# Patient Record
Sex: MALE | Race: OTHER | HISPANIC OR LATINO | URBAN - METROPOLITAN AREA
[De-identification: names, ages, dates, MRNs, and addresses within clinical notes are randomized per-mention and may not be internally consistent; named-entity substitution may affect disease eponyms.]

---

## 2020-08-27 ENCOUNTER — INPATIENT (INPATIENT)
Facility: HOSPITAL | Age: 35
LOS: 7 days | Discharge: HOME | DRG: 896 | End: 2020-09-04
Attending: STUDENT IN AN ORGANIZED HEALTH CARE EDUCATION/TRAINING PROGRAM | Admitting: FAMILY MEDICINE
Payer: MEDICAID

## 2020-08-27 VITALS
RESPIRATION RATE: 26 BRPM | TEMPERATURE: 98 F | WEIGHT: 179.9 LBS | OXYGEN SATURATION: 96 % | SYSTOLIC BLOOD PRESSURE: 139 MMHG | DIASTOLIC BLOOD PRESSURE: 91 MMHG | HEIGHT: 70 IN | HEART RATE: 120 BPM

## 2020-08-27 DIAGNOSIS — Z29.9 ENCOUNTER FOR PROPHYLACTIC MEASURES, UNSPECIFIED: ICD-10-CM

## 2020-08-27 DIAGNOSIS — E87.6 HYPOKALEMIA: ICD-10-CM

## 2020-08-27 DIAGNOSIS — E83.51 HYPOCALCEMIA: ICD-10-CM

## 2020-08-27 DIAGNOSIS — F10.929 ALCOHOL USE, UNSPECIFIED WITH INTOXICATION, UNSPECIFIED: ICD-10-CM

## 2020-08-27 DIAGNOSIS — R41.82 ALTERED MENTAL STATUS, UNSPECIFIED: ICD-10-CM

## 2020-08-27 DIAGNOSIS — E87.1 HYPO-OSMOLALITY AND HYPONATREMIA: ICD-10-CM

## 2020-08-27 DIAGNOSIS — D69.6 THROMBOCYTOPENIA, UNSPECIFIED: ICD-10-CM

## 2020-08-27 DIAGNOSIS — R56.9 UNSPECIFIED CONVULSIONS: ICD-10-CM

## 2020-08-27 DIAGNOSIS — Z98.890 OTHER SPECIFIED POSTPROCEDURAL STATES: Chronic | ICD-10-CM

## 2020-08-27 DIAGNOSIS — G93.41 METABOLIC ENCEPHALOPATHY: ICD-10-CM

## 2020-08-27 DIAGNOSIS — Z90.49 ACQUIRED ABSENCE OF OTHER SPECIFIED PARTS OF DIGESTIVE TRACT: Chronic | ICD-10-CM

## 2020-08-27 DIAGNOSIS — K72.00 ACUTE AND SUBACUTE HEPATIC FAILURE WITHOUT COMA: ICD-10-CM

## 2020-08-27 LAB
ALBUMIN SERPL ELPH-MCNC: 2.9 G/DL — LOW (ref 3.3–5)
ALP SERPL-CCNC: 233 U/L — HIGH (ref 40–120)
ALT FLD-CCNC: 373 U/L — HIGH (ref 12–78)
ANION GAP SERPL CALC-SCNC: 10 MMOL/L — SIGNIFICANT CHANGE UP (ref 5–17)
ANION GAP SERPL CALC-SCNC: 11 MMOL/L — SIGNIFICANT CHANGE UP (ref 5–17)
ANION GAP SERPL CALC-SCNC: 14 MMOL/L — SIGNIFICANT CHANGE UP (ref 5–17)
ANION GAP SERPL CALC-SCNC: 19 MMOL/L — HIGH (ref 5–17)
APAP SERPL-MCNC: <2 UG/ML — LOW (ref 10–30)
APTT BLD: 37.2 SEC — HIGH (ref 27.5–35.5)
AST SERPL-CCNC: 460 U/L — HIGH (ref 15–37)
BASOPHILS # BLD AUTO: 0 K/UL — SIGNIFICANT CHANGE UP (ref 0–0.2)
BASOPHILS NFR BLD AUTO: 0 % — SIGNIFICANT CHANGE UP (ref 0–2)
BILIRUB SERPL-MCNC: 12.8 MG/DL — HIGH (ref 0.2–1.2)
BUN SERPL-MCNC: 3 MG/DL — LOW (ref 7–23)
BUN SERPL-MCNC: 3 MG/DL — LOW (ref 7–23)
BUN SERPL-MCNC: 4 MG/DL — LOW (ref 7–23)
BUN SERPL-MCNC: 4 MG/DL — LOW (ref 7–23)
CALCIUM SERPL-MCNC: 6.5 MG/DL — CRITICAL LOW (ref 8.5–10.1)
CALCIUM SERPL-MCNC: 7.1 MG/DL — LOW (ref 8.5–10.1)
CALCIUM SERPL-MCNC: 7.3 MG/DL — LOW (ref 8.5–10.1)
CALCIUM SERPL-MCNC: 7.6 MG/DL — LOW (ref 8.5–10.1)
CHLORIDE SERPL-SCNC: 72 MMOL/L — LOW (ref 96–108)
CHLORIDE SERPL-SCNC: 83 MMOL/L — LOW (ref 96–108)
CHLORIDE SERPL-SCNC: 88 MMOL/L — LOW (ref 96–108)
CHLORIDE SERPL-SCNC: 92 MMOL/L — LOW (ref 96–108)
CO2 SERPL-SCNC: 20 MMOL/L — LOW (ref 22–31)
CO2 SERPL-SCNC: 23 MMOL/L — SIGNIFICANT CHANGE UP (ref 22–31)
CO2 SERPL-SCNC: 26 MMOL/L — SIGNIFICANT CHANGE UP (ref 22–31)
CO2 SERPL-SCNC: 27 MMOL/L — SIGNIFICANT CHANGE UP (ref 22–31)
CREAT SERPL-MCNC: 0.54 MG/DL — SIGNIFICANT CHANGE UP (ref 0.5–1.3)
CREAT SERPL-MCNC: 0.64 MG/DL — SIGNIFICANT CHANGE UP (ref 0.5–1.3)
CREAT SERPL-MCNC: 0.68 MG/DL — SIGNIFICANT CHANGE UP (ref 0.5–1.3)
CREAT SERPL-MCNC: 0.78 MG/DL — SIGNIFICANT CHANGE UP (ref 0.5–1.3)
EOSINOPHIL # BLD AUTO: 0 K/UL — SIGNIFICANT CHANGE UP (ref 0–0.5)
EOSINOPHIL NFR BLD AUTO: 0 % — SIGNIFICANT CHANGE UP (ref 0–6)
ETHANOL SERPL-MCNC: 314 MG/DL — HIGH (ref 0–10)
GLUCOSE SERPL-MCNC: 119 MG/DL — HIGH (ref 70–99)
GLUCOSE SERPL-MCNC: 132 MG/DL — HIGH (ref 70–99)
GLUCOSE SERPL-MCNC: 90 MG/DL — SIGNIFICANT CHANGE UP (ref 70–99)
GLUCOSE SERPL-MCNC: 92 MG/DL — SIGNIFICANT CHANGE UP (ref 70–99)
HCT VFR BLD CALC: 38 % — LOW (ref 39–50)
HGB BLD-MCNC: 14 G/DL — SIGNIFICANT CHANGE UP (ref 13–17)
IMM GRANULOCYTES NFR BLD AUTO: 0 % — SIGNIFICANT CHANGE UP (ref 0–1.5)
INR BLD: 1.43 RATIO — HIGH (ref 0.88–1.16)
LIDOCAIN IGE QN: 156 U/L — SIGNIFICANT CHANGE UP (ref 73–393)
LYMPHOCYTES # BLD AUTO: 1.3 K/UL — SIGNIFICANT CHANGE UP (ref 1–3.3)
LYMPHOCYTES # BLD AUTO: 28.3 % — SIGNIFICANT CHANGE UP (ref 13–44)
MAGNESIUM SERPL-MCNC: 1.8 MG/DL — SIGNIFICANT CHANGE UP (ref 1.6–2.6)
MCHC RBC-ENTMCNC: 27.7 PG — SIGNIFICANT CHANGE UP (ref 27–34)
MCHC RBC-ENTMCNC: 36.8 GM/DL — HIGH (ref 32–36)
MCV RBC AUTO: 75.2 FL — LOW (ref 80–100)
MONOCYTES # BLD AUTO: 0.35 K/UL — SIGNIFICANT CHANGE UP (ref 0–0.9)
MONOCYTES NFR BLD AUTO: 7.6 % — SIGNIFICANT CHANGE UP (ref 2–14)
NEUTROPHILS # BLD AUTO: 2.95 K/UL — SIGNIFICANT CHANGE UP (ref 1.8–7.4)
NEUTROPHILS NFR BLD AUTO: 64.1 % — SIGNIFICANT CHANGE UP (ref 43–77)
NRBC # BLD: 0 /100 WBCS — SIGNIFICANT CHANGE UP (ref 0–0)
PHOSPHATE SERPL-MCNC: 3.4 MG/DL — SIGNIFICANT CHANGE UP (ref 2.5–4.5)
PLATELET # BLD AUTO: 110 K/UL — LOW (ref 150–400)
POTASSIUM SERPL-MCNC: 2.4 MMOL/L — CRITICAL LOW (ref 3.5–5.3)
POTASSIUM SERPL-MCNC: 2.9 MMOL/L — CRITICAL LOW (ref 3.5–5.3)
POTASSIUM SERPL-MCNC: 3 MMOL/L — LOW (ref 3.5–5.3)
POTASSIUM SERPL-MCNC: 3 MMOL/L — LOW (ref 3.5–5.3)
POTASSIUM SERPL-SCNC: 2.4 MMOL/L — CRITICAL LOW (ref 3.5–5.3)
POTASSIUM SERPL-SCNC: 2.9 MMOL/L — CRITICAL LOW (ref 3.5–5.3)
POTASSIUM SERPL-SCNC: 3 MMOL/L — LOW (ref 3.5–5.3)
POTASSIUM SERPL-SCNC: 3 MMOL/L — LOW (ref 3.5–5.3)
PROT SERPL-MCNC: 6.4 G/DL — SIGNIFICANT CHANGE UP (ref 6–8.3)
PROTHROM AB SERPL-ACNC: 16.5 SEC — HIGH (ref 10.6–13.6)
RBC # BLD: 5.05 M/UL — SIGNIFICANT CHANGE UP (ref 4.2–5.8)
RBC # FLD: 16.5 % — HIGH (ref 10.3–14.5)
SALICYLATES SERPL-MCNC: <1.7 MG/DL — LOW (ref 2.8–20)
SARS-COV-2 IGG SERPL QL IA: NEGATIVE — SIGNIFICANT CHANGE UP
SARS-COV-2 IGM SERPL IA-ACNC: 5.15 AU/ML — SIGNIFICANT CHANGE UP
SARS-COV-2 RNA SPEC QL NAA+PROBE: SIGNIFICANT CHANGE UP
SODIUM SERPL-SCNC: 111 MMOL/L — CRITICAL LOW (ref 135–145)
SODIUM SERPL-SCNC: 120 MMOL/L — CRITICAL LOW (ref 135–145)
SODIUM SERPL-SCNC: 125 MMOL/L — LOW (ref 135–145)
SODIUM SERPL-SCNC: 129 MMOL/L — LOW (ref 135–145)
WBC # BLD: 4.6 K/UL — SIGNIFICANT CHANGE UP (ref 3.8–10.5)
WBC # FLD AUTO: 4.6 K/UL — SIGNIFICANT CHANGE UP (ref 3.8–10.5)

## 2020-08-27 PROCEDURE — 99291 CRITICAL CARE FIRST HOUR: CPT

## 2020-08-27 PROCEDURE — 71045 X-RAY EXAM CHEST 1 VIEW: CPT | Mod: 26

## 2020-08-27 PROCEDURE — 99223 1ST HOSP IP/OBS HIGH 75: CPT | Mod: AI

## 2020-08-27 PROCEDURE — 93010 ELECTROCARDIOGRAM REPORT: CPT

## 2020-08-27 PROCEDURE — 99285 EMERGENCY DEPT VISIT HI MDM: CPT

## 2020-08-27 PROCEDURE — 70450 CT HEAD/BRAIN W/O DYE: CPT | Mod: 26

## 2020-08-27 PROCEDURE — 76700 US EXAM ABDOM COMPLETE: CPT | Mod: 26

## 2020-08-27 RX ORDER — THIAMINE MONONITRATE (VIT B1) 100 MG
100 TABLET ORAL DAILY
Refills: 0 | Status: DISCONTINUED | OUTPATIENT
Start: 2020-08-27 | End: 2020-08-30

## 2020-08-27 RX ORDER — DESMOPRESSIN ACETATE 0.1 MG/1
2 TABLET ORAL
Refills: 0 | Status: DISCONTINUED | OUTPATIENT
Start: 2020-08-28 | End: 2020-08-28

## 2020-08-27 RX ORDER — SODIUM CHLORIDE 9 MG/ML
1000 INJECTION, SOLUTION INTRAVENOUS
Refills: 0 | Status: DISCONTINUED | OUTPATIENT
Start: 2020-08-27 | End: 2020-08-28

## 2020-08-27 RX ORDER — THIAMINE MONONITRATE (VIT B1) 100 MG
100 TABLET ORAL DAILY
Refills: 0 | Status: DISCONTINUED | OUTPATIENT
Start: 2020-08-27 | End: 2020-08-27

## 2020-08-27 RX ORDER — DEXMEDETOMIDINE HYDROCHLORIDE IN 0.9% SODIUM CHLORIDE 4 UG/ML
0.5 INJECTION INTRAVENOUS
Qty: 400 | Refills: 0 | Status: DISCONTINUED | OUTPATIENT
Start: 2020-08-27 | End: 2020-08-27

## 2020-08-27 RX ORDER — SODIUM CHLORIDE 9 MG/ML
500 INJECTION, SOLUTION INTRAVENOUS ONCE
Refills: 0 | Status: COMPLETED | OUTPATIENT
Start: 2020-08-27 | End: 2020-08-27

## 2020-08-27 RX ORDER — DEXMEDETOMIDINE HYDROCHLORIDE IN 0.9% SODIUM CHLORIDE 4 UG/ML
0.7 INJECTION INTRAVENOUS
Qty: 200 | Refills: 0 | Status: DISCONTINUED | OUTPATIENT
Start: 2020-08-27 | End: 2020-08-27

## 2020-08-27 RX ORDER — POTASSIUM CHLORIDE 20 MEQ
10 PACKET (EA) ORAL
Refills: 0 | Status: COMPLETED | OUTPATIENT
Start: 2020-08-27 | End: 2020-08-27

## 2020-08-27 RX ORDER — DESMOPRESSIN ACETATE 0.1 MG/1
2 TABLET ORAL ONCE
Refills: 0 | Status: DISCONTINUED | OUTPATIENT
Start: 2020-08-27 | End: 2020-08-27

## 2020-08-27 RX ORDER — SODIUM CHLORIDE 9 MG/ML
1000 INJECTION INTRAMUSCULAR; INTRAVENOUS; SUBCUTANEOUS
Refills: 0 | Status: DISCONTINUED | OUTPATIENT
Start: 2020-08-27 | End: 2020-08-27

## 2020-08-27 RX ORDER — DESMOPRESSIN ACETATE 0.1 MG/1
2 TABLET ORAL ONCE
Refills: 0 | Status: COMPLETED | OUTPATIENT
Start: 2020-08-27 | End: 2020-08-27

## 2020-08-27 RX ORDER — SODIUM CHLORIDE 9 MG/ML
1000 INJECTION INTRAMUSCULAR; INTRAVENOUS; SUBCUTANEOUS ONCE
Refills: 0 | Status: COMPLETED | OUTPATIENT
Start: 2020-08-27 | End: 2020-08-27

## 2020-08-27 RX ORDER — PANTOPRAZOLE SODIUM 20 MG/1
40 TABLET, DELAYED RELEASE ORAL DAILY
Refills: 0 | Status: DISCONTINUED | OUTPATIENT
Start: 2020-08-28 | End: 2020-08-31

## 2020-08-27 RX ORDER — HEPARIN SODIUM 5000 [USP'U]/ML
5000 INJECTION INTRAVENOUS; SUBCUTANEOUS EVERY 8 HOURS
Refills: 0 | Status: DISCONTINUED | OUTPATIENT
Start: 2020-08-27 | End: 2020-08-28

## 2020-08-27 RX ORDER — POTASSIUM CHLORIDE 20 MEQ
10 PACKET (EA) ORAL
Refills: 0 | Status: DISCONTINUED | OUTPATIENT
Start: 2020-08-27 | End: 2020-08-27

## 2020-08-27 RX ORDER — SODIUM CHLORIDE 9 MG/ML
1000 INJECTION, SOLUTION INTRAVENOUS
Refills: 0 | Status: COMPLETED | OUTPATIENT
Start: 2020-08-27 | End: 2020-08-27

## 2020-08-27 RX ORDER — DESMOPRESSIN ACETATE 0.1 MG/1
0.1 TABLET ORAL ONCE
Refills: 0 | Status: DISCONTINUED | OUTPATIENT
Start: 2020-08-27 | End: 2020-08-27

## 2020-08-27 RX ORDER — CALCIUM GLUCONATE 100 MG/ML
2 VIAL (ML) INTRAVENOUS ONCE
Refills: 0 | Status: COMPLETED | OUTPATIENT
Start: 2020-08-27 | End: 2020-08-27

## 2020-08-27 RX ORDER — PANTOPRAZOLE SODIUM 20 MG/1
40 TABLET, DELAYED RELEASE ORAL
Refills: 0 | Status: DISCONTINUED | OUTPATIENT
Start: 2020-08-27 | End: 2020-08-27

## 2020-08-27 RX ORDER — SODIUM CHLORIDE 9 MG/ML
1000 INJECTION, SOLUTION INTRAVENOUS
Refills: 0 | Status: DISCONTINUED | OUTPATIENT
Start: 2020-08-27 | End: 2020-08-27

## 2020-08-27 RX ORDER — DEXMEDETOMIDINE HYDROCHLORIDE IN 0.9% SODIUM CHLORIDE 4 UG/ML
0.7 INJECTION INTRAVENOUS
Qty: 200 | Refills: 0 | Status: DISCONTINUED | OUTPATIENT
Start: 2020-08-27 | End: 2020-08-28

## 2020-08-27 RX ORDER — FOLIC ACID 0.8 MG
1 TABLET ORAL DAILY
Refills: 0 | Status: DISCONTINUED | OUTPATIENT
Start: 2020-08-27 | End: 2020-08-30

## 2020-08-27 RX ORDER — POTASSIUM CHLORIDE 20 MEQ
10 PACKET (EA) ORAL ONCE
Refills: 0 | Status: COMPLETED | OUTPATIENT
Start: 2020-08-27 | End: 2020-08-27

## 2020-08-27 RX ORDER — ONDANSETRON 8 MG/1
4 TABLET, FILM COATED ORAL ONCE
Refills: 0 | Status: COMPLETED | OUTPATIENT
Start: 2020-08-27 | End: 2020-08-27

## 2020-08-27 RX ORDER — URSODIOL 250 MG/1
300 TABLET, FILM COATED ORAL THREE TIMES A DAY
Refills: 0 | Status: DISCONTINUED | OUTPATIENT
Start: 2020-08-27 | End: 2020-09-04

## 2020-08-27 RX ORDER — DESMOPRESSIN ACETATE 0.1 MG/1
1 TABLET ORAL ONCE
Refills: 0 | Status: COMPLETED | OUTPATIENT
Start: 2020-08-27 | End: 2020-08-27

## 2020-08-27 RX ORDER — DEXMEDETOMIDINE HYDROCHLORIDE IN 0.9% SODIUM CHLORIDE 4 UG/ML
0.05 INJECTION INTRAVENOUS
Qty: 200 | Refills: 0 | Status: DISCONTINUED | OUTPATIENT
Start: 2020-08-27 | End: 2020-08-27

## 2020-08-27 RX ADMIN — Medication 100 MILLIEQUIVALENT(S): at 12:53

## 2020-08-27 RX ADMIN — SODIUM CHLORIDE 1000 MILLILITER(S): 9 INJECTION INTRAMUSCULAR; INTRAVENOUS; SUBCUTANEOUS at 08:00

## 2020-08-27 RX ADMIN — Medication 100 MILLIEQUIVALENT(S): at 13:52

## 2020-08-27 RX ADMIN — Medication 100 MILLIEQUIVALENT(S): at 21:44

## 2020-08-27 RX ADMIN — Medication 100 MILLIEQUIVALENT(S): at 10:47

## 2020-08-27 RX ADMIN — Medication 2 MILLIGRAM(S): at 07:45

## 2020-08-27 RX ADMIN — SODIUM CHLORIDE 125 MILLILITER(S): 9 INJECTION INTRAMUSCULAR; INTRAVENOUS; SUBCUTANEOUS at 10:03

## 2020-08-27 RX ADMIN — Medication 2 MILLIGRAM(S): at 08:20

## 2020-08-27 RX ADMIN — Medication 100 MILLIGRAM(S): at 12:21

## 2020-08-27 RX ADMIN — Medication 200 GRAM(S): at 13:35

## 2020-08-27 RX ADMIN — DEXMEDETOMIDINE HYDROCHLORIDE IN 0.9% SODIUM CHLORIDE 18.2 MICROGRAM(S)/KG/HR: 4 INJECTION INTRAVENOUS at 22:19

## 2020-08-27 RX ADMIN — SODIUM CHLORIDE 1000 MILLILITER(S): 9 INJECTION, SOLUTION INTRAVENOUS at 14:40

## 2020-08-27 RX ADMIN — DESMOPRESSIN ACETATE 1 MICROGRAM(S): 0.1 TABLET ORAL at 13:30

## 2020-08-27 RX ADMIN — SODIUM CHLORIDE 30 MILLILITER(S): 9 INJECTION, SOLUTION INTRAVENOUS at 12:52

## 2020-08-27 RX ADMIN — URSODIOL 300 MILLIGRAM(S): 250 TABLET, FILM COATED ORAL at 21:32

## 2020-08-27 RX ADMIN — Medication 100 MILLIEQUIVALENT(S): at 14:30

## 2020-08-27 RX ADMIN — Medication 100 MILLIEQUIVALENT(S): at 11:53

## 2020-08-27 RX ADMIN — Medication 2 MILLIGRAM(S): at 11:54

## 2020-08-27 RX ADMIN — HEPARIN SODIUM 5000 UNIT(S): 5000 INJECTION INTRAVENOUS; SUBCUTANEOUS at 21:32

## 2020-08-27 RX ADMIN — DESMOPRESSIN ACETATE 2 MICROGRAM(S): 0.1 TABLET ORAL at 21:32

## 2020-08-27 RX ADMIN — Medication 100 MILLIEQUIVALENT(S): at 09:02

## 2020-08-27 RX ADMIN — SODIUM CHLORIDE 1000 MILLILITER(S): 9 INJECTION, SOLUTION INTRAVENOUS at 21:58

## 2020-08-27 RX ADMIN — DEXMEDETOMIDINE HYDROCHLORIDE IN 0.9% SODIUM CHLORIDE 18.2 MICROGRAM(S)/KG/HR: 4 INJECTION INTRAVENOUS at 21:34

## 2020-08-27 RX ADMIN — ONDANSETRON 4 MILLIGRAM(S): 8 TABLET, FILM COATED ORAL at 23:14

## 2020-08-27 RX ADMIN — Medication 1 MILLIGRAM(S): at 17:59

## 2020-08-27 RX ADMIN — HEPARIN SODIUM 5000 UNIT(S): 5000 INJECTION INTRAVENOUS; SUBCUTANEOUS at 11:54

## 2020-08-27 RX ADMIN — SODIUM CHLORIDE 1000 MILLILITER(S): 9 INJECTION INTRAMUSCULAR; INTRAVENOUS; SUBCUTANEOUS at 07:00

## 2020-08-27 RX ADMIN — SODIUM CHLORIDE 250 MILLILITER(S): 9 INJECTION, SOLUTION INTRAVENOUS at 21:35

## 2020-08-27 RX ADMIN — SODIUM CHLORIDE 50 MILLILITER(S): 9 INJECTION, SOLUTION INTRAVENOUS at 16:00

## 2020-08-27 NOTE — ED PROVIDER NOTE - PHYSICAL EXAMINATION
Gen: intoxicated  Head/eyes: NC/AT, pupils sluggish, +scleral icterus  ENT: airway patent  Neck: supple, no tenderness/meningismus/JVD, Trachea midline  Pulm/lung: Bilateral clear BS, normal resp effort, no wheeze/stridor/retractions  CV/heart: RRR, no M/R/G, +2 dist pulses (radial, pedal DP/PT, popliteal)  GI/Abd: soft, NT/ND, +BS, no guarding/rebound tenderness  Musculoskeletal: no edema/erythema/cyanosis, FROM in all extremities, no C/T/L spine ttp  Skin: no rash, no vesicles, no petechaie, no ecchymosis, no swelling  Neuro: intoxicated

## 2020-08-27 NOTE — PROGRESS NOTE ADULT - SUBJECTIVE AND OBJECTIVE BOX
HISTORY  35y Male presenting to [place] ED with Severe hyponatremia, altered mental status/encephalopathy and possible seizure overnight. He is a 12 beer/day drinker. Received 2 mg Ativan, 1L fluid bolus (infusing on arrival). ETOH > 300. Amharic speaking male, not following commands to bedside nursing interpretation. Noncompliant to exam.     SUBJECTIVE/ROS:  [ ] A ten-point review of systems was otherwise negative except as noted.  [x ] Due to altered mental status/intubation, subjective information were not able to be obtained from the patient. History was obtained, to the extent possible, from review of the chart and collateral sources of information.    PAST MEDICAL & SURGICAL HISTORY:  ETOH    Allergies    No Known Allergies    Intolerances        NEURO  RASS: +2  Exam:  agitated, not following commands. He is not oriented    Meds: LORazepam   Injectable 2 milliGRAM(s) IV Push once    [x] Adequacy of sedation and pain control has been assessed and adjusted      RESPIRATORY  RR: 26 (08-27-20 @ 06:47) (26 - 26)  SpO2: 96% (08-27-20 @ 06:47) (96% - 96%)  Wt(kg): --  Exam: unlabored, clear to auscultation bilaterally  Mechanical Ventilation: NA    [ NA] Extubation Readiness Assessed  Meds:       CARDIOVASCULAR  HR: 120 (08-27-20 @ 06:47) (120 - 120)  BP: 139/91 (08-27-20 @ 06:47) (139/91 - 139/91)  BP(mean): --  ABP: --  ABP(mean): --  Wt(kg): --  CVP(cm H2O): --      Exam:  Cardiac Rhythm: Sinus tachycardia 100s, S1S2  Perfusion     [x ]Adequate   [ ]Inadequate  Mentation   [ ]Normal       [x ]Reduced  Extremities  [ x]Warm         [ ]Cool  Volume Status [ ]Hypervolemic [ ]Euvolemic [ x]Hypovolemic  Meds:       GI/NUTRITION  Exam: soft nt nd, obese  Diet: NPO  Meds:     GENITOURINARY  I&O's Detail    Weight (kg): 81.6 (08-27 @ 06:55)  08-27    111<LL>  |  72<L>  |  3<L>  ----------------------------<  132<H>  2.9<LL>   |  20<L>  |  0.78    Ca    7.1<L>      27 Aug 2020 07:01    TPro  6.4  /  Alb  2.9<L>  /  TBili  12.8<H>  /  DBili  x   /  AST  460<H>  /  ALT  373<H>  /  AlkPhos  233<H>  08-27    [ ] Heredia catheter, indication: N/A  Meds: sodium chloride 0.9% Bolus 1000 milliLiter(s) IV Bolus once        HEMATOLOGIC  Meds:   [x] VTE Prophylaxis                        14.0   4.60  )-----------( 110      ( 27 Aug 2020 07:01 )             38.0       Transfusion     [ ] PRBC   [ ] Platelets   [ ] FFP   [ ] Cryoprecipitate      INFECTIOUS DISEASES  T(C): 36.7 (08-27-20 @ 06:47), Max: 36.7 (08-27-20 @ 06:47)  Wt(kg): --  WBC Count: 4.60 K/uL (08-27 @ 07:01)    Recent Cultures:    Meds:       ENDOCRINE  Capillary Blood Glucose  None    Meds:       ACCESS DEVICES:  [x ] Peripheral IV  [ ] Central Venous Line	[ ] R	[ ] L	[ ] IJ	[ ] Fem	[ ] SC	Placed:   [ ] Arterial Line		[ ] R	[ ] L	[ ] Fem	[ ] Rad	[ ] Ax	Placed:   [ ] PICC:					[ ] Mediport  [ ] Urinary Catheter, Date Placed:   [ ] Necessity of urinary, arterial, and venous catheters discussed    OTHER MEDICATIONS:      CODE STATUS: Full    IMAGING: Pending CT Head/CXR

## 2020-08-27 NOTE — PROGRESS NOTE ADULT - SUBJECTIVE AND OBJECTIVE BOX
Significant recent/past 24 hr events:  - DDAVP x1 with D5W infusion for rapid rise in serum sodium  - Precedex infusion restarted 2/2 increased agitation.    Subjective:    Review of Systems         Unable to obtain due to altered mental status in setting of severe hyponatremia.          Patient is a 35y old  Male who presents with a chief complaint of Seizure (27 Aug 2020 14:27)    HPI:  History obtained from patients partner, Leydig  36yo male with PMHx of ETOH abuse brought into ED this morning due to concern for "convulsion". States that about 6 am she noticed he was shaking a lot, and had blood in his mouth because he had bitten his tongue. Episode lasted about 2 mins. They were both lying in bed, and she noticed he was shaking next to her and as she turned around, she saw him having the shaking episode. He was unresponsive, an she called other household members who helped carry him out of the room and brought him in by car. His skin was very cold during this process. She states patient has no previous seizure history. The night prior he seemed weak/tired, had complained of some abdominal pain, and she noticed his skin was yellow. Last night, he had bowel and bladder incontinence.   She states he has been drinking ETOH excessively for about 1 month. Drinks beer (Corona) daily; up to 12 beers/day. Last drink was at 3am 08/27/2020. He had taken some medications last night for sleep; tylenol, peptobismol.   Prior to this, he had stopped drinking for about 2 years. According to partner, patient has no hx of drug use or cigarette smoking. (27 Aug 2020 09:08)    PAST MEDICAL & SURGICAL HISTORY:  No pertinent past medical history  S/P appendectomy  H/O hernia repair    FAMILY HISTORY:  Family history of renal disease: Uncle      Vitals   ICU Vital Signs Last 24 Hrs  T(C): 37.8 (27 Aug 2020 20:32), Max: 37.8 (27 Aug 2020 20:32)  T(F): 100.1 (27 Aug 2020 20:32), Max: 100.1 (27 Aug 2020 20:32)  HR: 91 (27 Aug 2020 22:00) (81 - 126)  BP: 118/71 (27 Aug 2020 22:00) (91/51 - 140/84)  BP(mean): 88 (27 Aug 2020 22:00) (64 - 105)  ABP: --  ABP(mean): --  RR: 25 (27 Aug 2020 22:00) (17 - 29)  SpO2: 95% (27 Aug 2020 22:00) (93% - 100%)      Physical Exam:   Constitutional: NAD, well-groomed, well-developed  HEENT: PERRLA, EOMI, no drainage or redness  Neck: supple,  No JVD, Trachea midline  Back: Normal spine flexure, No CVA tenderness, No deformity or limitation of movement  Respiratory: Breath Sounds equal & clear bilaterally to auscultation, no accessory muscle use noted  Cardiovascular: Regular rate, regular rhythm, normal S1, S2; no murmurs or rub  Gastrointestinal: Soft, non-tender, non distended, no hepatosplenomegaly, normal bowel sounds  Extremities: JOHNSON x 4, no peripheral edema, no cyanosis, no clubbing   Vascular: Equal and normal pulses: 2+ peripheral pulses throughout  Neurological: A+O x 3; speech clear and intact; no sensory, motor  deficits, normal reflexes  Psychiatric: calm, normal mood, normal affect  Musculoskeletal: No joint swelling or deformity; no limitation of movement  Skin: warm, dry, well perfused, no rashes    VENT SETTINGS         I&O's Detail    27 Aug 2020 07:01  -  27 Aug 2020 22:35  --------------------------------------------------------  IN:    dexmedetomidine Infusion: 26 mL    dextrose 5%.: 420 mL    dextrose 5%.: 500 mL    Lactated Ringers IV Bolus: 500 mL    sodium chloride 0.9%: 375 mL    Solution: 100 mL    Solution: 600 mL    Solution: 1000 mL  Total IN: 3521 mL    OUT:    Indwelling Catheter - Urethral: 6447 mL  Total OUT: 6447 mL    Total NET: -2926 mL          LABS                        14.0   4.60  )-----------( 110      ( 27 Aug 2020 07:01 )             38.0     08-27    129<L>  |  92<L>  |  4<L>  ----------------------------<  90  3.0<L>   |  23  |  0.68    Ca    7.3<L>      27 Aug 2020 20:47  Phos  3.4     08-27  Mg     1.8     08-27    TPro  5.7<L>  /  Alb  2.6<L>  /  TBili  11.5<H>  /  DBili  8.90<H>  /  AST  442<H>  /  ALT  328<H>  /  AlkPhos  197<H>  08-27    LIVER FUNCTIONS - ( 27 Aug 2020 10:05 )  Alb: 2.6 g/dL / Pro: 5.7 g/dL / ALK PHOS: 197 U/L / ALT: 328 U/L / AST: 442 U/L / GGT: x           PT/INR - ( 27 Aug 2020 10:05 )   PT: 16.5 sec;   INR: 1.43 ratio         PTT - ( 27 Aug 2020 10:05 )  PTT:37.2 sec                MEDICATIONS  (STANDING):  dexMEDEtomidine Infusion 0.7 MICROgram(s)/kG/Hr (18.2 mL/Hr) IV Continuous <Continuous>  dextrose 5%. 1000 milliLiter(s) (250 mL/Hr) IV Continuous <Continuous>  folic acid Injectable 1 milliGRAM(s) IV Push daily  heparin   Injectable 5000 Unit(s) SubCutaneous every 8 hours  multivitamin 1 Tablet(s) Oral daily  thiamine Injectable 100 milliGRAM(s) IV Push daily  ursodiol Capsule 300 milliGRAM(s) Oral three times a day    MEDICATIONS  (PRN):  LORazepam   Injectable 2 milliGRAM(s) IV Push every 2 hours PRN CIWA-Ar score increase by 2 points and a total score of 7 or less  LORazepam   Injectable 2 milliGRAM(s) IV Push every 1 hour PRN CIWA-Ar score 8 or greater      Allergies:  No Known Allergies        CRITICAL CARE TIME SPENT:  (Assessing presenting problems of acute illness, which pose high probability of life threatening deterioration or end organ damage/dysfunction, as well as medical decision making including initiating plan of care, reviewing data, reviewing radiologic exams, discussing with multidisciplinary team,  discussing goals of care with patient/family, and writing this note.  Non-inclusive of procedures performed) Significant recent/past 24 hr events:  - DDAVP x1 with D5W infusion for rapid rise in serum sodium.  With large amount of UOP over the past few hours.  - Precedex infusion restarted 2/2 increased agitation.    Subjective:    Review of Systems         Unable to obtain due to altered mental status in setting of severe hyponatremia.          Patient is a 35y old  Male who presents with a chief complaint of Seizure (27 Aug 2020 14:27)    HPI:  History obtained from patients partner, Leydig  36yo male with PMHx of ETOH abuse brought into ED this morning due to concern for "convulsion". States that about 6 am she noticed he was shaking a lot, and had blood in his mouth because he had bitten his tongue. Episode lasted about 2 mins. They were both lying in bed, and she noticed he was shaking next to her and as she turned around, she saw him having the shaking episode. He was unresponsive, an she called other household members who helped carry him out of the room and brought him in by car. His skin was very cold during this process. She states patient has no previous seizure history. The night prior he seemed weak/tired, had complained of some abdominal pain, and she noticed his skin was yellow. Last night, he had bowel and bladder incontinence.   She states he has been drinking ETOH excessively for about 1 month. Drinks beer (Corona) daily; up to 12 beers/day. Last drink was at 3am 08/27/2020. He had taken some medications last night for sleep; tylenol, peptobismol.   Prior to this, he had stopped drinking for about 2 years. According to partner, patient has no hx of drug use or cigarette smoking. (27 Aug 2020 09:08)    PAST MEDICAL & SURGICAL HISTORY:  No pertinent past medical history  S/P appendectomy  H/O hernia repair    FAMILY HISTORY:  Family history of renal disease: Uncle      Vitals   ICU Vital Signs Last 24 Hrs  T(C): 37.8 (27 Aug 2020 20:32), Max: 37.8 (27 Aug 2020 20:32)  T(F): 100.1 (27 Aug 2020 20:32), Max: 100.1 (27 Aug 2020 20:32)  HR: 91 (27 Aug 2020 22:00) (81 - 126)  BP: 118/71 (27 Aug 2020 22:00) (91/51 - 140/84)  BP(mean): 88 (27 Aug 2020 22:00) (64 - 105)  ABP: --  ABP(mean): --  RR: 25 (27 Aug 2020 22:00) (17 - 29)  SpO2: 95% (27 Aug 2020 22:00) (93% - 100%)      Physical Exam:   Constitutional: NAD, well-groomed, well-developed  HEENT: PERRLA, EOMI, no drainage or redness  Neck: supple,  No JVD, Trachea midline  Respiratory: Breath Sounds equal & clear bilaterally to auscultation, no accessory muscle use noted  Cardiovascular: Regular rate, regular rhythm, normal S1, S2; no murmurs or rub  Gastrointestinal: Soft, non-tender, non distended, no hepatosplenomegaly, normal bowel sounds  Extremities: JOHNSON x 4, no peripheral edema, no cyanosis, no clubbing   Vascular: Equal and normal pulses: 2+ peripheral pulses throughout  Neurological: Exam limited by agitation.  Not redirectable but exam non focal  Psychiatric: agitated  Musculoskeletal: No joint swelling or deformity; no limitation of movement  Skin: warm, dry, well perfused, no rashes    VENT SETTINGS         I&O's Detail    27 Aug 2020 07:01  -  27 Aug 2020 22:35  --------------------------------------------------------  IN:    dexmedetomidine Infusion: 26 mL    dextrose 5%.: 420 mL    dextrose 5%.: 500 mL    Lactated Ringers IV Bolus: 500 mL    sodium chloride 0.9%: 375 mL    Solution: 100 mL    Solution: 600 mL    Solution: 1000 mL  Total IN: 3521 mL    OUT:    Indwelling Catheter - Urethral: 6447 mL  Total OUT: 6447 mL    Total NET: -2926 mL          LABS                        14.0   4.60  )-----------( 110      ( 27 Aug 2020 07:01 )             38.0     08-27    129<L>  |  92<L>  |  4<L>  ----------------------------<  90  3.0<L>   |  23  |  0.68    Ca    7.3<L>      27 Aug 2020 20:47  Phos  3.4     08-27  Mg     1.8     08-27    TPro  5.7<L>  /  Alb  2.6<L>  /  TBili  11.5<H>  /  DBili  8.90<H>  /  AST  442<H>  /  ALT  328<H>  /  AlkPhos  197<H>  08-27    LIVER FUNCTIONS - ( 27 Aug 2020 10:05 )  Alb: 2.6 g/dL / Pro: 5.7 g/dL / ALK PHOS: 197 U/L / ALT: 328 U/L / AST: 442 U/L / GGT: x           PT/INR - ( 27 Aug 2020 10:05 )   PT: 16.5 sec;   INR: 1.43 ratio         PTT - ( 27 Aug 2020 10:05 )  PTT:37.2 sec                MEDICATIONS  (STANDING):  dexMEDEtomidine Infusion 0.7 MICROgram(s)/kG/Hr (18.2 mL/Hr) IV Continuous <Continuous>  dextrose 5%. 1000 milliLiter(s) (250 mL/Hr) IV Continuous <Continuous>  folic acid Injectable 1 milliGRAM(s) IV Push daily  heparin   Injectable 5000 Unit(s) SubCutaneous every 8 hours  multivitamin 1 Tablet(s) Oral daily  thiamine Injectable 100 milliGRAM(s) IV Push daily  ursodiol Capsule 300 milliGRAM(s) Oral three times a day    MEDICATIONS  (PRN):  LORazepam   Injectable 2 milliGRAM(s) IV Push every 2 hours PRN CIWA-Ar score increase by 2 points and a total score of 7 or less  LORazepam   Injectable 2 milliGRAM(s) IV Push every 1 hour PRN CIWA-Ar score 8 or greater      Allergies:  No Known Allergies        CRITICAL CARE TIME SPENT: 60 min  (Assessing presenting problems of acute illness, which pose high probability of life threatening deterioration or end organ damage/dysfunction, as well as medical decision making including initiating plan of care, reviewing data, reviewing radiologic exams, discussing with multidisciplinary team,  discussing goals of care with patient/family, and writing this note.  Non-inclusive of procedures performed) Significant recent/past 24 hr events:  - DDAVP x1 with D5W infusion for rapid rise in serum sodium.  With large amount of UOP over the past few hours.  - Precedex infusion restarted 2/2 increased agitation.    Subjective:    Review of Systems         Unable to obtain due to altered mental status in setting of severe hyponatremia.          Patient is a 35y old  Male who presents with a chief complaint of Seizure (27 Aug 2020 14:27)    HPI:  History obtained from patients partner, Leydig  34yo male with PMHx of ETOH abuse brought into ED this morning due to concern for "convulsion". States that about 6 am she noticed he was shaking a lot, and had blood in his mouth because he had bitten his tongue. Episode lasted about 2 mins. They were both lying in bed, and she noticed he was shaking next to her and as she turned around, she saw him having the shaking episode. He was unresponsive, an she called other household members who helped carry him out of the room and brought him in by car. His skin was very cold during this process. She states patient has no previous seizure history. The night prior he seemed weak/tired, had complained of some abdominal pain, and she noticed his skin was yellow. Last night, he had bowel and bladder incontinence.   She states he has been drinking ETOH excessively for about 1 month. Drinks beer (Corona) daily; up to 12 beers/day. Last drink was at 3am 08/27/2020. He had taken some medications last night for sleep; tylenol, peptobismol.   Prior to this, he had stopped drinking for about 2 years. According to partner, patient has no hx of drug use or cigarette smoking. (27 Aug 2020 09:08)    PAST MEDICAL & SURGICAL HISTORY:  No pertinent past medical history  S/P appendectomy  H/O hernia repair    FAMILY HISTORY:  Family history of renal disease: Uncle      Vitals   ICU Vital Signs Last 24 Hrs  T(C): 37.8 (27 Aug 2020 20:32), Max: 37.8 (27 Aug 2020 20:32)  T(F): 100.1 (27 Aug 2020 20:32), Max: 100.1 (27 Aug 2020 20:32)  HR: 91 (27 Aug 2020 22:00) (81 - 126)  BP: 118/71 (27 Aug 2020 22:00) (91/51 - 140/84)  BP(mean): 88 (27 Aug 2020 22:00) (64 - 105)  ABP: --  ABP(mean): --  RR: 25 (27 Aug 2020 22:00) (17 - 29)  SpO2: 95% (27 Aug 2020 22:00) (93% - 100%)      Physical Exam:   Constitutional: NAD, well-groomed, well-developed  HEENT: PERRLA, EOMI, no drainage or redness  Neck: supple,  No JVD, Trachea midline  Respiratory: Breath Sounds equal & clear bilaterally to auscultation, no accessory muscle use noted  Cardiovascular: Regular rate, regular rhythm, normal S1, S2; no murmurs or rub  Gastrointestinal: Soft, non-tender, non distended, no hepatosplenomegaly, normal bowel sounds  Extremities: JOHNSON x 4, no peripheral edema, no cyanosis, no clubbing   Vascular: Equal and normal pulses: 2+ peripheral pulses throughout  Neurological: Exam limited by agitation.  Not redirectable but exam non focal  Psychiatric: agitated  Musculoskeletal: No joint swelling or deformity; no limitation of movement  Skin: warm, dry, well perfused, no rashes    VENT SETTINGS         I&O's Detail    27 Aug 2020 07:01  -  27 Aug 2020 22:35  --------------------------------------------------------  IN:    dexmedetomidine Infusion: 26 mL    dextrose 5%.: 420 mL    dextrose 5%.: 500 mL    Lactated Ringers IV Bolus: 500 mL    sodium chloride 0.9%: 375 mL    Solution: 100 mL    Solution: 600 mL    Solution: 1000 mL  Total IN: 3521 mL    OUT:    Indwelling Catheter - Urethral: 6447 mL  Total OUT: 6447 mL    Total NET: -2926 mL          LABS                        14.0   4.60  )-----------( 110      ( 27 Aug 2020 07:01 )             38.0     08-27    129<L>  |  92<L>  |  4<L>  ----------------------------<  90  3.0<L>   |  23  |  0.68    Ca    7.3<L>      27 Aug 2020 20:47  Phos  3.4     08-27  Mg     1.8     08-27    TPro  5.7<L>  /  Alb  2.6<L>  /  TBili  11.5<H>  /  DBili  8.90<H>  /  AST  442<H>  /  ALT  328<H>  /  AlkPhos  197<H>  08-27    LIVER FUNCTIONS - ( 27 Aug 2020 10:05 )  Alb: 2.6 g/dL / Pro: 5.7 g/dL / ALK PHOS: 197 U/L / ALT: 328 U/L / AST: 442 U/L / GGT: x           PT/INR - ( 27 Aug 2020 10:05 )   PT: 16.5 sec;   INR: 1.43 ratio         PTT - ( 27 Aug 2020 10:05 )  PTT:37.2 sec                MEDICATIONS  (STANDING):  dexMEDEtomidine Infusion 0.7 MICROgram(s)/kG/Hr (18.2 mL/Hr) IV Continuous <Continuous>  dextrose 5%. 1000 milliLiter(s) (250 mL/Hr) IV Continuous <Continuous>  folic acid Injectable 1 milliGRAM(s) IV Push daily  heparin   Injectable 5000 Unit(s) SubCutaneous every 8 hours  multivitamin 1 Tablet(s) Oral daily  thiamine Injectable 100 milliGRAM(s) IV Push daily  ursodiol Capsule 300 milliGRAM(s) Oral three times a day    MEDICATIONS  (PRN):  LORazepam   Injectable 2 milliGRAM(s) IV Push every 2 hours PRN CIWA-Ar score increase by 2 points and a total score of 7 or less  LORazepam   Injectable 2 milliGRAM(s) IV Push every 1 hour PRN CIWA-Ar score 8 or greater      Allergies:  No Known Allergies

## 2020-08-27 NOTE — CONSULT NOTE ADULT - ASSESSMENT
1.	Hyponatremia: Beer potomania, Increased ADH state  2.	Hypokalemia  3.	Alcohol abuse  4.	Seizure    Improving sodium levels. Degree of correction maybe appropriate at this time as pt is presumed to have symptomatic hyponatremia. Will d/c IVF.   Follow sodium trend. Seizure precautions and treatment. Potassium supps. Will follow electrolytes and renal function trend. ICU management.   Further recommendations pending clinical course. Thank you for the courtesy of this referral.

## 2020-08-27 NOTE — PROGRESS NOTE ADULT - ASSESSMENT
36yo male with PMHx of ETOH abuse brought in by family after witnessed seizure like activity x 2 mins, admitted for seizure 2/2 severe hyponatremia, metabolic encephalopathy 2/2 ETOH intoxication and hyponatremia.     #Neuro  - Seizure like activity upon presentation  - Sodium 111 initially but with heavy urine output throughout the course of the day  - Agitated 2/2 etoh withdrawal; blood alcohol level 314  - CIWA protocol with lorazepam  - If unmanageable with CIWA protocol; will utilize dexmedetomidine  - c/w thiamine and folic acid     #CV  - No active issues  - POCUS grossly normal.  No evidence of pericardial effusion.  RV<LV. IVC indeterminate. Grossly normal LV function  - Hemodynamically stable  - Continue to monitor    #Pulm  - No active issues  - POCUS A-line predominant.  No consolidations or pleural effusions.  - Saturating % on room air    #GI  - Transaminitis 2/2 alcohol hepatitis  - GI following  - ursodiol  - PUD prophylaxis with protonix    #Renal/Metabolic  - indwelling squires catheter in place  - Strict I&Os  - Large output  - DDAVP 2mcg q6h x 24 hours   - Sodium initially 111 now 129; UOP fluctuating  - D5W @ 250/hour; D5W 1L x 1  - Continue to monitor serum sodiums q4h  - Continue to assess mental status     #Heme  - no active issues    #Dispo  - Critically ill 36yo male with PMHx of ETOH abuse brought in by family after witnessed seizure like activity x 2 mins, admitted for seizure 2/2 severe hyponatremia, metabolic encephalopathy 2/2 ETOH intoxication and hyponatremia.     #Neuro  - Seizure like activity upon presentation  - Sodium 111 initially but with heavy urine output throughout the course of the day  - Agitated 2/2 etoh withdrawal; blood alcohol level 314  - CIWA protocol with lorazepam  - If unmanageable with CIWA protocol; will utilize dexmedetomidine  - c/w thiamine and folic acid     #CV  - No active issues  - POCUS grossly normal.  No evidence of pericardial effusion.  RV<LV. IVC indeterminate. Grossly normal LV function  - Hemodynamically stable  - Continue to monitor    #Pulm  - No active issues  - POCUS A-line predominant.  No consolidations or pleural effusions.  - Saturating % on room air    #GI  - Transaminitis 2/2 alcohol hepatitis  - GI following  - ursodiol  - PUD prophylaxis with protonix    #Renal/Metabolic  - indwelling squires catheter in place  - Strict I&Os  - Large output  - DDAVP 2mcg q6h x 24 hours   - Sodium initially 111 now 129; UOP fluctuating  - D5W @ 250/hour; D5W 1L x 1  - Continue to monitor serum sodiums q4h  - Continue to assess mental status     #Heme  - no active issues    #Dispo  - Critically ill     CRITICAL CARE TIME SPENT: 60 min  (Assessing presenting problems of acute illness, which pose high probability of life threatening deterioration or end organ damage/dysfunction, as well as medical decision making including initiating plan of care, reviewing data, reviewing radiologic exams, discussing with multidisciplinary team,  discussing goals of care with patient/family, and writing this note.  Non-inclusive of procedures performed) 36yo male with PMHx of ETOH abuse brought in by family after witnessed seizure like activity x 2 mins, admitted for seizure 2/2 severe hyponatremia, metabolic encephalopathy 2/2 ETOH intoxication and hyponatremia.     #Neuro  - Seizure like activity upon presentation  - Sodium 111 initially but with heavy urine output throughout the course of the day  - Agitated 2/2 etoh withdrawal; blood alcohol level 314  - CIWA protocol with lorazepam  - If unmanageable with CIWA protocol; will utilize dexmedetomidine  - c/w thiamine and folic acid     #CV  - No active issues  - POCUS grossly normal.  No evidence of pericardial effusion.  RV<LV. IVC indeterminate. Grossly normal LV function  - Hemodynamically stable  - Continue to monitor    #Pulm  - No active issues  - POCUS A-line predominant.  No consolidations or pleural effusions.  - Saturating % on room air    #GI  - Transaminitis 2/2 alcohol hepatitis  - GI following  - ursodiol  - PUD prophylaxis with protonix    #Renal/Metabolic  - indwelling squires catheter in place  - Strict I&Os  - Large output  - DDAVP 2mcg q6h x 24 hours   - Sodium initially 111 now 129; UOP fluctuating  - D5W @ 250/hour; D5W 1L x 1  - Continue to monitor serum sodiums q4h  - Continue to assess mental status     #Heme  - no active issues    #Dispo  - Critically ill   - Case discussed and plan formulated with EICU attending Dr. Glasgow.    CRITICAL CARE TIME SPENT: 60 min  (Assessing presenting problems of acute illness, which pose high probability of life threatening deterioration or end organ damage/dysfunction, as well as medical decision making including initiating plan of care, reviewing data, reviewing radiologic exams, discussing with multidisciplinary team,  discussing goals of care with patient/family, and writing this note.  Non-inclusive of procedures performed)

## 2020-08-27 NOTE — H&P ADULT - NSHPPHYSICALEXAM_GEN_ALL_CORE
Vital Signs Last 24 Hrs  T(C): 36.7 (27 Aug 2020 06:47), Max: 36.7 (27 Aug 2020 06:47)  T(F): 98 (27 Aug 2020 06:47), Max: 98 (27 Aug 2020 06:47)  HR: 126 (27 Aug 2020 09:09) (103 - 126)  BP: 140/72 (27 Aug 2020 09:09) (133/89 - 140/72)  BP(mean): --  RR: 17 (27 Aug 2020 09:09) (17 - 26)  SpO2: 96% (27 Aug 2020 09:09) (95% - 96%)

## 2020-08-27 NOTE — H&P ADULT - PROBLEM SELECTOR PLAN 9
- Heparin    IMPROVE VTE Individual Risk Assessment  RISK                                                                Points  [  ] Previous VTE                                                  3  [  ] Thrombophilia                                               2  [  ] Lower limb paralysis                                      2        (unable to hold up >15 seconds)    [  ] Current Cancer                                              2         (within 6 months)  [X ] Immobilization > 24 hrs                                1  [X ] ICU/CCU stay > 24 hours                              1  [  ] Age > 60                                                      1  IMPROVE VTE Score ___2___    IMPROVE Score 0-1: Low Risk, No VTE prophylaxis required for most patients, encourage ambulation.   IMPROVE Score 2-3: At risk, pharmacologic VTE prophylaxis is indicated for most patients (in the absence of a contraindication)  IMPROVE Score > or = 4: High Risk, pharmacologic VTE prophylaxis is indicated for most patients (in the absence of a contraindication)

## 2020-08-27 NOTE — H&P ADULT - PROBLEM SELECTOR PLAN 5
- Potassium of 2.9 on admission  - Kcl 10 x  3 bags ordered in ED  - F/u repeat CMP - Last drink 3am 8/27/2020  - ETOH of 314on admission  - [facility] protocol  - F/u b12/b9 levels - Pt with transaminitis, hyperbilirubinemia, likely acute liver failure  - F/u PT/INR, hepatitis panel  - F/u abd ultrasound  - Avoid hepatotoxic drugs  - GI consulted per ED, Dr Torres

## 2020-08-27 NOTE — ED PROVIDER NOTE - OBJECTIVE STATEMENT
36 yo male hx of etoh abuse, here with wife, c/o episode of convulsion/seizure while sleeping tonight, wife states he drinks 12 beers a day for past month.

## 2020-08-27 NOTE — ED ADULT TRIAGE NOTE - CHIEF COMPLAINT QUOTE
Patient brought in by family from home as reported has been drinking alcohol since last night had 12 beer last night was having diarrhea and took tylenol and imodium as per wife she noticed  was seizing and blood noted in his tongue he fell yesterday

## 2020-08-27 NOTE — H&P ADULT - PROBLEM SELECTOR PLAN 3
- Pt with Na of 114 on admission and report of seizure like activity per family  - Nephro consulted in ED (Dr Silva)  - NS bolus given, repeat BMP (per nephro, will re-eval after bolus to determine if hypertonic saline indicated)  - F/u urine studies  - Serial BMPs - Admitted to icu, monitor on tele  - S/p ativan 2mg x 2  - Seizure precautions  - Ativan prn for seizure activity

## 2020-08-27 NOTE — ED PROVIDER NOTE - PROGRESS NOTE DETAILS
Pt agitated , was unable to get CT scan. Dw ICU - will see pt, agree with ativan now. Dw pts wife at length re seriousness of pts condition and need for admission to ICU. Pt with much agitation - given total 4mg - now sedate - CT performed. Dw Dr Abraham - accepts admit. Pt was seen and accepted by ICU.

## 2020-08-27 NOTE — H&P ADULT - PROBLEM SELECTOR PLAN 2
- Multifactorial; liver failure, hyponatremia, alcohol intoxication  - Admitted to ICU/tele monitoring  - Head CT neg for acute bleed  - F/u ammonia levels, Urine drug screen - Pt with Na of 114 on admission and report of seizure like activity per family  - Nephro consulted in ED (Dr Silva)  - NS bolus given, repeat BMP (per nephro, will re-eval after bolus to determine if hypertonic saline indicated)  - F/u urine studies  - Serial BMPs

## 2020-08-27 NOTE — CONSULT NOTE ADULT - SUBJECTIVE AND OBJECTIVE BOX
Patient is a 35y old  Male who presents with a chief complaint of Seizure (27 Aug 2020 09:08)    HPI:  History obtained from patients partner, Leydig  36yo male with PMHx of ETOH abuse brought into ED this morning due to concern for "convulsion". States that about 6 am she noticed he was shaking a lot, and had blood in his mouth because he had bitten his tongue. Episode lasted about 2 mins. They were both lying in bed, and she noticed he was shaking next to her and as she turned around, she saw him having the shaking episode. He was unresponsive, an she called other household members who helped carry him out of the room and brought him in by car. His skin was very cold during this process. She states patient has no previous seizure history. The night prior he seemed weak/tired, had complained of some abdominal pain, and she noticed his skin was yellow. Last night, he had bowel and bladder incontinence.   She states he has been drinking ETOH excessively for about 1 month. Drinks beer ([place]) daily; up to 12 beers/day. Last drink was at 3am 08/27/2020. He had taken some medications last night for sleep; tylenol, peptobismol.   Prior to this, he had stopped drinking for about 2 years. According to partner, patient has no hx of drug use or cigarette smoking. (27 Aug 2020 09:08)    Renal consult called for hyponatremia. History obtained from chart and patient. Pt seen in ICU.       PAST MEDICAL HISTORY:  No pertinent past medical history      PAST SURGICAL HISTORY:  S/P appendectomy  H/O hernia repair      FAMILY HISTORY:  Family history of renal disease: Uncle      SOCIAL HISTORY: + alcohol intake    Allergies    No Known Allergies    Intolerances      Home Medications:    MEDICATIONS  (STANDING):  calcium gluconate IVPB 2 Gram(s) IV Intermittent once  folic acid Injectable 1 milliGRAM(s) IV Push daily  heparin   Injectable 5000 Unit(s) SubCutaneous every 8 hours  multivitamin 1 Tablet(s) Oral daily  potassium chloride  10 mEq/100 mL IVPB 10 milliEquivalent(s) IV Intermittent every 1 hour  potassium chloride  10 mEq/100 mL IVPB 10 milliEquivalent(s) IV Intermittent every 1 hour  thiamine Injectable 100 milliGRAM(s) IV Push daily    MEDICATIONS  (PRN):  LORazepam   Injectable 2 milliGRAM(s) IV Push every 2 hours PRN CIWA-Ar score increase by 2 points and a total score of 7 or less  LORazepam   Injectable 2 milliGRAM(s) IV Push every 1 hour PRN CIWA-Ar score 8 or greater      REVIEW OF SYSTEMS:  General: pt resting in bed  sedated      T(F): 97.6 (08-27-20 @ 09:34), Max: 98 (08-27-20 @ 06:47)  HR: 121 (08-27-20 @ 10:00) (103 - 126)  BP: 134/80 (08-27-20 @ 10:00) (133/89 - 140/84)  RR: 28 (08-27-20 @ 10:00) (17 - 28)  SpO2: 99% (08-27-20 @ 10:00) (95% - 99%)  Wt(kg): --    PHYSICAL EXAM:  General: NAD  Respiratory: b/l air entry  Cardiovascular: S1 S2  Gastrointestinal: soft  Extremities: no edema        08-27    120<LL>  |  83<L>  |  3<L>  ----------------------------<  119<H>  2.4<LL>   |  26  |  0.64    Ca    6.5<LL>      27 Aug 2020 10:05    TPro  6.4  /  Alb  2.9<L>  /  TBili  12.8<H>  /  DBili  x   /  AST  460<H>  /  ALT  373<H>  /  AlkPhos  233<H>  08-27                          14.0   4.60  )-----------( 110      ( 27 Aug 2020 07:01 )             38.0       Potassium, Serum: 2.4 mmol/L (08-27 @ 10:05)  Blood Urea Nitrogen, Serum: 3 mg/dL (08-27 @ 10:05)  Calcium, Total Serum: 6.5 mg/dL (08-27 @ 10:05)  Hemoglobin: 14.0 g/dL (08-27 @ 07:01)      Creatinine, Serum: 0.64 (08-27 @ 10:05)  Creatinine, Serum: 0.78 (08-27 @ 07:01)        LIVER FUNCTIONS - ( 27 Aug 2020 07:01 )  Alb: 2.9 g/dL / Pro: 6.4 g/dL / ALK PHOS: 233 U/L / ALT: 373 U/L / AST: 460 U/L / GGT: x             I&O's Detail    27 Aug 2020 07:01  -  27 Aug 2020 11:01  --------------------------------------------------------  IN:    sodium chloride 0.9%: 375 mL    Solution: 100 mL  Total IN: 475 mL    OUT:    Indwelling Catheter - Urethral: 2900 mL  Total OUT: 2900 mL    Total NET: -2425 mL    < from: Xray Chest 1 View AP/PA (08.27.20 @ 08:48) >    EXAM:  XR CHEST AP OR PA 1V                            PROCEDURE DATE:  08/27/2020          INTERPRETATION:  Clinical information: Intoxication    AP view of the chest from 0833 hours:    COMPARISON:  None    The cardiac, hilar and mediastinal contours are unremarkable. The lungs are clear. The osseous structures demonstrate no acute abnormality.    IMPRESSION:    Clear lungs        SINTIA SULTANA M.D.,ATTENDING RADIOLOGIST  This document has been electronically signed. Aug 27 2020 10:18AM              < end of copied text >

## 2020-08-27 NOTE — ED ADULT NURSE REASSESSMENT NOTE - NS ED NURSE REASSESS COMMENT FT1
0715 Patient on monitor restless wife at bedside. Taken to CT unable to perform due to restless. MSpencer  0800 Ativan administered patient + results taken back to CT on cardiac monitor with RN & transport still restless md advised to administer 2nd dose ativan. CT complete brought back down to ED VS stable. Continued to be monitored. Report given to ICU RN ORA Melendez. Patient transported to ICU on monitor with RN & transport. Patient voiding. MSpencer

## 2020-08-27 NOTE — H&P ADULT - PROBLEM SELECTOR PLAN 6
- PLT of 110 on admission  - Thrombocytopenia likely due to chronic ETOH use - Potassium of 2.9 on admission  - Kcl 10 x  3 bags ordered in ED  - F/u repeat CMP - Last drink 3am 8/27/2020  - ETOH of 314on admission  - [facility] protocol  - F/u b12/b9 levels

## 2020-08-27 NOTE — H&P ADULT - PROBLEM SELECTOR PLAN 4
- Pt with transaminitis, hyperbilirubinemia, likely acute liver failure  - F/u PT/INR, hepatitis panel  - F/u abd ultrasound  - Avoid hepatotoxic drugs  - GI consulted per ED, Dr Torres - Corrected calcium 7.6 on admission  - s/p repletion in ICU

## 2020-08-27 NOTE — ED ADULT NURSE NOTE - OBJECTIVE STATEMENT
35 yr old male presents to the ED with c/o etoh intoxication. Brought in by wife who reports he started drinking 1 month ago. PMH of etoh abuse. Wife reports over the last 2 days, he drank 12 cases of beer. Pt obtunded and responds only to pain. speech slurred. Etoh odor on breath. Tachycardic. NSR on monitor. S1/S2. Lungs clear bruno. Resp even and unlabored on room air. Hiccups noted. + BS in all quadrants. Pt soiled himself with urine and stool. hyperglycemic on admission. skin warm dry and intact. no obvious signs of trauma, fall, bleeding, or injuries. will continue to monitor.

## 2020-08-27 NOTE — H&P ADULT - ASSESSMENT
36yo male with PMHx of ETOH abuse brought in by family after witnessed seizure like activity x 2 mins, admitted for seizure, hyponatremia, metabolic encephalopathy 2/2 ETOH intoxication and liver failure.

## 2020-08-27 NOTE — H&P ADULT - PROBLEM SELECTOR PLAN 8
- Heparin    IMPROVE VTE Individual Risk Assessment  RISK                                                                Points  [  ] Previous VTE                                                  3  [  ] Thrombophilia                                               2  [  ] Lower limb paralysis                                      2        (unable to hold up >15 seconds)    [  ] Current Cancer                                              2         (within 6 months)  [X ] Immobilization > 24 hrs                                1    [X ] ICU/CCU stay > 24 hours                              1  [  ] Age > 60                                                      1    IMPROVE VTE Score ___2___    IMPROVE Score 0-1: Low Risk, No VTE prophylaxis required for most patients, encourage ambulation.   IMPROVE Score 2-3: At risk, pharmacologic VTE prophylaxis is indicated for most patients (in the absence of a contraindication)  IMPROVE Score > or = 4: High Risk, pharmacologic VTE prophylaxis is indicated for most patients (in the absence of a contraindication) - PLT of 110 on admission  - Thrombocytopenia likely due to chronic ETOH use

## 2020-08-27 NOTE — CONSULT NOTE ADULT - SUBJECTIVE AND OBJECTIVE BOX
Patient is a 35y old  Male who presents with a chief complaint of Seizure (27 Aug 2020 11:00)    HPI:  35M h/o ETOH abuse p/w "convulsion". Pt unable to provide history, obtained from records. Per wife, while lying in bed, noted pt to be shaking and bleeding from mouth after biting tongue. Had recently restarted drinking EtOH after stopping 2 years ago. States that he has been drinking 12 beers/day for ~1month, last drink around 3AM this morning. No previous seizure history per wife. No known history of drug use.     Allergies: No Known Allergies    PAST MEDICAL & SURGICAL HISTORY:  No pertinent past medical history  S/P appendectomy  H/O hernia repair    FAMILY HISTORY:  Family history of renal disease: Uncle    SOCIAL HISTORY: daiy EtOH use (~12beers/day); no smoking, no illicit drugs    Home Medications: n/a    Review of Systems: unable to obtain due to mental status    T(F): 97.6 (08-27-20 @ 09:34), Max: 98 (08-27-20 @ 06:47)  HR: 104 (08-27-20 @ 11:00) (103 - 126)  BP: 120/62 (08-27-20 @ 11:00) (120/62 - 140/84)  RR: 20 (08-27-20 @ 11:00) (17 - 28)  SpO2: 95% (08-27-20 @ 11:00)  Wt(kg): --    CAPILLARY BLOOD GLUCOSE        I&O's Summary    27 Aug 2020 07:01  -  27 Aug 2020 11:22  --------------------------------------------------------  IN: 475 mL / OUT: 2900 mL / NET: -2425 mL        Physical Exam:   Gen: had been agitated but now calm s/p Ativan 2mg prior to CT  Neuro: unable to assess  HEENT: PERRL, +scleral icterus  CVS: tachy but regular, no murmur  Resp: cta b/l  Abd: soft, nt, nd  Ext: FROM all extremities, no edema/swelling  Skin: no rash/erythema/lacerations    Meds:   LORazepam   Injectable 2 milliGRAM(s) IV Push every 2 hours PRN  LORazepam   Injectable 2 milliGRAM(s) IV Push every 1 hour PRN  heparin   Injectable 5000 Unit(s) SubCutaneous every 8 hours  calcium gluconate IVPB 2 Gram(s) IV Intermittent once  folic acid Injectable 1 milliGRAM(s) IV Push daily  multivitamin 1 Tablet(s) Oral daily  potassium chloride  10 mEq/100 mL IVPB 10 milliEquivalent(s) IV Intermittent every 1 hour  potassium chloride  10 mEq/100 mL IVPB 10 milliEquivalent(s) IV Intermittent every 1 hour  thiamine Injectable 100 milliGRAM(s) IV Push daily                                    14.0   4.60  )-----------( 110      ( 27 Aug 2020 07:01 )             38.0       08-27    120<LL>  |  83<L>  |  3<L>  ----------------------------<  119<H>  2.4<LL>   |  26  |  0.64    Ca    6.5<LL>      27 Aug 2020 10:05    TPro  6.4  /  Alb  2.9<L>  /  TBili  12.8<H>  /  DBili  x   /  AST  460<H>  /  ALT  373<H>  /  AlkPhos  233<H>  08-27          PT/INR - ( 27 Aug 2020 10:05 )   PT: 16.5 sec;   INR: 1.43 ratio         PTT - ( 27 Aug 2020 10:05 )  PTT:37.2 sec      Radiology: CTH no acute findings      CODE STATUS: full  [facility] discussion: Y  Critical care time spent (mins): 45

## 2020-08-27 NOTE — H&P ADULT - PROBLEM SELECTOR PLAN 1
- S/p ativan 2mg x 2 - Multifactorial; liver failure, hyponatremia, alcohol intoxication  - Admitted to ICU/tele monitoring  - Head CT neg for acute bleed  - F/u ammonia levels, Urine drug screen

## 2020-08-27 NOTE — ED PROVIDER NOTE - CARE PLAN
Principal Discharge DX:	Altered mental status, unspecified altered mental status type  Secondary Diagnosis:	Hyponatremia  Secondary Diagnosis:	Alcoholic intoxication with complication  Secondary Diagnosis:	Acute liver failure with hepatic coma

## 2020-08-27 NOTE — PROGRESS NOTE ADULT - ASSESSMENT
34yo male presenting to [place] with:    1. Acute ETOH Intoxication  2. Severe Hyponatremia  3. Acute toxic/metabolic encephalopathy    Plan:   Neuro: CT head to r/o pathology. Aggressive resuscitation. Ativan/CIWA for withdrawal. Serial BMP with slow correction of sodium. Target 6-12meq per day. If remains symptomatic may require bolus of 3% hypertonic saline. Will admit to ICU for monitoring. Patient may require intubation due to agitation/withdrawal symptoms. Will additionally order a CT A/P vs Abdominal ultrasound to gauge degree of liver injury (manifested in admit LFT elevation). Do not suspect infectious etiology. Report of seizure activity while at home concerning in setting of severe hyponatremia. Ativan for seizure control/seizure. Will also consider antiepileptic. Blood sugar normal on admit labs.     NPO for now.   DVT proph  Serial BMP Q 4 hours  EKG  Access is appropriate for now (2 PIV). If requires persistent hypertonic saline will place central line  strict I&Os. Urine studies although almost assuredly prerenal. May need squires for output monitoring  Aspiration precautions. Fall risk protocol. Restraints may become necessary    Patient is critically ill and at high risk for deterioration and/or death without aggressive ICU level care. Discussed with ICU attending and ED attending.     CC Time spent excluding that which is considered bundled and/or procedural = 35 minutes.

## 2020-08-27 NOTE — PATIENT PROFILE ADULT - STATED REASON FOR ADMISSION
He was drinking  everyday for past 1 month. last drink was early this morning at 0200,takes 12bottles beer/day and liquor. He strted shaking whole  body and froth from the mouth noted and also he bit his tongue.So briought him to Er via car

## 2020-08-27 NOTE — CONSULT NOTE ADULT - ASSESSMENT
35M h/o EtOH abuse brought in with seizure-like activity in setting of EtOH intoxication and hyponatremia, now s/p 2L IVF in ED     Acute Problems:  - Symptomatic severe hyponatremia  - Seizure  - Alcohol Intoxication/Withdrawal  - Metabolic/Toxic encephalopathy  - Hypokalemia  - Hypocalcemia    Plan  - s/p 2L IVF in ED with correction of sodium from 111 --> 120 - although rapid correction, pt symptomatic with seizures likely in setting of hyponatremia; will hold additional fluid at this time, repeat BMP at 2pm  - check urine electrolytes with urine uric acid as well; electrolyte abnormalities likely 2/2 beer potomania, poor diet  - aggressive repletion of K of 2.4; ordered for 10mEq IVPB x6, repelte Ca with 2g x1  - seizure likely in setting of severe hyponatremia, no recurrence after presentation - will hold off on seizure ppx at this time  - continue CIWA protocol with standing Librium, prn symptom triggered Ativan  - tox screen pending    Pt to be admitted to ICU for ongoing managmenet 35M h/o EtOH abuse brought in with seizure-like activity in setting of EtOH intoxication and hyponatremia, now s/p 2L IVF in ED     Acute Problems:  - Symptomatic severe hyponatremia  - Seizure  - Alcohol Intoxication/Withdrawal  - Metabolic/Toxic encephalopathy  - Hypokalemia  - Hypocalcemia  - Transaminitis    Plan  - s/p 2L IVF in ED with correction of sodium from 111 --> 120 - although rapid correction, pt symptomatic with seizures likely in setting of hyponatremia; will hold additional fluid at this time, repeat BMP at 2pm  - check urine electrolytes with urine uric acid as well; electrolyte abnormalities likely 2/2 beer potomania, poor diet  - aggressive repletion of K of 2.4; ordered for 10mEq IVPB x6, repelte Ca with 2g x1  - seizure likely in setting of severe hyponatremia, no recurrence after presentation - will hold off on seizure ppx at this time  - continue CIWA protocol with standing Librium, prn symptom triggered Ativan  - tox screen pending  - transminitis likely in setting of acute alcoholic hepatitis - RUQ sono pending, will trend    Pt to be admitted to ICU for ongoing managmenet

## 2020-08-27 NOTE — ED ADULT NURSE NOTE - CAS EDN INTEG ASSESS
[FreeTextEntry1] : ------------Assessment and Plan--------39 year-old lady with PULMONARY ARTERIAL HYPERTENSION  S/P PPM  FOR TACHYCARDIA AT [place] ON S/Q REMODULIN - \par \par 1]   PULM  ART HTN ]   --------- WHO GROUP I FUNCTIONAL CLASS III----[ AS PER  DR FREEMAN  WHO DID PULM ANGIO SHE HAS FEATURES OF GROUP I  WITH SOME EVIDENCE OF DISTAL CLOTS] She is on remodulin  15NG/KG/MIN --Will increase to 16ng---NEEDS repeat  ECHO\par 2) ---She is now on xarelto---will be on lifelong anticoagulation---encouraged use of oxygen-\par 3), labs today- ? anemia - \par 4) S/P PPM- follows EP clinic at [facility] ---NEEDS TO F/U WITH EPS SERVICE AS WELL\par 5) chronic palpitations- referred to Dr Garcia- heart failure clinic\par 6) needs yearly CT chest and PFT\par 7) she has features of ERICA- including sleep disturbance, nocturnal desat, and excessive fatigue- will get HST to r/o ERICA\par 8) influenza vac given\par 9) has consult /[facility] next month\par 10) f/u in 2-3 months\par \par   Patient at this time  will follow  the above mentioned recommendations --and f/u in DEC 2018----If you have any questions  I can be reached  at #  [phone]. \par \par Vivian Yoon MD, FCCP \par Director, Pulmonary Hypertension Program \par [facility] \par Division of Pulmonary, Critical Care and Sleep Medicine \par  Professor of Medicine \par [employer]\par \par  - - -

## 2020-08-27 NOTE — CONSULT NOTE ADULT - ASSESSMENT
etoh abuse  etoh hepatitis  anemia    df 25 no role for steroids  in and out  thiamine folic acid  ppi once a day  check cbc  will need  upper gastrointestinal endoscopy when stable sodium

## 2020-08-27 NOTE — H&P ADULT - HISTORY OF PRESENT ILLNESS
History obtained from patients partner, Leydig  36yo male with PMHx of ETOH abuse brought into ED this morning due to concern for "convulsion". States that about 6 am she noticed he was shaking a lot, and had blood in his mouth because he had bitten his tongue. Episode lasted about 2 mins. They were both lying in bed, and she noticed he was shaking next to her and as she turned around, she saw him having the shaking episode. He was unresponsive, an she called other household members who helped carry him out of the room and brought him in by car. His skin was very cold during this process. She states patient has no previous seizure history. The night prior he seemed weak/tired, had complained of some abdominal pain, and she noticed his skin was yellow. Last night, he had bowel and bladder incontinence.   She states he has been drinking ETOH excessively for about 1 month. Drinks beer (Corona) daily; up to 12 beers/day. Last drink was at 3am 08/27/2020. He had taken some medications last night for sleep; tylenol, peptobismol.   Prior to this, he had stopped drinking for about 2 years. According to partner, patient has no hx of drug use or cigarette smoking.

## 2020-08-27 NOTE — H&P ADULT - PROBLEM SELECTOR PLAN 7
- PLT of 110 on admission  - Thrombocytopenia likely due to chronic ETOH use - Potassium of 2.9 on admission  - Kcl 10 x  3 bags ordered in ED  - F/u repeat CMP

## 2020-08-27 NOTE — ED ADULT NURSE NOTE - NSIMPLEMENTINTERV_GEN_ALL_ED
Implemented All Fall with Harm Risk Interventions:  [place] to call system. Call bell, personal items and telephone within reach. Instruct patient to call for assistance. Room bathroom lighting operational. Non-slip footwear when patient is off stretcher. Physically safe environment: no spills, clutter or unnecessary equipment. Stretcher in lowest position, wheels locked, appropriate side rails in place. Provide visual cue, wrist band, yellow gown, etc. Monitor gait and stability. Monitor for mental status changes and reorient to person, place, and time. Review medications for side effects contributing to fall risk. Reinforce activity limits and safety measures with patient and family. Provide visual clues: red socks.

## 2020-08-27 NOTE — CONSULT NOTE ADULT - SUBJECTIVE AND OBJECTIVE BOX
Chief Complaint:  Patient is a 35y old  Male who presents with a chief complaint of Seizure 36yo male with PMHx of ETOH abuse brought into ED this morning due to concern for "convulsion". States that about 6 am she noticed he was shaking a lot, and had blood in his mouth because he had bitten his tongue. Episode lasted about 2 mins. They were both lying in bed, and she noticed he was shaking next to her and as she turned around, she saw him having the shaking episode. He was unresponsive, an she called other household members who helped carry him out of the room and brought him in by car. His skin was very cold during this process. She states patient has no previous seizure history. The night prior he seemed weak/tired, had complained of some abdominal pain, and she noticed his skin was yellow. Last night, he had bowel and bladder incontinence.   She states he has been drinking ETOH excessively for about 1 month. Drinks beer (Corona) daily; up to 12 beers/day. Last drink was at 3am 2020. He had taken some medications last night for sleep; tylenol, peptobismol.   Prior to this, he had stopped drinking for about 2 years. According to partner, patient has no hx of drug use or cigarette smoking.        Review of Systems:  Review of Systems: Unable to obtain review of system due to patients mental status	      Allergies:  No Known Allergies      Medications:  dexMEDEtomidine Infusion 0.5 MICROgram(s)/kG/Hr IV Continuous <Continuous>  dextrose 5%. 1000 milliLiter(s) IV Continuous <Continuous>  folic acid Injectable 1 milliGRAM(s) IV Push daily  heparin   Injectable 5000 Unit(s) SubCutaneous every 8 hours  LORazepam   Injectable 2 milliGRAM(s) IV Push every 2 hours PRN  LORazepam   Injectable 2 milliGRAM(s) IV Push every 1 hour PRN  multivitamin 1 Tablet(s) Oral daily  pantoprazole    Tablet 40 milliGRAM(s) Oral before breakfast  potassium chloride  10 mEq/100 mL IVPB 10 milliEquivalent(s) IV Intermittent every 1 hour  thiamine Injectable 100 milliGRAM(s) IV Push daily  ursodiol Capsule 300 milliGRAM(s) Oral three times a day      PMHX/PSHX:  No pertinent past medical history  S/P appendectomy  H/O hernia repair      Family history:  Family history of renal disease      Social History: etoh abuse    ROS:     General:  No wt loss, fevers, chills, night sweats, fatigue,   Eyes:  Good vision, no reported pain  ENT:  No sore throat, pain, runny nose, dysphagia  CV:  No pain, palpitations, hypo/hypertension  Resp:  No dyspnea, cough, tachypnea, wheezing  GI:  No pain, No nausea, No vomiting, No diarrhea, No constipation, No weight loss, No fever, No pruritis, No rectal bleeding, No tarry stools, No dysphagia,  :  No pain, bleeding, incontinence, nocturia  Muscle:  No pain, weakness  Neuro:  No weakness, tingling, memory problems  Psych:  No fatigue, insomnia, mood problems, depression  Endocrine:  No polyuria, polydipsia, cold/heat intolerance  Heme:  No petechiae, ecchymosis, easy bruisability  Skin:  No rash, tattoos, scars, edema      PHYSICAL EXAM:   Vital Signs:  Vital Signs Last 24 Hrs  T(C): 36.6 (27 Aug 2020 13:58), Max: 36.7 (27 Aug 2020 06:47)  T(F): 97.9 (27 Aug 2020 13:58), Max: 98 (27 Aug 2020 06:47)  HR: 95 (27 Aug 2020 13:45) (81 - 126)  BP: 103/54 (27 Aug 2020 13:45) (91/51 - 140/84)  BP(mean): 75 (27 Aug 2020 13:45) (64 - 105)  RR: 27 (27 Aug 2020 13:45) (17 - 29)  SpO2: 100% (27 Aug 2020 13:45) (93% - 100%)  Daily Height in cm: 177.8 (27 Aug 2020 06:47)    Daily Weight in k.9 (27 Aug 2020 09:34)    GENERAL:  Appears stated age, well-groomed, well-nourished, no distress  HEENT:  NC/AT,  conjunctivae clear and pink, no thyromegaly, nodules, adenopathy, no JVD, sclera -anicteric  CHEST:  Full & symmetric excursion, no increased effort, breath sounds clear  HEART:  Regular rhythm, S1, S2, no murmur/rub/S3/S4, no abdominal bruit, no edema  ABDOMEN:  Soft, non-tender, non-distended, normoactive bowel sounds,  no masses ,no hepato-splenomegaly, no signs of chronic liver disease  EXTEREMITIES:  no cyanosis,clubbing or edema  SKIN:  No rash/erythema/ecchymoses/petechiae/wounds/abscess/warm/dry  NEURO:  Alert, oriented, no asterixis, no tremor, no encephalopathy    LABS:                        14.0   4.60  )-----------( 110      ( 27 Aug 2020 07:01 )             38.0     08-    120<LL>  |  83<L>  |  3<L>  ----------------------------<  119<H>  2.4<LL>   |  26  |  0.64    Ca    6.5<LL>      27 Aug 2020 10:05    TPro  5.7<L>  /  Alb  2.6<L>  /  TBili  11.5<H>  /  DBili  8.90<H>  /  AST  442<H>  /  ALT  328<H>  /  AlkPhos  197<H>      LIVER FUNCTIONS - ( 27 Aug 2020 10:05 )  Alb: 2.6 g/dL / Pro: 5.7 g/dL / ALK PHOS: 197 U/L / ALT: 328 U/L / AST: 442 U/L / GGT: x           PT/INR - ( 27 Aug 2020 10:05 )   PT: 16.5 sec;   INR: 1.43 ratio         PTT - ( 27 Aug 2020 10:05 )  PTT:37.2 sec    Amylase Serum--      Lipase [id number]       Ammonia--      Imaging:

## 2020-08-28 LAB
ALBUMIN SERPL ELPH-MCNC: 2 G/DL — LOW (ref 3.3–5)
ALP SERPL-CCNC: 176 U/L — HIGH (ref 40–120)
ALT FLD-CCNC: 238 U/L — HIGH (ref 12–78)
AMMONIA BLD-MCNC: 73 UMOL/L — HIGH (ref 11–32)
ANION GAP SERPL CALC-SCNC: 10 MMOL/L — SIGNIFICANT CHANGE UP (ref 5–17)
ANION GAP SERPL CALC-SCNC: 11 MMOL/L — SIGNIFICANT CHANGE UP (ref 5–17)
ANION GAP SERPL CALC-SCNC: 14 MMOL/L — SIGNIFICANT CHANGE UP (ref 5–17)
ANION GAP SERPL CALC-SCNC: 6 MMOL/L — SIGNIFICANT CHANGE UP (ref 5–17)
ANION GAP SERPL CALC-SCNC: 8 MMOL/L — SIGNIFICANT CHANGE UP (ref 5–17)
APTT BLD: 45.6 SEC — HIGH (ref 27.5–35.5)
AST SERPL-CCNC: 372 U/L — HIGH (ref 15–37)
BASOPHILS # BLD AUTO: 0.01 K/UL — SIGNIFICANT CHANGE UP (ref 0–0.2)
BASOPHILS NFR BLD AUTO: 0.2 % — SIGNIFICANT CHANGE UP (ref 0–2)
BILIRUB SERPL-MCNC: 10.7 MG/DL — HIGH (ref 0.2–1.2)
BUN SERPL-MCNC: 2 MG/DL — LOW (ref 7–23)
BUN SERPL-MCNC: 3 MG/DL — LOW (ref 7–23)
BUN SERPL-MCNC: 4 MG/DL — LOW (ref 7–23)
CALCIUM SERPL-MCNC: 6.6 MG/DL — LOW (ref 8.5–10.1)
CALCIUM SERPL-MCNC: 7 MG/DL — LOW (ref 8.5–10.1)
CALCIUM SERPL-MCNC: 7 MG/DL — LOW (ref 8.5–10.1)
CALCIUM SERPL-MCNC: 7.1 MG/DL — LOW (ref 8.5–10.1)
CALCIUM SERPL-MCNC: 7.8 MG/DL — LOW (ref 8.5–10.1)
CHLORIDE SERPL-SCNC: 84 MMOL/L — LOW (ref 96–108)
CHLORIDE SERPL-SCNC: 86 MMOL/L — LOW (ref 96–108)
CHLORIDE SERPL-SCNC: 87 MMOL/L — LOW (ref 96–108)
CHLORIDE SERPL-SCNC: 89 MMOL/L — LOW (ref 96–108)
CHLORIDE SERPL-SCNC: 95 MMOL/L — LOW (ref 96–108)
CHLORIDE UR-SCNC: 55 MMOL/L — SIGNIFICANT CHANGE UP
CO2 SERPL-SCNC: 23 MMOL/L — SIGNIFICANT CHANGE UP (ref 22–31)
CO2 SERPL-SCNC: 25 MMOL/L — SIGNIFICANT CHANGE UP (ref 22–31)
CO2 SERPL-SCNC: 25 MMOL/L — SIGNIFICANT CHANGE UP (ref 22–31)
CO2 SERPL-SCNC: 26 MMOL/L — SIGNIFICANT CHANGE UP (ref 22–31)
CO2 SERPL-SCNC: 27 MMOL/L — SIGNIFICANT CHANGE UP (ref 22–31)
CREAT ?TM UR-MCNC: 43 MG/DL — SIGNIFICANT CHANGE UP
CREAT SERPL-MCNC: 0.79 MG/DL — SIGNIFICANT CHANGE UP (ref 0.5–1.3)
CREAT SERPL-MCNC: 0.82 MG/DL — SIGNIFICANT CHANGE UP (ref 0.5–1.3)
CREAT SERPL-MCNC: 0.88 MG/DL — SIGNIFICANT CHANGE UP (ref 0.5–1.3)
CREAT SERPL-MCNC: 0.93 MG/DL — SIGNIFICANT CHANGE UP (ref 0.5–1.3)
CREAT SERPL-MCNC: 1 MG/DL — SIGNIFICANT CHANGE UP (ref 0.5–1.3)
EOSINOPHIL # BLD AUTO: 0.02 K/UL — SIGNIFICANT CHANGE UP (ref 0–0.5)
EOSINOPHIL NFR BLD AUTO: 0.5 % — SIGNIFICANT CHANGE UP (ref 0–6)
FOLATE SERPL-MCNC: 8.1 NG/ML — SIGNIFICANT CHANGE UP
GLUCOSE SERPL-MCNC: 122 MG/DL — HIGH (ref 70–99)
GLUCOSE SERPL-MCNC: 126 MG/DL — HIGH (ref 70–99)
GLUCOSE SERPL-MCNC: 131 MG/DL — HIGH (ref 70–99)
GLUCOSE SERPL-MCNC: 141 MG/DL — HIGH (ref 70–99)
GLUCOSE SERPL-MCNC: 150 MG/DL — HIGH (ref 70–99)
HAV IGM SER-ACNC: SIGNIFICANT CHANGE UP
HBV CORE IGM SER-ACNC: SIGNIFICANT CHANGE UP
HBV SURFACE AG SER-ACNC: SIGNIFICANT CHANGE UP
HCT VFR BLD CALC: 31.2 % — LOW (ref 39–50)
HCV AB S/CO SERPL IA: 0.14 S/CO — SIGNIFICANT CHANGE UP (ref 0–0.99)
HCV AB SERPL-IMP: SIGNIFICANT CHANGE UP
HGB BLD-MCNC: 11.3 G/DL — LOW (ref 13–17)
IMM GRANULOCYTES NFR BLD AUTO: 0.2 % — SIGNIFICANT CHANGE UP (ref 0–1.5)
INR BLD: 1.16 RATIO — SIGNIFICANT CHANGE UP (ref 0.88–1.16)
LYMPHOCYTES # BLD AUTO: 0.91 K/UL — LOW (ref 1–3.3)
LYMPHOCYTES # BLD AUTO: 22.5 % — SIGNIFICANT CHANGE UP (ref 13–44)
MAGNESIUM SERPL-MCNC: 1.7 MG/DL — SIGNIFICANT CHANGE UP (ref 1.6–2.6)
MCHC RBC-ENTMCNC: 28.4 PG — SIGNIFICANT CHANGE UP (ref 27–34)
MCHC RBC-ENTMCNC: 36.2 GM/DL — HIGH (ref 32–36)
MCV RBC AUTO: 78.4 FL — LOW (ref 80–100)
MONOCYTES # BLD AUTO: 0.19 K/UL — SIGNIFICANT CHANGE UP (ref 0–0.9)
MONOCYTES NFR BLD AUTO: 4.7 % — SIGNIFICANT CHANGE UP (ref 2–14)
NEUTROPHILS # BLD AUTO: 2.91 K/UL — SIGNIFICANT CHANGE UP (ref 1.8–7.4)
NEUTROPHILS NFR BLD AUTO: 71.9 % — SIGNIFICANT CHANGE UP (ref 43–77)
NRBC # BLD: 0 /100 WBCS — SIGNIFICANT CHANGE UP (ref 0–0)
OSMOLALITY UR: 231 MOSM/KG — SIGNIFICANT CHANGE UP (ref 50–1200)
PCP SPEC-MCNC: SIGNIFICANT CHANGE UP
PHOSPHATE SERPL-MCNC: 1.3 MG/DL — LOW (ref 2.5–4.5)
PLATELET # BLD AUTO: 73 K/UL — LOW (ref 150–400)
POTASSIUM SERPL-MCNC: 2.6 MMOL/L — CRITICAL LOW (ref 3.5–5.3)
POTASSIUM SERPL-MCNC: 2.7 MMOL/L — CRITICAL LOW (ref 3.5–5.3)
POTASSIUM SERPL-MCNC: 3 MMOL/L — LOW (ref 3.5–5.3)
POTASSIUM SERPL-MCNC: 3.5 MMOL/L — SIGNIFICANT CHANGE UP (ref 3.5–5.3)
POTASSIUM SERPL-MCNC: 3.9 MMOL/L — SIGNIFICANT CHANGE UP (ref 3.5–5.3)
POTASSIUM SERPL-SCNC: 2.6 MMOL/L — CRITICAL LOW (ref 3.5–5.3)
POTASSIUM SERPL-SCNC: 2.7 MMOL/L — CRITICAL LOW (ref 3.5–5.3)
POTASSIUM SERPL-SCNC: 3 MMOL/L — LOW (ref 3.5–5.3)
POTASSIUM SERPL-SCNC: 3.5 MMOL/L — SIGNIFICANT CHANGE UP (ref 3.5–5.3)
POTASSIUM SERPL-SCNC: 3.9 MMOL/L — SIGNIFICANT CHANGE UP (ref 3.5–5.3)
PROT SERPL-MCNC: 4.6 G/DL — LOW (ref 6–8.3)
PROTHROM AB SERPL-ACNC: 13.5 SEC — SIGNIFICANT CHANGE UP (ref 10.6–13.6)
RBC # BLD: 3.98 M/UL — LOW (ref 4.2–5.8)
RBC # FLD: 17.8 % — HIGH (ref 10.3–14.5)
SODIUM SERPL-SCNC: 119 MMOL/L — CRITICAL LOW (ref 135–145)
SODIUM SERPL-SCNC: 121 MMOL/L — LOW (ref 135–145)
SODIUM SERPL-SCNC: 123 MMOL/L — LOW (ref 135–145)
SODIUM SERPL-SCNC: 126 MMOL/L — LOW (ref 135–145)
SODIUM SERPL-SCNC: 127 MMOL/L — LOW (ref 135–145)
SODIUM UR-SCNC: 65 MMOL/L — SIGNIFICANT CHANGE UP
VIT B12 SERPL-MCNC: >2000 PG/ML — HIGH (ref 232–1245)
WBC # BLD: 4.05 K/UL — SIGNIFICANT CHANGE UP (ref 3.8–10.5)
WBC # FLD AUTO: 4.05 K/UL — SIGNIFICANT CHANGE UP (ref 3.8–10.5)

## 2020-08-28 PROCEDURE — 99291 CRITICAL CARE FIRST HOUR: CPT

## 2020-08-28 PROCEDURE — 99233 SBSQ HOSP IP/OBS HIGH 50: CPT

## 2020-08-28 RX ORDER — SODIUM CHLORIDE 9 MG/ML
1000 INJECTION, SOLUTION INTRAVENOUS
Refills: 0 | Status: DISCONTINUED | OUTPATIENT
Start: 2020-08-28 | End: 2020-08-29

## 2020-08-28 RX ORDER — DESMOPRESSIN ACETATE 0.1 MG/1
0.2 TABLET ORAL ONCE
Refills: 0 | Status: DISCONTINUED | OUTPATIENT
Start: 2020-08-28 | End: 2020-08-28

## 2020-08-28 RX ORDER — POTASSIUM CHLORIDE 20 MEQ
40 PACKET (EA) ORAL EVERY 4 HOURS
Refills: 0 | Status: COMPLETED | OUTPATIENT
Start: 2020-08-28 | End: 2020-08-28

## 2020-08-28 RX ORDER — POTASSIUM CHLORIDE 20 MEQ
20 PACKET (EA) ORAL ONCE
Refills: 0 | Status: COMPLETED | OUTPATIENT
Start: 2020-08-28 | End: 2020-08-28

## 2020-08-28 RX ORDER — SODIUM CHLORIDE 9 MG/ML
500 INJECTION, SOLUTION INTRAVENOUS
Refills: 0 | Status: COMPLETED | OUTPATIENT
Start: 2020-08-28 | End: 2020-08-28

## 2020-08-28 RX ORDER — POTASSIUM PHOSPHATE, MONOBASIC POTASSIUM PHOSPHATE, DIBASIC 236; 224 MG/ML; MG/ML
30 INJECTION, SOLUTION INTRAVENOUS ONCE
Refills: 0 | Status: DISCONTINUED | OUTPATIENT
Start: 2020-08-28 | End: 2020-08-28

## 2020-08-28 RX ORDER — DESMOPRESSIN ACETATE 0.1 MG/1
2 TABLET ORAL ONCE
Refills: 0 | Status: COMPLETED | OUTPATIENT
Start: 2020-08-28 | End: 2020-08-28

## 2020-08-28 RX ORDER — ENOXAPARIN SODIUM 100 MG/ML
40 INJECTION SUBCUTANEOUS DAILY
Refills: 0 | Status: DISCONTINUED | OUTPATIENT
Start: 2020-08-28 | End: 2020-09-04

## 2020-08-28 RX ORDER — POTASSIUM CHLORIDE 20 MEQ
10 PACKET (EA) ORAL
Refills: 0 | Status: COMPLETED | OUTPATIENT
Start: 2020-08-28 | End: 2020-08-28

## 2020-08-28 RX ORDER — SODIUM CHLORIDE 9 MG/ML
500 INJECTION, SOLUTION INTRAVENOUS
Refills: 0 | Status: DISCONTINUED | OUTPATIENT
Start: 2020-08-28 | End: 2020-08-28

## 2020-08-28 RX ORDER — POTASSIUM PHOSPHATE, MONOBASIC POTASSIUM PHOSPHATE, DIBASIC 236; 224 MG/ML; MG/ML
30 INJECTION, SOLUTION INTRAVENOUS ONCE
Refills: 0 | Status: COMPLETED | OUTPATIENT
Start: 2020-08-28 | End: 2020-08-28

## 2020-08-28 RX ORDER — MAGNESIUM SULFATE 500 MG/ML
2 VIAL (ML) INJECTION ONCE
Refills: 0 | Status: COMPLETED | OUTPATIENT
Start: 2020-08-28 | End: 2020-08-28

## 2020-08-28 RX ADMIN — DESMOPRESSIN ACETATE 2 MICROGRAM(S): 0.1 TABLET ORAL at 16:48

## 2020-08-28 RX ADMIN — Medication 1 TABLET(S): at 11:54

## 2020-08-28 RX ADMIN — Medication 2 MILLIGRAM(S): at 19:33

## 2020-08-28 RX ADMIN — POTASSIUM PHOSPHATE, MONOBASIC POTASSIUM PHOSPHATE, DIBASIC 83.33 MILLIMOLE(S): 236; 224 INJECTION, SOLUTION INTRAVENOUS at 08:00

## 2020-08-28 RX ADMIN — Medication 40 MILLIEQUIVALENT(S): at 09:46

## 2020-08-28 RX ADMIN — ENOXAPARIN SODIUM 40 MILLIGRAM(S): 100 INJECTION SUBCUTANEOUS at 11:55

## 2020-08-28 RX ADMIN — Medication 100 MILLIEQUIVALENT(S): at 09:15

## 2020-08-28 RX ADMIN — DEXMEDETOMIDINE HYDROCHLORIDE IN 0.9% SODIUM CHLORIDE 18.2 MICROGRAM(S)/KG/HR: 4 INJECTION INTRAVENOUS at 04:24

## 2020-08-28 RX ADMIN — Medication 50 MILLIGRAM(S): at 19:33

## 2020-08-28 RX ADMIN — Medication 100 MILLIEQUIVALENT(S): at 10:05

## 2020-08-28 RX ADMIN — URSODIOL 300 MILLIGRAM(S): 250 TABLET, FILM COATED ORAL at 05:21

## 2020-08-28 RX ADMIN — Medication 100 MILLIGRAM(S): at 11:54

## 2020-08-28 RX ADMIN — Medication 100 MILLIEQUIVALENT(S): at 08:38

## 2020-08-28 RX ADMIN — PANTOPRAZOLE SODIUM 40 MILLIGRAM(S): 20 TABLET, DELAYED RELEASE ORAL at 11:55

## 2020-08-28 RX ADMIN — SODIUM CHLORIDE 500 MILLILITER(S): 9 INJECTION, SOLUTION INTRAVENOUS at 06:53

## 2020-08-28 RX ADMIN — Medication 1 MILLIGRAM(S): at 13:53

## 2020-08-28 RX ADMIN — SODIUM CHLORIDE 500 MILLILITER(S): 9 INJECTION, SOLUTION INTRAVENOUS at 06:25

## 2020-08-28 RX ADMIN — SODIUM CHLORIDE 500 MILLILITER(S): 9 INJECTION, SOLUTION INTRAVENOUS at 01:12

## 2020-08-28 RX ADMIN — Medication 20 MILLIEQUIVALENT(S): at 15:15

## 2020-08-28 RX ADMIN — Medication 40 MILLIEQUIVALENT(S): at 06:45

## 2020-08-28 RX ADMIN — SODIUM CHLORIDE 500 MILLILITER(S): 9 INJECTION, SOLUTION INTRAVENOUS at 02:51

## 2020-08-28 RX ADMIN — Medication 50 GRAM(S): at 09:10

## 2020-08-28 RX ADMIN — SODIUM CHLORIDE 500 MILLILITER(S): 9 INJECTION, SOLUTION INTRAVENOUS at 05:22

## 2020-08-28 RX ADMIN — Medication 2 MILLIGRAM(S): at 11:30

## 2020-08-28 RX ADMIN — URSODIOL 300 MILLIGRAM(S): 250 TABLET, FILM COATED ORAL at 15:15

## 2020-08-28 RX ADMIN — DESMOPRESSIN ACETATE 2 MICROGRAM(S): 0.1 TABLET ORAL at 02:51

## 2020-08-28 RX ADMIN — DEXMEDETOMIDINE HYDROCHLORIDE IN 0.9% SODIUM CHLORIDE 18.2 MICROGRAM(S)/KG/HR: 4 INJECTION INTRAVENOUS at 01:12

## 2020-08-28 RX ADMIN — Medication 40 MILLIEQUIVALENT(S): at 13:53

## 2020-08-28 RX ADMIN — HEPARIN SODIUM 5000 UNIT(S): 5000 INJECTION INTRAVENOUS; SUBCUTANEOUS at 05:22

## 2020-08-28 RX ADMIN — SODIUM CHLORIDE 75 MILLILITER(S): 9 INJECTION, SOLUTION INTRAVENOUS at 19:33

## 2020-08-28 RX ADMIN — URSODIOL 300 MILLIGRAM(S): 250 TABLET, FILM COATED ORAL at 21:29

## 2020-08-28 NOTE — DIETITIAN INITIAL EVALUATION ADULT. - PROBLEM SELECTOR PLAN 2
- Pt with Na of 114 on admission and report of seizure like activity per family  - Nephro consulted in ED (Dr Silva)  - NS bolus given, repeat BMP (per nephro, will re-eval after bolus to determine if hypertonic saline indicated)  - F/u urine studies  - Serial BMPs

## 2020-08-28 NOTE — DIETITIAN INITIAL EVALUATION ADULT. - OTHER INFO
Pt admit s/p seizure with ETOH abuse/hyponatemia.  Per EMR, daily ETOH 12 beers/day, with liver failure/hepatic steosis.  Pt stated(through  phone) appetite usually good, ate 1/2 breakfast today, but admits to drinking too much alcohol/not enough food at times. Wt generally stable, may have lost a few pounds. BMI 32. Spoke with pt regarding healthy diet to promote liver health, adequate protein intake, mvi/thiamine/folic acid supplementation. Py with improving Na+ level, may need fluid restriction(per EMR hyponatremia likely contributed to poor diet/ETOH consumption).

## 2020-08-28 NOTE — SBIRT NOTE ADULT - NSSBIRTALCPASSREFTXDET_GEN_A_CORE
pt states he does not want inpatient rehab and does not have time to go to outpatient due to work.  He is agreeable to attending AA and list will be provided.  Will follow for additional needs.

## 2020-08-28 NOTE — PROGRESS NOTE ADULT - ATTENDING COMMENTS
35M h/o EtOH abuse brought in with seizure-like activity in setting of EtOH intoxication and hyponatremia s/p 2L IVF in ED with course c/b rapid overcorrection of Na with water diuresis s/p desmopressin x2, D5 500cc bolus x3 now with improved correction rate and no further seizure activity    Neuro: AOx3, continue CIWA with symptom triggered ativan (required 2 doses past 12 hrs), now off precedex and remains calm - no additional seizure activity, continue neuro checks q4hr  CV: no acute issues  Pulm: no acute issues  GI: regular diet, continue thiamine/folate/mvi; transaminitis improving, likely 2/2 alcohol hepatitis, RUQ sono showing steatosis, no acute inteverventions at current  Renal: overcorrection of Na initially from 111 --> 129 s/p 2L IVF in ED and water diuresis, given ddavp x2 and 1500cc D5 with improvement, Na 121 at noon; continued water diuresis and subsequent hypokalemia - will give additional dose of desmopressin now, consider DI w/u if not improving  Heme: Hb drop noted, likely dilutional s/p 2L IVF in ED, will trend. Thrombocytopenia likely 2/2 EtOH use, will trend; continue dvt ppx with lovenox  Endo: no acute issues    Pt remains critically ill. To remain in ICU at this time

## 2020-08-28 NOTE — PROGRESS NOTE ADULT - ASSESSMENT
34yo male with PMHx of ETOH abuse brought in by family after witnessed seizure like activity x 2 mins, admitted for seizure 2/2 severe hyponatremia, metabolic encephalopathy 2/2 ETOH intoxication and hyponatremia.     #Neuro  - Seizure like activity upon presentation  - Sodium upon presentation 111; was in 129s overnight 08/27; now 123  - D5W and DDVAP discontinued to titrate sodium levels in positive direction  - Upon presentation agitated 2/2 etoh withdrawal with blood alcohol level 314  - Agitation improving; continue CIWA protocol with lorazepam  - If unmanageable with CIWA protocol; will utilize dexmedetomidine which is off now  - c/w thiamine and folic acid     #CV  - No active issues  - POCUS grossly normal.  No evidence of pericardial effusion.  RV<LV. IVC indeterminate. Grossly normal LV function  - Hemodynamically stable  - Continue to monitor    #Pulm  - No active issues  - POCUS A-line predominant.  No consolidations or pleural effusions.  - Saturating % on room air    #GI  - Presented with transaminitis 2/2 alcohol hepatitis  - LFTs downtrending; continue to monitor  - Continue with ursodiol  - PUD prophylaxis with protonix  - GI following    #Renal/Metabolic  - indwelling squires catheter in place  - Strict I&Os  - DDAVP and D5W discontinued as sodium began to downtrend from to 123 from 129 yesterday   - Sodium initially 111 now 129; UOP fluctuating  - Continue to monitor serum sodiums q4h  - Continue to assess mental status     #Heme  - no active issues    #Dispo  - Off 1:1 monitoring as agitation resolving  - Continue to monitor until stable for downgrade to medicine floor 36yo male with PMHx of ETOH abuse brought in by family after witnessed seizure like activity x 2 mins, admitted for seizure 2/2 severe hyponatremia, metabolic encephalopathy 2/2 ETOH intoxication and hyponatremia.     PLAN    NEURO: Mental status improved, now AAOx3. Required precedex last night for agitation, now DC. Patient calm this AM, constant observation DCd. Pt w/ ?seizure yesterday at home, likely in the setting of severe hyponatremia. Will hold seizure ppx for now. Continue MV, thiamine, and folic acid.  CARDIO: HD stable, no acute issues  PULM: No active issues, saturating % on room air  GI: DASH diet started. Presented with transaminitis likely 2/2 alcohol hepatitis. LFTs downtrending; continue to monitor. US RUQ showed hepatic steatosis. PUD prophylaxis with protonix. GI following  RENAL: Patient w/ severe hyponatremia likely 2/2 beer potomania. Urine studies normal. Sodium rapidly correcting and required DDAVP x3 and 500cc D5 bolus x4 overnight. Repeat sodium now stable. Goal increase Na 8mEq/24hrs. Continue to monitor BMP q4. This morning, pt w/ hypokalemia and hypophosphatemia, repleted. Continue to monitor lytes and replete as needed.  ID: No acute issues  HEME: Lovenox for DVT ppx. H&H downtrending, likely dilutional. F/U morning CBC 34yo male with PMHx of ETOH abuse brought in by family after witnessed seizure like activity x 2 mins, admitted for seizure 2/2 severe hyponatremia, metabolic encephalopathy 2/2 ETOH intoxication and hyponatremia.     PLAN    NEURO: Mental status improved, now AAOx3. Required precedex last night for agitation, now DC. Patient calm this AM, constant observation DCd. Pt w/ ?seizure yesterday at home, likely in the setting of severe hyponatremia. Will hold seizure ppx for now. Continue MV, thiamine, and folic acid.  CARDIO: HD stable, no acute issues  PULM: No active issues, saturating % on room air  GI: DASH diet started. Presented with transaminitis likely 2/2 alcohol hepatitis. LFTs downtrending; continue to monitor. US RUQ showed hepatic steatosis. PUD prophylaxis with protonix. GI following  RENAL: Patient w/ severe hyponatremia likely 2/2 beer potomania. Urine studies normal. Sodium rapidly correcting and required DDAVP x3 and 500cc D5 bolus x4 overnight (111-->120-->125-->129-->now 123). Repeat sodium showed sodium of 119, and D5 was discontinued. Sodium now 121. Goal increase Na 8mEq/24hrs. Continue to monitor BMP q4. This morning, pt w/ hypokalemia and hypophosphatemia, repleted. Continue to monitor lytes and replete as needed.  ID: No acute issues  HEME: Lovenox for DVT ppx. H&H downtrending, likely dilutional. F/U morning CBC 36yo male with PMHx of ETOH abuse brought in by family after witnessed seizure like activity x 2 mins, admitted for seizure 2/2 severe hyponatremia, metabolic encephalopathy 2/2 ETOH intoxication and hyponatremia.     PLAN    NEURO: Mental status improved, now AAOx3. Required precedex last night for agitation, now DC. Patient calm this AM, constant observation DCd. Pt w/ ?seizure yesterday at home, likely in the setting of severe hyponatremia. Will hold seizure ppx for now. Continue MV, thiamine, and folic acid.  CARDIO: HD stable, no acute issues  PULM: No active issues, saturating % on room air  GI: DASH diet started. Presented with transaminitis likely 2/2 alcohol hepatitis. LFTs downtrending; continue to monitor. US RUQ showed hepatic steatosis, no evidence of gallstones. GI following. On ursodiol. PUD prophylaxis with protonix.  RENAL: Patient w/ severe hyponatremia likely 2/2 beer potomania. Urine studies normal. Sodium rapidly correcting and required DDAVP x3 and 500cc D5 bolus x4 overnight (111-->120-->125-->129-->now 123). Repeat sodium showed sodium of 119, and D5 was discontinued. Sodium now 121. Goal increase Na 8mEq/24hrs. Continue to monitor BMP q4. This morning, pt w/ hypokalemia and hypophosphatemia, repleted. Continue to monitor lytes and replete as needed.  ID: No acute issues  HEME: Lovenox for DVT ppx. H&H downtrending, likely dilutional. F/U morning CBC

## 2020-08-28 NOTE — DIETITIAN INITIAL EVALUATION ADULT. - PROBLEM/PLAN-2
OT Daily Note    Time In 1032   Time Out 1115     Subjective: Agreeable to therapy  Pain: Not expressed    Precautions: Other (comment)(fall)    Activity Tolerance   Patient completed BUE endurance and cognitive task seated in wheelchair at tabletop. For first task, patient prompted to locate and retrieve numbered discs from [place] board at midline on tabletop using alternating hands and insert into slanted target board forward on tabletop in chronological order (L to R, top to bottom). Patient completed with good accuracy, then prompted to retrieve and flip numbered discs from target board using alternating hands (completing in-hand manipulation), revealing new random number 1-60, and place back into corresponding space in [place] board at midline on tabletop. Patient again completed with 100% accuracy. Patient completed standing trial at elevated tabletop for functional reaching task promoting dynamic standing balance and activity tolerance. Patient transferred sit <> stand and maintained balance with supervision, tolerated standing x 10 minutes to participate in large jigsaw puzzle before time  this session. Assessment: Patient tolerated well    Education: Purpose of therapy  Interdisciplinary Communication: Collaborated with OTMalini regarding patient's performance and POC. Plan: Continue to address ADL/IADL, functional mobility, activity tolerance, balance, strengthening, education.       Smitha Malcolm, OTR/L DISPLAY PLAN FREE TEXT

## 2020-08-28 NOTE — PROGRESS NOTE ADULT - SUBJECTIVE AND OBJECTIVE BOX
INTERVAL HPI/OVERNIGHT EVENTS:  lethargic pt is still sleepy    Allergies    No Known Allergies    Intolerances    General:  No wt loss, fevers, chills, night sweats, fatigue,   Eyes:  Good vision, no reported pain  ENT:  No sore throat, pain, runny nose, dysphagia  CV:  No pain, palpitations, hypo/hypertension  Resp:  No dyspnea, cough, tachypnea, wheezing  GI:  No pain, No nausea, No vomiting, No diarrhea, No constipation, No weight loss, No fever, No pruritis, No rectal bleeding, No tarry stools, No dysphagia,  :  No pain, bleeding, incontinence, nocturia  Muscle:  No pain, weakness  Neuro:  No weakness, tingling, memory problems  Psych:  No fatigue, insomnia, mood problems, depression  Endocrine:  No polyuria, polydipsia, cold/heat intolerance  Heme:  No petechiae, ecchymosis, easy bruisability  Skin:  No rash, tattoos, scars, edema    PHYSICAL EXAM:   Vital Signs:  Vital Signs Last 24 Hrs  T(C): 36.7 (28 Aug 2020 08:00), Max: 37.8 (27 Aug 2020 20:32)  T(F): 98 (28 Aug 2020 08:00), Max: 100.1 (27 Aug 2020 20:32)  HR: 83 (28 Aug 2020 11:00) (61 - 122)  BP: 126/73 (28 Aug 2020 11:00) (91/51 - 126/73)  BP(mean): 95 (28 Aug 2020 11:00) (64 - 95)  RR: 30 (28 Aug 2020 11:) (12 - 33)  SpO2: 97% (28 Aug 2020 11:00) (93% - 100%)  Daily     Daily Weight in k.2 (28 Aug 2020 07:00)I&O's Summary    27 Aug 2020 07:01  -  28 Aug 2020 07:00  --------------------------------------------------------  IN: 7750.9 mL / OUT: 7547 mL / NET: 203.9 mL    28 Aug 2020 07:01  -  28 Aug 2020 11:46  --------------------------------------------------------  IN: 1380 mL / OUT: 1550 mL / NET: -170 mL        GENERAL:  Appears stated age, well-groomed, well-nourished, no distress  HEENT:  NC/AT,  conjunctivae clear and pink, no thyromegaly, nodules, adenopathy, no JVD, sclera -anicteric  CHEST:  Full & symmetric excursion, no increased effort, breath sounds clear  HEART:  Regular rhythm, S1, S2, no murmur/rub/S3/S4, no abdominal bruit, no edema  ABDOMEN:  Soft, non-tender, non-distended, normoactive bowel sounds,  no masses ,no hepato-splenomegaly, no signs of chronic liver disease  EXTEREMITIES:  no cyanosis,clubbing or edema  SKIN:  No rash/erythema/ecchymoses/petechiae/wounds/abscess/warm/dry  NEURO:  Alert, oriented, no asterixis, no tremor, no encephalopathy      LABS:                        11.3   4.05  )-----------( 73       ( 28 Aug 2020 04:47 )             31.2         119<LL>  |  84<L>  |  2<L>  ----------------------------<  131<H>  2.7<LL>   |  27  |  0.82    Ca    7.0<L>      28 Aug 2020 07:47  Phos  1.3       Mg     1.7         TPro  4.6<L>  /  Alb  2.0<L>  /  TBili  10.7<H>  /  DBili  x   /  AST  372<H>  /  ALT  238<H>  /  AlkPhos  176<H>      PT/INR - ( 28 Aug 2020 04:47 )   PT: 13.5 sec;   INR: 1.16 ratio         PTT - ( 28 Aug 2020 04:47 )  PTT:45.6 sec    amylase   lipaseLipase, Serum: 156 U/L ( @ 07:01)    RADIOLOGY & ADDITIONAL TESTS:

## 2020-08-28 NOTE — PROGRESS NOTE ADULT - SUBJECTIVE AND OBJECTIVE BOX
Patient is a 35y old  Male who presents with a chief complaint of Seizure (27 Aug 2020 22:35)    24 hour events: ***    REVIEW OF SYSTEMS  Constitutional: No fever, chills, fatigue  Neuro: No headache, numbness, weakness  Resp: No cough, wheezing, shortness of breath  CVS: No chest pain, palpitations, leg swelling  GI: No abdominal pain, nausea, vomiting, diarrhea   : No dysuria, frequency, incontinence  Skin: No itching, burning, rashes, or lesions   Msk: No joint pain or swelling  Psych: No depression, anxiety, mood swings  Heme: No bleeding    T(F): 97.8 (08-28-20 @ 04:24), Max: 100.1 (08-27-20 @ 20:32)  HR: 63 (08-28-20 @ 07:00) (61 - 126)  BP: 120/73 (08-28-20 @ 07:00) (91/51 - 140/84)  RR: 14 (08-28-20 @ 07:00) (12 - 29)  SpO2: 98% (08-28-20 @ 07:00) (93% - 100%)  Wt(kg): --            I&O's Summary    08-27 @ 07:01  -  08-28 @ 07:00  --------------------------------------------------------  IN: 7750.9 mL / OUT: 7547 mL / NET: 203.9 mL    08-28 @ 07:01  -  08-28 @ 07:53  --------------------------------------------------------  IN: 250 mL / OUT: 450 mL / NET: -200 mL      PHYSICAL EXAM  General:   CNS:   HEENT:   Resp:   CVS:   Abd:   Ext:   Skin:     MEDICATIONS      desmopressin Injectable IV Push          heparin   Injectable SubCutaneous    pantoprazole  Injectable IV Push  ursodiol Capsule Oral      dextrose 5%. IV Continuous  dextrose 5%. IV Continuous  folic acid Injectable IV Push  multivitamin Oral  potassium chloride   Powder Oral  potassium phosphate IVPB IV Intermittent  thiamine Injectable IV Push                                11.3   4.05  )-----------( 73       ( 28 Aug 2020 04:47 )             31.2       08-28    123<L>  |  87<L>  |  4<L>  ----------------------------<  150<H>  2.6<LL>   |  25  |  0.88    Ca    6.6<L>      28 Aug 2020 04:47  Phos  1.3     08-28  Mg     1.7     08-28    TPro  4.6<L>  /  Alb  2.0<L>  /  TBili  10.7<H>  /  DBili  x   /  AST  372<H>  /  ALT  238<H>  /  AlkPhos  176<H>  08-28          PT/INR - ( 28 Aug 2020 04:47 )   PT: 13.5 sec;   INR: 1.16 ratio         PTT - ( 28 Aug 2020 04:47 )  PTT:45.6 sec          Radiology: ***  Bedside lung ultrasound: ***  Bedside ECHO: ***    CENTRAL LINE: Y/N          DATE INSERTED:              REMOVE: Y/N  OSEGUERA: Y/N                        DATE INSERTED:              REMOVE: Y/N  A-LINE: Y/N                       DATE INSERTED:              REMOVE: Y/N    GLOBAL ISSUE/BEST PRACTICE  Analgesia:   Sedation:   CAM-ICU:   HOB elevation: yes  Stress ulcer prophylaxis:   VTE prophylaxis:   Glycemic control:   Nutrition:     CODE STATUS: ***  [facility] discussion: Y Patient is a 35y old  Male who presents with a chief complaint of Seizure (27 Aug 2020 22:35)    24 hour events: DDVAP and D5W infusion discontinued for decreasing sodium level. Off Precedex Ativan x1 for agitation this AM. Pt seen and examined at bedside this AM. Mental status is improving, no signs of Sz activity. Admits to fatigue, but denies fever, dizziness, chest pain, palpitations, nausea or abd. pain.    REVIEW OF SYSTEMS  Constitutional: +fatigue; No fever, chills  Neuro: No headache, numbness, weakness  Resp: No cough, wheezing, shortness of breath  CVS: No chest pain, palpitations, leg swelling  GI: No abdominal pain, nausea, vomiting, diarrhea   : No dysuria, frequency, incontinence  Skin: No itching, burning, rashes, or lesions   MSK: No joint pain or swelling    ICU Vital Signs Last 24 Hrs  T(C): 36.7 (28 Aug 2020 08:00), Max: 37.8 (27 Aug 2020 20:32)  T(F): 98 (28 Aug 2020 08:00), Max: 100.1 (27 Aug 2020 20:32)  HR: 90 (28 Aug 2020 12:00) (61 - 122)  BP: 127/74 (28 Aug 2020 12:00) (103/54 - 127/74)  BP(mean): 96 (28 Aug 2020 12:00) (74 - 96)  ABP: --  ABP(mean): --  RR: 26 (28 Aug 2020 12:00) (12 - 33)  SpO2: 97% (28 Aug 2020 12:00) (94% - 100%)      I&O's Summary    27 Aug 2020 07:01  -  28 Aug 2020 07:00  --------------------------------------------------------  IN: 7750.9 mL / OUT: 7547 mL / NET: 203.9 mL    28 Aug 2020 07:01  -  28 Aug 2020 13:39  --------------------------------------------------------  IN: 1480 mL / OUT: 1900 mL / NET: -420 mL      PHYSICAL EXAM  Constitutional: NAD  HEENT: Scleral icterus, non-bleeding laceration on R side of tongue; PERRLA, EOMI, no nystagmus  Neck: supple,  No JVD, Trachea midline  Respiratory: Breath Sounds equal & clear bilaterally to auscultation, no accessory muscle use noted  Cardiovascular: Regular rate, regular rhythm, normal S1, S2; no murmurs or rub  Gastrointestinal: Soft, non-tender, non distended, no hepatosplenomegaly, normal bowel sounds  Extremities: JOHNSON x 4, no peripheral edema, no cyanosis, no clubbing   Vascular: Equal and normal pulses: 2+ peripheral pulses throughout  Neurological: non-agitated; no focal neurologic deficits   Musculoskeletal: No joint swelling or deformity; no limitation of movement  Skin: warm, dry, well perfused, no rashes        MEDICATIONS  (STANDING):  enoxaparin Injectable 40 milliGRAM(s) SubCutaneous daily  folic acid Injectable 1 milliGRAM(s) IV Push daily  multivitamin 1 Tablet(s) Oral daily  pantoprazole  Injectable 40 milliGRAM(s) IV Push daily  potassium chloride   Powder 40 milliEquivalent(s) Oral every 4 hours  thiamine Injectable 100 milliGRAM(s) IV Push daily  ursodiol Capsule 300 milliGRAM(s) Oral three times a day    MEDICATIONS  (PRN):                            11.3   4.05  )-----------( 73       ( 28 Aug 2020 04:47 )             31.2       08-28    123<L>  |  87<L>  |  4<L>  ----------------------------<  150<H>  2.6<LL>   |  25  |  0.88    Ca    6.6<L>      28 Aug 2020 04:47  Phos  1.3     08-28  Mg     1.7     08-28    TPro  4.6<L>  /  Alb  2.0<L>  /  TBili  10.7<H>  /  DBili  x   /  AST  372<H>  /  ALT  238<H>  /  AlkPhos  176<H>  08-28          PT/INR - ( 28 Aug 2020 04:47 )   PT: 13.5 sec;   INR: 1.16 ratio         PTT - ( 28 Aug 2020 04:47 )  PTT:45.6 sec          Radiology: ***  Bedside lung ultrasound: ***  Bedside ECHO: ***    CENTRAL LINE: Y/N          DATE INSERTED:              REMOVE: Y/N  OSEGUERA: Y/N                        DATE INSERTED:              REMOVE: Y/N  A-LINE: Y/N                       DATE INSERTED:              REMOVE: Y/N    GLOBAL ISSUE/BEST PRACTICE  Analgesia:   Sedation:   CAM-ICU:   HOB elevation: yes  Stress ulcer prophylaxis:   VTE prophylaxis:   Glycemic control:   Nutrition:     CODE STATUS: ***  [facility] discussion: Y Patient is a 35y old  Male who presents with a chief complaint of Seizure (27 Aug 2020 22:35)    24 hour events: DDVAP and D5W infusion discontinued for decreasing sodium level. Off Precedex given Ativan x1 for agitation this AM which is resolving. Pt seen and examined at bedside this AM. Mental status is improving, no signs of Sz activity. Admits to fatigue, but denies fever, dizziness, chest pain, palpitations, nausea or abd. pain.    REVIEW OF SYSTEMS  Constitutional: +fatigue; No fever, chills  Neuro: No headache, numbness, weakness  Resp: No cough, wheezing, shortness of breath  CVS: No chest pain, palpitations, leg swelling  GI: No abdominal pain, nausea, vomiting, diarrhea   : No dysuria, frequency, incontinence  Skin: No itching, burning, rashes, or lesions   MSK: No joint pain or swelling    ICU Vital Signs Last 24 Hrs  T(C): 36.7 (28 Aug 2020 08:00), Max: 37.8 (27 Aug 2020 20:32)  T(F): 98 (28 Aug 2020 08:00), Max: 100.1 (27 Aug 2020 20:32)  HR: 90 (28 Aug 2020 12:00) (61 - 122)  BP: 127/74 (28 Aug 2020 12:00) (103/54 - 127/74)  BP(mean): 96 (28 Aug 2020 12:00) (74 - 96)  ABP: --  ABP(mean): --  RR: 26 (28 Aug 2020 12:00) (12 - 33)  SpO2: 97% (28 Aug 2020 12:00) (94% - 100%)      I&O's Summary    27 Aug 2020 07:01  -  28 Aug 2020 07:00  --------------------------------------------------------  IN: 7750.9 mL / OUT: 7547 mL / NET: 203.9 mL    28 Aug 2020 07:01  -  28 Aug 2020 13:39  --------------------------------------------------------  IN: 1480 mL / OUT: 1900 mL / NET: -420 mL      PHYSICAL EXAM  Constitutional: NAD  HEENT: Scleral icterus, non-bleeding laceration on R side of tongue; PERRLA, EOMI, no nystagmus  Neck: supple,  No JVD, Trachea midline  Respiratory: Breath Sounds equal & clear bilaterally to auscultation, no accessory muscle use noted  Cardiovascular: Regular rate, regular rhythm, normal S1, S2; no murmurs or rub  Gastrointestinal: Soft, non-tender, non distended, no hepatosplenomegaly, normal bowel sounds  Extremities: JOHNSON x 4, no peripheral edema, no cyanosis, no clubbing   Vascular: Equal and normal pulses: 2+ peripheral pulses throughout  Neurological: non-agitated; no focal neurologic deficits   Musculoskeletal: No joint swelling or deformity; no limitation of movement  Skin: warm, dry, well perfused, no rashes        MEDICATIONS  (STANDING):  enoxaparin Injectable 40 milliGRAM(s) SubCutaneous daily  folic acid Injectable 1 milliGRAM(s) IV Push daily  multivitamin 1 Tablet(s) Oral daily  pantoprazole  Injectable 40 milliGRAM(s) IV Push daily  potassium chloride   Powder 40 milliEquivalent(s) Oral every 4 hours  thiamine Injectable 100 milliGRAM(s) IV Push daily  ursodiol Capsule 300 milliGRAM(s) Oral three times a day    MEDICATIONS  (PRN):                            11.3   4.05  )-----------( 73       ( 28 Aug 2020 04:47 )             31.2       08-28    123<L>  |  87<L>  |  4<L>  ----------------------------<  150<H>  2.6<LL>   |  25  |  0.88    Ca    6.6<L>      28 Aug 2020 04:47  Phos  1.3     08-28  Mg     1.7     08-28    TPro  4.6<L>  /  Alb  2.0<L>  /  TBili  10.7<H>  /  DBili  x   /  AST  372<H>  /  ALT  238<H>  /  AlkPhos  176<H>  08-28          PT/INR - ( 28 Aug 2020 04:47 )   PT: 13.5 sec;   INR: 1.16 ratio         PTT - ( 28 Aug 2020 04:47 )  PTT:45.6 sec          Radiology: CT Head: no evidence of acute infarct, intracranial hemorrhage, or mass effect.  Abd. US: Hepatic Steatosis; No gallstones or gallbladder thickening  CXR: Unremarkable. Clear lungs.  Bedside lung ultrasound: A-line predominant. No consolidations or pleural effusions.  Bedside ECHO: No evidence of pericardial effusion. IVC indeterminate. Grossly normal LV function.    CENTRAL LINE: N            OSEGUERA: Y                      DATE INSERTED: 08/27          REMOVE: N  A-LINE: Patient is a 35y old  Male who presents with a chief complaint of Seizure (27 Aug 2020 22:35)    24 hour events: Patient seen and examined at bedside. Patient agitated last night and required precedex, now DCd. Remains HD stable, afebrile, satting well on RA. Mental status is improving, no signs of Sz activity. Admits to fatigue, but denies fever, dizziness, chest pain, palpitations, nausea or abd. pain. Ativan x1 for agitation this AM which is resolving.    REVIEW OF SYSTEMS  Constitutional: +fatigue; No fever, chills  Neuro: No headache, numbness, weakness  Resp: No cough, wheezing, shortness of breath  CVS: No chest pain, palpitations, leg swelling  GI: No abdominal pain, nausea, vomiting, diarrhea   : No dysuria, frequency, incontinence  Skin: No itching, burning, rashes, or lesions   MSK: No joint pain or swelling    ICU Vital Signs Last 24 Hrs  T(C): 36.7 (28 Aug 2020 08:00), Max: 37.8 (27 Aug 2020 20:32)  T(F): 98 (28 Aug 2020 08:00), Max: 100.1 (27 Aug 2020 20:32)  HR: 90 (28 Aug 2020 12:00) (61 - 122)  BP: 127/74 (28 Aug 2020 12:00) (103/54 - 127/74)  BP(mean): 96 (28 Aug 2020 12:00) (74 - 96)  ABP: --  ABP(mean): --  RR: 26 (28 Aug 2020 12:00) (12 - 33)  SpO2: 97% (28 Aug 2020 12:00) (94% - 100%)      I&O's Summary    27 Aug 2020 07:01  -  28 Aug 2020 07:00  --------------------------------------------------------  IN: 7750.9 mL / OUT: 7547 mL / NET: 203.9 mL    28 Aug 2020 07:01  -  28 Aug 2020 13:39  --------------------------------------------------------  IN: 1480 mL / OUT: 1900 mL / NET: -420 mL      PHYSICAL EXAM  Constitutional: Laying upright in bed in NAD  HEENT: Scleral icterus, non-bleeding laceration on R side of tongue; PERRLA, EOMI, no nystagmus. No tongue fasciculations.  Respiratory: Breath Sounds equal & clear bilaterally to auscultation, no wheezes, rales or rhonchi.  Cardiovascular: Regular rate, regular rhythm, normal S1, S2; no murmurs or rub  Gastrointestinal: Soft, non-tender, non distended, normal bowel sounds x4  Extremities: JOHNSON x 4, no peripheral edema, no cyanosis, no clubbing   Vascular: Equal and normal pulses: 2+ peripheral pulses throughout  Neurological: non-agitated; no focal neurologic deficits   Musculoskeletal: No joint swelling or deformity; no limitation of movement  Skin: warm, dry, well perfused, no rashes        MEDICATIONS  (STANDING):  enoxaparin Injectable 40 milliGRAM(s) SubCutaneous daily  folic acid Injectable 1 milliGRAM(s) IV Push daily  multivitamin 1 Tablet(s) Oral daily  pantoprazole  Injectable 40 milliGRAM(s) IV Push daily  potassium chloride   Powder 40 milliEquivalent(s) Oral every 4 hours  thiamine Injectable 100 milliGRAM(s) IV Push daily  ursodiol Capsule 300 milliGRAM(s) Oral three times a day    MEDICATIONS  (PRN):                            11.3   4.05  )-----------( 73       ( 28 Aug 2020 04:47 )             31.2       08-28    123<L>  |  87<L>  |  4<L>  ----------------------------<  150<H>  2.6<LL>   |  25  |  0.88    Ca    6.6<L>      28 Aug 2020 04:47  Phos  1.3     08-28  Mg     1.7     08-28    TPro  4.6<L>  /  Alb  2.0<L>  /  TBili  10.7<H>  /  DBili  x   /  AST  372<H>  /  ALT  238<H>  /  AlkPhos  176<H>  08-28          PT/INR - ( 28 Aug 2020 04:47 )   PT: 13.5 sec;   INR: 1.16 ratio         PTT - ( 28 Aug 2020 04:47 )  PTT:45.6 sec          Radiology: CT Head: no evidence of acute infarct, intracranial hemorrhage, or mass effect.  Abd US: Hepatic Steatosis; No gallstones or gallbladder thickening  CXR: Unremarkable. Clear lungs.  Bedside lung ultrasound: A-line predominant. No consolidations or pleural effusions.  Bedside ECHO: No evidence of pericardial effusion. IVC indeterminate. Grossly normal LV function.    CENTRAL LINE: N            OSEGUERA: Y                      DATE INSERTED: 08/27          REMOVE: N  A-LINE:  N                Code status: Full code

## 2020-08-28 NOTE — PROGRESS NOTE ADULT - SUBJECTIVE AND OBJECTIVE BOX
Patient is a 35y old  Male who presents with a chief complaint of Seizure (28 Aug 2020 11:46)      INTERVAL HPI: Pt seen and examined. States he is feeling better, knows he is in hospital but not ICU, states he has history of substance use disorder of alcoholism and was sober  at one point but relapse due to being extra thirsty. Pt understands and wants to quit.     OVERNIGHT EVENTS: none noted  T(F): 99.4 (08-28-20 @ 16:16), Max: 100.1 (08-27-20 @ 20:32)  HR: 104 (08-28-20 @ 16:00) (61 - 122)  BP: 122/76 (08-28-20 @ 16:00) (104/59 - 130/83)  RR: 27 (08-28-20 @ 16:00) (12 - 33)  SpO2: 97% (08-28-20 @ 16:00) (94% - 98%)  I&O's Summary    27 Aug 2020 07:01  -  28 Aug 2020 07:00  --------------------------------------------------------  IN: 7750.9 mL / OUT: 7547 mL / NET: 203.9 mL    28 Aug 2020 07:01  -  28 Aug 2020 16:31  --------------------------------------------------------  IN: 1820 mL / OUT: 5150 mL / NET: -3330 mL        REVIEW OF SYSTEMS:  CONSTITUTIONAL: No fever, weight loss, or fatigue  RESPIRATORY: No cough, wheezing, chills or hemoptysis; No shortness of breath  CARDIOVASCULAR: No chest pain, palpitations, dizziness, or leg swelling  GASTROINTESTINAL: No abdominal or epigastric pain. No nausea, vomiting, or hematemesis; No diarrhea or constipation. No melena or hematochezia.  GENITOURINARY: No dysuria, frequency, hematuria, or incontinence  NEUROLOGICAL: No headaches, memory loss, loss of strength, numbness, or tremors  SKIN: No itching, burning, rashes, or lesions   MUSCULOSKELETAL: No joint pain or swelling; No muscle, back, or extremity pain  PSYCHIATRIC: No depression, anxiety, mood swings, or difficulty sleeping      PHYSICAL EXAM:  GENERAL: NAD, well-groomed, well-developed  NERVOUS SYSTEM:  Alert & Oriented X3, Good concentration; Motor Strength 3/5 B/L upper and lower extremities; DTRs 2+ intact and symmetric  CHEST/LUNG: Clear to percussion bilaterally; No rales, rhonchi, wheezing, or rubs  HEART: Regular rate and rhythm; No murmurs, rubs, or gallops  ABDOMEN: Soft, Nontender, Nondistended; Bowel sounds present  EXTREMITIES:  2+ Peripheral Pulses, No clubbing, cyanosis, or edema  SKIN: No rashes or lesions    LABS:                        11.3   4.05  )-----------( 73       ( 28 Aug 2020 04:47 )             31.2     08-28    121<L>  |  86<L>  |  3<L>  ----------------------------<  126<H>  3.5   |  25  |  0.79    Ca    7.0<L>      28 Aug 2020 12:26  Phos  1.3     08-28  Mg     1.7     08-28    TPro  4.6<L>  /  Alb  2.0<L>  /  TBili  10.7<H>  /  DBili  x   /  AST  372<H>  /  ALT  238<H>  /  AlkPhos  176<H>  08-28    PT/INR - ( 28 Aug 2020 04:47 )   PT: 13.5 sec;   INR: 1.16 ratio         PTT - ( 28 Aug 2020 04:47 )  PTT:45.6 sec    CAPILLARY BLOOD GLUCOSE                  MEDICATIONS  (STANDING):  desmopressin Injectable 2 MICROGram(s) IV Push once  enoxaparin Injectable 40 milliGRAM(s) SubCutaneous daily  folic acid Injectable 1 milliGRAM(s) IV Push daily  multivitamin 1 Tablet(s) Oral daily  pantoprazole  Injectable 40 milliGRAM(s) IV Push daily  thiamine Injectable 100 milliGRAM(s) IV Push daily  ursodiol Capsule 300 milliGRAM(s) Oral three times a day    MEDICATIONS  (PRN):

## 2020-08-28 NOTE — DIETITIAN INITIAL EVALUATION ADULT. - REASON INDICATOR FOR ASSESSMENT
89 year old male patient with past medical history of Afib, HTN, hypothyroidism, chronic back pain, recurrent falls, anemia, dyslipidemia, facial cancer, CKD presented for bilateral hand swelling and pain and admitted with a primary diagnosis of cellulitis. More likely, it is looking like a rheumatological diagnosis.    #Hypotension, Lethargy and worsening renal function on CKD III  - clinically worsening, s/p 2L IVF with worsening renal fxn and urine output  - chest xray with b/l effusions/opacities  - No evidence of infection  - ID on board, dc abx (patient's myoclonus can also be from cefepime)  - FU Echo results  - denied ICU  - will give another 500cc bolus NS  - if continues to be hypotensive, may need to start pressors  - GOC    #Myoclonus  - F/U EEG  - CTH without any acute pathology  - could be due to neurotoxicity due to cefepime  - neurology on board    #Swelling with redness dorsum of left hand, likely synovitis?/ no evidence of cellulitis  - Leucocytosis on admission, mildly hypothermic, hemodynamically stable   - ESR elevated, so far rheum workup negative  - c/w steroids as follows: Prednisone 10 mg today, then 7.5 mg x 2 days, 5 mg x 2 days, then 2.5 mg x 2 days and stop  - rheumatology F/U    #Afib   - c/w Amiodarone and lopressor, holding Cardizem given hypotension  - c/w eliquis for now    #Suspected Malnutrition with functional Quadriplegia  - dietary on board, encourage po Intake  - PT/Rehab eval     #Facial 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 Awaiting wife's arrival for GOC conversation  Disposition:  Unknown    PAtient is critically ill and is at a high risk of decompensation ICU admit

## 2020-08-28 NOTE — DIETITIAN INITIAL EVALUATION ADULT. - PROBLEM SELECTOR PLAN 5
- Pt with transaminitis, hyperbilirubinemia, likely acute liver failure  - F/u PT/INR, hepatitis panel  - F/u abd ultrasound  - Avoid hepatotoxic drugs  - GI consulted per ED, Dr Torres

## 2020-08-28 NOTE — PROGRESS NOTE ADULT - SUBJECTIVE AND OBJECTIVE BOX
Patient is a 35y old  Male who presents with a chief complaint of Seizure (28 Aug 2020 13:12)      BRIEF HOSPITAL COURSE: 34 y/o male with pmhx of etoh abuse who presented on 8/27 with seizure activity witnessed by wife in setting of etoh intoxication and hyponatremia with initial sodium of 111. Hospital course complicated by sodium rapid correction to 129 in a 14 hour period. Since then sodium improved to 119 but then increased rapidly again and required an additional dose of DDAVP today (8/28) around 16:00 with a 1500 cc D5W bolus.     Events last 24 hours: continue auto-diuresis. urine output initially decraesed to 125 after 4 pm dose of DDAVP now uptrending again. repeat Na levels 127. no seizure activity. patient is tachycardia and agitated. responded well to 2 mg IVP ativan.    PAST MEDICAL & SURGICAL HISTORY:  No pertinent past medical history  S/P appendectomy  H/O hernia repair      Review of Systems:  CONSTITUTIONAL: No fever, chills, or fatigue  EYES: No eye pain, visual disturbances, or discharge  ENMT:  No difficulty hearing, tinnitus, vertigo; No sinus or throat pain  NECK: No pain or stiffness  RESPIRATORY: No cough, wheezing, chills or hemoptysis; No shortness of breath  CARDIOVASCULAR: No chest pain, palpitations, dizziness, or leg swelling  GASTROINTESTINAL: No abdominal or epigastric pain. No nausea, vomiting, or hematemesis; No diarrhea or constipation. No melena or hematochezia.  GENITOURINARY: No dysuria, frequency, hematuria, or incontinence  NEUROLOGICAL: No headaches, memory loss, loss of strength, numbness, or tremors  SKIN: No itching, burning, rashes, or lesions   MUSCULOSKELETAL: No joint pain or swelling; No muscle, back, or extremity pain  PSYCHIATRIC: No depression, anxiety, mood swings, or difficulty sleeping      Medications:        chlordiazePOXIDE 50 milliGRAM(s) Oral every 6 hours  chlordiazePOXIDE   Oral   LORazepam   Injectable 2 milliGRAM(s) IV Push every 4 hours PRN      enoxaparin Injectable 40 milliGRAM(s) SubCutaneous daily    pantoprazole  Injectable 40 milliGRAM(s) IV Push daily  ursodiol Capsule 300 milliGRAM(s) Oral three times a day        dextrose 5%. 1000 milliLiter(s) IV Continuous <Continuous>  folic acid Injectable 1 milliGRAM(s) IV Push daily  multivitamin 1 Tablet(s) Oral daily  thiamine Injectable 100 milliGRAM(s) IV Push daily                ICU Vital Signs Last 24 Hrs  T(C): 37.5 (28 Aug 2020 20:33), Max: 37.6 (27 Aug 2020 23:24)  T(F): 99.5 (28 Aug 2020 20:33), Max: 99.6 (27 Aug 2020 23:24)  HR: 127 (28 Aug 2020 21:00) (0 - 150)  BP: 134/68 (28 Aug 2020 21:00) (104/59 - 142/99)  BP(mean): 89 (28 Aug 2020 21:00) (75 - 113)  ABP: --  ABP(mean): --  RR: 30 (28 Aug 2020 21:00) (12 - 35)  SpO2: 94% (28 Aug 2020 21:00) (94% - 98%)          I&O's Detail    27 Aug 2020 07:01  -  28 Aug 2020 07:00  --------------------------------------------------------  IN:    dexmedetomidine Infusion: 26 mL    dexmedetomidine Infusion: 129.9 mL    dextrose 5%.: 1500 mL    dextrose 5%.: 420 mL    dextrose 5%.: 500 mL    dextrose 5%.: 2500 mL    Lactated Ringers IV Bolus: 500 mL    Oral Fluid: 100 mL    sodium chloride 0.9%: 375 mL    Solution: 100 mL    Solution: 600 mL    Solution: 1000 mL  Total IN: 7750.9 mL    OUT:    Indwelling Catheter - Urethral: 7547 mL  Total OUT: 7547 mL    Total NET: 203.9 mL      28 Aug 2020 07:01  -  28 Aug 2020 21:30  --------------------------------------------------------  IN:    dextrose 5%.: 250 mL    dextrose 5%.: 225 mL    Oral Fluid: 1180 mL    Solution: 500 mL    Solution: 50 mL    Solution: 300 mL  Total IN: 2505 mL    OUT:    Indwelling Catheter - Urethral: 6750 mL  Total OUT: 6750 mL    Total NET: -4245 mL            LABS:                        11.3   4.05  )-----------( 73       ( 28 Aug 2020 04:47 )             31.2     08-28    127<L>  |  95<L>  |  3<L>  ----------------------------<  141<H>  3.9   |  26  |  1.00    Ca    7.8<L>      28 Aug 2020 18:42  Phos  1.3     08-28  Mg     1.7     08-28    TPro  4.6<L>  /  Alb  2.0<L>  /  TBili  10.7<H>  /  DBili  x   /  AST  372<H>  /  ALT  238<H>  /  AlkPhos  176<H>  08-28          CAPILLARY BLOOD GLUCOSE        PT/INR - ( 28 Aug 2020 04:47 )   PT: 13.5 sec;   INR: 1.16 ratio         PTT - ( 28 Aug 2020 04:47 )  PTT:45.6 sec    CULTURES:      Physical Examination:    General: No acute distress.      HEENT: Pupils equal, reactive to light.  Symmetric.    PULM: Clear to auscultation bilaterally, no significant sputum production    NECK: Supple, no lymphadenopathy, trachea midline    CVS: Regular rate and rhythm, no murmurs, rubs, or gallops    ABD: Soft, nondistended, nontender, normoactive bowel sounds, no masses    EXT: No edema, nontender    SKIN: Warm and well perfused, no rashes noted.    NEURO: Alert, oriented, interactive, nonfocal    DEVICES:     RADIOLOGY: ***    CRITICAL CARE TIME SPENT: ***

## 2020-08-28 NOTE — DIETITIAN INITIAL EVALUATION ADULT. - PROBLEM SELECTOR PLAN 6
- Last drink 3am 8/27/2020  - ETOH of 314on admission  - [facility] protocol  - F/u b12/b9 levels

## 2020-08-28 NOTE — DIETITIAN INITIAL EVALUATION ADULT. - PROBLEM SELECTOR PLAN 3
- Admitted to icu, monitor on tele  - S/p ativan 2mg x 2  - Seizure precautions  - Ativan prn for seizure activity

## 2020-08-28 NOTE — PROGRESS NOTE ADULT - SUBJECTIVE AND OBJECTIVE BOX
Patient is a 35y old  Male who presents with a chief complaint of Seizure (28 Aug 2020 11:46)  Patient seen in follow up for hyponatremia.     Events noted. Received desmopressin, D5W for rapid correction of sodium levels.        PAST MEDICAL HISTORY:  No pertinent past medical history    MEDICATIONS  (STANDING):  enoxaparin Injectable 40 milliGRAM(s) SubCutaneous daily  folic acid Injectable 1 milliGRAM(s) IV Push daily  multivitamin 1 Tablet(s) Oral daily  pantoprazole  Injectable 40 milliGRAM(s) IV Push daily  thiamine Injectable 100 milliGRAM(s) IV Push daily  ursodiol Capsule 300 milliGRAM(s) Oral three times a day    MEDICATIONS  (PRN):    T(C): 37.4 (08-28-20 @ 16:16), Max: 37.8 (08-27-20 @ 20:32)  HR: 104 (08-28-20 @ 16:00) (61 - 126)  BP: 122/76 (08-28-20 @ 16:00) (91/51 - 140/84)  RR: 27 (08-28-20 @ 16:00) (12 - 33)  SpO2: 97% (08-28-20 @ 16:00) (93% - 100%)  Wt(kg): --  I&O's Detail    27 Aug 2020 07:01  -  28 Aug 2020 07:00  --------------------------------------------------------  IN:    dexmedetomidine Infusion: 26 mL    dexmedetomidine Infusion: 129.9 mL    dextrose 5%.: 1500 mL    dextrose 5%.: 420 mL    dextrose 5%.: 500 mL    dextrose 5%.: 2500 mL    Lactated Ringers IV Bolus: 500 mL    Oral Fluid: 100 mL    sodium chloride 0.9%: 375 mL    Solution: 100 mL    Solution: 600 mL    Solution: 1000 mL  Total IN: 7750.9 mL    OUT:    Indwelling Catheter - Urethral: 7547 mL  Total OUT: 7547 mL    Total NET: 203.9 mL      28 Aug 2020 07:01  -  28 Aug 2020 17:12  --------------------------------------------------------  IN:    dextrose 5%.: 250 mL    Oral Fluid: 720 mL    Solution: 500 mL    Solution: 50 mL    Solution: 300 mL  Total IN: 1820 mL    OUT:    Indwelling Catheter - Urethral: 5850 mL  Total OUT: 5850 mL    Total NET: -4030 mL    PHYSICAL EXAM:  General: No distress  Respiratory: b/l air entry  Cardiovascular: S1 S2  Gastrointestinal: soft  Extremities:  no edema                            11.3   4.05  )-----------( 73       ( 28 Aug 2020 04:47 )             31.2     08-28    121<L>  |  86<L>  |  3<L>  ----------------------------<  126<H>  3.5   |  25  |  0.79    Ca    7.0<L>      28 Aug 2020 12:26  Phos  1.3     08-28  Mg     1.7     08-28    TPro  4.6<L>  /  Alb  2.0<L>  /  T Bili  10.7<H>  /  D Bili  x   /  AST  372<H>  /  ALT  238<H>  /  Alk Phos  176<H>  08-28        LIVER FUNCTIONS - ( 28 Aug 2020 04:47 )  Alb: 2.0 g/dL / Pro: 4.6 g/dL / ALK PHOS: 176 U/L / ALT: 238 U/L / AST: 372 U/L / GGT: x               Sodium, Serum: 121 (08-28 @ 12:26)  Sodium, Serum: 119 (08-28 @ 07:47)  Sodium, Serum: 123 (08-28 @ 04:47)  Sodium, Serum: 126 (08-28 @ 00:21)    Creatinine, Serum: 0.79 (08-28 @ 12:26)  Creatinine, Serum: 0.82 (08-28 @ 07:47)  Creatinine, Serum: 0.88 (08-28 @ 04:47)  Creatinine, Serum: 0.93 (08-28 @ 00:21)  Creatinine, Serum: 0.68 (08-27 @ 20:47)  Creatinine, Serum: 0.54 (08-27 @ 15:28)  Creatinine, Serum: 0.64 (08-27 @ 10:05)  Creatinine, Serum: 0.78 (08-27 @ 07:01)    Potassium, Serum: 3.5 (08-28 @ 12:26)  Potassium, Serum: 2.7 (08-28 @ 07:47)  Potassium, Serum: 2.6 (08-28 @ 04:47)  Potassium, Serum: 3.0 (08-28 @ 00:21)    Hemoglobin: 11.3 (08-28 @ 04:47)  Hemoglobin: 14.0 (08-27 @ 07:01)

## 2020-08-28 NOTE — PROGRESS NOTE ADULT - ASSESSMENT
34 y/o male with pmhx of etoh abuse admitted with seizure like activity in setting of hyponatremia and etoh intoxication with hospital course complicated by rapid overcorrection of sodium levels secondary to auto-diuresis.    NEURO: MV, thiamine, folic acid. added prn ativan. hold librium due to transaminitis. he has not required frequent dosing as of yet, but if so will add on an ativan taper.   CVS: sinus tachy secondary to etoh withdrawal  PULM: no active issues  GI: protonix daily  RENAL: startd on D5W at 75 cc/hr as overcorrecting again. just received DDAVP < 6 hours ago. f/u bmp ordered for 23:00. may require additional DDAVP dose. goal Na level is 127 for morning labs, which would be a total of 16 meq increase in a 48 hour period. goal is no more than 8 meq in 24 hour period. hyponatremia likely secondary to beer potomania.   ID: monitor off abx  ENDO:  no active issues  HEME: lovenox subq for dvt prophylaxis  DISPO: full code 36 y/o male with pmhx of etoh abuse admitted with seizure like activity in setting of hyponatremia and etoh intoxication with hospital course complicated by rapid overcorrection of sodium levels secondary to auto-diuresis.    NEURO: MV, thiamine, folic acid. added prn ativan. hold librium due to transaminitis. he has not required frequent dosing as of yet, but if so will add on an ativan taper.   CVS: sinus tachy secondary to etoh withdrawal  PULM: no active issues  GI: protonix daily  RENAL: startd on D5W at 75 cc/hr as overcorrecting again. just received DDAVP < 6 hours ago. f/u bmp ordered for 23:00. may require additional DDAVP dose. goal Na level is 127 for morning labs, which would be a total of 16 meq increase in a 48 hour period. goal is no more than 8 meq in 24 hour period. hyponatremia likely secondary to beer potomania.   ID: monitor off abx  ENDO:  no active issues  HEME: lovenox subq for dvt prophylaxis  DISPO: full code      addendum: Na increase to 130 on repeat labs. urine output 350 cc/hr. give additional dose of DDAVP and bolus 500 cc D5W. continue maintenance for now to account of ongoing urinary losses causing overcorrection. potassium repleted. f/u am labs. 34 y/o male with pmhx of etoh abuse admitted with seizure like activity in setting of hyponatremia and etoh intoxication with hospital course complicated by rapid overcorrection of sodium levels secondary to auto-diuresis.    NEURO: MV, thiamine, folic acid. added prn ativan. hold librium due to transaminitis. he has not required frequent dosing as of yet, but if so will add on an ativan taper.   CVS: sinus tachy secondary to etoh withdrawal  PULM: no active issues  GI: protonix daily  RENAL: startd on D5W at 75 cc/hr as overcorrecting again. just received DDAVP < 6 hours ago. f/u bmp ordered for 23:00. may require additional DDAVP dose. goal Na level is 127 for morning labs, which would be a total of 16 meq increase in a 48 hour period. goal is no more than 8 meq in 24 hour period. hyponatremia likely secondary to beer potomania.   ID: monitor off abx  ENDO:  no active issues  HEME: lovenox subq for dvt prophylaxis  DISPO: full code      addendum: Na increase to 130 on repeat labs. urine output 350 cc/hr. give additional dose of DDAVP and bolus 500 cc D5W. continue maintenance for now to account of ongoing urinary losses causing overcorrection. potassium repleted. f/u am labs.     complaining of lower abdominal pain that started about an hour ago. had one episode of diarrhea earlier tonight but has not had any since. check cbc, lactate lipase. had an abdominal u/s that was negative. his belly is soft and nontender to palpation. monitor for now. 34 y/o male with pmhx of etoh abuse admitted with seizure like activity in setting of hyponatremia and etoh intoxication with hospital course complicated by rapid overcorrection of sodium levels secondary to auto-diuresis.    NEURO: MV, thiamine, folic acid. added prn ativan. hold librium due to transaminitis. he has not required frequent dosing as of yet, but if so will add on an ativan taper.   CVS: sinus tachy secondary to etoh withdrawal  PULM: no active issues  GI: protonix daily  RENAL: startd on D5W at 75 cc/hr as overcorrecting again. just received DDAVP < 6 hours ago. f/u bmp ordered for 23:00. may require additional DDAVP dose. goal Na level is 127 for morning labs, which would be a total of 16 meq increase in a 48 hour period. goal is no more than 8 meq in 24 hour period. hyponatremia likely secondary to beer potomania.   ID: monitor off abx  ENDO:  no active issues  HEME: lovenox subq for dvt prophylaxis  DISPO: full code      addendum: Na increase to 130 on repeat labs. urine output 350 cc/hr. give additional dose of DDAVP and bolus 500 cc D5W. continue maintenance for now to account of ongoing urinary losses causing overcorrection. potassium and phosphorus repleted. f/u am labs.     complaining of lower abdominal pain that started about an hour ago. had one episode of diarrhea earlier tonight but has not had any since. check cbc, lactate lipase. had an abdominal u/s that was negative. his belly is soft and nontender to palpation. monitor for now. 34 y/o male with pmhx of etoh abuse admitted with seizure like activity in setting of hyponatremia and etoh intoxication with hospital course complicated by rapid overcorrection of sodium levels secondary to auto-diuresis.    NEURO: MV, thiamine, folic acid. added prn ativan. hold librium due to transaminitis. he has not required frequent dosing as of yet, but if so will add on an ativan taper.   CVS: sinus tachy secondary to etoh withdrawal  PULM: no active issues  GI: protonix daily  RENAL: startd on D5W at 75 cc/hr as overcorrecting again. just received DDAVP < 6 hours ago. f/u bmp ordered for 23:00. may require additional DDAVP dose. goal Na level is 127 for morning labs, which would be a total of 16 meq increase in a 48 hour period. goal is no more than 8 meq in 24 hour period. hyponatremia likely secondary to beer potomania.   ID: monitor off abx  ENDO:  no active issues  HEME: lovenox subq for dvt prophylaxis  DISPO: full code      addendum: Na increase to 130 on repeat labs. urine output 350 cc/hr. give additional dose of DDAVP and bolus 500 cc D5W. continue maintenance for now to account of ongoing urinary losses causing overcorrection. potassium and phosphorus repleted. (switched kphos to be mixed with d5w not ns.) f/u am labs.     complaining of lower abdominal pain that started about an hour ago. had one episode of diarrhea earlier tonight but has not had any since. check cbc, lactate lipase. had an abdominal u/s that was negative. his belly is soft and nontender to palpation. monitor for now. 36 y/o male with pmhx of etoh abuse admitted with seizure like activity in setting of hyponatremia and etoh intoxication with hospital course complicated by rapid overcorrection of sodium levels secondary to auto-diuresis.    NEURO: MV, thiamine, folic acid. added prn ativan. hold librium due to transaminitis. he has not required frequent dosing as of yet, but if so will add on an ativan taper.   CVS: sinus tachy secondary to etoh withdrawal  PULM: no active issues  GI: protonix daily  RENAL: startd on D5W at 75 cc/hr as overcorrecting again. just received DDAVP < 6 hours ago. f/u bmp ordered for 23:00. may require additional DDAVP dose. goal Na level is 127 for morning labs, which would be a total of 16 meq increase in a 48 hour period. goal is no more than 8 meq in 24 hour period. hyponatremia likely secondary to beer potomania.   ID: monitor off abx  ENDO:  no active issues  HEME: lovenox subq for dvt prophylaxis  DISPO: full code      addendum: Na increase to 130 on repeat labs. urine output 350 cc/hr. give additional dose of DDAVP and bolus 500 cc D5W. continue maintenance for now to account of ongoing urinary losses causing overcorrection. potassium and phosphorus repleted. (switched kphos to be mixed with d5w not ns.) f/u am labs.     complaining of lower abdominal pain that started about an hour ago. had one episode of diarrhea earlier tonight but has not had any since. check cbc, lactate lipase. had an abdominal u/s that was negative. his belly is soft and nontender to palpation. monitor for now.    lipase mildly elevated, on maintenance fluids. likely secondary to etoh hepatitis. trend for now. no signs of sepsis, lactate normal.

## 2020-08-28 NOTE — PROGRESS NOTE ADULT - ASSESSMENT
etoh abuse  anemia  etoh hepatitis    plan  continue detox protocol   watch for seizures  serial meld labs  may need  upper gastrointestinal endoscopy prior to d/c  ppi once a day  gerd precautions  thiamine and folic acid

## 2020-08-28 NOTE — PROGRESS NOTE ADULT - PROBLEM SELECTOR PLAN 1
- Multifactorial; liver failure, hyponatremia, alcohol intoxication  - Admitted to ICU/tele monitoring  - Head CT neg for acute bleed  - F/u ammonia levels, Urine drug screen

## 2020-08-28 NOTE — PROGRESS NOTE ADULT - ASSESSMENT
1.	Hyponatremia: Beer potomania, Increased ADH state  2.	Hypokalemia  3.	Alcohol abuse  4.	Seizure    D/c IVF and desmopressin. Monitor sodium levels. If sodium remains low will dose sodium chloride tabs.   Avoid excessive PO fluids. Seizure precautions and treatment. Potassium supps. Will follow electrolytes and renal function trend. ICU management.

## 2020-08-29 LAB
ALBUMIN SERPL ELPH-MCNC: 2.4 G/DL — LOW (ref 3.3–5)
ALP SERPL-CCNC: 262 U/L — HIGH (ref 40–120)
ALT FLD-CCNC: 216 U/L — HIGH (ref 12–78)
AMYLASE P1 CFR SERPL: 95 U/L — SIGNIFICANT CHANGE UP (ref 25–125)
ANION GAP SERPL CALC-SCNC: 6 MMOL/L — SIGNIFICANT CHANGE UP (ref 5–17)
ANION GAP SERPL CALC-SCNC: 7 MMOL/L — SIGNIFICANT CHANGE UP (ref 5–17)
ANION GAP SERPL CALC-SCNC: 8 MMOL/L — SIGNIFICANT CHANGE UP (ref 5–17)
APTT BLD: 31.8 SEC — SIGNIFICANT CHANGE UP (ref 27.5–35.5)
AST SERPL-CCNC: 308 U/L — HIGH (ref 15–37)
BASOPHILS # BLD AUTO: 0.02 K/UL — SIGNIFICANT CHANGE UP (ref 0–0.2)
BASOPHILS # BLD AUTO: 0.03 K/UL — SIGNIFICANT CHANGE UP (ref 0–0.2)
BASOPHILS NFR BLD AUTO: 0.4 % — SIGNIFICANT CHANGE UP (ref 0–2)
BASOPHILS NFR BLD AUTO: 0.7 % — SIGNIFICANT CHANGE UP (ref 0–2)
BILIRUB SERPL-MCNC: 18.8 MG/DL — CRITICAL HIGH (ref 0.2–1.2)
BUN SERPL-MCNC: 2 MG/DL — LOW (ref 7–23)
BUN SERPL-MCNC: 3 MG/DL — LOW (ref 7–23)
BUN SERPL-MCNC: 4 MG/DL — LOW (ref 7–23)
CALCIUM SERPL-MCNC: 7.4 MG/DL — LOW (ref 8.5–10.1)
CALCIUM SERPL-MCNC: 7.5 MG/DL — LOW (ref 8.5–10.1)
CALCIUM SERPL-MCNC: 7.5 MG/DL — LOW (ref 8.5–10.1)
CALCIUM SERPL-MCNC: 7.6 MG/DL — LOW (ref 8.5–10.1)
CALCIUM SERPL-MCNC: 7.9 MG/DL — LOW (ref 8.5–10.1)
CALCIUM SERPL-MCNC: 7.9 MG/DL — LOW (ref 8.5–10.1)
CHLORIDE SERPL-SCNC: 101 MMOL/L — SIGNIFICANT CHANGE UP (ref 96–108)
CHLORIDE SERPL-SCNC: 103 MMOL/L — SIGNIFICANT CHANGE UP (ref 96–108)
CHLORIDE SERPL-SCNC: 94 MMOL/L — LOW (ref 96–108)
CHLORIDE SERPL-SCNC: 94 MMOL/L — LOW (ref 96–108)
CHLORIDE SERPL-SCNC: 97 MMOL/L — SIGNIFICANT CHANGE UP (ref 96–108)
CHLORIDE SERPL-SCNC: 97 MMOL/L — SIGNIFICANT CHANGE UP (ref 96–108)
CO2 SERPL-SCNC: 26 MMOL/L — SIGNIFICANT CHANGE UP (ref 22–31)
CO2 SERPL-SCNC: 28 MMOL/L — SIGNIFICANT CHANGE UP (ref 22–31)
CO2 SERPL-SCNC: 29 MMOL/L — SIGNIFICANT CHANGE UP (ref 22–31)
CREAT SERPL-MCNC: 0.89 MG/DL — SIGNIFICANT CHANGE UP (ref 0.5–1.3)
CREAT SERPL-MCNC: 0.92 MG/DL — SIGNIFICANT CHANGE UP (ref 0.5–1.3)
CREAT SERPL-MCNC: 0.92 MG/DL — SIGNIFICANT CHANGE UP (ref 0.5–1.3)
CREAT SERPL-MCNC: 0.93 MG/DL — SIGNIFICANT CHANGE UP (ref 0.5–1.3)
CREAT SERPL-MCNC: 0.95 MG/DL — SIGNIFICANT CHANGE UP (ref 0.5–1.3)
CREAT SERPL-MCNC: 1 MG/DL — SIGNIFICANT CHANGE UP (ref 0.5–1.3)
EOSINOPHIL # BLD AUTO: 0.01 K/UL — SIGNIFICANT CHANGE UP (ref 0–0.5)
EOSINOPHIL # BLD AUTO: 0.01 K/UL — SIGNIFICANT CHANGE UP (ref 0–0.5)
EOSINOPHIL NFR BLD AUTO: 0.2 % — SIGNIFICANT CHANGE UP (ref 0–6)
EOSINOPHIL NFR BLD AUTO: 0.2 % — SIGNIFICANT CHANGE UP (ref 0–6)
GLUCOSE SERPL-MCNC: 102 MG/DL — HIGH (ref 70–99)
GLUCOSE SERPL-MCNC: 107 MG/DL — HIGH (ref 70–99)
GLUCOSE SERPL-MCNC: 107 MG/DL — HIGH (ref 70–99)
GLUCOSE SERPL-MCNC: 113 MG/DL — HIGH (ref 70–99)
GLUCOSE SERPL-MCNC: 114 MG/DL — HIGH (ref 70–99)
GLUCOSE SERPL-MCNC: 117 MG/DL — HIGH (ref 70–99)
HCT VFR BLD CALC: 35.9 % — LOW (ref 39–50)
HCT VFR BLD CALC: 36.8 % — LOW (ref 39–50)
HGB BLD-MCNC: 13 G/DL — SIGNIFICANT CHANGE UP (ref 13–17)
HGB BLD-MCNC: 13.2 G/DL — SIGNIFICANT CHANGE UP (ref 13–17)
IMM GRANULOCYTES NFR BLD AUTO: 0.2 % — SIGNIFICANT CHANGE UP (ref 0–1.5)
IMM GRANULOCYTES NFR BLD AUTO: 0.2 % — SIGNIFICANT CHANGE UP (ref 0–1.5)
INR BLD: 1.04 RATIO — SIGNIFICANT CHANGE UP (ref 0.88–1.16)
LACTATE SERPL-SCNC: 1.3 MMOL/L — SIGNIFICANT CHANGE UP (ref 0.7–2)
LDH SERPL L TO P-CCNC: 586 U/L — HIGH (ref 50–242)
LIDOCAIN IGE QN: 443 U/L — HIGH (ref 73–393)
LYMPHOCYTES # BLD AUTO: 0.78 K/UL — LOW (ref 1–3.3)
LYMPHOCYTES # BLD AUTO: 0.79 K/UL — LOW (ref 1–3.3)
LYMPHOCYTES # BLD AUTO: 15.1 % — SIGNIFICANT CHANGE UP (ref 13–44)
LYMPHOCYTES # BLD AUTO: 17.5 % — SIGNIFICANT CHANGE UP (ref 13–44)
MAGNESIUM SERPL-MCNC: 2.1 MG/DL — SIGNIFICANT CHANGE UP (ref 1.6–2.6)
MAGNESIUM SERPL-MCNC: 2.3 MG/DL — SIGNIFICANT CHANGE UP (ref 1.6–2.6)
MAGNESIUM SERPL-MCNC: 2.3 MG/DL — SIGNIFICANT CHANGE UP (ref 1.6–2.6)
MCHC RBC-ENTMCNC: 28.3 PG — SIGNIFICANT CHANGE UP (ref 27–34)
MCHC RBC-ENTMCNC: 28.4 PG — SIGNIFICANT CHANGE UP (ref 27–34)
MCHC RBC-ENTMCNC: 35.9 GM/DL — SIGNIFICANT CHANGE UP (ref 32–36)
MCHC RBC-ENTMCNC: 36.2 GM/DL — HIGH (ref 32–36)
MCV RBC AUTO: 78.4 FL — LOW (ref 80–100)
MCV RBC AUTO: 78.8 FL — LOW (ref 80–100)
MONOCYTES # BLD AUTO: 0.28 K/UL — SIGNIFICANT CHANGE UP (ref 0–0.9)
MONOCYTES # BLD AUTO: 0.29 K/UL — SIGNIFICANT CHANGE UP (ref 0–0.9)
MONOCYTES NFR BLD AUTO: 5.6 % — SIGNIFICANT CHANGE UP (ref 2–14)
MONOCYTES NFR BLD AUTO: 6.2 % — SIGNIFICANT CHANGE UP (ref 2–14)
NEUTROPHILS # BLD AUTO: 3.4 K/UL — SIGNIFICANT CHANGE UP (ref 1.8–7.4)
NEUTROPHILS # BLD AUTO: 4.07 K/UL — SIGNIFICANT CHANGE UP (ref 1.8–7.4)
NEUTROPHILS NFR BLD AUTO: 75.2 % — SIGNIFICANT CHANGE UP (ref 43–77)
NEUTROPHILS NFR BLD AUTO: 78.5 % — HIGH (ref 43–77)
NRBC # BLD: 0 /100 WBCS — SIGNIFICANT CHANGE UP (ref 0–0)
NRBC # BLD: 0 /100 WBCS — SIGNIFICANT CHANGE UP (ref 0–0)
PHOSPHATE SERPL-MCNC: 0.7 MG/DL — CRITICAL LOW (ref 2.5–4.5)
PHOSPHATE SERPL-MCNC: 2.1 MG/DL — LOW (ref 2.5–4.5)
PHOSPHATE SERPL-MCNC: 3.3 MG/DL — SIGNIFICANT CHANGE UP (ref 2.5–4.5)
PLATELET # BLD AUTO: 88 K/UL — LOW (ref 150–400)
PLATELET # BLD AUTO: 93 K/UL — LOW (ref 150–400)
POTASSIUM SERPL-MCNC: 3.4 MMOL/L — LOW (ref 3.5–5.3)
POTASSIUM SERPL-MCNC: 3.5 MMOL/L — SIGNIFICANT CHANGE UP (ref 3.5–5.3)
POTASSIUM SERPL-MCNC: 3.5 MMOL/L — SIGNIFICANT CHANGE UP (ref 3.5–5.3)
POTASSIUM SERPL-MCNC: 3.7 MMOL/L — SIGNIFICANT CHANGE UP (ref 3.5–5.3)
POTASSIUM SERPL-MCNC: 4 MMOL/L — SIGNIFICANT CHANGE UP (ref 3.5–5.3)
POTASSIUM SERPL-MCNC: 4.1 MMOL/L — SIGNIFICANT CHANGE UP (ref 3.5–5.3)
POTASSIUM SERPL-SCNC: 3.4 MMOL/L — LOW (ref 3.5–5.3)
POTASSIUM SERPL-SCNC: 3.5 MMOL/L — SIGNIFICANT CHANGE UP (ref 3.5–5.3)
POTASSIUM SERPL-SCNC: 3.5 MMOL/L — SIGNIFICANT CHANGE UP (ref 3.5–5.3)
POTASSIUM SERPL-SCNC: 3.7 MMOL/L — SIGNIFICANT CHANGE UP (ref 3.5–5.3)
POTASSIUM SERPL-SCNC: 4 MMOL/L — SIGNIFICANT CHANGE UP (ref 3.5–5.3)
POTASSIUM SERPL-SCNC: 4.1 MMOL/L — SIGNIFICANT CHANGE UP (ref 3.5–5.3)
PROT SERPL-MCNC: 5.7 G/DL — LOW (ref 6–8.3)
PROTHROM AB SERPL-ACNC: 12.1 SEC — SIGNIFICANT CHANGE UP (ref 10.6–13.6)
RBC # BLD: 4.58 M/UL — SIGNIFICANT CHANGE UP (ref 4.2–5.8)
RBC # BLD: 4.67 M/UL — SIGNIFICANT CHANGE UP (ref 4.2–5.8)
RBC # FLD: 19 % — HIGH (ref 10.3–14.5)
RBC # FLD: 19.2 % — HIGH (ref 10.3–14.5)
SODIUM SERPL-SCNC: 130 MMOL/L — LOW (ref 135–145)
SODIUM SERPL-SCNC: 134 MMOL/L — LOW (ref 135–145)
SODIUM SERPL-SCNC: 135 MMOL/L — SIGNIFICANT CHANGE UP (ref 135–145)
WBC # BLD: 4.52 K/UL — SIGNIFICANT CHANGE UP (ref 3.8–10.5)
WBC # BLD: 5.18 K/UL — SIGNIFICANT CHANGE UP (ref 3.8–10.5)
WBC # FLD AUTO: 4.52 K/UL — SIGNIFICANT CHANGE UP (ref 3.8–10.5)
WBC # FLD AUTO: 5.18 K/UL — SIGNIFICANT CHANGE UP (ref 3.8–10.5)

## 2020-08-29 PROCEDURE — 99233 SBSQ HOSP IP/OBS HIGH 50: CPT

## 2020-08-29 PROCEDURE — 76700 US EXAM ABDOM COMPLETE: CPT | Mod: 26

## 2020-08-29 RX ORDER — POTASSIUM PHOSPHATE, MONOBASIC POTASSIUM PHOSPHATE, DIBASIC 236; 224 MG/ML; MG/ML
30 INJECTION, SOLUTION INTRAVENOUS ONCE
Refills: 0 | Status: DISCONTINUED | OUTPATIENT
Start: 2020-08-29 | End: 2020-08-29

## 2020-08-29 RX ORDER — SODIUM CHLORIDE 9 MG/ML
500 INJECTION, SOLUTION INTRAVENOUS
Refills: 0 | Status: COMPLETED | OUTPATIENT
Start: 2020-08-29 | End: 2020-08-29

## 2020-08-29 RX ORDER — POTASSIUM CHLORIDE 20 MEQ
40 PACKET (EA) ORAL ONCE
Refills: 0 | Status: COMPLETED | OUTPATIENT
Start: 2020-08-29 | End: 2020-08-29

## 2020-08-29 RX ORDER — SODIUM CHLORIDE 9 MG/ML
1000 INJECTION INTRAMUSCULAR; INTRAVENOUS; SUBCUTANEOUS
Refills: 0 | Status: DISCONTINUED | OUTPATIENT
Start: 2020-08-29 | End: 2020-08-29

## 2020-08-29 RX ORDER — POTASSIUM PHOSPHATE, MONOBASIC POTASSIUM PHOSPHATE, DIBASIC 236; 224 MG/ML; MG/ML
30 INJECTION, SOLUTION INTRAVENOUS ONCE
Refills: 0 | Status: COMPLETED | OUTPATIENT
Start: 2020-08-29 | End: 2020-08-29

## 2020-08-29 RX ORDER — DESMOPRESSIN ACETATE 0.1 MG/1
2 TABLET ORAL ONCE
Refills: 0 | Status: COMPLETED | OUTPATIENT
Start: 2020-08-29 | End: 2020-08-29

## 2020-08-29 RX ORDER — POTASSIUM CHLORIDE 20 MEQ
40 PACKET (EA) ORAL EVERY 4 HOURS
Refills: 0 | Status: COMPLETED | OUTPATIENT
Start: 2020-08-29 | End: 2020-08-29

## 2020-08-29 RX ORDER — SODIUM CHLORIDE 9 MG/ML
1000 INJECTION, SOLUTION INTRAVENOUS
Refills: 0 | Status: DISCONTINUED | OUTPATIENT
Start: 2020-08-29 | End: 2020-08-30

## 2020-08-29 RX ADMIN — Medication 40 MILLIEQUIVALENT(S): at 00:26

## 2020-08-29 RX ADMIN — Medication 1 TABLET(S): at 12:10

## 2020-08-29 RX ADMIN — URSODIOL 300 MILLIGRAM(S): 250 TABLET, FILM COATED ORAL at 22:42

## 2020-08-29 RX ADMIN — Medication 100 MILLIGRAM(S): at 13:52

## 2020-08-29 RX ADMIN — Medication 2 MILLIGRAM(S): at 05:54

## 2020-08-29 RX ADMIN — PANTOPRAZOLE SODIUM 40 MILLIGRAM(S): 20 TABLET, DELAYED RELEASE ORAL at 12:10

## 2020-08-29 RX ADMIN — POTASSIUM PHOSPHATE, MONOBASIC POTASSIUM PHOSPHATE, DIBASIC 83.33 MILLIMOLE(S): 236; 224 INJECTION, SOLUTION INTRAVENOUS at 09:25

## 2020-08-29 RX ADMIN — Medication 2 MILLIGRAM(S): at 11:57

## 2020-08-29 RX ADMIN — URSODIOL 300 MILLIGRAM(S): 250 TABLET, FILM COATED ORAL at 05:26

## 2020-08-29 RX ADMIN — POTASSIUM PHOSPHATE, MONOBASIC POTASSIUM PHOSPHATE, DIBASIC 83.33 MILLIMOLE(S): 236; 224 INJECTION, SOLUTION INTRAVENOUS at 00:42

## 2020-08-29 RX ADMIN — Medication 40 MILLIEQUIVALENT(S): at 07:59

## 2020-08-29 RX ADMIN — URSODIOL 300 MILLIGRAM(S): 250 TABLET, FILM COATED ORAL at 13:52

## 2020-08-29 RX ADMIN — SODIUM CHLORIDE 50 MILLILITER(S): 9 INJECTION, SOLUTION INTRAVENOUS at 17:15

## 2020-08-29 RX ADMIN — ENOXAPARIN SODIUM 40 MILLIGRAM(S): 100 INJECTION SUBCUTANEOUS at 12:10

## 2020-08-29 RX ADMIN — SODIUM CHLORIDE 999 MILLILITER(S): 9 INJECTION, SOLUTION INTRAVENOUS at 20:07

## 2020-08-29 RX ADMIN — Medication 1 MILLIGRAM(S): at 13:51

## 2020-08-29 RX ADMIN — Medication 40 MILLIEQUIVALENT(S): at 05:26

## 2020-08-29 RX ADMIN — DESMOPRESSIN ACETATE 2 MICROGRAM(S): 0.1 TABLET ORAL at 00:26

## 2020-08-29 RX ADMIN — SODIUM CHLORIDE 500 MILLILITER(S): 9 INJECTION, SOLUTION INTRAVENOUS at 00:25

## 2020-08-29 NOTE — PROGRESS NOTE ADULT - SUBJECTIVE AND OBJECTIVE BOX
Patient is a 35y old  Male who presents with a chief complaint of Seizure (28 Aug 2020 21:30)    24 hour events: ***    REVIEW OF SYSTEMS      T(F): 99 (08-29-20 @ 05:15), Max: 100.5 (08-29-20 @ 01:00)  HR: 124 (08-29-20 @ 07:00) (0 - 150)  BP: 128/79 (08-29-20 @ 07:00) (104/74 - 142/99)  RR: 20 (08-29-20 @ 07:00) (20 - 37)  SpO2: 97% (08-29-20 @ 07:00) (94% - 97%)  Wt(kg): --            I&O's Summary    08-28 @ 07:01  -  08-29 @ 07:00  --------------------------------------------------------  IN: 5214.8 mL / OUT: 69717 mL / NET: -5035.2 mL      PHYSICAL EXAM      MEDICATIONS  LORazepam   Injectable IV Push PRN  enoxaparin Injectable SubCutaneous  pantoprazole  Injectable IV Push  ursodiol Capsule Oral  dextrose 5%. IV Continuous  folic acid Injectable IV Push  multivitamin Oral  potassium chloride    Tablet ER Oral  potassium phosphate IVPB IV Intermittent  thiamine Injectable IV Push                              13.2   4.52  )-----------( 93       ( 29 Aug 2020 06:14 )             36.8       08-29    130<L>  |  94<L>  |  2<L>  ----------------------------<  107<H>  3.5   |  28  |  0.95    Ca    7.4<L>      29 Aug 2020 06:14  Phos  2.1     08-29  Mg     2.3     08-29    TPro  5.7<L>  /  Alb  2.4<L>  /  TBili  18.8<HH>  /  DBili  x   /  AST  308<H>  /  ALT  216<H>  /  AlkPhos  262<H>  08-29    Lactate 1.3           08-29 @ 00:49          PT/INR - ( 28 Aug 2020 04:47 )   PT: 13.5 sec;   INR: 1.16 ratio         PTT - ( 28 Aug 2020 04:47 )  PTT:45.6 sec          Radiology: ***  Bedside lung ultrasound: ***  Bedside ECHO: ***    CENTRAL LINE: Y/N          DATE INSERTED:              REMOVE: Y/N  OSEGUERA: Y/N                        DATE INSERTED:              REMOVE: Y/N  A-LINE: Y/N                       DATE INSERTED:              REMOVE: Y/N    GLOBAL ISSUE/BEST PRACTICE  Analgesia:   Sedation:   CAM-ICU:   HOB elevation: yes  Stress ulcer prophylaxis:   VTE prophylaxis:   Glycemic control:   Nutrition:     CODE STATUS: ***  GO discussion: Y Patient is a 35y old  Male who presents with a chief complaint of Seizure (28 Aug 2020 21:30)    24 hour events: Patient seen and examined at bedside. Continued to have high urine output of 350cc-400cc/hr overnight. Sodium increased to 130 and patient received additional dose of DDVAP around 12am. Patient with compliant of abdominal pain, denies nausea, vomiting.    REVIEW OF SYSTEMS  Constitutional: denies fever  HEENT: denies headache, dizziness  Respiratory: denies SOB  Cardiovascular: denies CP  Gastrointestinal: admits abdominal pain denies nausea, vomiting, diarrhea, constipation  Genitourinary: denies painful urination  Musculoskeletal: denies muscle aches    T(F): 99 (08-29-20 @ 05:15), Max: 100.5 (08-29-20 @ 01:00)  HR: 124 (08-29-20 @ 07:00) (0 - 150)  BP: 128/79 (08-29-20 @ 07:00) (104/74 - 142/99)  RR: 20 (08-29-20 @ 07:00) (20 - 37)  SpO2: 97% (08-29-20 @ 07:00) (94% - 97%)  Wt(kg): --    I&O's Summary    08-28 @ 07:01  -  08-29 @ 07:00  --------------------------------------------------------  IN: 5214.8 mL / OUT: 54911 mL / NET: -5035.2 mL      PHYSICAL EXAM  Gen: NAD  HEENT: NCAT, PEERLA b/l, EOMI b/l, no conjunctival erythema  Cardio: tachycardia, +s1s2, no murmurs, rubs, or gallops  Pulm: CTA b/l, no wheezes, rales or rhonchi  Abdomen: soft, tender to palpation of lower quadrants b/l, nondistended, +BS x4 quadrants, no guarding  Extremities: no clubbing, cyanosis or edema, +2 pedal pulses  Neuro: AAOx3  Skin: warm and dry      MEDICATIONS  LORazepam   Injectable IV Push PRN  enoxaparin Injectable SubCutaneous  pantoprazole  Injectable IV Push  ursodiol Capsule Oral  dextrose 5%. IV Continuous  folic acid Injectable IV Push  multivitamin Oral  potassium chloride    Tablet ER Oral  potassium phosphate IVPB IV Intermittent  thiamine Injectable IV Push                              13.2   4.52  )-----------( 93       ( 29 Aug 2020 06:14 )             36.8       08-29    130<L>  |  94<L>  |  2<L>  ----------------------------<  107<H>  3.5   |  28  |  0.95    Ca    7.4<L>      29 Aug 2020 06:14  Phos  2.1     08-29  Mg     2.3     08-29    TPro  5.7<L>  /  Alb  2.4<L>  /  TBili  18.8<HH>  /  DBili  x   /  AST  308<H>  /  ALT  216<H>  /  AlkPhos  262<H>  08-29    Lactate 1.3           08-29 @ 00:49          PT/INR - ( 28 Aug 2020 04:47 )   PT: 13.5 sec;   INR: 1.16 ratio         PTT - ( 28 Aug 2020 04:47 )  PTT:45.6 sec    Bedside lung ultrasound: ***  Bedside ECHO: ***    CENTRAL LINE: N   OSEGUERA: Y                     DATE INSERTED: 8/27        REMOVE: N    GLOBAL ISSUE/BEST PRACTICE  Analgesia: N  Sedation: N  HOB elevation: yes  Stress ulcer prophylaxis: Y  VTE prophylaxis: Y  Glycemic control: N  Nutrition: N    CODE STATUS: Full Code

## 2020-08-29 NOTE — PROGRESS NOTE ADULT - SUBJECTIVE AND OBJECTIVE BOX
Seen in ICU    Vital Signs Last 24 Hrs  T(C): 36.8 (08-29-20 @ 12:00), Max: 38.1 (08-29-20 @ 01:00)  T(F): 98.2 (08-29-20 @ 12:00), Max: 100.5 (08-29-20 @ 01:00)  HR: 121 (08-29-20 @ 16:00) (108 - 150)  BP: 120/71 (08-29-20 @ 16:00) (104/74 - 142/99)  BP(mean): 89 (08-29-20 @ 16:00) (83 - 113)  RR: 27 (08-29-20 @ 16:00) (20 - 37)  SpO2: 96% (08-29-20 @ 16:00) (94% - 97%)    PHYSICAL EXAM:    Respiratory: b/l air entry  Cardiovascular: S1 S2  Gastrointestinal: soft  Extremities:  no edema                        13.2   4.52  )-----------( 93       ( 29 Aug 2020 06:14 )             36.8     29 Aug 2020 14:26    134    |  101    |  3      ----------------------------<  117    4.1     |  26     |  0.92     Ca    7.9        29 Aug 2020 14:26  Phos  3.3       29 Aug 2020 14:26  Mg     2.3       29 Aug 2020 14:26    TPro  5.7    /  Alb  2.4    /  TBili  18.8   /  DBili  x      /  AST  308    /  ALT  216    /  AlkPhos  262    29 Aug 2020 06:14    LIVER FUNCTIONS - ( 29 Aug 2020 06:14 )  Alb: 2.4 g/dL / Pro: 5.7 g/dL / ALK PHOS: 262 U/L / ALT: 216 U/L / AST: 308 U/L / GGT: x           PT/INR - ( 29 Aug 2020 07:34 )   PT: 12.1 sec;   INR: 1.04 ratio      dextrose 5%. 1000 milliLiter(s) IV Continuous <Continuous>  enoxaparin Injectable 40 milliGRAM(s) SubCutaneous daily  folic acid Injectable 1 milliGRAM(s) IV Push daily  LORazepam   Injectable 2 milliGRAM(s) IV Push every 4 hours PRN  multivitamin 1 Tablet(s) Oral daily  pantoprazole  Injectable 40 milliGRAM(s) IV Push daily  thiamine Injectable 100 milliGRAM(s) IV Push daily  ursodiol Capsule 300 milliGRAM(s) Oral three times a day      Assessment and Plan:   		  Hyponatremia in setting of Beer potomania, Increased ADH state  Alcohol abuse  Seizure  Na corrected somewhat fast  NPO  D5W at 50cc/hr  F/u [facility]    [phone]

## 2020-08-29 NOTE — PROGRESS NOTE ADULT - SUBJECTIVE AND OBJECTIVE BOX
Date/Time Patient Seen:  		  Referring MD:   Data Reviewed	       Patient is a 35y old  Male who presents with a chief complaint of Seizure (29 Aug 2020 07:13)      Subjective/HPI     PAST MEDICAL & SURGICAL HISTORY:  No pertinent past medical history  S/P appendectomy  H/O hernia repair        Medication list         MEDICATIONS  (STANDING):  dextrose 5%. 1000 milliLiter(s) (75 mL/Hr) IV Continuous <Continuous>  enoxaparin Injectable 40 milliGRAM(s) SubCutaneous daily  folic acid Injectable 1 milliGRAM(s) IV Push daily  multivitamin 1 Tablet(s) Oral daily  pantoprazole  Injectable 40 milliGRAM(s) IV Push daily  potassium chloride    Tablet ER 40 milliEquivalent(s) Oral once  potassium phosphate IVPB 30 milliMole(s) IV Intermittent once  thiamine Injectable 100 milliGRAM(s) IV Push daily  ursodiol Capsule 300 milliGRAM(s) Oral three times a day    MEDICATIONS  (PRN):  LORazepam   Injectable 2 milliGRAM(s) IV Push every 4 hours PRN CIWA > 8         Vitals log        ICU Vital Signs Last 24 Hrs  T(C): 37.2 (29 Aug 2020 05:15), Max: 38.1 (29 Aug 2020 01:00)  T(F): 99 (29 Aug 2020 05:15), Max: 100.5 (29 Aug 2020 01:00)  HR: 124 (29 Aug 2020 07:00) (0 - 150)  BP: 128/79 (29 Aug 2020 07:00) (104/74 - 142/99)  BP(mean): 97 (29 Aug 2020 07:00) (83 - 113)  ABP: --  ABP(mean): --  RR: 20 (29 Aug 2020 07:00) (20 - 37)  SpO2: 97% (29 Aug 2020 07:00) (94% - 97%)           Input and Output:  I&O's Detail    28 Aug 2020 07:01  -  29 Aug 2020 07:00  --------------------------------------------------------  IN:    dextrose 5%.: 250 mL    dextrose 5%.: 500 mL    dextrose 5%.: 975 mL    Oral Fluid: 2140 mL    Solution: 999.8 mL    Solution: 50 mL    Solution: 300 mL  Total IN: 5214.8 mL    OUT:    Indwelling Catheter - Urethral: 94216 mL  Total OUT: 82635 mL    Total NET: -5035.2 mL          Lab Data                        13.2   4.52  )-----------( 93       ( 29 Aug 2020 06:14 )             36.8     08-29    130<L>  |  94<L>  |  2<L>  ----------------------------<  107<H>  3.5   |  28  |  0.95    Ca    7.4<L>      29 Aug 2020 06:14  Phos  2.1     08-29  Mg     2.3     08-29    TPro  5.7<L>  /  Alb  2.4<L>  /  TBili  18.8<HH>  /  DBili  x   /  AST  308<H>  /  ALT  216<H>  /  AlkPhos  262<H>  08-29            Review of Systems	      Objective     Physical Examination    on RA  heart s1s2  lung dec BS  abd soft      Pertinent Lab findings & Imaging      Giovanna:  NO   Adequate UO     I&O's Detail    28 Aug 2020 07:01  -  29 Aug 2020 07:00  --------------------------------------------------------  IN:    dextrose 5%.: 250 mL    dextrose 5%.: 500 mL    dextrose 5%.: 975 mL    Oral Fluid: 2140 mL    Solution: 999.8 mL    Solution: 50 mL    Solution: 300 mL  Total IN: 5214.8 mL    OUT:    Indwelling Catheter - Urethral: 88903 mL  Total OUT: 42277 mL    Total NET: -5035.2 mL               Discussed with:     Cultures:	        Radiology

## 2020-08-29 NOTE — PROGRESS NOTE ADULT - PROBLEM SELECTOR PLAN 1
VENKAT  etoh use disorder  hepatitis - etoh related  serial labs  vitamins - education - counseling -   would rec - Standing Dose of Ativan - in light of Etoh Hepatitis   cont ciwa  cont Ativan PRN as per ciwa - but pt would def benefit from standing dose of Ativan  replete lytes  assist with ADL  monitor for needs

## 2020-08-29 NOTE — PROGRESS NOTE ADULT - ATTENDING COMMENTS
5M h/o EtOH abuse brought in with seizure-like activity in setting of EtOH intoxication and hyponatremia s/p 2L IVF in ED with course c/b rapid overcorrection of Na with water diuresis s/p desmopressin x2, D5 500cc bolus x3 now with improved correction rate and no further seizure activity    Neuro: AOx3, continue CIWA with symptom triggered ativan; no additional seizure activity, continue neuro checks q4hr  CV: no acute issues  Pulm: no acute issues  GI: bilirubin noted to increase to 19 today, LFTs otherwise stable; lipase added (443) - given bump in bili with mild pancreatitis concern for obstruction despite previous US findings - will get repeat US today to assess, NPO pending results; continue thiamine/folate/mvi; transaminitis, c/w protonix for dark vomitus upon admission  Renal: Na again correcting rapidly with water diuresis 10L/24hrs, received desmopressin x2 overnight and restarted on D5, continue q4hr BMP (stable 130 previous 3 checks); hypokalemia resolved with aggressive repletion  Heme: H/H stable, hrombocytopenia slightly improving and likely 2/2 EtOH use, will trend; continue dvt ppx with lovenox  Endo: no acute issues    Pt remains critically ill, requiring close monitoring of electrolytes. To remain in ICU at this time . 35M h/o EtOH abuse brought in with seizure-like activity in setting of EtOH intoxication and hyponatremia s/p 2L IVF in ED with course c/b rapid overcorrection of Na with water diuresis s/p desmopressin x2, D5 500cc bolus x3 now with improved correction rate and no further seizure activity    Neuro: AOx3, continue CIWA with symptom triggered ativan; no additional seizure activity, continue neuro checks q4hr  CV: no acute issues  Pulm: no acute issues  GI: bilirubin noted to increase to 19 today, LFTs otherwise stable; lipase added (443) - given bump in bili with mild pancreatitis concern for obstruction despite previous US findings - will get repeat US today to assess, NPO pending results; continue thiamine/folate/mvi; transaminitis, c/w protonix for dark vomitus upon admission  Renal: Na again correcting rapidly with water diuresis 10L/24hrs, received desmopressin x2 overnight and restarted on D5, continue q4hr BMP (stable 130 previous 3 checks); hypokalemia resolved with aggressive repletion  Heme: H/H stable, hrombocytopenia slightly improving and likely 2/2 EtOH use, will trend; continue dvt ppx with lovenox  Endo: no acute issues    Pt remains critically ill, requiring close monitoring of electrolytes. To remain in ICU at this time .

## 2020-08-29 NOTE — PROGRESS NOTE ADULT - ASSESSMENT
34 y/o male with pmhx of etoh abuse admitted with seizure like activity in setting of hyponatremia and etoh intoxication with hospital course complicated by rapid overcorrection of sodium levels secondary to auto-diuresis.    Neuro: AAOx3, no further seizure activity, continue CIWA with symptom triggered ativan, continue neuro checks q4hr  CV: sinus tachycardia, likely 2/2 to ETOH withdrawal, continue to monitor  Pulm: no acute issues  GI: compliant of abdominal pain with elevated Bili of 18.8, NPO pending abdominal US, continue thiamine/folate, transaminitis improving, likely 2/2 alcohol hepatitis, f/u repeat abdominal US  Renal: overcorrection of Na initially from 111 --> 129 s/p 2L IVF in ED and water diuresis, given ddavp x3 yesterday during day, then addtional dose of DDAVP overnight, Na now holding at 130, continue D5W at 75 cc/hr, high urine output of 3550-400cc/hr, continue to monitor strict I&Os, hyponatremia 2/2 beer potomania, however may need to consider DI as cause  ID: No acute issues  Heme: Hb stable at 13.2, Thrombocytopenia likely 2/2 EtOH use, will continue to trend; continue dvt ppx with lovenox  Endo: no acute issues  Dispo: Full Code, remain in ICU at this time

## 2020-08-29 NOTE — PROGRESS NOTE ADULT - SUBJECTIVE AND OBJECTIVE BOX
INTERVAL HPI/OVERNIGHT EVENTS:  awake, minimally answering questions  h/h stable  uptrending Tbili 18.8 from 10.1   Meld- Na 23        MEDICATIONS  (STANDING):  dextrose 5%. 1000 milliLiter(s) (75 mL/Hr) IV Continuous <Continuous>  enoxaparin Injectable 40 milliGRAM(s) SubCutaneous daily  folic acid Injectable 1 milliGRAM(s) IV Push daily  multivitamin 1 Tablet(s) Oral daily  pantoprazole  Injectable 40 milliGRAM(s) IV Push daily  thiamine Injectable 100 milliGRAM(s) IV Push daily  ursodiol Capsule 300 milliGRAM(s) Oral three times a day    MEDICATIONS  (PRN):  LORazepam   Injectable 2 milliGRAM(s) IV Push every 4 hours PRN CIWA > 8      Allergies    No Known Allergies    Intolerances    General:  No wt loss, fevers, chills, night sweats, fatigue,   Eyes:  Good vision, no reported pain  ENT:  No sore throat, pain, runny nose, dysphagia  CV:  No pain, palpitations, hypo/hypertension  Resp:  No dyspnea, cough, tachypnea, wheezing  GI:  No pain, No nausea, No vomiting, No diarrhea, No constipation, No weight loss, No fever, No pruritis, No rectal bleeding, No tarry stools, No dysphagia,  :  No pain, bleeding, incontinence, nocturia  Muscle:  No pain, weakness  Neuro:  No weakness, tingling, memory problems  Psych:  No fatigue, insomnia, mood problems, depression  Endocrine:  No polyuria, polydipsia, cold/heat intolerance  Heme:  No petechiae, ecchymosis, easy bruisability  Skin:  No rash, tattoos, scars, edema    PHYSICAL EXAM:     Vital Signs Last 24 Hrs  T(C): 37 (29 Aug 2020 08:00), Max: 38.1 (29 Aug 2020 01:00)  T(F): 98.6 (29 Aug 2020 08:00), Max: 100.5 (29 Aug 2020 01:00)  HR: 145 (29 Aug 2020 09:00) (0 - 150)  BP: 135/80 (29 Aug 2020 09:00) (104/74 - 142/99)  BP(mean): 100 (29 Aug 2020 09:00) (83 - 113)  RR: 30 (29 Aug 2020 09:00) (20 - 37)  SpO2: 97% (29 Aug 2020 09:00) (94% - 97%)      27 Aug 2020 07:01  -  28 Aug 2020 07:00  --------------------------------------------------------  IN: 7750.9 mL / OUT: 7547 mL / NET: 203.9 mL    28 Aug 2020 07:01  -  28 Aug 2020 11:46  --------------------------------------------------------  IN: 1380 mL / OUT: 1550 mL / NET: -170 mL        GENERAL:  Appears stated age, well-groomed, well-nourished, no distress  HEENT:  NC/AT,  conjunctivae clear and pink, no thyromegaly, nodules, adenopathy, no JVD, sclera -anicteric  CHEST:  Full & symmetric excursion, no increased effort, breath sounds clear  HEART:  Regular rhythm, S1, S2, no murmur/rub/S3/S4, no abdominal bruit, no edema  ABDOMEN:  Soft, non-tender, non-distended, normoactive bowel sounds,  no masses ,no hepato-splenomegaly, no signs of chronic liver disease  EXTEREMITIES:  no cyanosis,clubbing or edema  SKIN:  No rash/erythema/ecchymoses/petechiae/wounds/abscess/warm/dry  NEURO:  Alert, oriented, no asterixis, no tremor, no encephalopathy        LABS:                        13.2   4.52  )-----------( 93       ( 29 Aug 2020 06:14 )             36.8     08-29    130<L>  |  94<L>  |  2<L>  ----------------------------<  107<H>  3.5   |  28  |  0.95    Ca    7.4<L>      29 Aug 2020 06:14  Phos  2.1     08-29  Mg     2.3     08-29    TPro  5.7<L>  /  Alb  2.4<L>  /  TBili  18.8<HH>  /  DBili  x   /  AST  308<H>  /  ALT  216<H>  /  AlkPhos  262<H>  08-29    PT/INR - ( 29 Aug 2020 07:34 )   PT: 12.1 sec;   INR: 1.04 ratio         PTT - ( 29 Aug 2020 07:34 )  PTT:31.8 sec      08-28-20 @ 07:01  -  08-29-20 @ 07:00  --------------------------------------------------------  IN: 5214.8 mL / OUT: 50893 mL / NET: -5035.2 mL    08-29-20 @ 07:01  -  08-29-20 @ 09:33  --------------------------------------------------------  IN: 150 mL / OUT: 650 mL / NET: -500 mL        RADIOLOGY & ADDITIONAL TESTS:

## 2020-08-29 NOTE — PROGRESS NOTE ADULT - ASSESSMENT
34yo male with PMHx of ETOH abuse brought in by family after witnessed seizure like activity x 2 mins, admitted for seizure, hyponatremia, metabolic encephalopathy 2/2 ETOH intoxication and liver failure.

## 2020-08-29 NOTE — PROGRESS NOTE ADULT - ASSESSMENT
etoh abuse  anemia  etoh hepatitis    plan  continue detox protocol   watch for seizures  serial meld labs Meld today 23  may need  upper gastrointestinal endoscopy prior to d/c  ppi once a day  gerd precautions  thiamine and folic acid

## 2020-08-29 NOTE — PROGRESS NOTE ADULT - SUBJECTIVE AND OBJECTIVE BOX
Patient is a 35y old  Male who presents with a chief complaint of Seizure (29 Aug 2020 17:05)      INTERVAL HPI:  OVERNIGHT EVENTS:  T(F): 98.2 (08-29-20 @ 12:00), Max: 100.5 (08-29-20 @ 01:00)  HR: 114 (08-29-20 @ 17:00) (114 - 150)  BP: 109/69 (08-29-20 @ 17:00) (104/74 - 142/99)  RR: 20 (08-29-20 @ 17:00) (20 - 37)  SpO2: 95% (08-29-20 @ 17:00) (94% - 97%)  I&O's Summary    28 Aug 2020 07:01  -  29 Aug 2020 07:00  --------------------------------------------------------  IN: 5214.8 mL / OUT: 89141 mL / NET: -5035.2 mL    29 Aug 2020 07:01  -  29 Aug 2020 17:57  --------------------------------------------------------  IN: 750 mL / OUT: 4000 mL / NET: -3250 mL        REVIEW OF SYSTEMS:  CONSTITUTIONAL: No fever, weight loss, or fatigue  EYES: No eye pain, visual disturbances, or discharge  ENMT:  No difficulty hearing, tinnitus, vertigo; No sinus or throat pain  NECK: No pain or stiffness  BREASTS: No pain, masses, or nipple discharge  RESPIRATORY: No cough, wheezing, chills or hemoptysis; No shortness of breath  CARDIOVASCULAR: No chest pain, palpitations, dizziness, or leg swelling  GASTROINTESTINAL: No abdominal or epigastric pain. No nausea, vomiting, or hematemesis; No diarrhea or constipation. No melena or hematochezia.  GENITOURINARY: No dysuria, frequency, hematuria, or incontinence  NEUROLOGICAL: No headaches, memory loss, loss of strength, numbness, or tremors  SKIN: No itching, burning, rashes, or lesions   LYMPH NODES: No enlarged glands  ENDOCRINE: No heat or cold intolerance; No hair loss  MUSCULOSKELETAL: No joint pain or swelling; No muscle, back, or extremity pain  PSYCHIATRIC: No depression, anxiety, mood swings, or difficulty sleeping  HEME/LYMPH: No easy bruising, or bleeding gums  ALLERY AND IMMUNOLOGIC: No hives or eczema    PHYSICAL EXAM:  GENERAL: NAD, well-groomed, well-developed  HEAD:  Atraumatic, Normocephalic  EYES: EOMI, PERRLA, conjunctiva and sclera clear  ENMT: No tonsillar erythema, exudates, or enlargement; Moist mucous membranes, Good dentition, No lesions  NECK: Supple, No JVD, Normal thyroid  NERVOUS SYSTEM:  Alert & Oriented X3, Good concentration; Motor Strength 5/5 B/L upper and lower extremities; DTRs 2+ intact and symmetric  CHEST/LUNG: Clear to percussion bilaterally; No rales, rhonchi, wheezing, or rubs  HEART: Regular rate and rhythm; No murmurs, rubs, or gallops  ABDOMEN: Soft, Nontender, Nondistended; Bowel sounds present  EXTREMITIES:  2+ Peripheral Pulses, No clubbing, cyanosis, or edema  LYMPH: No lymphadenopathy noted  SKIN: No rashes or lesions    LABS:                        13.2   4.52  )-----------( 93       ( 29 Aug 2020 06:14 )             36.8     08-29    134<L>  |  101  |  3<L>  ----------------------------<  117<H>  4.1   |  26  |  0.92    Ca    7.9<L>      29 Aug 2020 14:26  Phos  3.3     08-29  Mg     2.3     08-29    TPro  5.7<L>  /  Alb  2.4<L>  /  TBili  18.8<HH>  /  DBili  x   /  AST  308<H>  /  ALT  216<H>  /  AlkPhos  262<H>  08-29    PT/INR - ( 29 Aug 2020 07:34 )   PT: 12.1 sec;   INR: 1.04 ratio         PTT - ( 29 Aug 2020 07:34 )  PTT:31.8 sec    CAPILLARY BLOOD GLUCOSE                  MEDICATIONS  (STANDING):  dextrose 5%. 1000 milliLiter(s) (50 mL/Hr) IV Continuous <Continuous>  enoxaparin Injectable 40 milliGRAM(s) SubCutaneous daily  folic acid Injectable 1 milliGRAM(s) IV Push daily  multivitamin 1 Tablet(s) Oral daily  pantoprazole  Injectable 40 milliGRAM(s) IV Push daily  thiamine Injectable 100 milliGRAM(s) IV Push daily  ursodiol Capsule 300 milliGRAM(s) Oral three times a day    MEDICATIONS  (PRN):  LORazepam   Injectable 2 milliGRAM(s) IV Push every 4 hours PRN CIWA > 8 Patient is a 35y old  Male who presents with a chief complaint of Seizure (29 Aug 2020 17:05)      INTERVAL HPI: Pt seen and examined. States he is feeling bettter, denies any acute complaints at this time. States he still has his gallbladder, did admit to some transverse lower abd pain but its better.     OVERNIGHT EVENTS: none ntoed  T(F): 98.2 (08-29-20 @ 12:00), Max: 100.5 (08-29-20 @ 01:00)  HR: 114 (08-29-20 @ 17:00) (114 - 150)  BP: 109/69 (08-29-20 @ 17:00) (104/74 - 142/99)  RR: 20 (08-29-20 @ 17:00) (20 - 37)  SpO2: 95% (08-29-20 @ 17:00) (94% - 97%)  I&O's Summary    28 Aug 2020 07:01  -  29 Aug 2020 07:00  --------------------------------------------------------  IN: 5214.8 mL / OUT: 25320 mL / NET: -5035.2 mL    29 Aug 2020 07:01  -  29 Aug 2020 17:57  --------------------------------------------------------  IN: 750 mL / OUT: 4000 mL / NET: -3250 mL        REVIEW OF SYSTEMS:  CONSTITUTIONAL: No fever, weight loss, or fatigue  RESPIRATORY: No cough, wheezing, chills or hemoptysis; No shortness of breath  CARDIOVASCULAR: No chest pain, palpitations, dizziness, or leg swelling  GASTROINTESTINAL: No abdominal or epigastric pain. No nausea, vomiting, or hematemesis; No diarrhea or constipation. No melena or hematochezia.  GENITOURINARY: No dysuria, frequency, hematuria, or incontinence  NEUROLOGICAL: No headaches, memory loss, loss of strength, numbness, or tremors  SKIN: No itching, burning, rashes, or lesions   MUSCULOSKELETAL: No joint pain or swelling; No muscle, back, or extremity pain  PSYCHIATRIC: No depression, anxiety, mood swings, or difficulty sleeping      PHYSICAL EXAM:  GENERAL: NAD, tremulous, poss withdrawal symptoms occuring  HEENT-scleral icterus noted  NERVOUS SYSTEM:  Alert & Oriented X3, tremulous, Motor Strength 3/5 B/L upper and lower extremities  CHEST/LUNG: Clear to percussion bilaterally; No rales, rhonchi, wheezing, or rubs  HEART: Regular rate and rhythm; No murmurs, rubs, or gallops  ABDOMEN: Soft, Nontender, Nondistended; Bowel sounds present  EXTREMITIES:  2+ Peripheral Pulses, No clubbing, cyanosis, or edema  SKIN: No rashes or lesions    LABS:                        13.2   4.52  )-----------( 93       ( 29 Aug 2020 06:14 )             36.8     08-29    134<L>  |  101  |  3<L>  ----------------------------<  117<H>  4.1   |  26  |  0.92    Ca    7.9<L>      29 Aug 2020 14:26  Phos  3.3     08-29  Mg     2.3     08-29    TPro  5.7<L>  /  Alb  2.4<L>  /  TBili  18.8<HH>  /  DBili  x   /  AST  308<H>  /  ALT  216<H>  /  AlkPhos  262<H>  08-29    PT/INR - ( 29 Aug 2020 07:34 )   PT: 12.1 sec;   INR: 1.04 ratio         PTT - ( 29 Aug 2020 07:34 )  PTT:31.8 sec    CAPILLARY BLOOD GLUCOSE                  MEDICATIONS  (STANDING):  dextrose 5%. 1000 milliLiter(s) (50 mL/Hr) IV Continuous <Continuous>  enoxaparin Injectable 40 milliGRAM(s) SubCutaneous daily  folic acid Injectable 1 milliGRAM(s) IV Push daily  multivitamin 1 Tablet(s) Oral daily  pantoprazole  Injectable 40 milliGRAM(s) IV Push daily  thiamine Injectable 100 milliGRAM(s) IV Push daily  ursodiol Capsule 300 milliGRAM(s) Oral three times a day    MEDICATIONS  (PRN):  LORazepam   Injectable 2 milliGRAM(s) IV Push every 4 hours PRN CIWA > 8

## 2020-08-29 NOTE — PROGRESS NOTE ADULT - PROBLEM SELECTOR PLAN 1
- improving  - Multifactorial; liver failure, hyponatremia, alcohol intoxication  - Admitted to ICU/tele monitoring  - Head CT neg for acute bleed  - F/u ammonia levels, Urine drug screen

## 2020-08-30 LAB
ALBUMIN SERPL ELPH-MCNC: 2.4 G/DL — LOW (ref 3.3–5)
ALP SERPL-CCNC: 283 U/L — HIGH (ref 40–120)
ALT FLD-CCNC: 189 U/L — HIGH (ref 12–78)
ANION GAP SERPL CALC-SCNC: 4 MMOL/L — LOW (ref 5–17)
ANION GAP SERPL CALC-SCNC: 5 MMOL/L — SIGNIFICANT CHANGE UP (ref 5–17)
ANION GAP SERPL CALC-SCNC: 6 MMOL/L — SIGNIFICANT CHANGE UP (ref 5–17)
ANION GAP SERPL CALC-SCNC: 7 MMOL/L — SIGNIFICANT CHANGE UP (ref 5–17)
ANION GAP SERPL CALC-SCNC: 7 MMOL/L — SIGNIFICANT CHANGE UP (ref 5–17)
AST SERPL-CCNC: 247 U/L — HIGH (ref 15–37)
BASOPHILS # BLD AUTO: 0.03 K/UL — SIGNIFICANT CHANGE UP (ref 0–0.2)
BASOPHILS NFR BLD AUTO: 0.8 % — SIGNIFICANT CHANGE UP (ref 0–2)
BILIRUB SERPL-MCNC: 19.8 MG/DL — CRITICAL HIGH (ref 0.2–1.2)
BUN SERPL-MCNC: 5 MG/DL — LOW (ref 7–23)
BUN SERPL-MCNC: 6 MG/DL — LOW (ref 7–23)
BUN SERPL-MCNC: 8 MG/DL — SIGNIFICANT CHANGE UP (ref 7–23)
CALCIUM SERPL-MCNC: 8.2 MG/DL — LOW (ref 8.5–10.1)
CALCIUM SERPL-MCNC: 8.3 MG/DL — LOW (ref 8.5–10.1)
CALCIUM SERPL-MCNC: 8.3 MG/DL — LOW (ref 8.5–10.1)
CALCIUM SERPL-MCNC: 8.4 MG/DL — LOW (ref 8.5–10.1)
CALCIUM SERPL-MCNC: 8.4 MG/DL — LOW (ref 8.5–10.1)
CHLORIDE SERPL-SCNC: 101 MMOL/L — SIGNIFICANT CHANGE UP (ref 96–108)
CHLORIDE SERPL-SCNC: 102 MMOL/L — SIGNIFICANT CHANGE UP (ref 96–108)
CHLORIDE SERPL-SCNC: 104 MMOL/L — SIGNIFICANT CHANGE UP (ref 96–108)
CO2 SERPL-SCNC: 26 MMOL/L — SIGNIFICANT CHANGE UP (ref 22–31)
CO2 SERPL-SCNC: 27 MMOL/L — SIGNIFICANT CHANGE UP (ref 22–31)
CO2 SERPL-SCNC: 27 MMOL/L — SIGNIFICANT CHANGE UP (ref 22–31)
CO2 SERPL-SCNC: 28 MMOL/L — SIGNIFICANT CHANGE UP (ref 22–31)
CO2 SERPL-SCNC: 28 MMOL/L — SIGNIFICANT CHANGE UP (ref 22–31)
CREAT SERPL-MCNC: 1 MG/DL — SIGNIFICANT CHANGE UP (ref 0.5–1.3)
CREAT SERPL-MCNC: 1.1 MG/DL — SIGNIFICANT CHANGE UP (ref 0.5–1.3)
CREAT SERPL-MCNC: 1.1 MG/DL — SIGNIFICANT CHANGE UP (ref 0.5–1.3)
EOSINOPHIL # BLD AUTO: 0.07 K/UL — SIGNIFICANT CHANGE UP (ref 0–0.5)
EOSINOPHIL NFR BLD AUTO: 1.9 % — SIGNIFICANT CHANGE UP (ref 0–6)
GLUCOSE SERPL-MCNC: 106 MG/DL — HIGH (ref 70–99)
GLUCOSE SERPL-MCNC: 108 MG/DL — HIGH (ref 70–99)
GLUCOSE SERPL-MCNC: 114 MG/DL — HIGH (ref 70–99)
GLUCOSE SERPL-MCNC: 117 MG/DL — HIGH (ref 70–99)
GLUCOSE SERPL-MCNC: 97 MG/DL — SIGNIFICANT CHANGE UP (ref 70–99)
HCT VFR BLD CALC: 38 % — LOW (ref 39–50)
HGB BLD-MCNC: 13.1 G/DL — SIGNIFICANT CHANGE UP (ref 13–17)
IMM GRANULOCYTES NFR BLD AUTO: 0.5 % — SIGNIFICANT CHANGE UP (ref 0–1.5)
LIDOCAIN IGE QN: 564 U/L — HIGH (ref 73–393)
LYMPHOCYTES # BLD AUTO: 1.02 K/UL — SIGNIFICANT CHANGE UP (ref 1–3.3)
LYMPHOCYTES # BLD AUTO: 27.4 % — SIGNIFICANT CHANGE UP (ref 13–44)
MAGNESIUM SERPL-MCNC: 2.3 MG/DL — SIGNIFICANT CHANGE UP (ref 1.6–2.6)
MCHC RBC-ENTMCNC: 28.2 PG — SIGNIFICANT CHANGE UP (ref 27–34)
MCHC RBC-ENTMCNC: 34.5 GM/DL — SIGNIFICANT CHANGE UP (ref 32–36)
MCV RBC AUTO: 81.9 FL — SIGNIFICANT CHANGE UP (ref 80–100)
MONOCYTES # BLD AUTO: 0.36 K/UL — SIGNIFICANT CHANGE UP (ref 0–0.9)
MONOCYTES NFR BLD AUTO: 9.7 % — SIGNIFICANT CHANGE UP (ref 2–14)
NEUTROPHILS # BLD AUTO: 2.22 K/UL — SIGNIFICANT CHANGE UP (ref 1.8–7.4)
NEUTROPHILS NFR BLD AUTO: 59.7 % — SIGNIFICANT CHANGE UP (ref 43–77)
NRBC # BLD: 0 /100 WBCS — SIGNIFICANT CHANGE UP (ref 0–0)
PHOSPHATE SERPL-MCNC: 2.8 MG/DL — SIGNIFICANT CHANGE UP (ref 2.5–4.5)
PLATELET # BLD AUTO: 101 K/UL — LOW (ref 150–400)
POTASSIUM SERPL-MCNC: 3.6 MMOL/L — SIGNIFICANT CHANGE UP (ref 3.5–5.3)
POTASSIUM SERPL-MCNC: 3.7 MMOL/L — SIGNIFICANT CHANGE UP (ref 3.5–5.3)
POTASSIUM SERPL-MCNC: 3.8 MMOL/L — SIGNIFICANT CHANGE UP (ref 3.5–5.3)
POTASSIUM SERPL-MCNC: 3.8 MMOL/L — SIGNIFICANT CHANGE UP (ref 3.5–5.3)
POTASSIUM SERPL-MCNC: 3.9 MMOL/L — SIGNIFICANT CHANGE UP (ref 3.5–5.3)
POTASSIUM SERPL-SCNC: 3.6 MMOL/L — SIGNIFICANT CHANGE UP (ref 3.5–5.3)
POTASSIUM SERPL-SCNC: 3.7 MMOL/L — SIGNIFICANT CHANGE UP (ref 3.5–5.3)
POTASSIUM SERPL-SCNC: 3.8 MMOL/L — SIGNIFICANT CHANGE UP (ref 3.5–5.3)
POTASSIUM SERPL-SCNC: 3.8 MMOL/L — SIGNIFICANT CHANGE UP (ref 3.5–5.3)
POTASSIUM SERPL-SCNC: 3.9 MMOL/L — SIGNIFICANT CHANGE UP (ref 3.5–5.3)
PROT SERPL-MCNC: 6.1 G/DL — SIGNIFICANT CHANGE UP (ref 6–8.3)
RBC # BLD: 4.64 M/UL — SIGNIFICANT CHANGE UP (ref 4.2–5.8)
RBC # FLD: 21.2 % — HIGH (ref 10.3–14.5)
SODIUM SERPL-SCNC: 133 MMOL/L — LOW (ref 135–145)
SODIUM SERPL-SCNC: 133 MMOL/L — LOW (ref 135–145)
SODIUM SERPL-SCNC: 134 MMOL/L — LOW (ref 135–145)
SODIUM SERPL-SCNC: 137 MMOL/L — SIGNIFICANT CHANGE UP (ref 135–145)
SODIUM SERPL-SCNC: 137 MMOL/L — SIGNIFICANT CHANGE UP (ref 135–145)
WBC # BLD: 3.72 K/UL — LOW (ref 3.8–10.5)
WBC # FLD AUTO: 3.72 K/UL — LOW (ref 3.8–10.5)

## 2020-08-30 PROCEDURE — 99233 SBSQ HOSP IP/OBS HIGH 50: CPT

## 2020-08-30 RX ORDER — LANOLIN ALCOHOL/MO/W.PET/CERES
5 CREAM (GRAM) TOPICAL AT BEDTIME
Refills: 0 | Status: DISCONTINUED | OUTPATIENT
Start: 2020-08-30 | End: 2020-09-04

## 2020-08-30 RX ORDER — THIAMINE MONONITRATE (VIT B1) 100 MG
100 TABLET ORAL DAILY
Refills: 0 | Status: DISCONTINUED | OUTPATIENT
Start: 2020-08-30 | End: 2020-09-04

## 2020-08-30 RX ORDER — DESMOPRESSIN ACETATE 0.1 MG/1
2 TABLET ORAL ONCE
Refills: 0 | Status: COMPLETED | OUTPATIENT
Start: 2020-08-30 | End: 2020-08-30

## 2020-08-30 RX ORDER — FOLIC ACID 0.8 MG
1 TABLET ORAL DAILY
Refills: 0 | Status: DISCONTINUED | OUTPATIENT
Start: 2020-08-30 | End: 2020-09-04

## 2020-08-30 RX ADMIN — ENOXAPARIN SODIUM 40 MILLIGRAM(S): 100 INJECTION SUBCUTANEOUS at 11:23

## 2020-08-30 RX ADMIN — Medication 2 MILLIGRAM(S): at 16:09

## 2020-08-30 RX ADMIN — Medication 1 TABLET(S): at 11:23

## 2020-08-30 RX ADMIN — URSODIOL 300 MILLIGRAM(S): 250 TABLET, FILM COATED ORAL at 16:09

## 2020-08-30 RX ADMIN — Medication 5 MILLIGRAM(S): at 21:23

## 2020-08-30 RX ADMIN — Medication 2 MILLIGRAM(S): at 09:05

## 2020-08-30 RX ADMIN — URSODIOL 300 MILLIGRAM(S): 250 TABLET, FILM COATED ORAL at 05:42

## 2020-08-30 RX ADMIN — Medication 30 MILLILITER(S): at 04:17

## 2020-08-30 RX ADMIN — DESMOPRESSIN ACETATE 2 MICROGRAM(S): 0.1 TABLET ORAL at 03:18

## 2020-08-30 RX ADMIN — Medication 2 MILLIGRAM(S): at 23:01

## 2020-08-30 RX ADMIN — Medication 100 MILLIGRAM(S): at 11:23

## 2020-08-30 RX ADMIN — URSODIOL 300 MILLIGRAM(S): 250 TABLET, FILM COATED ORAL at 21:23

## 2020-08-30 RX ADMIN — PANTOPRAZOLE SODIUM 40 MILLIGRAM(S): 20 TABLET, DELAYED RELEASE ORAL at 11:23

## 2020-08-30 RX ADMIN — Medication 1 MILLIGRAM(S): at 11:23

## 2020-08-30 RX ADMIN — SODIUM CHLORIDE 50 MILLILITER(S): 9 INJECTION, SOLUTION INTRAVENOUS at 01:00

## 2020-08-30 NOTE — PROGRESS NOTE ADULT - PROBLEM SELECTOR PLAN 1
US abd - hepatic steatosis -   on Ativan PRN for ciwa triggers - VENKAT management  vitamins -   education and counseling  monitor VS and HD and Sat  replete lytes  labs and imaging reviewed

## 2020-08-30 NOTE — PHARMACOTHERAPY INTERVENTION NOTE - COMMENTS
Patient ordered thiamine and folic acid IV, now tolerating oral meds, s/w ICU MD to change to oral orders; accepted. Also discussed IV protonix, ordered per GI for bloody emesis, MD to keep as IV for now

## 2020-08-30 NOTE — PROGRESS NOTE ADULT - SUBJECTIVE AND OBJECTIVE BOX
Patient is a 35y old  Male who presents with a chief complaint of Seizure (30 Aug 2020 06:51)    24 hour events: **    REVIEW OF SYSTEMS      T(F): 98.6 (08-30-20 @ 04:00), Max: 99 (08-30-20 @ 00:00)  HR: 122 (08-30-20 @ 07:00) (109 - 149)  BP: 115/80 (08-30-20 @ 07:00) (109/69 - 142/80)  RR: 25 (08-30-20 @ 07:00) (18 - 37)  SpO2: 96% (08-30-20 @ 07:00) (93% - 97%)  Wt(kg): --            I&O's Summary    08-29 @ 07:01  -  08-30 @ 07:00  --------------------------------------------------------  IN: 2620 mL / OUT: 7500 mL / NET: -4880 mL      PHYSICAL EXAM  General:   CNS:   HEENT:   Resp:   CVS:   Abd:   Ext:   Skin:     MEDICATIONS  LORazepam   Injectable IV Push PRN  enoxaparin Injectable SubCutaneous  pantoprazole  Injectable IV Push  ursodiol Capsule Oral  dextrose 5%. IV Continuous  folic acid Injectable IV Push  multivitamin Oral  thiamine Injectable IV Push                                13.1   3.72  )-----------( 101      ( 30 Aug 2020 06:20 )             38.0       08-30    137  |  102  |  6<L>  ----------------------------<  97  3.9   |  28  |  1.10    Ca    8.3<L>      30 Aug 2020 06:20  Phos  2.8     08-30  Mg     2.3     08-30    TPro  6.1  /  Alb  2.4<L>  /  TBili  x   /  DBili  x   /  AST  247<H>  /  ALT  189<H>  /  AlkPhos  283<H>  08-30    PT/INR - ( 29 Aug 2020 07:34 )   PT: 12.1 sec;   INR: 1.04 ratio         PTT - ( 29 Aug 2020 07:34 )  PTT:31.8 sec      Radiology: ***  Bedside lung ultrasound: ***  Bedside ECHO: ***    CENTRAL LINE: Y/N          DATE INSERTED:              REMOVE: Y/N  OSEGUERA: Y/N                        DATE INSERTED:              REMOVE: Y/N  A-LINE: Y/N                       DATE INSERTED:              REMOVE: Y/N    GLOBAL ISSUE/BEST PRACTICE  Analgesia:   Sedation:   CAM-ICU:   HOB elevation: yes  Stress ulcer prophylaxis:   VTE prophylaxis:   Glycemic control:   Nutrition:     CODE STATUS: ***  [facility] discussion: Y Patient is a 35y old  Male who presents with a chief complaint of Seizure (30 Aug 2020 06:51)    24 hour events: Patient seen and examined at bedside. No acute events overnight, remained afebrile. Recieved additional dose of DDVAP at 3am. Urine output slowing down. No seizure activity overnight.      REVIEW OF SYSTEMS  Constitutional: denies fever  HEENT: denies headache, dizziness  Respiratory: denies SOB  Cardiovascular: denies CP  Gastrointestinal: denies nausea, vomiting, diarrhea, constipation, abdominal pain  Genitourinary: denies painful urination  Musculoskeletal: denies muscle aches      T(F): 98.6 (08-30-20 @ 04:00), Max: 99 (08-30-20 @ 00:00)  HR: 122 (08-30-20 @ 07:00) (109 - 149)  BP: 115/80 (08-30-20 @ 07:00) (109/69 - 142/80)  RR: 25 (08-30-20 @ 07:00) (18 - 37)  SpO2: 96% (08-30-20 @ 07:00) (93% - 97%)  Wt(kg): --    I&O's Summary    08-29 @ 07:01  -  08-30 @ 07:00  --------------------------------------------------------  IN: 2620 mL / OUT: 7500 mL / NET: -4880 mL    Physical Exam:  Gen: well appearing, NAD  HEENT: NCAT, PEERLA b/l, EOMI b/l, no conjunctival erythema  Cardio: regular rate and rhythm, +s1s2, no murmurs, rubs, or gallops  Pulm: CTA b/l, no wheezes, rales or rhonchi  Abdomen: soft, nontender, nondistended, no guarding  Extremities: no clubbing, cyanosis or edema, +2 pedal pulses  Neuro: AAOx3  Skin: warm and dry      MEDICATIONS  LORazepam   Injectable IV Push PRN  enoxaparin Injectable SubCutaneous  pantoprazole  Injectable IV Push  ursodiol Capsule Oral  dextrose 5%. IV Continuous  folic acid Injectable IV Push  multivitamin Oral  thiamine Injectable IV Push                                13.1   3.72  )-----------( 101      ( 30 Aug 2020 06:20 )             38.0       08-30    137  |  102  |  6<L>  ----------------------------<  97  3.9   |  28  |  1.10    Ca    8.3<L>      30 Aug 2020 06:20  Phos  2.8     08-30  Mg     2.3     08-30    TPro  6.1  /  Alb  2.4<L>  /  TBili  x   /  DBili  x   /  AST  247<H>  /  ALT  189<H>  /  AlkPhos  283<H>  08-30    PT/INR - ( 29 Aug 2020 07:34 )   PT: 12.1 sec;   INR: 1.04 ratio         PTT - ( 29 Aug 2020 07:34 )  PTT:31.8 sec      Radiology:  < from: US Abdomen Complete (08.29.20 @ 23:49) >  IMPRESSION: Hepatic steatosis. No biliary ductal dilatation.    < end of copied text >    CENTRAL LINE: N   OSEGUERA: Y                     DATE INSERTED: 8/27        REMOVE: N    GLOBAL ISSUE/BEST PRACTICE  Analgesia: N  Sedation: N  HOB elevation: yes  Stress ulcer prophylaxis: Y  VTE prophylaxis: Y  Glycemic control: N  Nutrition: N    CODE STATUS: Full Code

## 2020-08-30 NOTE — PROGRESS NOTE ADULT - SUBJECTIVE AND OBJECTIVE BOX
INTERVAL HPI/OVERNIGHT EVENTS:  Pt seen and examined, sitting in bed cooperative w/ exam  h/h stable  uptrending Tbili 19.8 <- 18.8 <-10.1, AST/ ALT downtrending   lipase uptrending 564   yest Meld- Na 23        MEDICATIONS  (STANDING):  dextrose 5%. 1000 milliLiter(s) (50 mL/Hr) IV Continuous <Continuous>  enoxaparin Injectable 40 milliGRAM(s) SubCutaneous daily  folic acid 1 milliGRAM(s) Oral daily  multivitamin 1 Tablet(s) Oral daily  pantoprazole  Injectable 40 milliGRAM(s) IV Push daily  thiamine 100 milliGRAM(s) Oral daily  ursodiol Capsule 300 milliGRAM(s) Oral three times a day    MEDICATIONS  (PRN):  LORazepam   Injectable 2 milliGRAM(s) IV Push every 4 hours PRN CIWA > 8      Allergies    No Known Allergies    Intolerances        General:  No wt loss, fevers, chills, night sweats, fatigue,   Eyes:  Good vision, no reported pain  ENT:  No sore throat, pain, runny nose, dysphagia  CV:  No pain, palpitations, hypo/hypertension  Resp:  No dyspnea, cough, tachypnea, wheezing  GI:  No pain, No nausea, No vomiting, No diarrhea, No constipation, No weight loss, No fever, No pruritis, No rectal bleeding, No tarry stools, No dysphagia,  :  No pain, bleeding, incontinence, nocturia  Muscle:  No pain, weakness  Neuro:  No weakness, tingling, memory problems  Psych:  No fatigue, insomnia, mood problems, depression  Endocrine:  No polyuria, polydipsia, cold/heat intolerance  Heme:  No petechiae, ecchymosis, easy bruisability  Skin:  No rash, tattoos, scars, edema    PHYSICAL EXAM:     Vital Signs Last 24 Hrs  T(C): 37 (30 Aug 2020 04:00), Max: 37.2 (30 Aug 2020 00:00)  T(F): 98.6 (30 Aug 2020 04:00), Max: 99 (30 Aug 2020 00:00)  HR: 127 (30 Aug 2020 09:00) (109 - 149)  BP: 118/86 (30 Aug 2020 09:00) (109/69 - 142/80)  BP(mean): 98 (30 Aug 2020 09:00) (83 - 104)  RR: 30 (30 Aug 2020 09:00) (18 - 37)  SpO2: 96% (30 Aug 2020 09:00) (93% - 97%)      27 Aug 2020 07:01  -  28 Aug 2020 07:00  --------------------------------------------------------  IN: 7750.9 mL / OUT: 7547 mL / NET: 203.9 mL    28 Aug 2020 07:01  -  28 Aug 2020 11:46  --------------------------------------------------------  IN: 1380 mL / OUT: 1550 mL / NET: -170 mL        GENERAL:  Appears stated age, well-groomed, well-nourished, no distress  HEENT:  NC/AT,  conjunctivae clear and pink, no thyromegaly, nodules, adenopathy, no JVD, sclera -anicteric  CHEST:  Full & symmetric excursion, no increased effort, breath sounds clear  HEART:  Regular rhythm, S1, S2, no murmur/rub/S3/S4, no abdominal bruit, no edema  ABDOMEN:  Soft, non-tender, non-distended, normoactive bowel sounds,  no masses ,no hepato-splenomegaly, no signs of chronic liver disease  EXTEREMITIES:  no cyanosis,clubbing or edema  SKIN:  No rash/erythema/ecchymoses/petechiae/wounds/abscess/warm/dry  NEURO:  Alert, oriented, no asterixis, no tremor, no encephalopathy              LABS:                        13.1   3.72  )-----------( 101      ( 30 Aug 2020 06:20 )             38.0     08-30    137  |  102  |  6<L>  ----------------------------<  97  3.9   |  28  |  1.10    Ca    8.3<L>      30 Aug 2020 06:20  Phos  2.8     08-30  Mg     2.3     08-30    TPro  6.1  /  Alb  2.4<L>  /  TBili  19.8<HH>  /  DBili  15.60<H>  /  AST  247<H>  /  ALT  189<H>  /  AlkPhos  283<H>  08-30    PT/INR - ( 29 Aug 2020 07:34 )   PT: 12.1 sec;   INR: 1.04 ratio         PTT - ( 29 Aug 2020 07:34 )  PTT:31.8 sec      08-29-20 @ 07:01  -  08-30-20 @ 07:00  --------------------------------------------------------  IN: 2620 mL / OUT: 7500 mL / NET: -4880 mL    08-30-20 @ 07:01 - 08-30-20 @ 10:22  --------------------------------------------------------  IN: 175 mL / OUT: 0 mL / NET: 175 mL      RADIOLOGY & ADDITIONAL TESTS:

## 2020-08-30 NOTE — PROGRESS NOTE ADULT - PROBLEM SELECTOR PLAN 1
- improving  - Multifactorial; liver failure, hyponatremia, alcohol intoxication  - Admitted to ICU/tele monitoring  - Head CT neg for acute bleed  - ammonia levels initially 73, Urine drug screen negative  -steadily improving  -Will need to c/w Detox protocol

## 2020-08-30 NOTE — PROGRESS NOTE ADULT - ASSESSMENT
5M h/o EtOH abuse brought in with seizure-like activity in setting of EtOH intoxication and hyponatremia s/p 2L IVF in ED with course c/b rapid overcorrection of Na with water diuresis s/p desmopressin and D5  now with improved correction rate and no further seizure activity.    Neuro: AAOx3, continue CIWA with symptom triggered prn ativan, no additional seizure activity, continue neuro checks q4hr  CV: sinus tachycxardia, likely 2/.2 to ETOH withdrawal  Pulm: no acute issues  GI: total bilirubin noted to increase to 19.8 with both elevated direct and indirect, LFTs otherwise stable; lipase added (443) - given bump in bili with mild pancreatitis concern for obstruction despite previous US findings - will get repeat US today to assess, NPO pending results; continue thiamine/folate/mvi; transaminitis, c/w protonix for dark vomitus upon admission  Renal: Na again correcting rapidly with water diuresis 10L/24hrs, received desmopressin x2 overnight and restarted on D5, continue q4hr BMP (stable 130 previous 3 checks); hypokalemia resolved with aggressive repletion  Heme: H/H stable, hrombocytopenia slightly improving and likely 2/2 EtOH use, will trend; continue dvt ppx with lovenox  Endo: no acute issues 5M h/o EtOH abuse brought in with seizure-like activity in setting of EtOH intoxication and hyponatremia s/p 2L IVF in ED with course c/b rapid overcorrection of Na with water diuresis s/p desmopressin and D5  now with improved correction rate and no further seizure activity.    Neuro: AAOx3, continue CIWA with symptom triggered prn ativan, no additional seizure activity, continue neuro checks q4hr  CV: sinus tachycxardia, likely 2/.2 to ETOH withdrawal  Pulm: no acute issues  GI: total bilirubin noted to increase to 19.8 with both elevated direct and indirect, LFTs otherwise stable; repeat ultrasound with out evidence of obstruction, possibly 2/2 to hepatic steatosis, continue to trend; continue thiamine/folate/mvi; c/w protonix for dark vomitus upon admission, may need endoscopy piror to d/c as per GI  Renal: Na stable at 137, s/p 1 dose of DDVAP overnight, continue 50cc/hr of D5, continue q4hr BMP and continue to monitor urine output  Heme: H/H stable, thrombocytopenia slightly improving and likely 2/2 EtOH use, will trend; continue dvt ppx with lovenox  Endo: no acute issues  Dispo: Full Code, continue to monitor in ICU

## 2020-08-30 NOTE — PROGRESS NOTE ADULT - SUBJECTIVE AND OBJECTIVE BOX
Patient seen and examined at bedside. Comfortable cooperative this am with writer.  Chart notes reviewed. Received DDAVP overnight  On CIWA  Labs: Bilis uptrending    Review of Systems:  General:denies fever chills, headache, weakness  HEENT: denies blurry vision,diffculty swallowing, difficulty hearing, tinnitus  Cardiovascular: denies chest pain  ,palpitations  Pulmonary:denies shortness of breath, cough, wheezing, hemoptysis  Gastrointestinal: denies abdominal pain, constipation, diarrhea,nausea , vomiting, hematochezia  : denies hematuria, dysuria, or incontinence  Neurological: denies weakness, numbness , tingling, dizziness, tremors  MSK: denies muscle pain, difficulty ambulating, swelling, back pain  skin: denies skin rash, itching, burning, or  skin lesions  Psychiatrical: denies mood disturbances, anxierty, feeling depressed, depression , or difficulty sleeping    Objective:  Vitals  T(C): 36.7 (08-30-20 @ 16:00), Max: 37.2 (08-30-20 @ 00:00)  HR: 104 (08-30-20 @ 22:00) (104 - 149)  BP: 116/66 (08-30-20 @ 22:00) (109/75 - 144/110)  RR: 26 (08-30-20 @ 22:00) (19 - 38)  SpO2: 97% (08-30-20 @ 22:00) (93% - 98%)    Physical Exam:  General: comfortable, no acute distress, well nourished  HEENT: Atraumatic, no LAD, trachea midline, PERRLA  Cardiovascular: normal s1s2, no murmurs, gallops or fricition rubs  Pulmonary: clear to ausculation Bilaterally, no wheezing , rhonchi  Gastrointestinal: soft non tender non distended, no masses felt, no organomegally  Muscloskeletal: no lower extremity edema, intact bilateral lower extremity pulses  Neurological: CN II-12 intact. No focal weakness  Psychiatrical: normal mood, cooperative  SKIN: no rash, lesions or ulcers    Labs:                          13.1   3.72  )-----------( 101      ( 30 Aug 2020 06:20 )             38.0     08-30    133<L>  |  101  |  8   ----------------------------<  114<H>  3.7   |  28  |  1.00    Ca    8.2<L>      30 Aug 2020 16:28  Phos  2.8     08-30  Mg     2.3     08-30    TPro  6.1  /  Alb  2.4<L>  /  TBili  19.8<HH>  /  DBili  15.60<H>  /  AST  247<H>  /  ALT  189<H>  /  AlkPhos  283<H>  08-30    LIVER FUNCTIONS - ( 30 Aug 2020 06:20 )  Alb: 2.4 g/dL / Pro: 6.1 g/dL / ALK PHOS: 283 U/L / ALT: 189 U/L / AST: 247 U/L / GGT: x           PT/INR - ( 29 Aug 2020 07:34 )   PT: 12.1 sec;   INR: 1.04 ratio         PTT - ( 29 Aug 2020 07:34 )  PTT:31.8 sec      Active Medications  MEDICATIONS  (STANDING):  enoxaparin Injectable 40 milliGRAM(s) SubCutaneous daily  folic acid 1 milliGRAM(s) Oral daily  multivitamin 1 Tablet(s) Oral daily  pantoprazole  Injectable 40 milliGRAM(s) IV Push daily  thiamine 100 milliGRAM(s) Oral daily  ursodiol Capsule 300 milliGRAM(s) Oral three times a day    MEDICATIONS  (PRN):  LORazepam   Injectable 2 milliGRAM(s) IV Push every 4 hours PRN CIWA > 8  melatonin 5 milliGRAM(s) Oral at bedtime PRN Insomnia

## 2020-08-30 NOTE — PROGRESS NOTE ADULT - ATTENDING COMMENTS
35M h/o EtOH abuse brought in with seizure-like activity in setting of EtOH intoxication and hyponatremia s/p 2L IVF in ED with course c/b rapid overcorrection of Na with water diuresis s/p desmopressin x2, D5 500cc bolus x3 now with improved correction rate and no further seizure activity    Neuro: AOx3, continue CIWA with symptom triggered ativan (required 1 dose in previous 24 hours)   CV: sinus tach 2/2 withdrawal vs volume depletion - if Na stabilized can give trial of gentle hydration   Pulm: no acute issues  GI: bilirubin noted to increase to 20 today (direct = 16), LFTs otherwise stable; lipase also uptrending - repeat sono without evidence of biliary ductal dilation, likely 2/2 hepatic steatosis. GI following; continue thiamine/folate/mvi; transaminitis, c/w protonix for likely gastritis  Renal: sodium now corrected, UOP slowing down today, likely polyuria in setting of water diuresis from sodium correction, will monitor today; if continues with normalization of sodium, may need DI workup  Heme: H/H stable, platelets continue to uptrend; continue dvt ppx with lovenox  Endo: no acute issues    Will monitor in ICU at current, if polyuria resolves and needs less frequent electrolyte checks can transfer to floor in coming days

## 2020-08-30 NOTE — PROGRESS NOTE ADULT - SUBJECTIVE AND OBJECTIVE BOX
Date/Time Patient Seen:  		  Referring MD:   Data Reviewed	       Patient is a 35y old  Male who presents with a chief complaint of Seizure (29 Aug 2020 17:05)      Subjective/HPI     PAST MEDICAL & SURGICAL HISTORY:  No pertinent past medical history  S/P appendectomy  H/O hernia repair        Medication list         MEDICATIONS  (STANDING):  dextrose 5%. 1000 milliLiter(s) (50 mL/Hr) IV Continuous <Continuous>  enoxaparin Injectable 40 milliGRAM(s) SubCutaneous daily  folic acid Injectable 1 milliGRAM(s) IV Push daily  multivitamin 1 Tablet(s) Oral daily  pantoprazole  Injectable 40 milliGRAM(s) IV Push daily  thiamine Injectable 100 milliGRAM(s) IV Push daily  ursodiol Capsule 300 milliGRAM(s) Oral three times a day    MEDICATIONS  (PRN):  LORazepam   Injectable 2 milliGRAM(s) IV Push every 4 hours PRN CIWA > 8         Vitals log        ICU Vital Signs Last 24 Hrs  T(C): 37 (30 Aug 2020 04:00), Max: 37.2 (30 Aug 2020 00:00)  T(F): 98.6 (30 Aug 2020 04:00), Max: 99 (30 Aug 2020 00:00)  HR: 114 (30 Aug 2020 05:00) (109 - 149)  BP: 114/71 (30 Aug 2020 05:00) (109/69 - 142/80)  BP(mean): 86 (30 Aug 2020 05:00) (83 - 104)  ABP: --  ABP(mean): --  RR: 25 (30 Aug 2020 05:00) (18 - 37)  SpO2: 95% (30 Aug 2020 05:00) (93% - 97%)           Input and Output:  I&O's Detail    28 Aug 2020 07:01  -  29 Aug 2020 07:00  --------------------------------------------------------  IN:    dextrose 5%.: 975 mL    dextrose 5%.: 250 mL    dextrose 5%.: 500 mL    Oral Fluid: 2140 mL    Solution: 999.8 mL    Solution: 50 mL    Solution: 300 mL  Total IN: 5214.8 mL    OUT:    Indwelling Catheter - Urethral: 31395 mL  Total OUT: 98353 mL    Total NET: -5035.2 mL      29 Aug 2020 07:01  -  30 Aug 2020 06:51  --------------------------------------------------------  IN:    dextrose 5%.: 750 mL    dextrose 5%.: 500 mL    dextrose 5%.: 600 mL    Oral Fluid: 470 mL  Total IN: 2320 mL    OUT:    Indwelling Catheter - Urethral: 7450 mL  Total OUT: 7450 mL    Total NET: -5130 mL          Lab Data                        13.1   3.72  )-----------( 101      ( 30 Aug 2020 06:20 )             38.0     08-30    137  |  104  |  5<L>  ----------------------------<  106<H>  3.8   |  27  |  1.00    Ca    8.3<L>      30 Aug 2020 00:36  Phos  3.3     08-29  Mg     2.3     08-29    TPro  5.7<L>  /  Alb  2.4<L>  /  TBili  18.8<HH>  /  DBili  x   /  AST  308<H>  /  ALT  216<H>  /  AlkPhos  262<H>  08-29            Review of Systems	      Objective     Physical Examination    heart s1s2  lung dec BS  abd soft  on room air      Pertinent Lab findings & Imaging      Giovanna:  NO   Adequate UO     I&O's Detail    28 Aug 2020 07:01  -  29 Aug 2020 07:00  --------------------------------------------------------  IN:    dextrose 5%.: 975 mL    dextrose 5%.: 250 mL    dextrose 5%.: 500 mL    Oral Fluid: 2140 mL    Solution: 999.8 mL    Solution: 50 mL    Solution: 300 mL  Total IN: 5214.8 mL    OUT:    Indwelling Catheter - Urethral: 82658 mL  Total OUT: 78156 mL    Total NET: -5035.2 mL      29 Aug 2020 07:01  -  30 Aug 2020 06:51  --------------------------------------------------------  IN:    dextrose 5%.: 750 mL    dextrose 5%.: 500 mL    dextrose 5%.: 600 mL    Oral Fluid: 470 mL  Total IN: 2320 mL    OUT:    Indwelling Catheter - Urethral: 7450 mL  Total OUT: 7450 mL    Total NET: -5130 mL               Discussed with:     Cultures:	        Radiology

## 2020-08-30 NOTE — PROGRESS NOTE ADULT - SUBJECTIVE AND OBJECTIVE BOX
Resting    Vital Signs Last 24 Hrs  T(C): 37 (08-30-20 @ 04:00), Max: 37.2 (08-30-20 @ 00:00)  T(F): 98.6 (08-30-20 @ 04:00), Max: 99 (08-30-20 @ 00:00)  HR: 118 (08-30-20 @ 16:00) (109 - 149)  BP: 111/66 (08-30-20 @ 16:00) (109/75 - 142/80)  BP(mean): 82 (08-30-20 @ 16:00) (82 - 104)  RR: 30 (08-30-20 @ 16:00) (18 - 31)  SpO2: 95% (08-30-20 @ 16:00) (93% - 97%)    Respiratory: b/l air entry  Cardiovascular: S1 S2  Gastrointestinal: soft  Extremities:  no edema                                13.1   3.72  )-----------( 101      ( 30 Aug 2020 06:20 )             38.0     30 Aug 2020 16:28    133    |  101    |  8      ----------------------------<  114    3.7     |  28     |  1.00     Ca    8.2        30 Aug 2020 16:28  Phos  2.8       30 Aug 2020 06:20  Mg     2.3       30 Aug 2020 06:20    TPro  6.1    /  Alb  2.4    /  TBili  19.8   /  DBili  15.60  /  AST  247    /  ALT  189    /  AlkPhos  283    30 Aug 2020 06:20    LIVER FUNCTIONS - ( 30 Aug 2020 06:20 )  Alb: 2.4 g/dL / Pro: 6.1 g/dL / ALK PHOS: 283 U/L / ALT: 189 U/L / AST: 247 U/L / GGT: x           PT/INR - ( 29 Aug 2020 07:34 )   PT: 12.1 sec;   INR: 1.04 ratio      enoxaparin Injectable 40 milliGRAM(s) SubCutaneous daily  folic acid 1 milliGRAM(s) Oral daily  LORazepam   Injectable 2 milliGRAM(s) IV Push every 4 hours PRN  multivitamin 1 Tablet(s) Oral daily  pantoprazole  Injectable 40 milliGRAM(s) IV Push daily  thiamine 100 milliGRAM(s) Oral daily  ursodiol Capsule 300 milliGRAM(s) Oral three times a day      Assessment and Plan:   		  Hx Alcohol abuse  Hyponatremia in setting of Beer potomania, Increased ADH state  Seizure  Na is better and stable  No need for IVF  F/u BMP in am    [phone]

## 2020-08-30 NOTE — PROGRESS NOTE ADULT - ASSESSMENT
etoh abuse  anemia  etoh hepatitis        plan  elevated T bili abd u/s dem hepatic steatosis no biliary dilatation   trend LFTs   continue detox protocol   watch for seizures  serial meld labs -Meld yest 23  may need  upper gastrointestinal endoscopy prior to d/c  ppi once a day  gerd precautions  thiamine and folic acid

## 2020-08-31 LAB
ALBUMIN SERPL ELPH-MCNC: 2.4 G/DL — LOW (ref 3.3–5)
ALBUMIN SERPL ELPH-MCNC: 2.4 G/DL — LOW (ref 3.3–5)
ALP SERPL-CCNC: 278 U/L — HIGH (ref 40–120)
ALP SERPL-CCNC: 293 U/L — HIGH (ref 40–120)
ALT FLD-CCNC: 178 U/L — HIGH (ref 12–78)
ALT FLD-CCNC: 180 U/L — HIGH (ref 12–78)
ANION GAP SERPL CALC-SCNC: 7 MMOL/L — SIGNIFICANT CHANGE UP (ref 5–17)
ANION GAP SERPL CALC-SCNC: 7 MMOL/L — SIGNIFICANT CHANGE UP (ref 5–17)
AST SERPL-CCNC: 230 U/L — HIGH (ref 15–37)
AST SERPL-CCNC: 237 U/L — HIGH (ref 15–37)
BASOPHILS # BLD AUTO: 0.06 K/UL — SIGNIFICANT CHANGE UP (ref 0–0.2)
BASOPHILS NFR BLD AUTO: 1.1 % — SIGNIFICANT CHANGE UP (ref 0–2)
BILIRUB DIRECT SERPL-MCNC: 15.2 MG/DL — HIGH (ref 0.05–0.2)
BILIRUB SERPL-MCNC: 18.2 MG/DL — CRITICAL HIGH (ref 0.2–1.2)
BILIRUB SERPL-MCNC: 18.5 MG/DL — CRITICAL HIGH (ref 0.2–1.2)
BUN SERPL-MCNC: 7 MG/DL — SIGNIFICANT CHANGE UP (ref 7–23)
BUN SERPL-MCNC: 8 MG/DL — SIGNIFICANT CHANGE UP (ref 7–23)
CALCIUM SERPL-MCNC: 8.5 MG/DL — SIGNIFICANT CHANGE UP (ref 8.5–10.1)
CALCIUM SERPL-MCNC: 8.6 MG/DL — SIGNIFICANT CHANGE UP (ref 8.5–10.1)
CHLORIDE SERPL-SCNC: 101 MMOL/L — SIGNIFICANT CHANGE UP (ref 96–108)
CHLORIDE SERPL-SCNC: 98 MMOL/L — SIGNIFICANT CHANGE UP (ref 96–108)
CO2 SERPL-SCNC: 25 MMOL/L — SIGNIFICANT CHANGE UP (ref 22–31)
CO2 SERPL-SCNC: 29 MMOL/L — SIGNIFICANT CHANGE UP (ref 22–31)
CREAT SERPL-MCNC: 0.96 MG/DL — SIGNIFICANT CHANGE UP (ref 0.5–1.3)
CREAT SERPL-MCNC: 1.1 MG/DL — SIGNIFICANT CHANGE UP (ref 0.5–1.3)
EOSINOPHIL # BLD AUTO: 0.15 K/UL — SIGNIFICANT CHANGE UP (ref 0–0.5)
EOSINOPHIL NFR BLD AUTO: 2.9 % — SIGNIFICANT CHANGE UP (ref 0–6)
GLUCOSE SERPL-MCNC: 101 MG/DL — HIGH (ref 70–99)
GLUCOSE SERPL-MCNC: 121 MG/DL — HIGH (ref 70–99)
HCT VFR BLD CALC: 38.5 % — LOW (ref 39–50)
HGB BLD-MCNC: 12.8 G/DL — LOW (ref 13–17)
IMM GRANULOCYTES NFR BLD AUTO: 0.8 % — SIGNIFICANT CHANGE UP (ref 0–1.5)
LYMPHOCYTES # BLD AUTO: 1.11 K/UL — SIGNIFICANT CHANGE UP (ref 1–3.3)
LYMPHOCYTES # BLD AUTO: 21.1 % — SIGNIFICANT CHANGE UP (ref 13–44)
MAGNESIUM SERPL-MCNC: 2.1 MG/DL — SIGNIFICANT CHANGE UP (ref 1.6–2.6)
MCHC RBC-ENTMCNC: 28.2 PG — SIGNIFICANT CHANGE UP (ref 27–34)
MCHC RBC-ENTMCNC: 33.2 GM/DL — SIGNIFICANT CHANGE UP (ref 32–36)
MCV RBC AUTO: 84.8 FL — SIGNIFICANT CHANGE UP (ref 80–100)
MONOCYTES # BLD AUTO: 0.58 K/UL — SIGNIFICANT CHANGE UP (ref 0–0.9)
MONOCYTES NFR BLD AUTO: 11 % — SIGNIFICANT CHANGE UP (ref 2–14)
NEUTROPHILS # BLD AUTO: 3.32 K/UL — SIGNIFICANT CHANGE UP (ref 1.8–7.4)
NEUTROPHILS NFR BLD AUTO: 63.1 % — SIGNIFICANT CHANGE UP (ref 43–77)
NRBC # BLD: 0 /100 WBCS — SIGNIFICANT CHANGE UP (ref 0–0)
OSMOLALITY UR: 290 MOSM/KG — SIGNIFICANT CHANGE UP (ref 50–1200)
PHOSPHATE SERPL-MCNC: 3.2 MG/DL — SIGNIFICANT CHANGE UP (ref 2.5–4.5)
PLATELET # BLD AUTO: 129 K/UL — LOW (ref 150–400)
POTASSIUM SERPL-MCNC: 3.6 MMOL/L — SIGNIFICANT CHANGE UP (ref 3.5–5.3)
POTASSIUM SERPL-MCNC: 3.8 MMOL/L — SIGNIFICANT CHANGE UP (ref 3.5–5.3)
POTASSIUM SERPL-SCNC: 3.6 MMOL/L — SIGNIFICANT CHANGE UP (ref 3.5–5.3)
POTASSIUM SERPL-SCNC: 3.8 MMOL/L — SIGNIFICANT CHANGE UP (ref 3.5–5.3)
PROT SERPL-MCNC: 6.1 G/DL — SIGNIFICANT CHANGE UP (ref 6–8.3)
PROT SERPL-MCNC: 6.3 G/DL — SIGNIFICANT CHANGE UP (ref 6–8.3)
RBC # BLD: 4.54 M/UL — SIGNIFICANT CHANGE UP (ref 4.2–5.8)
RBC # FLD: 20 % — HIGH (ref 10.3–14.5)
SODIUM SERPL-SCNC: 133 MMOL/L — LOW (ref 135–145)
SODIUM SERPL-SCNC: 134 MMOL/L — LOW (ref 135–145)
SODIUM UR-SCNC: 29 MMOL/L — SIGNIFICANT CHANGE UP
WBC # BLD: 5.26 K/UL — SIGNIFICANT CHANGE UP (ref 3.8–10.5)
WBC # FLD AUTO: 5.26 K/UL — SIGNIFICANT CHANGE UP (ref 3.8–10.5)

## 2020-08-31 PROCEDURE — 99233 SBSQ HOSP IP/OBS HIGH 50: CPT

## 2020-08-31 PROCEDURE — 99233 SBSQ HOSP IP/OBS HIGH 50: CPT | Mod: GC

## 2020-08-31 RX ORDER — POTASSIUM CHLORIDE 20 MEQ
40 PACKET (EA) ORAL ONCE
Refills: 0 | Status: COMPLETED | OUTPATIENT
Start: 2020-08-31 | End: 2020-08-31

## 2020-08-31 RX ORDER — PANTOPRAZOLE SODIUM 20 MG/1
40 TABLET, DELAYED RELEASE ORAL
Refills: 0 | Status: DISCONTINUED | OUTPATIENT
Start: 2020-08-31 | End: 2020-09-04

## 2020-08-31 RX ADMIN — Medication 1 TABLET(S): at 12:08

## 2020-08-31 RX ADMIN — Medication 1 MILLIGRAM(S): at 12:08

## 2020-08-31 RX ADMIN — Medication 2 MILLIGRAM(S): at 20:03

## 2020-08-31 RX ADMIN — Medication 2 MILLIGRAM(S): at 21:37

## 2020-08-31 RX ADMIN — Medication 2 MILLIGRAM(S): at 15:41

## 2020-08-31 RX ADMIN — Medication 40 MILLIEQUIVALENT(S): at 15:41

## 2020-08-31 RX ADMIN — URSODIOL 300 MILLIGRAM(S): 250 TABLET, FILM COATED ORAL at 06:28

## 2020-08-31 RX ADMIN — Medication 100 MILLIGRAM(S): at 12:08

## 2020-08-31 RX ADMIN — URSODIOL 300 MILLIGRAM(S): 250 TABLET, FILM COATED ORAL at 21:37

## 2020-08-31 RX ADMIN — URSODIOL 300 MILLIGRAM(S): 250 TABLET, FILM COATED ORAL at 15:45

## 2020-08-31 RX ADMIN — Medication 5 MILLIGRAM(S): at 21:37

## 2020-08-31 RX ADMIN — PANTOPRAZOLE SODIUM 40 MILLIGRAM(S): 20 TABLET, DELAYED RELEASE ORAL at 12:07

## 2020-08-31 RX ADMIN — ENOXAPARIN SODIUM 40 MILLIGRAM(S): 100 INJECTION SUBCUTANEOUS at 12:08

## 2020-08-31 RX ADMIN — Medication 2 MILLIGRAM(S): at 12:08

## 2020-08-31 NOTE — DISCHARGE NOTE PROVIDER - CARE PROVIDER_API CALL
Navneet Leach  GASTROENTEROLOGY  [address] [place] [address]  Phone: [phone]  Fax: [phone]  Follow Up Time:     Abbie Curtis (DO)  [address] [id number]  Phone: [phone]  Fax: [phone]  Follow Up Time:

## 2020-08-31 NOTE — PROGRESS NOTE ADULT - ATTENDING COMMENTS
Patient seen and examined, agree with above     35M h/o alcohol abuse, admitted with symptomatic hyponatremia and alcohol intoxication, now with alcohol withdrawal syndrome.      Rapid correction of hyponatremia initially - now appropriate increment after DDAVP and D5   C/o intermittent anxiety  Tachycardic  Not agitated     Recs:  Neuro: hallucination 1-2 days ago, now with intermittent anxiety and tachycardia - likely developing alcohol withdrawal syndrome, has required a few doses of prn Ativan - will start po Ativan 2mg q8 and continue prn for CIWA>8, continue FA, thiamine  CV: sinus tach from alcohol withdrawal - monitor   Resp: stable   GI: po diet, alcoholic liver disease/steatosis - TB and LFTs stable, no biliary obstruction on US, on PPI per GI for suspected GIB on admission - may need EGD prior to discharge   ID: no issues   Renal: hyponatremia appropriately corrected now, no further intervention, check labs daily   PPx: Lovenox   MSK: OOB, fall precautions     Stable for medicine

## 2020-08-31 NOTE — DISCHARGE NOTE PROVIDER - CARE PROVIDERS DIRECT ADDRESSES
,[email or web address],[email or web address]

## 2020-08-31 NOTE — PROGRESS NOTE ADULT - ASSESSMENT
INCOMPLETE     35M h/o EtOH abuse brought in with seizure-like activity in setting of EtOH intoxication and hyponatremia s/p 2L IVF in ED with course c/b rapid overcorrection of Na with water diuresis s/p desmopressin and D5 now with improved correction rate and no further seizure activity.    Neuro: AAOx3, continue CIWA with symptom triggered prn ativan, folic acid, thiamine, multivitamin. No additional seizure activity, continue neuro checks q4hr  CV:  Sinus tach 2/2 withdrawal vs volume depletion - if Na stabilized can give trial of gentle hydration ___________   Pulm: no acute issues  GI: bilirubin noted to increase to 20 today (direct = 16), LFTs otherwise stable; lipase also uptrending - repeat sono without evidence of biliary ductal dilation, likely 2/2 hepatic steatosis. GI following; continue thiamine/folate/mvi; transaminitis, c/w protonix for likely gastritis, may need endoscopy piror to d/c as per GI  Renal:   -Na stable at 134 this AM, next BMP _________   -sodium now corrected, UOP slowing down today, likely polyuria in setting of water diuresis from sodium correction, will monitor today; if continues with normalization of sodium, may need DI workup __________  Heme: H/H stable, thrombocytopenia improving (129 this AM) and likely 2/2 EtOH use, will trend; continue dvt ppx with lovenox  Endo: no acute issues INCOMPLETE     35M h/o EtOH abuse brought in with seizure-like activity in setting of EtOH intoxication and hyponatremia s/p 2L IVF in ED with course c/b rapid overcorrection of Na with water diuresis s/p desmopressin and D5 now with improved correction rate and no further seizure activity.    Neuro: AAOx3, continue CIWA with symptom triggered prn ativan, folic acid, thiamine, multivitamin. No additional seizure activity, continue neuro checks q4hr  CV:  Sinus tach 2/2 withdrawal vs volume depletion - if Na stabilized can give trial of gentle hydration ___________   Pulm: no acute issues  GI: bilirubin still elevated at 18.5  (direct = 15.2), LFTs otherwise elevated but stable; lipase also uptrending (564 yesterday) - repeat sono without evidence of biliary ductal dilation, likely 2/2 hepatic steatosis. GI following; continue thiamine/folate/mvi; transaminitis, c/w protonix for likely gastritis, may need endoscopy piror to d/c as per GI  Renal:   -Na stable at 134 this AM, next BMP _________   -sodium now corrected, UOP slowing down today, likely polyuria in setting of water diuresis from sodium correction, will monitor today; if continues with normalization of sodium, may need DI workup __________  Heme: H/H stable, thrombocytopenia improving (129 this AM) and likely 2/2 EtOH use, will trend; continue dvt ppx with lovenox  Endo: no acute issues 35M h/o EtOH abuse brought in with seizure-like activity in setting of EtOH intoxication and hyponatremia s/p 2L IVF in ED with course c/b rapid overcorrection of Na with water diuresis s/p desmopressin and D5 now with improved correction rate and no further seizure activity.    Neuro: AAOX3, started on PO ativan 2mg Q 12 today as overnight patient did require frequent use of PRN ativan, continue CIWA with symptom triggered prn ativan as well, folic acid, thiamine and multivitamin. No additional seizure activity.   CV:  Sinus tach 2/2 withdrawal vs volume depletion, encourage PO intake   Pulm: no acute issues  GI: Bilirubin still elevated at 18.5  (direct = 15.2), LFTs otherwise elevated but stable; lipase also uptrend to 564 on 8/31, sono without evidence of biliary ductal dilation, likely 2/2 hepatic steatosis in the setting of ETOH abuse,  c/w protonix as per GI and may need endoscopy piror to d/c as per GI  Renal: Admitted with hyponatremia in the setting of Beer potomania and increased ADH. S/p DDVAP. Now with stable Na of 133 and appropriate urine output. F/u CMP at 2 pm.    Heme: H/H stable, thrombocytopenia improving (129 this AM) and likely 2/2 EtOH use, will trend; continue w/D with Lovenox  Endo: no acute issues  Dispo: stable for transfer to medicine floors

## 2020-08-31 NOTE — PROGRESS NOTE ADULT - SUBJECTIVE AND OBJECTIVE BOX
INTERVAL HPI/OVERNIGHT EVENTS:  pt seen and examined  denies n/v/abd pain  nicole diet  per rn, having frequent, soft, non bloody bms      MEDICATIONS  (STANDING):  dextrose 5%. 1000 milliLiter(s) (50 mL/Hr) IV Continuous <Continuous>  enoxaparin Injectable 40 milliGRAM(s) SubCutaneous daily  folic acid 1 milliGRAM(s) Oral daily  multivitamin 1 Tablet(s) Oral daily  pantoprazole  Injectable 40 milliGRAM(s) IV Push daily  thiamine 100 milliGRAM(s) Oral daily  ursodiol Capsule 300 milliGRAM(s) Oral three times a day    MEDICATIONS  (PRN):  LORazepam   Injectable 2 milliGRAM(s) IV Push every 4 hours PRN CIWA > 8      Allergies    No Known Allergies    Intolerances        General:  No wt loss, fevers, chills, night sweats, fatigue,   Eyes:  Good vision, no reported pain  ENT:  No sore throat, pain, runny nose, dysphagia  CV:  No pain, palpitations, hypo/hypertension  Resp:  No dyspnea, cough, tachypnea, wheezing  GI: see above  :  No pain, bleeding, incontinence, nocturia  Muscle:  No pain, weakness  Neuro:  No weakness, tingling, memory problems  Psych:  No fatigue, insomnia, mood problems, depression  Endocrine:  No polyuria, polydipsia, cold/heat intolerance  Heme:  No petechiae, ecchymosis, easy bruisability  Skin:  No rash, tattoos, scars, edema    PHYSICAL EXAM:     Vital Signs Last 24 Hrs  T(C): 37 (30 Aug 2020 04:00), Max: 37.2 (30 Aug 2020 00:00)  T(F): 98.6 (30 Aug 2020 04:00), Max: 99 (30 Aug 2020 00:00)  HR: 127 (30 Aug 2020 09:00) (109 - 149)  BP: 118/86 (30 Aug 2020 09:00) (109/69 - 142/80)  BP(mean): 98 (30 Aug 2020 09:00) (83 - 104)  RR: 30 (30 Aug 2020 09:00) (18 - 37)  SpO2: 96% (30 Aug 2020 09:00) (93% - 97%)      27 Aug 2020 07:01  -  28 Aug 2020 07:00  --------------------------------------------------------  IN: 7750.9 mL / OUT: 7547 mL / NET: 203.9 mL    28 Aug 2020 07:01  -  28 Aug 2020 11:46  --------------------------------------------------------  IN: 1380 mL / OUT: 1550 mL / NET: -170 mL        GENERAL: lying in bed  HEENT:  NC/AT  ABDOMEN:  Soft, non-tender, non-distended  EXTREMITIES:  no  edema  SKIN:  jaundice  NEURO:  Awake alert appropriate               LABS:                        13.1   3.72  )-----------( 101      ( 30 Aug 2020 06:20 )             38.0     08-30    137  |  102  |  6<L>  ----------------------------<  97  3.9   |  28  |  1.10    Ca    8.3<L>      30 Aug 2020 06:20  Phos  2.8     08-30  Mg     2.3     08-30    TPro  6.1  /  Alb  2.4<L>  /  TBili  19.8<HH>  /  DBili  15.60<H>  /  AST  247<H>  /  ALT  189<H>  /  AlkPhos  283<H>  08-30    PT/INR - ( 29 Aug 2020 07:34 )   PT: 12.1 sec;   INR: 1.04 ratio         PTT - ( 29 Aug 2020 07:34 )  PTT:31.8 sec      08-29-20 @ 07:01  -  08-30-20 @ 07:00  --------------------------------------------------------  IN: 2620 mL / OUT: 7500 mL / NET: -4880 mL    08-30-20 @ 07:01 - 08-30-20 @ 10:22  --------------------------------------------------------  IN: 175 mL / OUT: 0 mL / NET: 175 mL      RADIOLOGY & ADDITIONAL TESTS:

## 2020-08-31 NOTE — CHART NOTE - NSCHARTNOTEFT_GEN_A_CORE
Assessment: Pt seen for nutrition f/u in ICU. Chart reviewed, hospital course noted.    Pt sleeping, easily arousable. Uz[place] speaking but able to make needs known in English. States appetite has been good. Consuming >50% of tray. Denied N/V, moving bowels daily. Requesting fresh fruit, will provide. Previously educated by RD on healthy diet to promote liver health, adequate protein intake, mvi/thiamine/folic acid supplementation.      Factors impacting intake: [ ] none [ ] nausea  [ ] vomiting [ ] diarrhea [ ] constipation  [ ]chewing problems [ ] swallowing issues  [X] other: ETOH abuse    Diet, DASH/TLC:   Sodium & Cholesterol Restricted (08-30-20 @ 07:49)    Intake: good, >50% of meals    Current Weight: Weight (kg): 103.9 (08-27 @ 09:34)  % Weight Change    Pertinent Medications: MEDICATIONS  (STANDING):  enoxaparin Injectable 40 milliGRAM(s) SubCutaneous daily  folic acid 1 milliGRAM(s) Oral daily  multivitamin 1 Tablet(s) Oral daily  pantoprazole  Injectable 40 milliGRAM(s) IV Push daily  thiamine 100 milliGRAM(s) Oral daily  ursodiol Capsule 300 milliGRAM(s) Oral three times a day    MEDICATIONS  (PRN):  LORazepam   Injectable 2 milliGRAM(s) IV Push every 4 hours PRN CIWA > 8  melatonin 5 milliGRAM(s) Oral at bedtime PRN Insomnia    Pertinent Labs: 08-31 Na134 mmol/L<L> Glu 101 mg/dL<H> K+ 3.8 mmol/L Cr  1.10 mg/dL BUN 8 mg/dL 08-31 Phos 3.2 mg/dL 08-31 Alb 2.4 g/dL<L>     CAPILLARY BLOOD GLUCOSE        Skin: no edema or pressure injuries noted    Estimated Needs:   [X] no change since previous assessment  [ ] recalculated:     Previous Nutrition Diagnosis:   [X] Inadequate Oral Intake.    Nutrition Diagnosis is [ ] ongoing  [X] resolved [ ] not applicable     New Nutrition Diagnosis: [X] not applicable       Interventions:   Recommend  [ ] Change Diet To:  [ ] Nutrition Supplement  [ ] Nutrition Support  [X] Other: Encourage po intake of healthy balanced meals with emphasis on lean protein. Will honor pt's food preferences for fresh fruit with meals. Continue with Multivitamin, thiamine, folic acid supplementation for micronutrient coverage.     Monitoring and Evaluation:   [ ] PO intake [ x ] Tolerance to diet prescription [ x ] weights [ x ] labs[ x ] follow up per protocol  [ ] other:

## 2020-08-31 NOTE — PROGRESS NOTE ADULT - ASSESSMENT
etoh hepatitis  etoh abuse  elev lfts    repeat abd u/s showing fatty liver wo gs/biliary dilatation   some mild improvement in labs today  rec mri/mrcp when optimized  cont ppi daily and laura tid  diet as tolerated  ciwa protocol   daily meld labs  may need  upper gastrointestinal endoscopy prior to d/c  cont icu care

## 2020-08-31 NOTE — PROGRESS NOTE ADULT - ASSESSMENT
1.	Hyponatremia: Beer potomania, Increased ADH state  2.	Hypokalemia  3.	Alcohol abuse  4.	Seizure    Improved sodium levels. To continue current meds. Avoid excessive PO fluids. Potassium supps PRN.   Will follow electrolytes and renal function trend.

## 2020-08-31 NOTE — DISCHARGE NOTE PROVIDER - NSDCCPCAREPLAN_GEN_ALL_CORE_FT
PRINCIPAL DISCHARGE DIAGNOSIS  Diagnosis: Alcoholic intoxication with complication  Assessment and Plan of Treatment: You were noted to have altered mental status in setting of alcohol intoxication and were monitored closely in the ICU. You were placed on medication to help control withdrawal symptoms.   - It is highly recommended that you stop drinking as it has already greatly effected your liver and continuing to drink can cause worsening effects, and even death  - Follow up with PCP within a week after discharge      SECONDARY DISCHARGE DIAGNOSES  Diagnosis: Acute liver failure  Assessment and Plan of Treatment: You were noted to have very elevated liver enzymes and because of your drinking, the liver has been effected greatly. It is recommended that you have workup and evaluation by a hepatologist for possible liver transplant but for this you would need to stop drinking.  - Please take into consideration all this information and follow up with PCP for more information    Diagnosis: UTI (urinary tract infection)  Assessment and Plan of Treatment: You were noted to have a fever and workup showed that it was likely due to urinary tract infection. You were started on IV antibiotics  - START vantin 200mg twice daily with last dose on 9/8/2020 PRINCIPAL DISCHARGE DIAGNOSIS  Diagnosis: Alcoholic intoxication with complication  Assessment and Plan of Treatment: You were noted to have altered mental status in setting of alcohol intoxication and were monitored closely in the ICU. You were placed on medication to help control withdrawal symptoms.   - It is highly recommended that you stop drinking as it has already greatly effected your liver and continuing to drink can cause worsening effects, and even death  - Follow up with PCP within a week after discharge      SECONDARY DISCHARGE DIAGNOSES  Diagnosis: Acute liver failure  Assessment and Plan of Treatment: You were noted to have very elevated liver enzymes and because of your drinking, the liver has been effected greatly. It is recommended that you have workup and evaluation by a hepatologist for possible liver transplant but for this you would need to stop drinking.  - MRCP showed no obstruction  - FOllow up with the GI doctor   - Please take into consideration all this information and follow up with PCP for more information    Diagnosis: UTI (urinary tract infection)  Assessment and Plan of Treatment: You were noted to have a fever and workup showed that it was likely due to urinary tract infection. You were started on IV antibiotics  - Urine culture senstivities did not return on day of discharge however since you responded well to IV antibiotics, you were placed on oral equivalent. It is important for you to return to hospital and answer our call if urine culture returns and current antibiotics do not work  - START vantin 200mg twice daily with last dose on 9/11/2020

## 2020-08-31 NOTE — PROGRESS NOTE ADULT - SUBJECTIVE AND OBJECTIVE BOX
Date/Time Patient Seen:  		  Referring MD:   Data Reviewed	       Patient is a 35y old  Male who presents with a chief complaint of Seizure (30 Aug 2020 22:13)      Subjective/HPI     PAST MEDICAL & SURGICAL HISTORY:  No pertinent past medical history  S/P appendectomy  H/O hernia repair        Medication list         MEDICATIONS  (STANDING):  enoxaparin Injectable 40 milliGRAM(s) SubCutaneous daily  folic acid 1 milliGRAM(s) Oral daily  multivitamin 1 Tablet(s) Oral daily  pantoprazole  Injectable 40 milliGRAM(s) IV Push daily  thiamine 100 milliGRAM(s) Oral daily  ursodiol Capsule 300 milliGRAM(s) Oral three times a day    MEDICATIONS  (PRN):  LORazepam   Injectable 2 milliGRAM(s) IV Push every 4 hours PRN CIWA > 8  melatonin 5 milliGRAM(s) Oral at bedtime PRN Insomnia         Vitals log        ICU Vital Signs Last 24 Hrs  T(C): 36.7 (30 Aug 2020 16:00), Max: 36.8 (30 Aug 2020 12:00)  T(F): 98 (30 Aug 2020 16:00), Max: 98.2 (30 Aug 2020 12:00)  HR: 109 (31 Aug 2020 06:00) (102 - 144)  BP: 104/66 (31 Aug 2020 06:00) (85/57 - 144/110)  BP(mean): 81 (31 Aug 2020 06:00) (67 - 121)  ABP: --  ABP(mean): --  RR: 21 (31 Aug 2020 06:00) (19 - 38)  SpO2: 96% (31 Aug 2020 06:00) (95% - 99%)           Input and Output:  I&O's Detail    29 Aug 2020 07:01  -  30 Aug 2020 07:00  --------------------------------------------------------  IN:    dextrose 5%.: 750 mL    dextrose 5%.: 500 mL    dextrose 5%.: 700 mL    Oral Fluid: 670 mL  Total IN: 2620 mL    OUT:    Indwelling Catheter - Urethral: 7500 mL  Total OUT: 7500 mL    Total NET: -4880 mL      30 Aug 2020 07:01  -  31 Aug 2020 06:55  --------------------------------------------------------  IN:    dextrose 5%.: 300 mL    IV PiggyBack: 1720 mL    Oral Fluid: 960 mL  Total IN: 2980 mL    OUT:    Indwelling Catheter - Urethral: 1905 mL    Voided: 520 mL  Total OUT: 2425 mL    Total NET: 555 mL          Lab Data                        12.8   5.26  )-----------( 129      ( 31 Aug 2020 05:23 )             38.5     08-31    134<L>  |  98  |  8   ----------------------------<  101<H>  3.8   |  29  |  1.10    Ca    8.5      31 Aug 2020 05:23  Phos  3.2     08-31  Mg     2.1     08-31    TPro  6.1  /  Alb  2.4<L>  /  TBili  18.5<HH>  /  DBili  15.20<H>  /  AST  230<H>  /  ALT  178<H>  /  AlkPhos  278<H>  08-31            Review of Systems	      Objective     Physical Examination    heart s1s2  lung dec BS      Pertinent Lab findings & Imaging      Giovanna:  NO   Adequate UO     I&O's Detail    29 Aug 2020 07:01  -  30 Aug 2020 07:00  --------------------------------------------------------  IN:    dextrose 5%.: 750 mL    dextrose 5%.: 500 mL    dextrose 5%.: 700 mL    Oral Fluid: 670 mL  Total IN: 2620 mL    OUT:    Indwelling Catheter - Urethral: 7500 mL  Total OUT: 7500 mL    Total NET: -4880 mL      30 Aug 2020 07:01  -  31 Aug 2020 06:55  --------------------------------------------------------  IN:    dextrose 5%.: 300 mL    IV PiggyBack: 1720 mL    Oral Fluid: 960 mL  Total IN: 2980 mL    OUT:    Indwelling Catheter - Urethral: 1905 mL    Voided: 520 mL  Total OUT: 2425 mL    Total NET: 555 mL               Discussed with:     Cultures:	        Radiology

## 2020-08-31 NOTE — PROGRESS NOTE ADULT - SUBJECTIVE AND OBJECTIVE BOX
INCOMPLETE     Patient is a 35y old  Male who presents with a chief complaint of Seizure (31 Aug 2020 06:55)    HPI: 35M h/o EtOH abuse brought in with seizure-like activity in setting of EtOH intoxication and hyponatremia s/p 2L IVF in ED with course c/b rapid overcorrection of Na with water diuresis s/p desmopressin x2, D5 500cc bolus x3 now with improved correction rate and no further seizure activity.   elevated T bili abd u/s dem hepatic steatosis no biliary dilatation (only US)       24 hour events: Patient seen and examined at bedside. No acute events overnight. Received ativan 6 mg total over the last 12 hours.     REVIEW OF SYSTEMS  Constitutional: No fever, chills, fatigue  Neuro: No headache, numbness, weakness  Resp: No cough, wheezing, shortness of breath  CVS: No chest pain, palpitations, leg swelling  GI: No abdominal pain, nausea, vomiting, diarrhea   : No dysuria, frequency, incontinence  Skin: No itching, burning, rashes, or lesions   Msk: No joint pain or swelling  Psych: No depression, anxiety, mood swings  Heme: No bleeding    T(F): 98.8 (08-31-20 @ 06:00), Max: 98.8 (08-31-20 @ 06:00)  HR: 106 (08-31-20 @ 07:00) (102 - 144)  BP: 109/78 (08-31-20 @ 07:00) (85/57 - 144/110)  RR: 24 (08-31-20 @ 07:00) (19 - 38)  SpO2: 99% (08-31-20 @ 07:00) (95% - 99%)  Wt(kg): --            I&O's Summary    08-30 @ 07:01  -  08-31 @ 07:00  --------------------------------------------------------  IN: 2980 mL / OUT: 2425 mL / NET: 555 mL      PHYSICAL EXAM  General:   CNS:   HEENT:   Resp:   CVS:   Abd:   Ext:   Skin:     MEDICATIONS          LORazepam   Injectable IV Push PRN  melatonin Oral PRN      enoxaparin Injectable SubCutaneous    pantoprazole  Injectable IV Push  ursodiol Capsule Oral      folic acid Oral  multivitamin Oral  thiamine Oral                                12.8   5.26  )-----------( 129      ( 31 Aug 2020 05:23 )             38.5       08-31    134<L>  |  98  |  8   ----------------------------<  101<H>  3.8   |  29  |  1.10    Ca    8.5      31 Aug 2020 05:23  Phos  3.2     08-31  Mg     2.1     08-31    TPro  6.1  /  Alb  2.4<L>  /  TBili  18.5<HH>  /  DBili  15.20<H>  /  AST  230<H>  /  ALT  178<H>  /  AlkPhos  278<H>  08-31                    Radiology: ***  Bedside lung ultrasound: ***  Bedside ECHO: ***    CENTRAL LINE: Y/N          DATE INSERTED:              REMOVE: Y/N  OSEGUERA: Y/N                        DATE INSERTED:              REMOVE: Y/N  A-LINE: Y/N                       DATE INSERTED:              REMOVE: Y/N    GLOBAL ISSUE/BEST PRACTICE  Analgesia:   Sedation:   CAM-ICU:   HOB elevation: yes  Stress ulcer prophylaxis:   VTE prophylaxis:   Glycemic control:   Nutrition:     CODE STATUS: ***  [facility] discussion: Y Patient is a 35y old  Male who presents with a chief complaint of Seizure (31 Aug 2020 06:55)      24 hour events: Patient seen and examined at bedside. Denies any current complaints. Received ativan 6 mg total over the last 12 hours.     REVIEW OF SYSTEMS  Constitutional: denies fever, chills   HEENT: denies headache, dizziness  Respiratory: denies SOB  Cardiovascular: denies CP, palpitations   Gastrointestinal: denies nausea, vomiting, diarrhea, constipation, abdominal pain  Genitourinary: denies dsyuria   Musculoskeletal: denies muscle aches, joint pain      T(F): 98.8 (08-31-20 @ 06:00), Max: 98.8 (08-31-20 @ 06:00)  HR: 106 (08-31-20 @ 07:00) (102 - 144)  BP: 109/78 (08-31-20 @ 07:00) (85/57 - 144/110)  RR: 24 (08-31-20 @ 07:00) (19 - 38)  SpO2: 99% (08-31-20 @ 07:00) (95% - 99%)  Wt(kg): --      I&O's Summary    08-30 @ 07:01  -  08-31 @ 07:00  --------------------------------------------------------  IN: 2980 mL / OUT: 2425 mL / NET: 555 mL      PHYSICAL EXAM  Gen: well appearing, NAD  Neuro: AAOx3  HEENT: NCAT, PEERLA b/l, EOMI b/l, +scleral icterus   Cardio: regular rate and rhythm, +s1s2, no murmurs, rubs, or gallops  Pulm: CTA b/l, no wheezes, rales or rhonchi  Abdomen: soft, nontender, nondistended, no guarding  Extremities: no clubbing, cyanosis or edema   Skin: warm and dry    MEDICATIONS      LORazepam   Injectable IV Push PRN  melatonin Oral PRN  enoxaparin Injectable SubCutaneous    pantoprazole  Injectable IV Push  ursodiol Capsule Oral    folic acid Oral  multivitamin Oral  thiamine Oral                            12.8   5.26  )-----------( 129      ( 31 Aug 2020 05:23 )             38.5       08-31    134<L>  |  98  |  8   ----------------------------<  101<H>  3.8   |  29  |  1.10    Ca    8.5      31 Aug 2020 05:23  Phos  3.2     08-31  Mg     2.1     08-31    TPro  6.1  /  Alb  2.4<L>  /  TBili  18.5<HH>  /  DBili  15.20<H>  /  AST  230<H>  /  ALT  178<H>  /  AlkPhos  278<H>  08-31      Radiology: no new imaging       CENTRAL LINE: N          DATE INSERTED:              REMOVE: Y/N  OSEGUERA: N                        DATE INSERTED:              REMOVE: Y/N  A-LINE: N                       DATE INSERTED:              REMOVE: Y/N    CENTRAL LINE: N   OSEGUERA: Y                     DATE INSERTED: 8/27        REMOVE: N    GLOBAL ISSUE/BEST PRACTICE  Analgesia: N  Sedation: N  HOB elevation: yes  Stress ulcer prophylaxis: Y  VTE prophylaxis: Lovenox   Glycemic control: N  Nutrition: N

## 2020-08-31 NOTE — DISCHARGE NOTE PROVIDER - HOSPITAL COURSE
FROM ADMISSION H+P:     HPI:    History obtained from patients partner, Leydig    36yo male with PMHx of ETOH abuse brought into ED this morning due to concern for "convulsion". States that about 6 am she noticed he was shaking a lot, and had blood in his mouth because he had bitten his tongue. Episode lasted about 2 mins. They were both lying in bed, and she noticed he was shaking next to her and as she turned around, she saw him having the shaking episode. He was unresponsive, an she called other household members who helped carry him out of the room and brought him in by car. His skin was very cold during this process. She states patient has no previous seizure history. The night prior he seemed weak/tired, had complained of some abdominal pain, and she noticed his skin was yellow. Last night, he had bowel and bladder incontinence.     She states he has been drinking ETOH excessively for about 1 month. Drinks beer (Corona) daily; up to 12 beers/day. Last drink was at 3am 08/27/2020. He had taken some medications last night for sleep; tylenol, peptobismol.     Prior to this, he had stopped drinking for about 2 years. According to partner, patient has no hx of drug use or cigarette smoking. (27 Aug 2020 09:08)            ---    HOSPITAL COURSE: Patient was admitted to the ICU w/ metabolic encephalopathy s/p seizure activity in the setting of severe hyponatremia and ETOH abuse. CT head was negative for acute pathology. Nephrology consult (Shira) followed patient during hospital course. Hyponatremia was 2/2 to beers potomania, serial Na levels as well as urine output was monitored. Initially patient was given NS boluses, as Na increased rapidly, he was then given DDAVP and D5 with Na levels correcting and stabilized to wnl. GI () followed patient during hospital course, transaminitis was attributed to hepatic steatosis in the setting of ETOH abuse as US abdomen X2 was consistent w/ hepatic steatosis and negative for biliary duct dilation.    For ETOH abuse, pt was placed on CIWA protocol with additional PO ativan and addication specilaist (Radha) followed patient during hospital course.            CONSULTANTS:     Nephrology consult (Shira)    GI ()         ---    TIME SPENT:    The total amount of time spent reviewing the hospital notes, laboratory values, imaging findings, assessing/counseling the patient, discussing with consultant physicians, social work, nursing staff was -- minutes        ---    Primary care provider was made aware of plan for discharge:      [  ] NO     [  ] YES FROM ADMISSION H+P:     HPI:    History obtained from patients partner, Leydig    34yo male with PMHx of ETOH abuse brought into ED this morning due to concern for "convulsion". States that about 6 am she noticed he was shaking a lot, and had blood in his mouth because he had bitten his tongue. Episode lasted about 2 mins. They were both lying in bed, and she noticed he was shaking next to her and as she turned around, she saw him having the shaking episode. He was unresponsive, an she called other household members who helped carry him out of the room and brought him in by car. His skin was very cold during this process. She states patient has no previous seizure history. The night prior he seemed weak/tired, had complained of some abdominal pain, and she noticed his skin was yellow. Last night, he had bowel and bladder incontinence.     She states he has been drinking ETOH excessively for about 1 month. Drinks beer (Corona) daily; up to 12 beers/day. Last drink was at 3am 08/27/2020. He had taken some medications last night for sleep; tylenol, peptobismol.     Prior to this, he had stopped drinking for about 2 years. According to partner, patient has no hx of drug use or cigarette smoking. (27 Aug 2020 09:08)            ---    HOSPITAL COURSE: Patient was admitted to the ICU w/ metabolic encephalopathy s/p seizure activity in the setting of severe hyponatremia and ETOH abuse. CT head was negative for acute pathology. Nephrology consult (Shira) followed patient during hospital course. Hyponatremia was 2/2 to beers potomania, serial Na levels as well as urine output was monitored. Initially patient was given NS boluses, as Na increased rapidly, he was then given DDAVP and D5 with Na levels correcting and stabilized to wnl. GI () followed patient during hospital course, transaminitis was attributed to hepatic steatosis in the setting of ETOH abuse as US abdomen X2 was consistent w/ hepatic steatosis and negative for biliary duct dilation. Patient was seen by GI and given that the LFTs were trending down, patient okay to have MRCP and endo done outpatient. For ETOH abuse, pt was placed on CIWA protocol with additional PO ativan and addication specilaist (Radha) followed patient during hospital course. PO ativan was tapered off as per addiction medicine and patient did not require further PRN ativan doses. Patient also noted to have fever of 102.8F on 9/2 and workup was done showing positive UA, and patient was started on rocephin (okay as per ID and not contraindicated in setting of transaminitis).              CONSULTANTS:     Nephrology consult (Shira)    GI ()     Addiction Medicine (Radha)    Infectious Disease (Alireza)        ---    TIME SPENT:    The total amount of time spent reviewing the hospital notes, laboratory values, imaging findings, assessing/counseling the patient, discussing with consultant physicians, social work, nursing staff was -- minutes FROM ADMISSION H+P:     HPI:    History obtained from patients partner, Leydig    34yo male with PMHx of ETOH abuse brought into ED this morning due to concern for "convulsion". States that about 6 am she noticed he was shaking a lot, and had blood in his mouth because he had bitten his tongue. Episode lasted about 2 mins. They were both lying in bed, and she noticed he was shaking next to her and as she turned around, she saw him having the shaking episode. He was unresponsive, an she called other household members who helped carry him out of the room and brought him in by car. His skin was very cold during this process. She states patient has no previous seizure history. The night prior he seemed weak/tired, had complained of some abdominal pain, and she noticed his skin was yellow. Last night, he had bowel and bladder incontinence.     She states he has been drinking ETOH excessively for about 1 month. Drinks beer (Corona) daily; up to 12 beers/day. Last drink was at 3am 08/27/2020. He had taken some medications last night for sleep; tylenol, peptobismol.     Prior to this, he had stopped drinking for about 2 years. According to partner, patient has no hx of drug use or cigarette smoking. (27 Aug 2020 09:08)            ---    HOSPITAL COURSE: Patient was admitted to the ICU w/ metabolic encephalopathy s/p seizure activity in the setting of severe hyponatremia and ETOH abuse. CT head was negative for acute pathology. Nephrology consult (Shira) followed patient during hospital course. Hyponatremia was 2/2 to beers potomania, serial Na levels as well as urine output was monitored. Initially patient was given NS boluses, as Na increased rapidly, he was then given DDAVP and D5 with Na levels correcting and stabilized to wnl. GI () followed patient during hospital course, transaminitis was attributed to hepatic steatosis in the setting of ETOH abuse as US abdomen X2 was consistent w/ hepatic steatosis and negative for biliary duct dilation. Patient was seen by GI and given that the LFTs were trending down, MRCP performed and showed no acute obstruction, only hepatomegaly.  Patient to follow up with GI outpatient for endoscopy regarding UGIB. For ETOH abuse, pt was placed on CIWA protocol with additional PO ativan and addication specilaist (Radha) followed patient during hospital course. PO ativan was tapered off as per addiction medicine and patient did not require further PRN ativan doses. Patient also noted to have fever of 102.8F on 9/2 and workup was done showing positive UA, and patient was started on rocephin (okay as per ID and not contraindicated in setting of transaminitis).  BCx with NGTD x36 hours and UCx with GNR. Patient wanted to be discharged and did not want to wait for sensitivities to return, but given that patient responded well to rocephin and has been nontoxic and afebrile, will dc patient to home with vantin for total of 10 days abx and patient understands that he will need to come back to hospital in case UCx grows bacteria not treatable with current abx. Patient contact information obtained and understands that he must  call and return to hospital if current abx treatment not efficaciuos, as it can be life threatenting. Patient offered to receive rocephin on day of discharge, however he refused and wants to go home.         used to explain information: [id number]    Patient understands and to follow up outpatient.        Vital Signs Last 24 Hrs    T(C): 37.5 (04 Sep 2020 05:15), Max: 37.5 (04 Sep 2020 05:15)    T(F): 99.5 (04 Sep 2020 05:15), Max: 99.5 (04 Sep 2020 05:15)    HR: 101 (04 Sep 2020 05:15) (98 - 119)    BP: 134/87 (04 Sep 2020 05:15) (119/72 - 143/88)    RR: 18 (04 Sep 2020 05:15) (16 - 18)    SpO2: 94% (04 Sep 2020 05:15) (93% - 96%)        PHYSICAL EXAM:    General: comfortable, well nourished, jaundiced, laying comfortably in bed    HEENT: PEERL, scleral icterus noted    Cardiovascular: normal s1s2, no murmurs, gallops or  rubs    Pulmonary: clear to ausculation Bilaterally, no wheezing , rhonchi    Gastrointestinal: soft non tender non distended, no masses felt, no suprapubic tenderness present    Muscloskeletal: no lower extremity edema, intact bilateral lower extremity pulses    Neurological: No focal weakness, no pronator drift noted, no tremors    Psychiatrical: normal mood, cooperative    SKIN: no rash, lesions or ulcers, tattoos on chest and hand, some yellowing of skin        CONSULTANTS:     Nephrology consult (Shira)    GI ()     Addiction Medicine (Radha)    Infectious Disease (Alireza)        ---    TIME SPENT:    The total amount of time spent reviewing the hospital notes, laboratory values, imaging findings, assessing/counseling the patient, discussing with consultant physicians, social work, nursing staff was -- minutes FROM ADMISSION H+P:     HPI:    History obtained from patients partner, Leydig    36yo male with PMHx of ETOH abuse brought into ED this morning due to concern for "convulsion". States that about 6 am she noticed he was shaking a lot, and had blood in his mouth because he had bitten his tongue. Episode lasted about 2 mins. They were both lying in bed, and she noticed he was shaking next to her and as she turned around, she saw him having the shaking episode. He was unresponsive, an she called other household members who helped carry him out of the room and brought him in by car. His skin was very cold during this process. She states patient has no previous seizure history. The night prior he seemed weak/tired, had complained of some abdominal pain, and she noticed his skin was yellow. Last night, he had bowel and bladder incontinence.     She states he has been drinking ETOH excessively for about 1 month. Drinks beer (Corona) daily; up to 12 beers/day. Last drink was at 3am 08/27/2020. He had taken some medications last night for sleep; tylenol,        ---    HOSPITAL COURSE:     Patient was admitted to the MICU w/ metabolic encephalopathy s/p seizure activity in the setting of severe hyponatremia and ETOH abuse. CT head was negative for acute pathology. Nephrology consult (Shira) followed patient during hospital course. Hyponatremia was 2/2 to beer potomania, serial Na levels as well as urine output was monitored. Initially patient was given NS boluses, as Na increased rapidly, he was then given DDAVP and D5 with Na levels correcting and stabilized to wnl. GI () followed patient during hospital course, transaminitis was attributed to hepatic steatosis, alcoholic hepatitis with cholestasis in the setting of ETOH abuse as US abdomen X2 was consistent w/ hepatic steatosis and negative for biliary duct dilation. MRCP also performed and showed no acute obstruction, only hepatomegaly. Patient agrees to follow up with GI outpatient for endoscopy regarding UGIB. For ETOH abuse, pt was placed on CIWA protocol with additional PO ativan and addication specilaist (Radha) followed patient during hospital course. PO ativan was tapered off as per addiction medicine and patient did not require further PRN ativan doses. Patient also noted to have fever of 102.8F on 9/2 and workup was done showing positive UA, and patient was started on rocephin. BCx with NGTD x36 hours and UCx with GNR - discussed w/ lab and cannot guarantee the sensitivities will be available until tomorrow. Patient asked to be discharged and did not want to wait for sensitivities to return, but given that patient responded well to rocephin and has been nontoxic and afebrile, will dc patient to home with vantin for total of 10 days abx and patient understands that he will need to come back to hospital in case UCx grows bacteria not treatable with current abx. Patient contact information obtained and understands that he will beneift from taking antibiotics and return to hospital if current abx treatment not efficacious, as it can be life threatenting. Patient was recommended to have last dose of ceftriaxone on day of discharge but he no longer wants IV antibiotics. Agrees to transition to po abx.          used to explain information: [id number] on the day of discharge    Patient understands and to follow up outpatient.        Vital Signs Last 24 Hrs    T(C): 37.5 (04 Sep 2020 05:15), Max: 37.5 (04 Sep 2020 05:15)    T(F): 99.5 (04 Sep 2020 05:15), Max: 99.5 (04 Sep 2020 05:15)    HR: 101 (04 Sep 2020 05:15) (98 - 119)    BP: 134/87 (04 Sep 2020 05:15) (119/72 - 143/88)    RR: 18 (04 Sep 2020 05:15) (16 - 18)    SpO2: 94% (04 Sep 2020 05:15) (93% - 96%)        PHYSICAL EXAM:    General: comfortable, well nourished, improving jaundice, laying comfortably in bed    HEENT: PERRL, scleral icterus noted    Cardiovascular: normal s1s2, no murmurs, gallops or  rubs    Pulmonary: clear to ausculation Bilaterally, no wheezing , rhonchi    Gastrointestinal: soft non tender non distended, no masses felt, no suprapubic tenderness present    Muscloskeletal: no lower extremity edema, intact bilateral lower extremity pulses    Neurological: No focal weakness, no pronator drift noted, no tremors    Psychiatrical: normal mood, cooperative    SKIN: no rash, lesions or ulcers, tattoos on chest and hand, some yellowing of skin        CONSULTANTS:     Nephrology consult (Shira)    GI ()     Addiction Medicine (Radha)    Infectious Disease (Alireza)        ---    TIME SPENT:    The total amount of time spent reviewing the hospital notes, laboratory values, imaging findings, assessing/counseling the patient, discussing with consultant physicians, social work, nursing staff was 51 minutes

## 2020-08-31 NOTE — PROGRESS NOTE ADULT - SUBJECTIVE AND OBJECTIVE BOX
Patient is a 35y old  Male who presents with a chief complaint of Seizure (31 Aug 2020 12:35)      INTERVAL HPI: Pt seen and examined. States he feels fine and no longer wants to drink alcohol after everything hes been through. Denies any acute complaints at this time. Is hoping he can go home tomorrow.     OVERNIGHT EVENTS: none noted  T(F): 99 (08-31-20 @ 15:55), Max: 99 (08-31-20 @ 15:55)  HR: 118 (08-31-20 @ 17:00) (101 - 134)  BP: 138/76 (08-31-20 @ 17:00) (85/57 - 147/87)  RR: 31 (08-31-20 @ 17:00) (21 - 37)  SpO2: 97% (08-31-20 @ 17:00) (92% - 99%)  I&O's Summary    30 Aug 2020 07:01  -  31 Aug 2020 07:00  --------------------------------------------------------  IN: 2980 mL / OUT: 2425 mL / NET: 555 mL    31 Aug 2020 07:01  -  31 Aug 2020 17:27  --------------------------------------------------------  IN: 850 mL / OUT: 2460 mL / NET: -1610 mL        Review of Systems:  General:denies fever chills, headache, weakness  HEENT: denies blurry vision,diffculty swallowing, difficulty hearing, tinnitus  Cardiovascular: denies chest pain  ,palpitations  Pulmonary:denies shortness of breath, cough, wheezing, hemoptysis  Gastrointestinal: denies abdominal pain, constipation, diarrhea,nausea , vomiting, hematochezia  : denies hematuria, dysuria, or incontinence  Neurological: denies weakness, numbness , tingling, dizziness, tremors  MSK: denies muscle pain, difficulty ambulating, swelling, back pain  skin: denies skin rash, itching, burning, or  skin lesions  Psychiatrical: denies mood disturbances, anxierty, feeling depressed, depression , or difficulty sleeping      Physical Exam:  General: comfortable, appears slihgtly anxious, well nourished  HEENT: wnl except icterictus b/l sclera  Cardiovascular: normal s1s2, no murmurs, gallops or fricition rubs  Pulmonary: clear to ausculation Bilaterally, no wheezing , rhonchi  Gastrointestinal: soft non tender non distended, no masses felt, no organomegally  Muscloskeletal: no lower extremity edema, intact bilateral lower extremity pulses  Neurological: CN II-12 intact. No focal weakness  Psychiatrical: normal mood, cooperative  SKIN: no rash, lesions or ulcers, tattoos on chest, some yellowing of gerneealized skin    LABS:                        12.8   5.26  )-----------( 129      ( 31 Aug 2020 05:23 )             38.5     08-31    133<L>  |  101  |  7   ----------------------------<  121<H>  3.6   |  25  |  0.96    Ca    8.6      31 Aug 2020 13:48  Phos  3.2     08-31  Mg     2.1     08-31    TPro  6.3  /  Alb  2.4<L>  /  TBili  18.2<HH>  /  DBili  x   /  AST  237<H>  /  ALT  180<H>  /  AlkPhos  293<H>  08-31        CAPILLARY BLOOD GLUCOSE                  MEDICATIONS  (STANDING):  enoxaparin Injectable 40 milliGRAM(s) SubCutaneous daily  folic acid 1 milliGRAM(s) Oral daily  LORazepam     Tablet 2 milliGRAM(s) Oral every 8 hours  multivitamin 1 Tablet(s) Oral daily  pantoprazole    Tablet 40 milliGRAM(s) Oral before breakfast  thiamine 100 milliGRAM(s) Oral daily  ursodiol Capsule 300 milliGRAM(s) Oral three times a day    MEDICATIONS  (PRN):  LORazepam   Injectable 2 milliGRAM(s) IV Push every 4 hours PRN CIWA > 8  melatonin 5 milliGRAM(s) Oral at bedtime PRN Insomnia

## 2020-08-31 NOTE — PROGRESS NOTE ADULT - ATTENDING COMMENTS
pt stable for floors, will get addiction medicine to see and f/u GI for dc planning, apprec sw involvement with etoh rehab as pt strongly interested in quitting etoh pt stable for floors, apprec addiction medicine recs and f/u GI for egd,  dc planning, apprec sw involvement with etoh rehab as pt strongly interested in quitting etoh

## 2020-08-31 NOTE — PROGRESS NOTE ADULT - PROBLEM SELECTOR PLAN 1
US abd - hepatic steatosis -   on Ativan PRN for ciwa triggers - VENKAT management  vitamins -   education and counseling  monitor VS and HD and Sat  replete lytes  labs and imaging reviewed.   renal follow up reviewed  gi follow up reviewed

## 2020-08-31 NOTE — PROGRESS NOTE ADULT - SUBJECTIVE AND OBJECTIVE BOX
Patient is a 35y old  Male who presents with a chief complaint of Seizure (28 Aug 2020 11:46)  Patient seen in follow up for hyponatremia.     Improved sodium levels.        PAST MEDICAL HISTORY:  No pertinent past medical history    MEDICATIONS  (STANDING):  enoxaparin Injectable 40 milliGRAM(s) SubCutaneous daily  folic acid 1 milliGRAM(s) Oral daily  multivitamin 1 Tablet(s) Oral daily  pantoprazole  Injectable 40 milliGRAM(s) IV Push daily  thiamine 100 milliGRAM(s) Oral daily  ursodiol Capsule 300 milliGRAM(s) Oral three times a day    MEDICATIONS  (PRN):  LORazepam     Tablet 2 milliGRAM(s) Oral every 6 hours PRN Anxiety  LORazepam   Injectable 2 milliGRAM(s) IV Push every 4 hours PRN CIWA > 8  melatonin 5 milliGRAM(s) Oral at bedtime PRN Insomnia    T(C): 36.7 (08-31-20 @ 12:00), Max: 37.2 (08-30-20 @ 00:00)  HR: 110 (08-31-20 @ 12:00) (101 - 149)  BP: 112/72 (08-31-20 @ 12:00) (85/57 - 144/110)  RR: 27 (08-31-20 @ 12:00)  SpO2: 96% (08-31-20 @ 12:00)  Wt(kg): --  I&O's Detail    30 Aug 2020 07:01  -  31 Aug 2020 07:00  --------------------------------------------------------  IN:    dextrose 5%.: 300 mL    IV PiggyBack: 1720 mL    Oral Fluid: 960 mL  Total IN: 2980 mL    OUT:    Indwelling Catheter - Urethral: 1905 mL    Voided: 520 mL  Total OUT: 2425 mL    Total NET: 555 mL      31 Aug 2020 07:01  -  31 Aug 2020 12:36  --------------------------------------------------------  IN:    Oral Fluid: 490 mL  Total IN: 490 mL    OUT:    Voided: 1280 mL  Total OUT: 1280 mL    Total NET: -790 mL             PHYSICAL EXAM:  General: No distress  Respiratory: b/l air entry  Cardiovascular: S1 S2  Gastrointestinal: soft  Extremities:  no edema                        LABORATORY:                        12.8   5.26  )-----------( 129      ( 31 Aug 2020 05:23 )             38.5     08-31    134<L>  |  98  |  8   ----------------------------<  101<H>  3.8   |  29  |  1.10    Ca    8.5      31 Aug 2020 05:23  Phos  3.2     08-31  Mg     2.1     08-31    TPro  6.1  /  Alb  2.4<L>  /  TBili  18.5<HH>  /  DBili  15.20<H>  /  AST  230<H>  /  ALT  178<H>  /  AlkPhos  278<H>  08-31    Sodium, Serum: 134 mmol/L (08-31 @ 05:23)  Sodium, Serum: 133 mmol/L (08-30 @ 22:09)  Sodium, Serum: 133 mmol/L (08-30 @ 16:28)  Sodium, Serum: 134 mmol/L (08-30 @ 10:40)    Potassium, Serum: 3.8 mmol/L (08-31 @ 05:23)  Potassium, Serum: 3.8 mmol/L (08-30 @ 22:09)  Potassium, Serum: 3.7 mmol/L (08-30 @ 16:28)  Potassium, Serum: 3.6 mmol/L (08-30 @ 10:40)    Hemoglobin: 12.8 g/dL (08-31 @ 05:23)  Hemoglobin: 13.1 g/dL (08-30 @ 06:20)  Hemoglobin: 13.2 g/dL (08-29 @ 06:14)  Hemoglobin: 13.0 g/dL (08-29 @ 00:49)    Creatinine, Serum 1.10 (08-31 @ 05:23)  Creatinine, Serum 1.00 (08-30 @ 22:09)  Creatinine, Serum 1.00 (08-30 @ 16:28)  Creatinine, Serum 1.10 (08-30 @ 10:40)        LIVER FUNCTIONS - ( 31 Aug 2020 05:23 )  Alb: 2.4 g/dL / Pro: 6.1 g/dL / ALK PHOS: 278 U/L / ALT: 178 U/L / AST: 230 U/L / GGT: x

## 2020-09-01 DIAGNOSIS — F10.10 ALCOHOL ABUSE, UNCOMPLICATED: ICD-10-CM

## 2020-09-01 LAB
ALBUMIN SERPL ELPH-MCNC: 2.5 G/DL — LOW (ref 3.3–5)
ALP SERPL-CCNC: 356 U/L — HIGH (ref 40–120)
ALT FLD-CCNC: 182 U/L — HIGH (ref 12–78)
ANION GAP SERPL CALC-SCNC: 8 MMOL/L — SIGNIFICANT CHANGE UP (ref 5–17)
AST SERPL-CCNC: 248 U/L — HIGH (ref 15–37)
BASOPHILS # BLD AUTO: 0.08 K/UL — SIGNIFICANT CHANGE UP (ref 0–0.2)
BASOPHILS NFR BLD AUTO: 1 % — SIGNIFICANT CHANGE UP (ref 0–2)
BILIRUB SERPL-MCNC: 16.8 MG/DL — CRITICAL HIGH (ref 0.2–1.2)
BUN SERPL-MCNC: 7 MG/DL — SIGNIFICANT CHANGE UP (ref 7–23)
CALCIUM SERPL-MCNC: 8.6 MG/DL — SIGNIFICANT CHANGE UP (ref 8.5–10.1)
CHLORIDE SERPL-SCNC: 100 MMOL/L — SIGNIFICANT CHANGE UP (ref 96–108)
CO2 SERPL-SCNC: 26 MMOL/L — SIGNIFICANT CHANGE UP (ref 22–31)
CREAT SERPL-MCNC: 1 MG/DL — SIGNIFICANT CHANGE UP (ref 0.5–1.3)
EOSINOPHIL # BLD AUTO: 0.14 K/UL — SIGNIFICANT CHANGE UP (ref 0–0.5)
EOSINOPHIL NFR BLD AUTO: 1.7 % — SIGNIFICANT CHANGE UP (ref 0–6)
GLUCOSE SERPL-MCNC: 109 MG/DL — HIGH (ref 70–99)
HCT VFR BLD CALC: 39.9 % — SIGNIFICANT CHANGE UP (ref 39–50)
HGB BLD-MCNC: 13 G/DL — SIGNIFICANT CHANGE UP (ref 13–17)
IMM GRANULOCYTES NFR BLD AUTO: 1 % — SIGNIFICANT CHANGE UP (ref 0–1.5)
LYMPHOCYTES # BLD AUTO: 1.71 K/UL — SIGNIFICANT CHANGE UP (ref 1–3.3)
LYMPHOCYTES # BLD AUTO: 20.6 % — SIGNIFICANT CHANGE UP (ref 13–44)
MAGNESIUM SERPL-MCNC: 2.2 MG/DL — SIGNIFICANT CHANGE UP (ref 1.6–2.6)
MCHC RBC-ENTMCNC: 28.7 PG — SIGNIFICANT CHANGE UP (ref 27–34)
MCHC RBC-ENTMCNC: 32.6 GM/DL — SIGNIFICANT CHANGE UP (ref 32–36)
MCV RBC AUTO: 88.1 FL — SIGNIFICANT CHANGE UP (ref 80–100)
MONOCYTES # BLD AUTO: 1.15 K/UL — HIGH (ref 0–0.9)
MONOCYTES NFR BLD AUTO: 13.8 % — SIGNIFICANT CHANGE UP (ref 2–14)
NEUTROPHILS # BLD AUTO: 5.15 K/UL — SIGNIFICANT CHANGE UP (ref 1.8–7.4)
NEUTROPHILS NFR BLD AUTO: 61.9 % — SIGNIFICANT CHANGE UP (ref 43–77)
NRBC # BLD: 0 /100 WBCS — SIGNIFICANT CHANGE UP (ref 0–0)
PHOSPHATE SERPL-MCNC: 3 MG/DL — SIGNIFICANT CHANGE UP (ref 2.5–4.5)
PLATELET # BLD AUTO: 160 K/UL — SIGNIFICANT CHANGE UP (ref 150–400)
POTASSIUM SERPL-MCNC: 3.6 MMOL/L — SIGNIFICANT CHANGE UP (ref 3.5–5.3)
POTASSIUM SERPL-SCNC: 3.6 MMOL/L — SIGNIFICANT CHANGE UP (ref 3.5–5.3)
PROT SERPL-MCNC: 6.7 G/DL — SIGNIFICANT CHANGE UP (ref 6–8.3)
RBC # BLD: 4.53 M/UL — SIGNIFICANT CHANGE UP (ref 4.2–5.8)
RBC # FLD: 18.6 % — HIGH (ref 10.3–14.5)
SODIUM SERPL-SCNC: 134 MMOL/L — LOW (ref 135–145)
WBC # BLD: 8.31 K/UL — SIGNIFICANT CHANGE UP (ref 3.8–10.5)
WBC # FLD AUTO: 8.31 K/UL — SIGNIFICANT CHANGE UP (ref 3.8–10.5)

## 2020-09-01 PROCEDURE — 99253 IP/OBS CNSLTJ NEW/EST LOW 45: CPT

## 2020-09-01 PROCEDURE — 99233 SBSQ HOSP IP/OBS HIGH 50: CPT | Mod: GC

## 2020-09-01 RX ORDER — POTASSIUM CHLORIDE 20 MEQ
40 PACKET (EA) ORAL ONCE
Refills: 0 | Status: COMPLETED | OUTPATIENT
Start: 2020-09-01 | End: 2020-09-01

## 2020-09-01 RX ADMIN — Medication 1 MILLIGRAM(S): at 21:49

## 2020-09-01 RX ADMIN — Medication 5 MILLIGRAM(S): at 19:57

## 2020-09-01 RX ADMIN — Medication 2 MILLIGRAM(S): at 02:57

## 2020-09-01 RX ADMIN — Medication 1 MILLIGRAM(S): at 14:11

## 2020-09-01 RX ADMIN — Medication 1 MILLIGRAM(S): at 12:17

## 2020-09-01 RX ADMIN — Medication 40 MILLIEQUIVALENT(S): at 12:16

## 2020-09-01 RX ADMIN — PANTOPRAZOLE SODIUM 40 MILLIGRAM(S): 20 TABLET, DELAYED RELEASE ORAL at 05:05

## 2020-09-01 RX ADMIN — URSODIOL 300 MILLIGRAM(S): 250 TABLET, FILM COATED ORAL at 05:05

## 2020-09-01 RX ADMIN — Medication 2 MILLIGRAM(S): at 05:05

## 2020-09-01 RX ADMIN — ENOXAPARIN SODIUM 40 MILLIGRAM(S): 100 INJECTION SUBCUTANEOUS at 12:16

## 2020-09-01 RX ADMIN — Medication 100 MILLIGRAM(S): at 12:17

## 2020-09-01 RX ADMIN — URSODIOL 300 MILLIGRAM(S): 250 TABLET, FILM COATED ORAL at 21:49

## 2020-09-01 RX ADMIN — Medication 1 TABLET(S): at 12:17

## 2020-09-01 RX ADMIN — URSODIOL 300 MILLIGRAM(S): 250 TABLET, FILM COATED ORAL at 14:11

## 2020-09-01 NOTE — PROGRESS NOTE ADULT - PROBLEM SELECTOR PLAN 1
on Ativan for VENKAT  would taper to 1 mg PO every 8 hours  ciwa  prn Ativan as per ciwa  vitamins  nutrition  education  counseling  serial labs - LFTs -   transferred out of ICU

## 2020-09-01 NOTE — PROGRESS NOTE ADULT - ASSESSMENT
etoh hepatitis  etoh abuse  elev lfts    repeat abd u/s showing fatty liver wo gs/biliary dilatation   lfts slowly improving; hgb stable  rec mri/mrcp when optimized  cont ppi daily and laura tid  diet as tolerated  ciwa protocol   daily meld labs  may need upper gastrointestinal endoscopy prior to d/c

## 2020-09-01 NOTE — PROGRESS NOTE ADULT - PROBLEM SELECTOR PLAN 1
- resolved  - Multifactorial; liver failure, hyponatremia, alcohol intoxication  - Head CT neg for acute bleed  - ammonia levels initially 73, Urine drug screen negative  - continue with CIWA protocol and ativan ATC, tapered to 1mg q8hrs  - seen by Dr. Hernandez, has capacity to make own decisions  - Dr. Garcia consulted, recs appreciated

## 2020-09-01 NOTE — BEHAVIORAL HEALTH ASSESSMENT NOTE - NSBHCHARTREVIEWINVESTIGATE_PSY_A_CORE FT
< from: 12 Lead ECG (08.27.20 @ 07:12) >    Ventricular Rate 126 BPM    Atrial Rate 126 BPM    P-R Interval 158 ms    QRS Duration 86 ms    Q-T Interval 304 ms    QTC Calculation(Bezet) 440 ms    P Axis 42 degrees    R Axis 2 degrees    T Axis 30 degrees    Diagnosis Line Sinus tachycardia with premature ventricular complexes or fusion complexes  Possible Left atrial enlargement  Borderline ECG  No previous ECGs available  Confirmed by Sarah HUDDLESTON, Darren ([id number]) on 8/27/2020 1:09:10 PM    < end of copied text >

## 2020-09-01 NOTE — BEHAVIORAL HEALTH ASSESSMENT NOTE - NSBHCHARTREVIEWVS_PSY_A_CORE FT
Vital Signs Last 24 Hrs  T(C): 36.8 (01 Sep 2020 05:17), Max: 37.5 (31 Aug 2020 20:20)  T(F): 98.3 (01 Sep 2020 05:17), Max: 99.5 (31 Aug 2020 20:20)  HR: 121 (01 Sep 2020 05:00) (110 - 129)  BP: 113/71 (01 Sep 2020 05:00) (106/65 - 147/87)  BP(mean): 87 (01 Sep 2020 05:00) (80 - 110)  RR: 30 (01 Sep 2020 05:00) (23 - 34)  SpO2: 94% (01 Sep 2020 05:00) (92% - 99%)

## 2020-09-01 NOTE — BEHAVIORAL HEALTH ASSESSMENT NOTE - HPI (INCLUDE ILLNESS QUALITY, SEVERITY, DURATION, TIMING, CONTEXT, MODIFYING FACTORS, ASSOCIATED SIGNS AND SYMPTOMS)
Patient seen, evaluated and chart reviewed. Patient is a 34 y/o  male, with no prior psychiatric hospitalizations, or significant psychiatric treatment history, history of ETOH abuse brought into ED this morning due to concern for "convulsion". States that about 6 am she noticed he was shaking a lot, and had blood in his mouth because he had bitten his tongue. Episode lasted about 2 mins. The night prior he seemed weak/tired, had complained of some abdominal pain, and she noticed his skin Patient states he has been drinking ETOH excessively for about 1 month. Drinks beer ([place]) daily; up to 12 beers/day. Last drink was at 3am 08/27/2020. He had taken some medications last night for sleep; tylenol, peptobismol. Prior to this, he had stopped drinking for about 2 years. According to partner, patient has no hx of drug use or cigarette smoking.  Patient was initially confused, and the issue of decision making capacity was raised. At this time patient is lucid, there is no evidence of psychosis, sunny or depression.

## 2020-09-01 NOTE — PROGRESS NOTE ADULT - ATTENDING COMMENTS
36yo male with PMHx of ETOH abuse brought in by family after witnessed seizure like activity x 2 mins, admitted for seizure, hyponatremia, metabolic encephalopathy 2/2 ETOH intoxication and liver failure. Plan: monitor ciaw and w/drawal symptoms, monitor clinical course, egd and mrcp later this week as per GI once withdrawal resolved

## 2020-09-01 NOTE — PROGRESS NOTE ADULT - SUBJECTIVE AND OBJECTIVE BOX
Patient is a 35y old  Male who presents with a chief complaint of Seizure (28 Aug 2020 11:46)  Patient seen in follow up for hyponatremia.     Improved and stable sodium levels.        PAST MEDICAL HISTORY:  No pertinent past medical history    MEDICATIONS  (STANDING):  enoxaparin Injectable 40 milliGRAM(s) SubCutaneous daily  folic acid 1 milliGRAM(s) Oral daily  LORazepam     Tablet 1 milliGRAM(s) Oral every 8 hours  multivitamin 1 Tablet(s) Oral daily  pantoprazole    Tablet 40 milliGRAM(s) Oral before breakfast  potassium chloride   Powder 40 milliEquivalent(s) Oral once  thiamine 100 milliGRAM(s) Oral daily  ursodiol Capsule 300 milliGRAM(s) Oral three times a day    MEDICATIONS  (PRN):  LORazepam   Injectable 2 milliGRAM(s) IV Push every 4 hours PRN CIWA > 8  melatonin 5 milliGRAM(s) Oral at bedtime PRN Insomnia    T(C): 36.8 (09-01-20 @ 05:17), Max: 37.5 (08-31-20 @ 20:20)  HR: 121 (09-01-20 @ 05:00) (101 - 144)  BP: 113/71 (09-01-20 @ 05:00) (85/57 - 147/87)  RR: 30 (09-01-20 @ 05:00)  SpO2: 94% (09-01-20 @ 05:00)  Wt(kg): --  I&O's Detail    31 Aug 2020 07:01  -  01 Sep 2020 07:00  --------------------------------------------------------  IN:    Oral Fluid: 1380 mL  Total IN: 1380 mL    OUT:    Voided: 4060 mL  Total OUT: 4060 mL    Total NET: -2680 mL        PHYSICAL EXAM:  General: No distress  Respiratory: b/l air entry  Cardiovascular: S1 S2  Gastrointestinal: soft  Extremities:  no edema                      LABORATORY:                        13.0   8.31  )-----------( 160      ( 01 Sep 2020 05:29 )             39.9     09-01    134<L>  |  100  |  7   ----------------------------<  109<H>  3.6   |  26  |  1.00    Ca    8.6      01 Sep 2020 05:29  Phos  3.0     09-01  Mg     2.2     09-01    TPro  6.7  /  Alb  2.5<L>  /  TBili  16.8<HH>  /  DBili  x   /  AST  248<H>  /  ALT  182<H>  /  AlkPhos  356<H>  09-01    Sodium, Serum: 134 mmol/L (09-01 @ 05:29)  Sodium, Serum: 133 mmol/L (08-31 @ 13:48)  Sodium, Serum: 134 mmol/L (08-31 @ 05:23)  Sodium, Serum: 133 mmol/L (08-30 @ 22:09)    Potassium, Serum: 3.6 mmol/L (09-01 @ 05:29)  Potassium, Serum: 3.6 mmol/L (08-31 @ 13:48)  Potassium, Serum: 3.8 mmol/L (08-31 @ 05:23)  Potassium, Serum: 3.8 mmol/L (08-30 @ 22:09)    Hemoglobin: 13.0 g/dL (09-01 @ 05:29)  Hemoglobin: 12.8 g/dL (08-31 @ 05:23)  Hemoglobin: 13.1 g/dL (08-30 @ 06:20)    Creatinine, Serum 1.00 (09-01 @ 05:29)  Creatinine, Serum 0.96 (08-31 @ 13:48)  Creatinine, Serum 1.10 (08-31 @ 05:23)  Creatinine, Serum 1.00 (08-30 @ 22:09)        LIVER FUNCTIONS - ( 01 Sep 2020 05:29 )  Alb: 2.5 g/dL / Pro: 6.7 g/dL / ALK PHOS: 356 U/L / ALT: 182 U/L / AST: 248 U/L / GGT: x

## 2020-09-01 NOTE — BEHAVIORAL HEALTH ASSESSMENT NOTE - NSBHCHARTREVIEWLAB_PSY_A_CORE FT
13.0   8.31  )-----------( 160      ( 01 Sep 2020 05:29 )             39.9   09-01    134<L>  |  100  |  7   ----------------------------<  109<H>  3.6   |  26  |  1.00    Ca    8.6      01 Sep 2020 05:29  Phos  3.0     09-01  Mg     2.2     09-01    TPro  6.7  /  Alb  2.5<L>  /  TBili  16.8<HH>  /  DBili  x   /  AST  248<H>  /  ALT  182<H>  /  AlkPhos  356<H>  09-01

## 2020-09-01 NOTE — BEHAVIORAL HEALTH ASSESSMENT NOTE - RISK ASSESSMENT
Low Acute Suicide Risk No history of suicidal ideation, patient is future-oriented and has good support

## 2020-09-01 NOTE — BEHAVIORAL HEALTH ASSESSMENT NOTE - SUICIDE PROTECTIVE FACTORS
Responsibility to family and others/Has future plans/Identifies reasons for living/Supportive social network of family or friends

## 2020-09-01 NOTE — BEHAVIORAL HEALTH ASSESSMENT NOTE - NSBHCHARTREVIEWIMAGING_PSY_A_CORE FT
< from: US Abdomen Complete (08.29.20 @ 23:49) >    EXAM:  US ABDOMEN COMPLETE                            PROCEDURE DATE:  08/29/2020          INTERPRETATION:  CLINICAL INFORMATION: Abdominal pain, elevated bilirubin.    COMPARISON: 8/27/2020 ultrasound    TECHNIQUE: Sonography of the abdomen.    FINDINGS:    Liver: 17.0 cm in length. Increased echogenicity.  Bile ducts: Normal caliber. Common bile duct measures 4 mm.  Gallbladder: No cholelithiasis, wall thickening or pericholecystic fluid. Negative Grayson sign.  Pancreas: Not visualized due tooverlying bowel gas.  Spleen: 10.8 cm. Within normal limits.  Right kidney: 12.7 cm. No hydronephrosis.  Left kidney: 11.7 cm.  No hydronephrosis.  Ascites: None.  Aorta and IVC: Not visualized.    IMPRESSION: Hepatic steatosis. No biliary ductal dilatation.                RAHEEM MIRAMONTES M.D., ATTENDING RADIOLOGIST  This document has been electronically signed. Aug 30 2020  1:28AM    < end of copied text >

## 2020-09-01 NOTE — PROGRESS NOTE ADULT - ASSESSMENT
1.	Hyponatremia: Beer potomania, Increased ADH state  2.	Hypokalemia  3.	Alcohol abuse, ETOH hepatitis  4.	Seizure    Improved sodium levels. To continue current meds. Avoid excessive PO fluids. Potassium supps PRN.   GI follow up. Will follow electrolytes and renal function trend.

## 2020-09-01 NOTE — PROGRESS NOTE ADULT - SUBJECTIVE AND OBJECTIVE BOX
Patient is a 35y old  Male who presents with a chief complaint of Seizure (01 Sep 2020 10:26)    INTERVAL HPI:  Patient seen and examined at bedside. Patient was downgraded from ICU overnight.  Placed on ativan 2mg q8hrs ATC yesterday and overnight required additional ativan 2mg IVP x2 for CIWA scores (overnight ranging from 2-10). This morning, patient noted to be urinating a lot all over the floor. Denies dizziness, lightheadedness, chest pain, SOB, abd pain, n/v, tremors.  [id number] used    T(F): 98.3 (09-01-20 @ 05:17), Max: 99.5 (08-31-20 @ 20:20)  HR: 121 (09-01-20 @ 05:00) (110 - 129)  BP: 113/71 (09-01-20 @ 05:00) (108/71 - 147/87)  RR: 30 (09-01-20 @ 05:00) (23 - 34)  SpO2: 94% (09-01-20 @ 05:00) (92% - 99%)    I&O's Summary    31 Aug 2020 07:01  -  01 Sep 2020 07:00  --------------------------------------------------------  IN: 1380 mL / OUT: 4060 mL / NET: -2680 mL      Review of Systems:  General: denies fever chills, headache, weakness  HEENT: denies blurry vision, diffculty swallowing, difficulty hearing, tinnitus  Cardiovascular: denies chest pain  ,palpitations  Pulmonary:denies shortness of breath, cough, wheezing, hemoptysis  Gastrointestinal: denies abdominal pain, constipation, diarrhea, nausea , vomiting, hematochezia  : denies hematuria, dysuria, or incontinence  Neurological: denies weakness, numbness , tingling, dizziness, tremors  MSK: denies muscle pain, difficulty ambulating, swelling, back pain  skin: denies skin rash, itching, burning, or  skin lesions  Psychiatrical: denies mood disturbances, anxiety feeling depressed, depression , or difficulty sleeping    Physical Exam:  General: comfortable, well nourished, jaundiced  HEENT: PEERL, scleral icterus noted  Cardiovascular: normal s1s2, no murmurs, gallops or  rubs  Pulmonary: clear to ausculation Bilaterally, no wheezing , rhonchi  Gastrointestinal: soft non tender non distended, no masses felt, no organomegally  Muscloskeletal: no lower extremity edema, intact bilateral lower extremity pulses  Neurological: No focal weakness, no pronator drift noted, no tremors  Psychiatrical: normal mood, cooperative  SKIN: no rash, lesions or ulcers, tattoos on chest, some yellowing of skin      LABS:                        13.0   8.31  )-----------( 160      ( 01 Sep 2020 05:29 )             39.9     09-01    134<L>  |  100  |  7   ----------------------------<  109<H>  3.6   |  26  |  1.00    Ca    8.6      01 Sep 2020 05:29  Phos  3.0     09-01  Mg     2.2     09-01    TPro  6.7  /  Alb  2.5<L>  /  TBili  16.8<HH>  /  DBili  x   /  AST  248<H>  /  ALT  182<H>  /  AlkPhos  356<H>  09-01          MEDICATIONS  (STANDING):  enoxaparin Injectable 40 milliGRAM(s) SubCutaneous daily  folic acid 1 milliGRAM(s) Oral daily  LORazepam     Tablet 1 milliGRAM(s) Oral every 8 hours  multivitamin 1 Tablet(s) Oral daily  pantoprazole    Tablet 40 milliGRAM(s) Oral before breakfast  potassium chloride   Powder 40 milliEquivalent(s) Oral once  thiamine 100 milliGRAM(s) Oral daily  ursodiol Capsule 300 milliGRAM(s) Oral three times a day    MEDICATIONS  (PRN):  LORazepam   Injectable 2 milliGRAM(s) IV Push every 4 hours PRN CIWA > 8  melatonin 5 milliGRAM(s) Oral at bedtime PRN Insomnia

## 2020-09-01 NOTE — BEHAVIORAL HEALTH ASSESSMENT NOTE - SUMMARY
34 y/o  male, with no prior psychiatric hospitalizations, or significant psychiatric treatment history, history of ETOH abuse brought into ED this morning due to concern for "convulsion". States that about 6 am she noticed he was shaking a lot, and had blood in his mouth because he had bitten his tongue. Episode lasted about 2 mins.  Patient was initially confused, and the issue of decision making capacity was raised. At this time patient is lucid, there is no evidence of psychosis, sunny or depression.    IMP: Alcohol abuse    REC: Patient at this time has decision making capacity regarding medical decisions

## 2020-09-01 NOTE — PROGRESS NOTE ADULT - SUBJECTIVE AND OBJECTIVE BOX
Date/Time Patient Seen:  		  Referring MD:   Data Reviewed	       Patient is a 35y old  Male who presents with a chief complaint of Seizure (31 Aug 2020 18:01)      Subjective/HPI     PAST MEDICAL & SURGICAL HISTORY:  No pertinent past medical history  S/P appendectomy  H/O hernia repair        Medication list         MEDICATIONS  (STANDING):  enoxaparin Injectable 40 milliGRAM(s) SubCutaneous daily  folic acid 1 milliGRAM(s) Oral daily  LORazepam     Tablet 2 milliGRAM(s) Oral every 8 hours  multivitamin 1 Tablet(s) Oral daily  pantoprazole    Tablet 40 milliGRAM(s) Oral before breakfast  thiamine 100 milliGRAM(s) Oral daily  ursodiol Capsule 300 milliGRAM(s) Oral three times a day    MEDICATIONS  (PRN):  LORazepam   Injectable 2 milliGRAM(s) IV Push every 4 hours PRN CIWA > 8  melatonin 5 milliGRAM(s) Oral at bedtime PRN Insomnia         Vitals log        ICU Vital Signs Last 24 Hrs  T(C): 36.8 (01 Sep 2020 05:17), Max: 37.5 (31 Aug 2020 20:20)  T(F): 98.3 (01 Sep 2020 05:17), Max: 99.5 (31 Aug 2020 20:20)  HR: 121 (01 Sep 2020 05:00) (101 - 129)  BP: 113/71 (01 Sep 2020 05:00) (100/66 - 147/87)  BP(mean): 87 (01 Sep 2020 05:00) (78 - 110)  ABP: --  ABP(mean): --  RR: 30 (01 Sep 2020 05:00) (23 - 34)  SpO2: 94% (01 Sep 2020 05:00) (92% - 99%)           Input and Output:  I&O's Detail    31 Aug 2020 07:01  -  01 Sep 2020 07:00  --------------------------------------------------------  IN:    Oral Fluid: 1380 mL  Total IN: 1380 mL    OUT:    Voided: 4060 mL  Total OUT: 4060 mL    Total NET: -2680 mL          Lab Data                        13.0   8.31  )-----------( 160      ( 01 Sep 2020 05:29 )             39.9     09-01    134<L>  |  100  |  7   ----------------------------<  109<H>  3.6   |  26  |  1.00    Ca    8.6      01 Sep 2020 05:29  Phos  3.0     09-01  Mg     2.2     09-01    TPro  6.7  /  Alb  2.5<L>  /  TBili  16.8<HH>  /  DBili  x   /  AST  248<H>  /  ALT  182<H>  /  AlkPhos  356<H>  09-01            Review of Systems	      Objective     Physical Examination    heart s1s2  lung dec BS  abd soft      Pertinent Lab findings & Imaging      Giovanna:  NO   Adequate UO     I&O's Detail    31 Aug 2020 07:01  -  01 Sep 2020 07:00  --------------------------------------------------------  IN:    Oral Fluid: 1380 mL  Total IN: 1380 mL    OUT:    Voided: 4060 mL  Total OUT: 4060 mL    Total NET: -2680 mL               Discussed with:     Cultures:	        Radiology

## 2020-09-01 NOTE — PROGRESS NOTE ADULT - SUBJECTIVE AND OBJECTIVE BOX
INTERVAL HPI/OVERNIGHT EVENTS:  pt seen and examined  transferred to floors  denies n/v/d/abd pain  nicole po  ciwa 2-10    MEDICATIONS  (STANDING):  dextrose 5%. 1000 milliLiter(s) (50 mL/Hr) IV Continuous <Continuous>  enoxaparin Injectable 40 milliGRAM(s) SubCutaneous daily  folic acid 1 milliGRAM(s) Oral daily  multivitamin 1 Tablet(s) Oral daily  pantoprazole  Injectable 40 milliGRAM(s) IV Push daily  thiamine 100 milliGRAM(s) Oral daily  ursodiol Capsule 300 milliGRAM(s) Oral three times a day    MEDICATIONS  (PRN):  LORazepam   Injectable 2 milliGRAM(s) IV Push every 4 hours PRN CIWA > 8      Allergies    No Known Allergies    Intolerances        General:  No wt loss, fevers, chills, night sweats, fatigue,   Eyes:  Good vision, no reported pain  ENT:  No sore throat, pain, runny nose, dysphagia  CV:  No pain, palpitations, hypo/hypertension  Resp:  No dyspnea, cough, tachypnea, wheezing  GI: see above  :  No pain, bleeding, incontinence, nocturia  Muscle:  No pain, weakness  Neuro:  No weakness, tingling, memory problems  Psych:  No fatigue, insomnia, mood problems, depression  Endocrine:  No polyuria, polydipsia, cold/heat intolerance  Heme:  No petechiae, ecchymosis, easy bruisability  Skin:  No rash, tattoos, scars, edema    PHYSICAL EXAM:     Vital Signs Last 24 Hrs  T(C): 37 (30 Aug 2020 04:00), Max: 37.2 (30 Aug 2020 00:00)  T(F): 98.6 (30 Aug 2020 04:00), Max: 99 (30 Aug 2020 00:00)  HR: 127 (30 Aug 2020 09:00) (109 - 149)  BP: 118/86 (30 Aug 2020 09:00) (109/69 - 142/80)  BP(mean): 98 (30 Aug 2020 09:00) (83 - 104)  RR: 30 (30 Aug 2020 09:00) (18 - 37)  SpO2: 96% (30 Aug 2020 09:00) (93% - 97%)      27 Aug 2020 07:01  -  28 Aug 2020 07:00  --------------------------------------------------------  IN: 7750.9 mL / OUT: 7547 mL / NET: 203.9 mL    28 Aug 2020 07:01  -  28 Aug 2020 11:46  --------------------------------------------------------  IN: 1380 mL / OUT: 1550 mL / NET: -170 mL        GENERAL: lying in bed  HEENT:  NC/AT  ABDOMEN:  Soft, non-tender, non-distended  EXTREMITIES:  no  edema  SKIN:  jaundice  NEURO:  Awake alert appropriate               LABS:                        13.1   3.72  )-----------( 101      ( 30 Aug 2020 06:20 )             38.0     08-30    137  |  102  |  6<L>  ----------------------------<  97  3.9   |  28  |  1.10    Ca    8.3<L>      30 Aug 2020 06:20  Phos  2.8     08-30  Mg     2.3     08-30    TPro  6.1  /  Alb  2.4<L>  /  TBili  19.8<HH>  /  DBili  15.60<H>  /  AST  247<H>  /  ALT  189<H>  /  AlkPhos  283<H>  08-30    PT/INR - ( 29 Aug 2020 07:34 )   PT: 12.1 sec;   INR: 1.04 ratio         PTT - ( 29 Aug 2020 07:34 )  PTT:31.8 sec      08-29-20 @ 07:01  -  08-30-20 @ 07:00  --------------------------------------------------------  IN: 2620 mL / OUT: 7500 mL / NET: -4880 mL    08-30-20 @ 07:01 - 08-30-20 @ 10:22  --------------------------------------------------------  IN: 175 mL / OUT: 0 mL / NET: 175 mL      RADIOLOGY & ADDITIONAL TESTS:

## 2020-09-02 DIAGNOSIS — N39.0 URINARY TRACT INFECTION, SITE NOT SPECIFIED: ICD-10-CM

## 2020-09-02 LAB
ALBUMIN SERPL ELPH-MCNC: 2.3 G/DL — LOW (ref 3.3–5)
ALP SERPL-CCNC: 328 U/L — HIGH (ref 40–120)
ALT FLD-CCNC: 159 U/L — HIGH (ref 12–78)
ANION GAP SERPL CALC-SCNC: 7 MMOL/L — SIGNIFICANT CHANGE UP (ref 5–17)
APPEARANCE UR: ABNORMAL
AST SERPL-CCNC: 183 U/L — HIGH (ref 15–37)
BILIRUB SERPL-MCNC: 15 MG/DL — CRITICAL HIGH (ref 0.2–1.2)
BILIRUB UR-MCNC: ABNORMAL
BUN SERPL-MCNC: 9 MG/DL — SIGNIFICANT CHANGE UP (ref 7–23)
CALCIUM SERPL-MCNC: 8.6 MG/DL — SIGNIFICANT CHANGE UP (ref 8.5–10.1)
CHLORIDE SERPL-SCNC: 99 MMOL/L — SIGNIFICANT CHANGE UP (ref 96–108)
CO2 SERPL-SCNC: 26 MMOL/L — SIGNIFICANT CHANGE UP (ref 22–31)
COLOR SPEC: YELLOW — SIGNIFICANT CHANGE UP
CREAT SERPL-MCNC: 0.93 MG/DL — SIGNIFICANT CHANGE UP (ref 0.5–1.3)
DIFF PNL FLD: ABNORMAL
GLUCOSE SERPL-MCNC: 98 MG/DL — SIGNIFICANT CHANGE UP (ref 70–99)
GLUCOSE UR QL: NEGATIVE — SIGNIFICANT CHANGE UP
HCT VFR BLD CALC: 36.7 % — LOW (ref 39–50)
HGB BLD-MCNC: 12 G/DL — LOW (ref 13–17)
KETONES UR-MCNC: NEGATIVE — SIGNIFICANT CHANGE UP
LEUKOCYTE ESTERASE UR-ACNC: ABNORMAL
MAGNESIUM SERPL-MCNC: 2.5 MG/DL — SIGNIFICANT CHANGE UP (ref 1.6–2.6)
MCHC RBC-ENTMCNC: 29.1 PG — SIGNIFICANT CHANGE UP (ref 27–34)
MCHC RBC-ENTMCNC: 32.7 GM/DL — SIGNIFICANT CHANGE UP (ref 32–36)
MCV RBC AUTO: 89.1 FL — SIGNIFICANT CHANGE UP (ref 80–100)
NITRITE UR-MCNC: POSITIVE
NRBC # BLD: 0 /100 WBCS — SIGNIFICANT CHANGE UP (ref 0–0)
PH UR: 6 — SIGNIFICANT CHANGE UP (ref 5–8)
PLATELET # BLD AUTO: 204 K/UL — SIGNIFICANT CHANGE UP (ref 150–400)
POTASSIUM SERPL-MCNC: 3.4 MMOL/L — LOW (ref 3.5–5.3)
POTASSIUM SERPL-SCNC: 3.4 MMOL/L — LOW (ref 3.5–5.3)
PROT SERPL-MCNC: 6.6 G/DL — SIGNIFICANT CHANGE UP (ref 6–8.3)
PROT UR-MCNC: 30 MG/DL
RBC # BLD: 4.12 M/UL — LOW (ref 4.2–5.8)
RBC # FLD: 17.3 % — HIGH (ref 10.3–14.5)
SODIUM SERPL-SCNC: 132 MMOL/L — LOW (ref 135–145)
SP GR SPEC: 1.01 — SIGNIFICANT CHANGE UP (ref 1.01–1.02)
UROBILINOGEN FLD QL: 8
WBC # BLD: 7.29 K/UL — SIGNIFICANT CHANGE UP (ref 3.8–10.5)
WBC # FLD AUTO: 7.29 K/UL — SIGNIFICANT CHANGE UP (ref 3.8–10.5)

## 2020-09-02 PROCEDURE — 99222 1ST HOSP IP/OBS MODERATE 55: CPT

## 2020-09-02 PROCEDURE — 99233 SBSQ HOSP IP/OBS HIGH 50: CPT | Mod: GC

## 2020-09-02 PROCEDURE — 71045 X-RAY EXAM CHEST 1 VIEW: CPT | Mod: 26

## 2020-09-02 RX ORDER — SODIUM CHLORIDE 9 MG/ML
2 INJECTION INTRAMUSCULAR; INTRAVENOUS; SUBCUTANEOUS ONCE
Refills: 0 | Status: COMPLETED | OUTPATIENT
Start: 2020-09-02 | End: 2020-09-02

## 2020-09-02 RX ORDER — IBUPROFEN 200 MG
400 TABLET ORAL ONCE
Refills: 0 | Status: COMPLETED | OUTPATIENT
Start: 2020-09-02 | End: 2020-09-02

## 2020-09-02 RX ORDER — CEFTRIAXONE 500 MG/1
1000 INJECTION, POWDER, FOR SOLUTION INTRAMUSCULAR; INTRAVENOUS ONCE
Refills: 0 | Status: COMPLETED | OUTPATIENT
Start: 2020-09-02 | End: 2020-09-02

## 2020-09-02 RX ORDER — POTASSIUM CHLORIDE 20 MEQ
40 PACKET (EA) ORAL ONCE
Refills: 0 | Status: COMPLETED | OUTPATIENT
Start: 2020-09-02 | End: 2020-09-02

## 2020-09-02 RX ORDER — CEFTRIAXONE 500 MG/1
1000 INJECTION, POWDER, FOR SOLUTION INTRAMUSCULAR; INTRAVENOUS EVERY 24 HOURS
Refills: 0 | Status: DISCONTINUED | OUTPATIENT
Start: 2020-09-03 | End: 2020-09-04

## 2020-09-02 RX ORDER — CEFTRIAXONE 500 MG/1
INJECTION, POWDER, FOR SOLUTION INTRAMUSCULAR; INTRAVENOUS
Refills: 0 | Status: DISCONTINUED | OUTPATIENT
Start: 2020-09-02 | End: 2020-09-04

## 2020-09-02 RX ADMIN — Medication 1 MILLIGRAM(S): at 06:03

## 2020-09-02 RX ADMIN — SODIUM CHLORIDE 2 GRAM(S): 9 INJECTION INTRAMUSCULAR; INTRAVENOUS; SUBCUTANEOUS at 13:47

## 2020-09-02 RX ADMIN — PANTOPRAZOLE SODIUM 40 MILLIGRAM(S): 20 TABLET, DELAYED RELEASE ORAL at 06:03

## 2020-09-02 RX ADMIN — Medication 1 MILLIGRAM(S): at 13:47

## 2020-09-02 RX ADMIN — Medication 400 MILLIGRAM(S): at 06:02

## 2020-09-02 RX ADMIN — Medication 400 MILLIGRAM(S): at 06:49

## 2020-09-02 RX ADMIN — URSODIOL 300 MILLIGRAM(S): 250 TABLET, FILM COATED ORAL at 06:03

## 2020-09-02 RX ADMIN — CEFTRIAXONE 100 MILLIGRAM(S): 500 INJECTION, POWDER, FOR SOLUTION INTRAMUSCULAR; INTRAVENOUS at 13:46

## 2020-09-02 RX ADMIN — URSODIOL 300 MILLIGRAM(S): 250 TABLET, FILM COATED ORAL at 21:28

## 2020-09-02 RX ADMIN — Medication 40 MILLIEQUIVALENT(S): at 13:46

## 2020-09-02 RX ADMIN — ENOXAPARIN SODIUM 40 MILLIGRAM(S): 100 INJECTION SUBCUTANEOUS at 13:46

## 2020-09-02 RX ADMIN — Medication 0.5 MILLIGRAM(S): at 14:46

## 2020-09-02 RX ADMIN — URSODIOL 300 MILLIGRAM(S): 250 TABLET, FILM COATED ORAL at 17:46

## 2020-09-02 RX ADMIN — Medication 1 TABLET(S): at 13:47

## 2020-09-02 RX ADMIN — Medication 0.5 MILLIGRAM(S): at 21:29

## 2020-09-02 RX ADMIN — Medication 100 MILLIGRAM(S): at 13:47

## 2020-09-02 NOTE — PROGRESS NOTE ADULT - PROBLEM SELECTOR PLAN 1
Tm 102.8 - vs noted - overnight events noted -   will taper Ativan this am - ciwa - prn Ativan   monitor LFT  education  counseling  nutrition  vitamins

## 2020-09-02 NOTE — PROGRESS NOTE ADULT - SUBJECTIVE AND OBJECTIVE BOX
Patient is a 35y old  Male who presents with a chief complaint of Seizure (02 Sep 2020 11:51)      FROM ADMISSION H+P:   HPI:  History obtained from patients partner, Leydig  34yo male with PMHx of ETOH abuse brought into ED this morning due to concern for "convulsion". States that about 6 am she noticed he was shaking a lot, and had blood in his mouth because he had bitten his tongue. Episode lasted about 2 mins. They were both lying in bed, and she noticed he was shaking next to her and as she turned around, she saw him having the shaking episode. He was unresponsive, an she called other household members who helped carry him out of the room and brought him in by car. His skin was very cold during this process. She states patient has no previous seizure history. The night prior he seemed weak/tired, had complained of some abdominal pain, and she noticed his skin was yellow. Last night, he had bowel and bladder incontinence.   She states he has been drinking ETOH excessively for about 1 month. Drinks beer (Corona) daily; up to 12 beers/day. Last drink was at 3am 2020. He had taken some medications last night for sleep; tylenol, peptobismol.   Prior to this, he had stopped drinking for about 2 years. According to partner, patient has no hx of drug use or cigarette smoking. (27 Aug 2020 09:08)      ----  INTERVAL HPI/OVERNIGHT EVENTS:   [id number]    Pt seen and evaluated at the bedside. Overnight patient had fever of 102.8F and had CXR performed which was negative for infiltrates.  UA performed which was grossly positive and patient received Motrin overnight with improvement in temperature. This morning patient admits to urinary frequency and some suprapubic tenderness however patient denies dysuria.  Currently patient has no acute complaints, no SOB, no cough, no chest pain or palpitations. Patient wants to go home and it was explained to patient that he is still on standing ativan ATC which has been tapered this morning, CIWA score around 1 and did not require any PRN ativan overnight. Patient understands that he needs to stay due to further evaluation of elevated LFTs and new fever, and is agreeable to stay.     ----  PAST MEDICAL & SURGICAL HISTORY:  No pertinent past medical history  S/P appendectomy  H/O hernia repair      FAMILY HISTORY:  Family history of renal disease: Uncle      Home Medications:      Allergies    No Known Allergies    Intolerances        ----  MEDICATIONS  (STANDING):  cefTRIAXone   IVPB      enoxaparin Injectable 40 milliGRAM(s) SubCutaneous daily  folic acid 1 milliGRAM(s) Oral daily  LORazepam     Tablet 0.5 milliGRAM(s) Oral every 8 hours  multivitamin 1 Tablet(s) Oral daily  pantoprazole    Tablet 40 milliGRAM(s) Oral before breakfast  thiamine 100 milliGRAM(s) Oral daily  ursodiol Capsule 300 milliGRAM(s) Oral three times a day    MEDICATIONS  (PRN):  LORazepam   Injectable 2 milliGRAM(s) IV Push every 4 hours PRN CIWA > 8  melatonin 5 milliGRAM(s) Oral at bedtime PRN Insomnia      ----  REVIEW OF SYSTEMS:  CONSTITUTIONAL: denies fever at this time, chills, fatigue, weakness  HEENT: denies blurred vision, sore throat  SKIN: denies new lesions, rash  CARDIOVASCULAR: denies chest pain, chest pressure, palpitations  RESPIRATORY: denies shortness of breath, sputum production  GASTROINTESTINAL: denies nausea, vomiting, diarrhea, abdominal pain  GENITOURINARY: denies dysuria, discharge, admits to increased urinary frequency   LYMPHATICS: denies enlarged lymph nodes, extremity swelling    ----  PHYSICAL EXAM:  General: comfortable, well nourished, jaundiced, sitting on side of bed  HEENT: PEERL, scleral icterus noted  Cardiovascular: normal s1s2, no murmurs, gallops or  rubs  Pulmonary: clear to ausculation Bilaterally, no wheezing , rhonchi  Gastrointestinal: soft non tender non distended, no masses felt, suprapubic tenderness present  Muscloskeletal: no lower extremity edema, intact bilateral lower extremity pulses  Neurological: No focal weakness, no pronator drift noted, no tremors  Psychiatrical: normal mood, cooperative  SKIN: no rash, lesions or ulcers, tattoos on chest and hand, some yellowing of skin    T(C): 37.1 (20 @ 13:28), Max: 39.3 (20 @ 04:59)  HR: 95 (20 @ 13:28) (95 - 115)  BP: 127/84 (20 @ 13:28) (102/69 - 127/84)  RR: 16 (20 @ 13:28) (16 - 19)  SpO2: 95% (20 @ 13:28) (90% - 95%)  Wt(kg): --    ----  I&O's Summary      LABS:                        12.0   7.29  )-----------( 204      ( 02 Sep 2020 07:27 )             36.7     09-02    132<L>  |  99  |  9   ----------------------------<  98  3.4<L>   |  26  |  0.93    Ca    8.6      02 Sep 2020 07:27  Phos  3.0     09-  Mg     2.5         TPro  6.6  /  Alb  2.3<L>  /  TBili  15.0<HH>  /  DBili  x   /  AST  183<H>  /  ALT  159<H>  /  AlkPhos  328<H>        Urinalysis Basic - ( 02 Sep 2020 06:10 )    Color: Yellow / Appearance: Slightly Turbid / S.010 / pH: x  Gluc: x / Ketone: Negative  / Bili: Large / Urobili: 8   Blood: x / Protein: 30 mg/dL / Nitrite: Positive   Leuk Esterase: Moderate / RBC: 0-2 /HPF / WBC >50   Sq Epi: x / Non Sq Epi: Occasional / Bacteria: TNTC      CAPILLARY BLOOD GLUCOSE                    ----  Personally reviewed:  Vital sign trends: [X  ] yes    [  ] no     [  ] n/a  Laboratory results: [ X ] yes    [  ] no     [  ] n/a  Radiology results: [X  ] yes    [  ] no     [  ] n/a  Culture results: [X  ] yes    [  ] no     [  ] n/a  Consultant recommendations: [ X ] yes    [  ] no     [  ] n/a Patient is a 35y old  Male who presents with a chief complaint of Seizure (02 Sep 2020 11:51)      FROM ADMISSION H+P:   HPI:  History obtained from patients partner, Leydig  34yo male with PMHx of ETOH abuse brought into ED this morning due to concern for "convulsion". States that about 6 am she noticed he was shaking a lot, and had blood in his mouth because he had bitten his tongue. Episode lasted about 2 mins. They were both lying in bed, and she noticed he was shaking next to her and as she turned around, she saw him having the shaking episode. He was unresponsive, an she called other household members who helped carry him out of the room and brought him in by car. His skin was very cold during this process. She states patient has no previous seizure history. The night prior he seemed weak/tired, had complained of some abdominal pain, and she noticed his skin was yellow. Last night, he had bowel and bladder incontinence.   She states he has been drinking ETOH excessively for about 1 month. Drinks beer (Corona) daily; up to 12 beers/day. Last drink was at 3am 2020. He had taken some medications last night for sleep; tylenol, peptobismol.   Prior to this, he had stopped drinking for about 2 years. According to partner, patient has no hx of drug use or cigarette smoking. (27 Aug 2020 09:08)      ----  INTERVAL HPI/OVERNIGHT EVENTS:   [id number]    Pt seen and evaluated at the bedside. Overnight patient had fever of 102.8F and had CXR performed which was negative for infiltrates.  UA performed which was grossly positive and patient received Motrin overnight with improvement in temperature. This morning patient admits to urinary frequency and some suprapubic tenderness however patient denies dysuria.  Currently patient has no acute complaints, no SOB, no cough, no chest pain or palpitations. Patient wants to go home and it was explained to patient that he is still on standing ativan ATC which has been tapered this morning, CIWA score around 1 and did not require any PRN ativan overnight. Patient understands that he needs to stay due to further evaluation of elevated LFTs and new fever, and is agreeable to stay.     ----  PAST MEDICAL & SURGICAL HISTORY:  No pertinent past medical history  S/P appendectomy  H/O hernia repair      FAMILY HISTORY:  Family history of renal disease: Uncle      Home Medications:      Allergies    No Known Allergies    Intolerances        ----  MEDICATIONS  (STANDING):  cefTRIAXone   IVPB      enoxaparin Injectable 40 milliGRAM(s) SubCutaneous daily  folic acid 1 milliGRAM(s) Oral daily  LORazepam     Tablet 0.5 milliGRAM(s) Oral every 8 hours  multivitamin 1 Tablet(s) Oral daily  pantoprazole    Tablet 40 milliGRAM(s) Oral before breakfast  thiamine 100 milliGRAM(s) Oral daily  ursodiol Capsule 300 milliGRAM(s) Oral three times a day    MEDICATIONS  (PRN):  LORazepam   Injectable 2 milliGRAM(s) IV Push every 4 hours PRN CIWA > 8  melatonin 5 milliGRAM(s) Oral at bedtime PRN Insomnia      ----  REVIEW OF SYSTEMS:  CONSTITUTIONAL: denies fever at this time, chills, fatigue, weakness  HEENT: denies blurred vision, sore throat  SKIN: denies new lesions, rash  CARDIOVASCULAR: denies chest pain, chest pressure, palpitations  RESPIRATORY: denies shortness of breath, sputum production  GASTROINTESTINAL: denies nausea, vomiting, diarrhea, abdominal pain  GENITOURINARY: denies dysuria, discharge, admits to increased urinary frequency   LYMPHATICS: denies enlarged lymph nodes, extremity swelling    ----  PHYSICAL EXAM:  General: comfortable, well nourished, jaundiced, sitting on side of bed  HEENT: PEERL, scleral icterus noted  Cardiovascular: normal s1s2, no murmurs, gallops or  rubs  Pulmonary: clear to ausculation Bilaterally, no wheezing , rhonchi  Gastrointestinal: soft non tender non distended, no masses felt, suprapubic tenderness present  Muscloskeletal: no lower extremity edema, intact bilateral lower extremity pulses  Neurological: No focal weakness, no pronator drift noted, no tremors  Psychiatrical: normal mood, cooperative  SKIN: no rash, lesions or ulcers, tattoos on chest and hand, some yellowing of skin    T(C): 37.1 (20 @ 13:28), Max: 39.3 (20 @ 04:59)  HR: 95 (20 @ 13:28) (95 - 115)  BP: 127/84 (20 @ 13:28) (102/69 - 127/84)  RR: 16 (20 @ 13:28) (16 - 19)  SpO2: 95% (20 @ 13:28) (90% - 95%)  Wt(kg): --    ----  I&O's Summary      LABS:                        12.0   7.29  )-----------( 204      ( 02 Sep 2020 07:27 )             36.7     09-02    132<L>  |  99  |  9   ----------------------------<  98  3.4<L>   |  26  |  0.93    Ca    8.6      02 Sep 2020 07:27  Phos  3.0     09-  Mg     2.5         TPro  6.6  /  Alb  2.3<L>  /  TBili  15.0<HH>  /  DBili  x   /  AST  183<H>  /  ALT  159<H>  /  AlkPhos  328<H>        Urinalysis Basic - ( 02 Sep 2020 06:10 )    Color: Yellow / Appearance: Slightly Turbid / S.010 / pH: x  Gluc: x / Ketone: Negative  / Bili: Large / Urobili: 8   Blood: x / Protein: 30 mg/dL / Nitrite: Positive   Leuk Esterase: Moderate / RBC: 0-2 /HPF / WBC >50   Sq Epi: x / Non Sq Epi: Occasional / Bacteria: TNTC      CAPILLARY BLOOD GLUCOSE                    ----  Personally reviewed:  Vital sign trends: [X  ] yes    [  ] no     [  ] n/a  Laboratory results: [ X ] yes    [  ] no     [  ] n/a  Radiology results: [X  ] yes    [  ] no     [  ] n/a  Culture results: [X  ] yes    [  ] no     [  ] n/a  Consultant recommendations: [ X ] yes    [  ] no     [  ] n/a

## 2020-09-02 NOTE — CONSULT NOTE ADULT - SUBJECTIVE AND OBJECTIVE BOX
[facility]  INFECTIOUS DISEASES   =======================================================    [facility]-[id number]  DIANE GARAY     , [place]     CC:  Seizure     HPI:  36y/o man with ETOH abuse was admitted on  after a new onset seizure related to alcohol abuse and withdrawal. He was in MICU initially and then transferred to medical floor.   LFTs are high for which had a negative acute hepatitis panel, most likely alcoholic hepatitis, now LFTs trending down.   Last night had fever for which work up was done and ID was called for further recommendation.   He is complaining of suprapubic pain and frequency and UA showed high WBC and + nitrate.     PAST MEDICAL & SURGICAL HISTORY:  No pertinent past medical history  S/P appendectomy  H/O hernia repair    Social Hx: no smoking or drugs,     FAMILY HISTORY:  Family history of renal disease: Uncle    Allergies  No Known Allergies    Antibiotics:  MEDICATIONS  (STANDING):  enoxaparin Injectable 40 milliGRAM(s) SubCutaneous daily  folic acid 1 milliGRAM(s) Oral daily  LORazepam     Tablet 0.5 milliGRAM(s) Oral every 8 hours  multivitamin 1 Tablet(s) Oral daily  pantoprazole    Tablet 40 milliGRAM(s) Oral before breakfast  potassium chloride   Powder 40 milliEquivalent(s) Oral once  sodium chloride 2 Gram(s) Oral once  thiamine 100 milliGRAM(s) Oral daily  ursodiol Capsule 300 milliGRAM(s) Oral three times a day    MEDICATIONS  (PRN):  LORazepam   Injectable 2 milliGRAM(s) IV Push every 4 hours PRN CIWA > 8  melatonin 5 milliGRAM(s) Oral at bedtime PRN Insomnia       REVIEW OF SYSTEMS:  CONSTITUTIONAL:  No Fever or chills  HEENT:  No diplopia or blurred vision.  No sore throat or runny nose.  CARDIOVASCULAR:  No chest pain or SOB.  RESPIRATORY:  No cough, shortness of breath, PND or orthopnea.  GASTROINTESTINAL:  No nausea, vomiting or diarrhea.  GENITOURINARY:  No dysuria, has frequency & urgency. lower abdominal pain   MUSCULOSKELETAL:  no joint aches, no muscle pain  SKIN:  No change in skin, hair or nails.  NEUROLOGIC:  No paresthesias, fasciculations, seizures or weakness.  PSYCHIATRIC:  No disorder of thought or mood.  ENDOCRINE:  No heat or cold intolerance, polyuria or polydipsia.  HEMATOLOGICAL:  No easy bruising or bleeding.     Physical Exam:  Vital Signs Last 24 Hrs  T(C): 37.2 (02 Sep 2020 06:49), Max: 39.3 (02 Sep 2020 04:59)  T(F): 99 (02 Sep 2020 06:49), Max: 102.8 (02 Sep 2020 04:59)  HR: 115 (02 Sep 2020 04:59) (112 - 115)  BP: 102/69 (02 Sep 2020 04:59) (102/69 - 119/77)  BP(mean): --  RR: 19 (02 Sep 2020 04:59) (16 - 19)  SpO2: 90% (02 Sep 2020 04:59) (90% - 93%)  GEN: NAD  HEENT: normocephalic and atraumatic. EOMI. PERRL.    NECK: Supple.  No lymphadenopathy   LUNGS: Clear to auscultation.  HEART: Regular rate and rhythm without murmur.  ABDOMEN: Soft, mild suprapubic tenderness, and nondistended.  Positive bowel sounds.    : No CVA tenderness  EXTREMITIES: Without any cyanosis, clubbing, rash, lesions or edema.  NEUROLOGIC: grossly intact.  PSYCHIATRIC: Appropriate affect .  SKIN: No ulceration or induration present.    Labs:      132<L>  |  99  |  9   ----------------------------<  98  3.4<L>   |  26  |  0.93    Ca    8.6      02 Sep 2020 07:27  Phos  3.0       Mg     2.5         TPro  6.6  /  Alb  2.3<L>  /  TBili  15.0<HH>  /  DBili  x   /  AST  183<H>  /  ALT  159<H>  /  AlkPhos  328<H>                          12.0   7.29  )-----------( 204      ( 02 Sep 2020 07:27 )             36.7     Urinalysis Basic - ( 02 Sep 2020 06:10 )    Color: Yellow / Appearance: Slightly Turbid / S.010 / pH: x  Gluc: x / Ketone: Negative  / Bili: Large / Urobili: 8   Blood: x / Protein: 30 mg/dL / Nitrite: Positive   Leuk Esterase: Moderate / RBC: 0-2 /HPF / WBC >50   Sq Epi: x / Non Sq Epi: Occasional / Bacteria: TNTC    LIVER FUNCTIONS - ( 02 Sep 2020 07:27 )  Alb: 2.3 g/dL / Pro: 6.6 g/dL / ALK PHOS: 328 U/L / ALT: 159 U/L / AST: 183 U/L / GGT: x           COVID-19 PCR: NotDetec (20 @ 08:12)    All imaging and other data have been reviewed.    Assessment and Plan:   36y/o man with ETOH abuse was admitted on  after a new onset seizure related to alcohol abuse and withdrawal. He was in MICU initially and then transferred to medical floor.   LFTs are high for which had a negative acute hepatitis panel, most likely alcoholic hepatitis, now LFTs trending down.   Last night had fever for which work up was done clinically and based on UA looks like source is UTI.     Fever, UTI:  - Blood culture x 2   - UA with high WBC and nitrate, will follow UC  - No leukocytosis   - Start Ceftriaxone 1gm daily, not contraindicated with his liver function unless develops LEBRON and liver failure with high INR.   - Will monitor LFTs     Alcoholic hepatitis:   - Follow up LFTs  - Acute hep panel is neg  - Alcohol rehab   - GI on board, for imaging to rule out other causes.    Thank you for courtesy of this consult.     Will follow.    Reji Lay MD  Division of Infectious Diseases   [phone] [facility]  INFECTIOUS DISEASES   =======================================================    [facility]-[id number]  DIANE GARAY     , [place]  [place] # [id number]    CC:  Seizure     HPI:  34y/o man with ETOH abuse was admitted on  after a new onset seizure related to alcohol abuse and withdrawal. He was in MICU initially and then transferred to medical floor.   LFTs are high for which had a negative acute hepatitis panel, most likely alcoholic hepatitis, now LFTs trending down.   Last night had fever for which work up was done and ID was called for further recommendation.   He is complaining of suprapubic pain and frequency and UA showed high WBC and + nitrate.     PAST MEDICAL & SURGICAL HISTORY:  No pertinent past medical history  S/P appendectomy  H/O hernia repair    Social Hx: no smoking or drugs,     FAMILY HISTORY:  Family history of renal disease: Uncle    Allergies  No Known Allergies    Antibiotics:  MEDICATIONS  (STANDING):  enoxaparin Injectable 40 milliGRAM(s) SubCutaneous daily  folic acid 1 milliGRAM(s) Oral daily  LORazepam     Tablet 0.5 milliGRAM(s) Oral every 8 hours  multivitamin 1 Tablet(s) Oral daily  pantoprazole    Tablet 40 milliGRAM(s) Oral before breakfast  potassium chloride   Powder 40 milliEquivalent(s) Oral once  sodium chloride 2 Gram(s) Oral once  thiamine 100 milliGRAM(s) Oral daily  ursodiol Capsule 300 milliGRAM(s) Oral three times a day    MEDICATIONS  (PRN):  LORazepam   Injectable 2 milliGRAM(s) IV Push every 4 hours PRN CIWA > 8  melatonin 5 milliGRAM(s) Oral at bedtime PRN Insomnia       REVIEW OF SYSTEMS:  CONSTITUTIONAL:  No Fever or chills  HEENT:  No diplopia or blurred vision.  No sore throat or runny nose.  CARDIOVASCULAR:  No chest pain or SOB.  RESPIRATORY:  No cough, shortness of breath, PND or orthopnea.  GASTROINTESTINAL:  No nausea, vomiting or diarrhea.  GENITOURINARY:  No dysuria, has frequency & urgency. lower abdominal pain   MUSCULOSKELETAL:  no joint aches, no muscle pain  SKIN:  No change in skin, hair or nails.  NEUROLOGIC:  No paresthesias, fasciculations, seizures or weakness.  PSYCHIATRIC:  No disorder of thought or mood.  ENDOCRINE:  No heat or cold intolerance, polyuria or polydipsia.  HEMATOLOGICAL:  No easy bruising or bleeding.     Physical Exam:  Vital Signs Last 24 Hrs  T(C): 37.2 (02 Sep 2020 06:49), Max: 39.3 (02 Sep 2020 04:59)  T(F): 99 (02 Sep 2020 06:49), Max: 102.8 (02 Sep 2020 04:59)  HR: 115 (02 Sep 2020 04:59) (112 - 115)  BP: 102/69 (02 Sep 2020 04:59) (102/69 - 119/77)  BP(mean): --  RR: 19 (02 Sep 2020 04:59) (16 - 19)  SpO2: 90% (02 Sep 2020 04:59) (90% - 93%)  GEN: NAD  HEENT: normocephalic and atraumatic. EOMI. PERRL.    NECK: Supple.  No lymphadenopathy   LUNGS: Clear to auscultation.  HEART: Regular rate and rhythm without murmur.  ABDOMEN: Soft, mild suprapubic tenderness, and nondistended.  Positive bowel sounds.    : No CVA tenderness  EXTREMITIES: Without any cyanosis, clubbing, rash, lesions or edema.  NEUROLOGIC: grossly intact.  PSYCHIATRIC: Appropriate affect .  SKIN: No ulceration or induration present.    Labs:      132<L>  |  99  |  9   ----------------------------<  98  3.4<L>   |  26  |  0.93    Ca    8.6      02 Sep 2020 07:27  Phos  3.0       Mg     2.5         TPro  6.6  /  Alb  2.3<L>  /  TBili  15.0<HH>  /  DBili  x   /  AST  183<H>  /  ALT  159<H>  /  AlkPhos  328<H>                          12.0   7.29  )-----------( 204      ( 02 Sep 2020 07:27 )             36.7     Urinalysis Basic - ( 02 Sep 2020 06:10 )    Color: Yellow / Appearance: Slightly Turbid / S.010 / pH: x  Gluc: x / Ketone: Negative  / Bili: Large / Urobili: 8   Blood: x / Protein: 30 mg/dL / Nitrite: Positive   Leuk Esterase: Moderate / RBC: 0-2 /HPF / WBC >50   Sq Epi: x / Non Sq Epi: Occasional / Bacteria: TNTC    LIVER FUNCTIONS - ( 02 Sep 2020 07:27 )  Alb: 2.3 g/dL / Pro: 6.6 g/dL / ALK PHOS: 328 U/L / ALT: 159 U/L / AST: 183 U/L / GGT: x           COVID-19 PCR: NotDetec (20 @ 08:12)    All imaging and other data have been reviewed.    Assessment and Plan:   34y/o man with ETOH abuse was admitted on  after a new onset seizure related to alcohol abuse and withdrawal. He was in MICU initially and then transferred to medical floor.   LFTs are high for which had a negative acute hepatitis panel, most likely alcoholic hepatitis, now LFTs trending down.   Last night had fever for which work up was done clinically and based on UA looks like source is UTI.     Fever, UTI:  - Blood culture x 2   - UA with high WBC and nitrate, will follow UC  - No leukocytosis   - Start Ceftriaxone 1gm daily, not contraindicated with his liver function unless develops LEBRON and liver failure with high INR.   - Will monitor LFTs     Alcoholic hepatitis:   - Follow up LFTs  - Acute hep panel is neg  - Alcohol rehab   - GI on board, for imaging to rule out other causes.    Thank you for courtesy of this consult.     Will follow.    Reji Lay MD  Division of Infectious Diseases   [phone]

## 2020-09-02 NOTE — PROVIDER CONTACT NOTE (CRITICAL VALUE NOTIFICATION) - TEST AND RESULT REPORTED:
na+ 111,k+ 2.9
Na 119, K+  2.7
Na 120, K+  2.4 , Ca 6.5
Total Bilirubin  15.0
Total Bilirubin 16.8
k2.6
phosphorus 0.7
total bili
total bilirubin   18.2
total bilirubin 18.5
EDDIE patel 19.8

## 2020-09-02 NOTE — PROGRESS NOTE ADULT - ATTENDING COMMENTS
I personally conducted a physical examination of the patient. I personally gathered the patient's history. I edited the above listed findings which were prepared by the listed resident physician. I personally discussed the plan of care with the patient. The questions and concerns were addressed to the best of my ability. The patient is in agreement with the listed treatment plan.     - the pt is febrile w/ w/u in progress but suspect UTI is likely etiology. no other events today. plan for eventual MRCP and possible EGD but the pt is reluctant. he is considering leaving A. has capacity.

## 2020-09-02 NOTE — PROGRESS NOTE ADULT - ASSESSMENT
etoh hepatitis  etoh abuse  elev lfts    repeat abd u/s showing fatty liver wo gs/biliary dilatation   lfts slowly improving; hgb stable  rec mri/mrcp when calm, can be done as outpatient given improving lfts  cont ppi daily and laura tid  diet as tolerated  ciwa protocol   daily meld labs

## 2020-09-02 NOTE — CHART NOTE - NSCHARTNOTEFT_GEN_A_CORE
Called by RN for Pt c/o new fever 102.0. Pt seen at bedside, he reports insomnia for several days. He denies dysuria, cough, chills, headaches.         T(C): 39.3 (09-02-20 @ 00:00), Max: 39.3 (09-02-20 @ 00:00)  HR: 115 (09-02-20 @ 00:00) (112 - 115)  BP: 102/69 (09-02-20 @ 00:00) (102/69 - 119/77)  RR: 19 (09-02-20 @ 00:00) (16 - 19)  SpO2: 90% (09-02-20 @ 00:00) (90% - 93%)  Wt(kg): --    Physical :  Gen- NAD, ncat  Cardio - s+1,s+2, rrr, no murmur  Lung - cta b/l, no wheeze, no rhonchi, no rales   Abdomen- +BS, NT/ND, no guarding, no rebound, no masses  Ext- no edema, 2+ pulses b/l  Neuro- strength 5/5 b/l extrem  Skin: icteric     LABS:                        13.0   8.31  )-----------( 160      ( 01 Sep 2020 05:29 )             39.9     09-01    134<L>  |  100  |  7   ----------------------------<  109<H>  3.6   |  26  |  1.00    Ca    8.6      01 Sep 2020 05:29  Phos  3.0     09-01  Mg     2.2     09-01    TPro  6.7  /  Alb  2.5<L>  /  TBili  16.8<HH>  /  DBili  x   /  AST  248<H>  /  ALT  182<H>  /  AlkPhos  356<H>  09-01                Assessment/Plan  36yo male with PMHx of ETOH abuse brought in by family after witnessed seizure like activity x 2 mins, admitted for seizure, hyponatremia, metabolic encephalopathy 2/2 ETOH intoxication and liver failure    1. Patient asymptomatic at the moment, denies URI symptoms, dysuria, fever, chills, N/V, abd pain.   2. BC,UC,UA  order   3. Stat CXR order, pt denies SOB, 90%  RA this am   4.One dose Motrin given. Trend fever  5. No leukocytosis on 09/01, follow up CBC. Monitor off antibiotics for now.   6. RN to call with changes     JERRICA Malone   [email or web address] Called by RN for Pt c/o new fever 102.0. Pt seen at bedside, he reports insomnia for several days. He denies dysuria, cough, chills, headaches.         T(C): 39.3 (09-02-20 @ 00:00), Max: 39.3 (09-02-20 @ 00:00)  HR: 115 (09-02-20 @ 00:00) (112 - 115)  BP: 102/69 (09-02-20 @ 00:00) (102/69 - 119/77)  RR: 19 (09-02-20 @ 00:00) (16 - 19)  SpO2: 90% (09-02-20 @ 00:00) (90% - 93%)  Wt(kg): --    Physical :  Gen- NAD, ncat  Cardio - s+1,s+2, rrr, no murmur  Lung - cta b/l, no wheeze, no rhonchi, no rales   Abdomen- +BS, NT/ND, no guarding, no rebound, no masses  Ext- no edema, 2+ pulses b/l  Neuro- strength 5/5 b/l extrem  Skin: icteric     LABS:                        13.0   8.31  )-----------( 160      ( 01 Sep 2020 05:29 )             39.9     09-01    134<L>  |  100  |  7   ----------------------------<  109<H>  3.6   |  26  |  1.00    Ca    8.6      01 Sep 2020 05:29  Phos  3.0     09-01  Mg     2.2     09-01    TPro  6.7  /  Alb  2.5<L>  /  TBili  16.8<HH>  /  DBili  x   /  AST  248<H>  /  ALT  182<H>  /  AlkPhos  356<H>  09-01                Assessment/Plan  36yo male with PMHx of ETOH abuse brought in by family after witnessed seizure like activity x 2 mins, admitted for seizure, hyponatremia, metabolic encephalopathy 2/2 ETOH intoxication and liver failure. Now with new fever (102.8F).    1. Patient asymptomatic at the moment, denies URI symptoms, dysuria, fever, chills, N/V, abd pain.   2. BC,UC,UA  order   3. Stat CXR order, pt denies SOB, 90%  RA this am   4.One dose Motrin given. Trend fever  5. No leukocytosis on 09/01, follow up CBC. Monitor off antibiotics for now.   6. RN to call with changes     JERRICA Malone   pgy1 Called by RN for Pt c/o new fever 102.8F. Pt seen at bedside, he reports insomnia for several days. He denies dysuria, cough, chills, headaches.         T(C): 39.3 (09-02-20 @ 00:00), Max: 39.3 (09-02-20 @ 00:00)  HR: 115 (09-02-20 @ 00:00) (112 - 115)  BP: 102/69 (09-02-20 @ 00:00) (102/69 - 119/77)  RR: 19 (09-02-20 @ 00:00) (16 - 19)  SpO2: 90% (09-02-20 @ 00:00) (90% - 93%)  Wt(kg): --    Physical :  Gen- NAD, ncat  Cardio - s+1,s+2, rrr, no murmur  Lung - cta b/l, no wheeze, no rhonchi, no rales   Abdomen- +BS, NT/ND, no guarding, no rebound, no masses  Ext- no edema, 2+ pulses b/l  Neuro- strength 5/5 b/l extrem  Skin: icteric     LABS:                        13.0   8.31  )-----------( 160      ( 01 Sep 2020 05:29 )             39.9     09-01    134<L>  |  100  |  7   ----------------------------<  109<H>  3.6   |  26  |  1.00    Ca    8.6      01 Sep 2020 05:29  Phos  3.0     09-01  Mg     2.2     09-01    TPro  6.7  /  Alb  2.5<L>  /  TBili  16.8<HH>  /  DBili  x   /  AST  248<H>  /  ALT  182<H>  /  AlkPhos  356<H>  09-01                Assessment/Plan  36yo male with PMHx of ETOH abuse brought in by family after witnessed seizure like activity x 2 mins, admitted for seizure, hyponatremia, metabolic encephalopathy 2/2 ETOH intoxication and liver failure. Now with new fever (102.8F).    1. Patient asymptomatic at the moment, denies URI symptoms, dysuria, fever, chills, N/V, abd pain.   2. BC,UC,UA  ordered   3. Stat CXR ordered, pt denies SOB, 90%  RA this am   4. One dose Motrin given. Trend fever  5. No leukocytosis on 09/01, follow up CBC. Monitor off antibiotics for now.   6. RN to call with changes     JERRICA Malone   [email or web address]

## 2020-09-02 NOTE — PROGRESS NOTE ADULT - ASSESSMENT
1.	Hyponatremia: Beer potomania, Increased ADH state  2.	Hypokalemia  3.	Alcohol abuse, ETOH hepatitis  4.	Seizure    Stable sodium levels. To continue current meds. Avoid excessive PO fluids. Potassium supps.   GI follow up. Will follow electrolytes and renal function trend.

## 2020-09-02 NOTE — PROGRESS NOTE ADULT - SUBJECTIVE AND OBJECTIVE BOX
Date/Time Patient Seen:  		  Referring MD:   Data Reviewed	       Patient is a 35y old  Male who presents with a chief complaint of Seizure (01 Sep 2020 11:39)      Subjective/HPI     PAST MEDICAL & SURGICAL HISTORY:  No pertinent past medical history  S/P appendectomy  H/O hernia repair        Medication list         MEDICATIONS  (STANDING):  enoxaparin Injectable 40 milliGRAM(s) SubCutaneous daily  folic acid 1 milliGRAM(s) Oral daily  LORazepam     Tablet 0.5 milliGRAM(s) Oral every 8 hours  multivitamin 1 Tablet(s) Oral daily  pantoprazole    Tablet 40 milliGRAM(s) Oral before breakfast  thiamine 100 milliGRAM(s) Oral daily  ursodiol Capsule 300 milliGRAM(s) Oral three times a day    MEDICATIONS  (PRN):  LORazepam   Injectable 2 milliGRAM(s) IV Push every 4 hours PRN CIWA > 8  melatonin 5 milliGRAM(s) Oral at bedtime PRN Insomnia         Vitals log        ICU Vital Signs Last 24 Hrs  T(C): 39.3 (02 Sep 2020 04:59), Max: 39.3 (02 Sep 2020 04:59)  T(F): 102.8 (02 Sep 2020 04:59), Max: 102.8 (02 Sep 2020 04:59)  HR: 115 (02 Sep 2020 04:59) (112 - 115)  BP: 102/69 (02 Sep 2020 04:59) (102/69 - 119/77)  BP(mean): --  ABP: --  ABP(mean): --  RR: 19 (02 Sep 2020 04:59) (16 - 19)  SpO2: 90% (02 Sep 2020 04:59) (90% - 93%)           Input and Output:  I&O's Detail    31 Aug 2020 07:01  -  01 Sep 2020 07:00  --------------------------------------------------------  IN:    Oral Fluid: 1380 mL  Total IN: 1380 mL    OUT:    Voided: 4060 mL  Total OUT: 4060 mL    Total NET: -2680 mL          Lab Data                        13.0   8.31  )-----------( 160      ( 01 Sep 2020 05:29 )             39.9     09-01    134<L>  |  100  |  7   ----------------------------<  109<H>  3.6   |  26  |  1.00    Ca    8.6      01 Sep 2020 05:29  Phos  3.0     09-01  Mg     2.2     09-01    TPro  6.7  /  Alb  2.5<L>  /  TBili  16.8<HH>  /  DBili  x   /  AST  248<H>  /  ALT  182<H>  /  AlkPhos  356<H>  09-01            Review of Systems	      Objective     Physical Examination    heart s1s2  lung dec BS  abd soft      Pertinent Lab findings & Imaging      Giovanna:  NO   Adequate UO     I&O's Detail    31 Aug 2020 07:01  -  01 Sep 2020 07:00  --------------------------------------------------------  IN:    Oral Fluid: 1380 mL  Total IN: 1380 mL    OUT:    Voided: 4060 mL  Total OUT: 4060 mL    Total NET: -2680 mL               Discussed with:     Cultures:	        Radiology

## 2020-09-02 NOTE — PROGRESS NOTE ADULT - SUBJECTIVE AND OBJECTIVE BOX
Patient is a 35y old  Male who presents with a chief complaint of Seizure (28 Aug 2020 11:46)  Patient seen in follow up for hyponatremia.     Pt resting in bed.         PAST MEDICAL HISTORY:  No pertinent past medical history    MEDICATIONS  (STANDING):  cefTRIAXone   IVPB      enoxaparin Injectable 40 milliGRAM(s) SubCutaneous daily  folic acid 1 milliGRAM(s) Oral daily  LORazepam     Tablet 0.5 milliGRAM(s) Oral every 8 hours  multivitamin 1 Tablet(s) Oral daily  pantoprazole    Tablet 40 milliGRAM(s) Oral before breakfast  thiamine 100 milliGRAM(s) Oral daily  ursodiol Capsule 300 milliGRAM(s) Oral three times a day    MEDICATIONS  (PRN):  LORazepam   Injectable 2 milliGRAM(s) IV Push every 4 hours PRN CIWA > 8  melatonin 5 milliGRAM(s) Oral at bedtime PRN Insomnia    T(C): 37.1 (20 @ 13:28), Max: 39.3 (20 @ 04:59)  HR: 95 (20 @ 13:28) (95 - 129)  BP: 127/84 (20 @ 13:28) (102/69 - 138/76)  RR: 16 (20 @ 13:28)  SpO2: 95% (20 @ 13:28)  Wt(kg): --  I&O's Detail        PHYSICAL EXAM:  General: No distress  Respiratory: b/l air entry  Cardiovascular: S1 S2  Gastrointestinal: soft  Extremities:  no edema                        LABORATORY:                        12.0   7.29  )-----------( 204      ( 02 Sep 2020 07:27 )             36.7         132<L>  |  99  |  9   ----------------------------<  98  3.4<L>   |  26  |  0.93    Ca    8.6      02 Sep 2020 07:27  Phos  3.0     -  Mg     2.5         TPro  6.6  /  Alb  2.3<L>  /  TBili  15.0<HH>  /  DBili  x   /  AST  183<H>  /  ALT  159<H>  /  AlkPhos  328<H>      Sodium, Serum: 132 mmol/L ( @ 07:27)  Sodium, Serum: 134 mmol/L ( @ 05:29)    Potassium, Serum: 3.4 mmol/L (:)  Potassium, Serum: 3.6 mmol/L ( @ 05:29)    Hemoglobin: 12.0 g/dL ( @ :27)  Hemoglobin: 13.0 g/dL ( 05:29)  Hemoglobin: 12.8 g/dL ( 05:23)    Creatinine, Serum 0.93 (:)  Creatinine, Serum 1.00 (:29)  Creatinine, Serum 0.96 ( 13:48)  Creatinine, Serum 1.10 ( @ 05:23)        LIVER FUNCTIONS - ( 02 Sep 2020 07:27 )  Alb: 2.3 g/dL / Pro: 6.6 g/dL / ALK PHOS: 328 U/L / ALT: 159 U/L / AST: 183 U/L / GGT: x           Urinalysis Basic - ( 02 Sep 2020 06:10 )    Color: Yellow / Appearance: Slightly Turbid / S.010 / pH: x  Gluc: x / Ketone: Negative  / Bili: Large / Urobili: 8   Blood: x / Protein: 30 mg/dL / Nitrite: Positive   Leuk Esterase: Moderate / RBC: 0-2 /HPF / WBC >50   Sq Epi: x / Non Sq Epi: Occasional / Bacteria: TNTC

## 2020-09-02 NOTE — PROVIDER CONTACT NOTE (OTHER) - ACTION/TREATMENT ORDERED:
PA aware and placed order for labs.
MD made aware. Blood cultures, urinalysis, urine culture, stat CXR and 1x dose or Motrin ordered.

## 2020-09-02 NOTE — PROVIDER CONTACT NOTE (CRITICAL VALUE NOTIFICATION) - PERSON GIVING RESULT:
Bindu BOYKIN
Bindu Saul
Bindu Saul, lab
Juanita MCDOWELL
Ligia Garcia
VILMA Cowan
jenn hood
tammy [facility]
tammy laguerre
Laurence

## 2020-09-02 NOTE — PROVIDER CONTACT NOTE (CRITICAL VALUE NOTIFICATION) - ACTION/TREATMENT ORDERED:
monitor
see orders
Dr Lagos aware
PA aware. no new orders at this time.
kcl to be replaced
no new orders
repeat lab test ordered

## 2020-09-02 NOTE — PROGRESS NOTE ADULT - PROBLEM SELECTOR PLAN 1
- patient with new fever on 9/2 and have workup performed  - UA grossly positive, patient also with increased urinary frequency and suprapubic tenderness  - will start on rocephin, okay to continue not contraindicated with his liver function unless develops LEBRON and liver failure with high INR  - f/u BCx and UCx   - ID, Dr. Lay, consulted recs appreciated - patient with new fever on 9/2 and had workup performed  - CXR negative for new infiltrate   - UA grossly positive, patient also with increased urinary frequency and suprapubic tenderness  - will start on rocephin, okay to continue not contraindicated with his liver function unless develops LEBRON and liver failure with high INR  - f/u BCx and UCx   - ID, Dr. Lay, consulted recs appreciated

## 2020-09-03 LAB
ALBUMIN SERPL ELPH-MCNC: 2.3 G/DL — LOW (ref 3.3–5)
ALP SERPL-CCNC: 292 U/L — HIGH (ref 40–120)
ALT FLD-CCNC: 144 U/L — HIGH (ref 12–78)
ANION GAP SERPL CALC-SCNC: 8 MMOL/L — SIGNIFICANT CHANGE UP (ref 5–17)
APTT BLD: 30.3 SEC — SIGNIFICANT CHANGE UP (ref 27.5–35.5)
AST SERPL-CCNC: 167 U/L — HIGH (ref 15–37)
BASOPHILS # BLD AUTO: 0.05 K/UL — SIGNIFICANT CHANGE UP (ref 0–0.2)
BASOPHILS NFR BLD AUTO: 1 % — SIGNIFICANT CHANGE UP (ref 0–2)
BILIRUB DIRECT SERPL-MCNC: 8.3 MG/DL — HIGH (ref 0.05–0.2)
BILIRUB INDIRECT FLD-MCNC: 1.2 MG/DL — HIGH (ref 0.2–1)
BILIRUB SERPL-MCNC: 9.5 MG/DL — HIGH (ref 0.2–1.2)
BUN SERPL-MCNC: 10 MG/DL — SIGNIFICANT CHANGE UP (ref 7–23)
CALCIUM SERPL-MCNC: 9.2 MG/DL — SIGNIFICANT CHANGE UP (ref 8.5–10.1)
CHLORIDE SERPL-SCNC: 102 MMOL/L — SIGNIFICANT CHANGE UP (ref 96–108)
CO2 SERPL-SCNC: 24 MMOL/L — SIGNIFICANT CHANGE UP (ref 22–31)
CREAT SERPL-MCNC: 0.76 MG/DL — SIGNIFICANT CHANGE UP (ref 0.5–1.3)
EOSINOPHIL # BLD AUTO: 0.08 K/UL — SIGNIFICANT CHANGE UP (ref 0–0.5)
EOSINOPHIL NFR BLD AUTO: 1.6 % — SIGNIFICANT CHANGE UP (ref 0–6)
GLUCOSE SERPL-MCNC: 96 MG/DL — SIGNIFICANT CHANGE UP (ref 70–99)
HCT VFR BLD CALC: 37 % — LOW (ref 39–50)
HGB BLD-MCNC: 11.5 G/DL — LOW (ref 13–17)
IMM GRANULOCYTES NFR BLD AUTO: 4.7 % — HIGH (ref 0–1.5)
INR BLD: 0.97 RATIO — SIGNIFICANT CHANGE UP (ref 0.88–1.16)
LYMPHOCYTES # BLD AUTO: 1.06 K/UL — SIGNIFICANT CHANGE UP (ref 1–3.3)
LYMPHOCYTES # BLD AUTO: 21.7 % — SIGNIFICANT CHANGE UP (ref 13–44)
MCHC RBC-ENTMCNC: 28.5 PG — SIGNIFICANT CHANGE UP (ref 27–34)
MCHC RBC-ENTMCNC: 31.1 GM/DL — LOW (ref 32–36)
MCV RBC AUTO: 91.8 FL — SIGNIFICANT CHANGE UP (ref 80–100)
MONOCYTES # BLD AUTO: 0.6 K/UL — SIGNIFICANT CHANGE UP (ref 0–0.9)
MONOCYTES NFR BLD AUTO: 12.3 % — SIGNIFICANT CHANGE UP (ref 2–14)
NEUTROPHILS # BLD AUTO: 2.87 K/UL — SIGNIFICANT CHANGE UP (ref 1.8–7.4)
NEUTROPHILS NFR BLD AUTO: 58.7 % — SIGNIFICANT CHANGE UP (ref 43–77)
NRBC # BLD: 0 /100 WBCS — SIGNIFICANT CHANGE UP (ref 0–0)
PLATELET # BLD AUTO: 235 K/UL — SIGNIFICANT CHANGE UP (ref 150–400)
POTASSIUM SERPL-MCNC: 3.3 MMOL/L — LOW (ref 3.5–5.3)
POTASSIUM SERPL-SCNC: 3.3 MMOL/L — LOW (ref 3.5–5.3)
PROCALCITONIN SERPL-MCNC: 1.11 NG/ML — HIGH (ref 0–0.04)
PROT SERPL-MCNC: 6.8 G/DL — SIGNIFICANT CHANGE UP (ref 6–8.3)
PROTHROM AB SERPL-ACNC: 11.4 SEC — SIGNIFICANT CHANGE UP (ref 10.6–13.6)
RBC # BLD: 4.03 M/UL — LOW (ref 4.2–5.8)
RBC # FLD: 17.2 % — HIGH (ref 10.3–14.5)
SODIUM SERPL-SCNC: 134 MMOL/L — LOW (ref 135–145)
WBC # BLD: 4.89 K/UL — SIGNIFICANT CHANGE UP (ref 3.8–10.5)
WBC # FLD AUTO: 4.89 K/UL — SIGNIFICANT CHANGE UP (ref 3.8–10.5)

## 2020-09-03 PROCEDURE — 99232 SBSQ HOSP IP/OBS MODERATE 35: CPT

## 2020-09-03 PROCEDURE — 74181 MRI ABDOMEN W/O CONTRAST: CPT | Mod: 26

## 2020-09-03 PROCEDURE — 99233 SBSQ HOSP IP/OBS HIGH 50: CPT | Mod: GC

## 2020-09-03 RX ORDER — POTASSIUM CHLORIDE 20 MEQ
40 PACKET (EA) ORAL EVERY 4 HOURS
Refills: 0 | Status: COMPLETED | OUTPATIENT
Start: 2020-09-03 | End: 2020-09-03

## 2020-09-03 RX ADMIN — Medication 40 MILLIEQUIVALENT(S): at 13:10

## 2020-09-03 RX ADMIN — URSODIOL 300 MILLIGRAM(S): 250 TABLET, FILM COATED ORAL at 21:02

## 2020-09-03 RX ADMIN — Medication 40 MILLIEQUIVALENT(S): at 11:11

## 2020-09-03 RX ADMIN — Medication 5 MILLIGRAM(S): at 21:02

## 2020-09-03 RX ADMIN — Medication 0.5 MILLIGRAM(S): at 05:47

## 2020-09-03 RX ADMIN — Medication 1 TABLET(S): at 11:11

## 2020-09-03 RX ADMIN — URSODIOL 300 MILLIGRAM(S): 250 TABLET, FILM COATED ORAL at 13:10

## 2020-09-03 RX ADMIN — URSODIOL 300 MILLIGRAM(S): 250 TABLET, FILM COATED ORAL at 05:47

## 2020-09-03 RX ADMIN — Medication 1 MILLIGRAM(S): at 11:11

## 2020-09-03 RX ADMIN — ENOXAPARIN SODIUM 40 MILLIGRAM(S): 100 INJECTION SUBCUTANEOUS at 11:11

## 2020-09-03 RX ADMIN — CEFTRIAXONE 100 MILLIGRAM(S): 500 INJECTION, POWDER, FOR SOLUTION INTRAMUSCULAR; INTRAVENOUS at 11:11

## 2020-09-03 RX ADMIN — Medication 5 MILLIGRAM(S): at 00:28

## 2020-09-03 RX ADMIN — PANTOPRAZOLE SODIUM 40 MILLIGRAM(S): 20 TABLET, DELAYED RELEASE ORAL at 05:49

## 2020-09-03 RX ADMIN — Medication 100 MILLIGRAM(S): at 11:11

## 2020-09-03 NOTE — PROGRESS NOTE ADULT - PROBLEM SELECTOR PROBLEM 3
Alcoholic intoxication with complication
Seizure
Alcoholic intoxication with complication
Seizure

## 2020-09-03 NOTE — PROGRESS NOTE ADULT - PROBLEM SELECTOR PLAN 4
- Admitted to icu, monitor on tele, sec to hyponatremia no further episodes of seizures since admission  - Seizure precautions  - Ativan prn for seizure activity and CIWA
- Corrected calcium 7.6 on admission  - s/p repletion in ICU
- Admitted to icu, monitor on tele, sec to hyponatremia no further episodes of seizures since admission  - Seizure precautions  - Ativan prn for seizure activity and CIWA
- Corrected calcium 7.6 on admission  - s/p repletion in ICU
- Corrected calcium 7.6 on admission  - s/p repletion in ICU
- Pt with transaminitis, hyperbilirubinemia persists, likely acute liver failure, Tbili now stable tho still elevated   - abd ultrasound: steatosis noted, no obstruction  - Avoid hepatotoxic drugs  - will require MRCP/MRI when optimized  - may need upper GI endoscopy prior to dc to evaluated for GIB  - continue thiamine/folate/mvi; transaminitis, c/w protonix for dark vomitus upon admission  - apprec GI recs
- Corrected calcium 7.6 on admission  - s/p repletion in ICU

## 2020-09-03 NOTE — PROGRESS NOTE ADULT - PROBLEM SELECTOR PLAN 1
completed Benzo detox with Ativan for VENKAT  will dc Benzo regimen  vitamins  nutrition  education  counseling  VSS  on room air  dc planning  social work follow up  medical regimen

## 2020-09-03 NOTE — PROGRESS NOTE ADULT - SUBJECTIVE AND OBJECTIVE BOX
[facility]  INFECTIOUS DISEASES   =======================================================    [id number]  DIANE GARAY     Follow up; UTI    No complain today. no fever, no urinary symptoms or abdominal pain.     PAST MEDICAL & SURGICAL HISTORY:  No pertinent past medical history  S/P appendectomy  H/O hernia repair    Social Hx: no smoking or drugs,     FAMILY HISTORY:  Family history of renal disease: Uncle    Allergies  No Known Allergies    Antibiotics:  Ceftriaxone     REVIEW OF SYSTEMS:  CONSTITUTIONAL:  No Fever or chills  HEENT:  No diplopia or blurred vision.  No sore throat or runny nose.  CARDIOVASCULAR:  No chest pain or SOB.  RESPIRATORY:  No cough, shortness of breath, PND or orthopnea.  GASTROINTESTINAL:  No nausea, vomiting or diarrhea.  GENITOURINARY:  No dysuria, no frequency & urgency. no abdominal pain   MUSCULOSKELETAL:  no joint aches, no muscle pain  SKIN:  No change in skin, hair or nails.  NEUROLOGIC:  No paresthesias, fasciculations, seizures or weakness.  PSYCHIATRIC:  No disorder of thought or mood.  ENDOCRINE:  No heat or cold intolerance, polyuria or polydipsia.  HEMATOLOGICAL:  No easy bruising or bleeding.     Physical Exam:  Vital Signs Last 24 Hrs  T(C): 37.4 (03 Sep 2020 05:46), Max: 37.7 (02 Sep 2020 20:21)  T(F): 99.3 (03 Sep 2020 05:46), Max: 99.8 (02 Sep 2020 20:21)  HR: 98 (03 Sep 2020 05:46) (95 - 101)  BP: 110/74 (03 Sep 2020 05:46) (110/74 - 127/84)  RR: 18 (03 Sep 2020 05:46) (16 - 18)  SpO2: 95% (03 Sep 2020 05:46) (95% - 96%)  GEN: NAD  HEENT: normocephalic and atraumatic. EOMI. PERRL.    NECK: Supple.  No lymphadenopathy   LUNGS: Clear to auscultation.  HEART: Regular rate and rhythm without murmur.  ABDOMEN: Soft, no tenderness, and nondistended.  Positive bowel sounds.    : No CVA tenderness  EXTREMITIES: Without any cyanosis, clubbing, rash, lesions or edema.  NEUROLOGIC: grossly intact.  PSYCHIATRIC: Appropriate affect .  SKIN: No ulceration or induration present.    Labs:             11.5   4.89  )-----------( 235      ( 03 Sep 2020 08:48 )             37.0     134<L>  |  102  |  10  ----------------------------<  96  3.3<L>   |  24  |  0.76    Ca    9.2      03 Sep 2020 08:48  Mg     2.5     09-02    TPro  6.8  /  Alb  2.3<L>  /  TBili  9.5<H>  /  DBili  8.30<H>  /  AST  167<H>  /  ALT  144<H>  /  AlkPhos  292<H>  09-03    Culture - Urine (collected 09-02-20 @ 08:58)  Source: .Urine Clean Catch (Midstream)    WBC Count: 4.89 K/uL (09-03-20 @ 08:48)  WBC Count: 7.29 K/uL (09-02-20 @ 07:27)  WBC Count: 8.31 K/uL (09-01-20 @ 05:29)  WBC Count: 5.26 K/uL (08-31-20 @ 05:23)  WBC Count: 3.72 K/uL (08-30-20 @ 06:20)    Creatinine, Serum: 0.76 mg/dL (09-03-20 @ 08:48)  Creatinine, Serum: 0.93 mg/dL (09-02-20 @ 07:27)  Creatinine, Serum: 1.00 mg/dL (09-01-20 @ 05:29)  Creatinine, Serum: 0.96 mg/dL (08-31-20 @ 13:48)  Creatinine, Serum: 1.10 mg/dL (08-31-20 @ 05:23)  Creatinine, Serum: 1.00 mg/dL (08-30-20 @ 22:09)  Creatinine, Serum: 1.00 mg/dL (08-30-20 @ 16:28)  Creatinine, Serum: 1.10 mg/dL (08-30-20 @ 10:40)  Creatinine, Serum: 1.10 mg/dL (08-30-20 @ 06:20)  Creatinine, Serum: 1.00 mg/dL (08-30-20 @ 00:36)  Creatinine, Serum: 0.89 mg/dL (08-29-20 @ 18:44)  Creatinine, Serum: 0.92 mg/dL (08-29-20 @ 14:26)    Procalcitonin, Serum: 1.11 ng/mL (09-03-20 @ 08:48)     COVID-19 PCR: NotDetec (08-27-20 @ 08:12)    All imaging and other data have been reviewed.    Assessment and Plan:   36y/o man with ETOH abuse was admitted on 8/27 after a new onset seizure related to alcohol abuse and withdrawal. He was in MICU initially and then transferred to medical floor.   LFTs are high for which had a negative acute hepatitis panel, most likely alcoholic hepatitis, now LFTs trending down.   Last night had fever for which work up was done clinically and based on UA looks like source is UTI.     Fever, UTI:  - Blood culture pending  - UA with high WBC and nitrate, UC with GNR will follow ID and sensitivity   - No leukocytosis   - Continue Ceftriaxone 1gm daily  - Will monitor LFTs while on ABx   - Based on UC ID and sensitivity possibly can switch to oral antibiotic.      Alcoholic hepatitis:   - Follow up LFTs, improving   - Acute hep panel is neg  - Alcohol rehab   - GI on board    Will follow.    Reji Lay MD  Division of Infectious Diseases   [phone]

## 2020-09-03 NOTE — PROGRESS NOTE ADULT - ATTENDING COMMENTS
I personally conducted a physical examination of the patient. I personally gathered the patient's history. I edited the above listed findings which were prepared by the listed resident physician. I personally discussed the plan of care with the patient. The questions and concerns were addressed to the best of my ability. The patient is in agreement with the listed treatment plan.     - no events today. GNR in urine. BCx clear. afebrile. d/c planning potentially on po cephalosporin w/ plan to manage as complicated UTI in a male pt.

## 2020-09-03 NOTE — PROGRESS NOTE ADULT - PROBLEM SELECTOR PLAN 8
- Lovenox    IMPROVE VTE Individual Risk Assessment  RISK                                                                Points  [  ] Previous VTE                                                  3  [  ] Thrombophilia                                               2  [  ] Lower limb paralysis                                      2        (unable to hold up >15 seconds)    [  ] Current Cancer                                              2         (within 6 months)  [X ] Immobilization > 24 hrs                                1  [X ] ICU/CCU stay > 24 hours                              1  [  ] Age > 60                                                      1  IMPROVE VTE Score ___2___    IMPROVE Score 0-1: Low Risk, No VTE prophylaxis required for most patients, encourage ambulation.   IMPROVE Score 2-3: At risk, pharmacologic VTE prophylaxis is indicated for most patients (in the absence of a contraindication)  IMPROVE Score > or = 4: High Risk, pharmacologic VTE prophylaxis is indicated for most patients (in the absence of a contraindication)
- PLT of 110 on admission  - Thrombocytopenia likely due to chronic ETOH use
- PLT of 110 on admission, now improved  - Thrombocytopenia likely due to chronic ETOH use
- PLT of 110 on admission  - Thrombocytopenia likely due to chronic ETOH use
- PLT of 110 on admission, now improved  - Thrombocytopenia likely due to chronic ETOH use
- PLT of 110 on admission  - Thrombocytopenia likely due to chronic ETOH use
- PLT of 110 on admission  - Thrombocytopenia likely due to chronic ETOH use

## 2020-09-03 NOTE — PROGRESS NOTE ADULT - PROBLEM SELECTOR PROBLEM 1
Alcohol abuse
Alcoholic intoxication with complication
Metabolic encephalopathy
UTI (urinary tract infection)
Metabolic encephalopathy
UTI (urinary tract infection)
Metabolic encephalopathy
Metabolic encephalopathy
Alcoholic intoxication with complication
Metabolic encephalopathy

## 2020-09-03 NOTE — PROGRESS NOTE ADULT - SUBJECTIVE AND OBJECTIVE BOX
Patient is a 35y old  Male who presents with a chief complaint of Seizure (03 Sep 2020 12:03)      FROM ADMISSION H+P:   HPI:  History obtained from patients partner, Leydig  34yo male with PMHx of ETOH abuse brought into ED this morning due to concern for "convulsion". States that about 6 am she noticed he was shaking a lot, and had blood in his mouth because he had bitten his tongue. Episode lasted about 2 mins. They were both lying in bed, and she noticed he was shaking next to her and as she turned around, she saw him having the shaking episode. He was unresponsive, an she called other household members who helped carry him out of the room and brought him in by car. His skin was very cold during this process. She states patient has no previous seizure history. The night prior he seemed weak/tired, had complained of some abdominal pain, and she noticed his skin was yellow. Last night, he had bowel and bladder incontinence.   She states he has been drinking ETOH excessively for about 1 month. Drinks beer (Corona) daily; up to 12 beers/day. Last drink was at 3am 2020. He had taken some medications last night for sleep; tylenol, peptobismol.   Prior to this, he had stopped drinking for about 2 years. According to partner, patient has no hx of drug use or cigarette smoking. (27 Aug 2020 09:08)    ----  INTERVAL HPI/OVERNIGHT EVENTS: Pt seen and evaluated at the bedside. No acute overnight events occurred. Patient afebrile overnight. Patient states that urinary frequency has improved and denies dysuria or odor in urine. Patient without sweating or chills. Patient wants to go home however patient was informed that we are still waiting for blood and urine cultures to return and that we need to make sure he is on the appropriate antibiotics.  Patient understands the aforementioned and is agreeable to stay for one more day. At this moment, patient has no other acute complaints, denies SOB, chest pain, palpitations, abd pain, no nausea or vomiting. Patient informed that since he will be staying for one more day, we will get MRCP to further evaluate for biliary obstruction and is agreeable to imaging. CIWA for last 24 hours 0-1 and required no PRN ativan overnight, now off ativan oral taper this morning.    : [id number]    ----  PAST MEDICAL & SURGICAL HISTORY:  No pertinent past medical history  S/P appendectomy  H/O hernia repair      FAMILY HISTORY:  Family history of renal disease: Uncle      Home Medications:      Allergies    No Known Allergies    Intolerances        ----  MEDICATIONS  (STANDING):  cefTRIAXone   IVPB 1000 milliGRAM(s) IV Intermittent every 24 hours  cefTRIAXone   IVPB      enoxaparin Injectable 40 milliGRAM(s) SubCutaneous daily  folic acid 1 milliGRAM(s) Oral daily  multivitamin 1 Tablet(s) Oral daily  pantoprazole    Tablet 40 milliGRAM(s) Oral before breakfast  thiamine 100 milliGRAM(s) Oral daily  ursodiol Capsule 300 milliGRAM(s) Oral three times a day    MEDICATIONS  (PRN):  LORazepam   Injectable 2 milliGRAM(s) IV Push every 4 hours PRN CIWA > 8  melatonin 5 milliGRAM(s) Oral at bedtime PRN Insomnia    ----  REVIEW OF SYSTEMS:  CONSTITUTIONAL: denies fever at this time, chills, fatigue, weakness  HEENT: denies blurred vision, sore throat  SKIN: denies new lesions, rash  CARDIOVASCULAR: denies chest pain, chest pressure, palpitations  RESPIRATORY: denies shortness of breath, sputum production  GASTROINTESTINAL: denies nausea, vomiting, diarrhea, abdominal pain  GENITOURINARY: denies dysuria, discharge, urinary frequency   LYMPHATICS: denies enlarged lymph nodes, extremity swelling    ----  PHYSICAL EXAM:  General: comfortable, well nourished, jaundiced, laying comfortably in bed  HEENT: PEERL, scleral icterus noted  Cardiovascular: normal s1s2, no murmurs, gallops or  rubs  Pulmonary: clear to ausculation Bilaterally, no wheezing , rhonchi  Gastrointestinal: soft non tender non distended, no masses felt, no suprapubic tenderness present  Muscloskeletal: no lower extremity edema, intact bilateral lower extremity pulses  Neurological: No focal weakness, no pronator drift noted, no tremors  Psychiatrical: normal mood, cooperative  SKIN: no rash, lesions or ulcers, tattoos on chest and hand, some yellowing of skin    T(C): 37.2 (20 @ 13:27), Max: 37.7 (20 @ 20:21)  HR: 98 (20 @ 13:27) (98 - 101)  BP: 119/72 (20 @ 13:27) (110/74 - 119/72)  RR: 16 (20 @ 13:27) (16 - 18)  SpO2: 93% (20 @ 13:27) (93% - 96%)  Wt(kg): --    ----  I&O's Summary      LABS:                        11.5   4.89  )-----------( 235      ( 03 Sep 2020 08:48 )             37.0         134<L>  |  102  |  10  ----------------------------<  96  3.3<L>   |  24  |  0.76    Ca    9.2      03 Sep 2020 08:48  Mg     2.5         TPro  6.8  /  Alb  2.3<L>  /  TBili  9.5<H>  /  DBili  8.30<H>  /  AST  167<H>  /  ALT  144<H>  /  AlkPhos  292<H>      PT/INR - ( 03 Sep 2020 08:48 )   PT: 11.4 sec;   INR: 0.97 ratio         PTT - ( 03 Sep 2020 08:48 )  PTT:30.3 sec  Urinalysis Basic - ( 02 Sep 2020 06:10 )    Color: Yellow / Appearance: Slightly Turbid / S.010 / pH: x  Gluc: x / Ketone: Negative  / Bili: Large / Urobili: 8   Blood: x / Protein: 30 mg/dL / Nitrite: Positive   Leuk Esterase: Moderate / RBC: 0-2 /HPF / WBC >50   Sq Epi: x / Non Sq Epi: Occasional / Bacteria: TNTC      CAPILLARY BLOOD GLUCOSE           @ 12:17   No growth to date.  --  --   @ 08:58   >100,000 CFU/ml Gram Negative Rods  --  --            ----  Personally reviewed:  Vital sign trends: [X  ] yes    [  ] no     [  ] n/a  Laboratory results: [X  ] yes    [  ] no     [  ] n/a  Radiology results: [  X] yes    [  ] no     [  ] n/a  Culture results: [ X ] yes    [  ] no     [  ] n/a  Consultant recommendations: [X  ] yes    [  ] no     [  ] n/a Patient is a 35y old  Male who presents with a chief complaint of Seizure (03 Sep 2020 12:03)      FROM ADMISSION H+P:   HPI:  History obtained from patients partner, Leydig  36yo male with PMHx of ETOH abuse brought into ED this morning due to concern for "convulsion". States that about 6 am she noticed he was shaking a lot, and had blood in his mouth because he had bitten his tongue. Episode lasted about 2 mins. They were both lying in bed, and she noticed he was shaking next to her and as she turned around, she saw him having the shaking episode. He was unresponsive, an she called other household members who helped carry him out of the room and brought him in by car. His skin was very cold during this process. She states patient has no previous seizure history. The night prior he seemed weak/tired, had complained of some abdominal pain, and she noticed his skin was yellow. Last night, he had bowel and bladder incontinence.   She states he has been drinking ETOH excessively for about 1 month. Drinks beer (Corona) daily; up to 12 beers/day. Last drink was at 3am 2020. He had taken some medications last night for sleep; tylenol, peptobismol.   Prior to this, he had stopped drinking for about 2 years. According to partner, patient has no hx of drug use or cigarette smoking. (27 Aug 2020 09:08)    ----  INTERVAL HPI/OVERNIGHT EVENTS:   Pt seen and evaluated at the bedside. No acute overnight events occurred. Patient afebrile overnight. Patient states that urinary frequency has improved and denies dysuria or odor in urine. Patient without sweating or chills. Patient wants to go home however patient was informed that we are still waiting for blood and urine cultures to return and that we need to make sure he is on the appropriate antibiotics.  Patient understands the aforementioned and is agreeable to stay for one more day. At this moment, patient has no other acute complaints, denies SOB, chest pain, palpitations, abd pain, no nausea or vomiting. Patient informed that since he will be staying for one more day, we will get MRCP to further evaluate for biliary obstruction and is agreeable to imaging. CIWA for last 24 hours 0-1 and required no PRN ativan overnight, now off ativan oral taper this morning.    : [id number]    ----  PAST MEDICAL & SURGICAL HISTORY:  No pertinent past medical history  S/P appendectomy  H/O hernia repair      FAMILY HISTORY:  Family history of renal disease: Uncle      Home Medications:      Allergies    No Known Allergies    Intolerances        ----  MEDICATIONS  (STANDING):  cefTRIAXone   IVPB 1000 milliGRAM(s) IV Intermittent every 24 hours  cefTRIAXone   IVPB      enoxaparin Injectable 40 milliGRAM(s) SubCutaneous daily  folic acid 1 milliGRAM(s) Oral daily  multivitamin 1 Tablet(s) Oral daily  pantoprazole    Tablet 40 milliGRAM(s) Oral before breakfast  thiamine 100 milliGRAM(s) Oral daily  ursodiol Capsule 300 milliGRAM(s) Oral three times a day    MEDICATIONS  (PRN):  LORazepam   Injectable 2 milliGRAM(s) IV Push every 4 hours PRN CIWA > 8  melatonin 5 milliGRAM(s) Oral at bedtime PRN Insomnia    ----  REVIEW OF SYSTEMS:  CONSTITUTIONAL: denies fever at this time, chills, fatigue, weakness  HEENT: denies blurred vision, sore throat  SKIN: denies new lesions, rash  CARDIOVASCULAR: denies chest pain, chest pressure, palpitations  RESPIRATORY: denies shortness of breath, sputum production  GASTROINTESTINAL: denies nausea, vomiting, diarrhea, abdominal pain  GENITOURINARY: denies dysuria, discharge, urinary frequency   LYMPHATICS: denies enlarged lymph nodes, extremity swelling    ----  PHYSICAL EXAM:  General: comfortable, well nourished, jaundiced, laying comfortably in bed  HEENT: PEERL, scleral icterus noted  Cardiovascular: normal s1s2, no murmurs, gallops or  rubs  Pulmonary: clear to ausculation Bilaterally, no wheezing , rhonchi  Gastrointestinal: soft non tender non distended, no masses felt, no suprapubic tenderness present  Muscloskeletal: no lower extremity edema, intact bilateral lower extremity pulses  Neurological: No focal weakness, no pronator drift noted, no tremors  Psychiatrical: normal mood, cooperative  SKIN: no rash, lesions or ulcers, tattoos on chest and hand, some yellowing of skin    T(C): 37.2 (20 @ 13:27), Max: 37.7 (20 @ 20:21)  HR: 98 (20 @ 13:27) (98 - 101)  BP: 119/72 (20 @ 13:27) (110/74 - 119/72)  RR: 16 (20 @ 13:27) (16 - 18)  SpO2: 93% (20 @ 13:27) (93% - 96%)  Wt(kg): --    ----  I&O's Summary      LABS:                        11.5   4.89  )-----------( 235      ( 03 Sep 2020 08:48 )             37.0         134<L>  |  102  |  10  ----------------------------<  96  3.3<L>   |  24  |  0.76    Ca    9.2      03 Sep 2020 08:48  Mg     2.5         TPro  6.8  /  Alb  2.3<L>  /  TBili  9.5<H>  /  DBili  8.30<H>  /  AST  167<H>  /  ALT  144<H>  /  AlkPhos  292<H>      PT/INR - ( 03 Sep 2020 08:48 )   PT: 11.4 sec;   INR: 0.97 ratio         PTT - ( 03 Sep 2020 08:48 )  PTT:30.3 sec  Urinalysis Basic - ( 02 Sep 2020 06:10 )    Color: Yellow / Appearance: Slightly Turbid / S.010 / pH: x  Gluc: x / Ketone: Negative  / Bili: Large / Urobili: 8   Blood: x / Protein: 30 mg/dL / Nitrite: Positive   Leuk Esterase: Moderate / RBC: 0-2 /HPF / WBC >50   Sq Epi: x / Non Sq Epi: Occasional / Bacteria: TNTC      CAPILLARY BLOOD GLUCOSE           @ 12:17   No growth to date.  --  --   @ 08:58   >100,000 CFU/ml Gram Negative Rods  --  --            ----  Personally reviewed:  Vital sign trends: [X  ] yes    [  ] no     [  ] n/a  Laboratory results: [X  ] yes    [  ] no     [  ] n/a  Radiology results: [  X] yes    [  ] no     [  ] n/a  Culture results: [ X ] yes    [  ] no     [  ] n/a  Consultant recommendations: [X  ] yes    [  ] no     [  ] n/a

## 2020-09-03 NOTE — PROGRESS NOTE ADULT - PROBLEM SELECTOR PLAN 7
- resolved  - Kcl 10 x  3 bags ordered in ED  - F/u repeat CMP
- trend and replete prn
- resolved  -trend and replete prn
- trend and replete prn
- PLT of 110 on admission, now improved  - Thrombocytopenia likely due to chronic ETOH use
- resolved  - Kcl 10 x  3 bags ordered in ED  - F/u repeat CMP
- resolved  - Kcl 10 x  3 bags ordered in ED  - F/u repeat CMP

## 2020-09-03 NOTE — PROGRESS NOTE ADULT - PROBLEM SELECTOR PLAN 9
- Heparin    IMPROVE VTE Individual Risk Assessment  RISK                                                                Points  [  ] Previous VTE                                                  3  [  ] Thrombophilia                                               2  [  ] Lower limb paralysis                                      2        (unable to hold up >15 seconds)    [  ] Current Cancer                                              2         (within 6 months)  [X ] Immobilization > 24 hrs                                1  [X ] ICU/CCU stay > 24 hours                              1  [  ] Age > 60                                                      1  IMPROVE VTE Score ___2___    IMPROVE Score 0-1: Low Risk, No VTE prophylaxis required for most patients, encourage ambulation.   IMPROVE Score 2-3: At risk, pharmacologic VTE prophylaxis is indicated for most patients (in the absence of a contraindication)  IMPROVE Score > or = 4: High Risk, pharmacologic VTE prophylaxis is indicated for most patients (in the absence of a contraindication)
- Heparin    IMPROVE VTE Individual Risk Assessment  RISK                                                                Points  [  ] Previous VTE                                                  3  [  ] Thrombophilia                                               2  [  ] Lower limb paralysis                                      2        (unable to hold up >15 seconds)    [  ] Current Cancer                                              2         (within 6 months)  [X ] Immobilization > 24 hrs                                1  [X ] ICU/CCU stay > 24 hours                              1  [  ] Age > 60                                                      1  IMPROVE VTE Score ___2___    IMPROVE Score 0-1: Low Risk, No VTE prophylaxis required for most patients, encourage ambulation.   IMPROVE Score 2-3: At risk, pharmacologic VTE prophylaxis is indicated for most patients (in the absence of a contraindication)  IMPROVE Score > or = 4: High Risk, pharmacologic VTE prophylaxis is indicated for most patients (in the absence of a contraindication)
- Lovenox    IMPROVE VTE Individual Risk Assessment  RISK                                                                Points  [  ] Previous VTE                                                  3  [  ] Thrombophilia                                               2  [  ] Lower limb paralysis                                      2        (unable to hold up >15 seconds)    [  ] Current Cancer                                              2         (within 6 months)  [X ] Immobilization > 24 hrs                                1  [X ] ICU/CCU stay > 24 hours                              1  [  ] Age > 60                                                      1  IMPROVE VTE Score ___2___    IMPROVE Score 0-1: Low Risk, No VTE prophylaxis required for most patients, encourage ambulation.   IMPROVE Score 2-3: At risk, pharmacologic VTE prophylaxis is indicated for most patients (in the absence of a contraindication)  IMPROVE Score > or = 4: High Risk, pharmacologic VTE prophylaxis is indicated for most patients (in the absence of a contraindication)
- Heparin    IMPROVE VTE Individual Risk Assessment  RISK                                                                Points  [  ] Previous VTE                                                  3  [  ] Thrombophilia                                               2  [  ] Lower limb paralysis                                      2        (unable to hold up >15 seconds)    [  ] Current Cancer                                              2         (within 6 months)  [X ] Immobilization > 24 hrs                                1  [X ] ICU/CCU stay > 24 hours                              1  [  ] Age > 60                                                      1  IMPROVE VTE Score ___2___    IMPROVE Score 0-1: Low Risk, No VTE prophylaxis required for most patients, encourage ambulation.   IMPROVE Score 2-3: At risk, pharmacologic VTE prophylaxis is indicated for most patients (in the absence of a contraindication)  IMPROVE Score > or = 4: High Risk, pharmacologic VTE prophylaxis is indicated for most patients (in the absence of a contraindication)
- Lovenox    IMPROVE VTE Individual Risk Assessment  RISK                                                                Points  [  ] Previous VTE                                                  3  [  ] Thrombophilia                                               2  [  ] Lower limb paralysis                                      2        (unable to hold up >15 seconds)    [  ] Current Cancer                                              2         (within 6 months)  [X ] Immobilization > 24 hrs                                1  [X ] ICU/CCU stay > 24 hours                              1  [  ] Age > 60                                                      1  IMPROVE VTE Score ___2___    IMPROVE Score 0-1: Low Risk, No VTE prophylaxis required for most patients, encourage ambulation.   IMPROVE Score 2-3: At risk, pharmacologic VTE prophylaxis is indicated for most patients (in the absence of a contraindication)  IMPROVE Score > or = 4: High Risk, pharmacologic VTE prophylaxis is indicated for most patients (in the absence of a contraindication)
- Heparin    IMPROVE VTE Individual Risk Assessment  RISK                                                                Points  [  ] Previous VTE                                                  3  [  ] Thrombophilia                                               2  [  ] Lower limb paralysis                                      2        (unable to hold up >15 seconds)    [  ] Current Cancer                                              2         (within 6 months)  [X ] Immobilization > 24 hrs                                1  [X ] ICU/CCU stay > 24 hours                              1  [  ] Age > 60                                                      1  IMPROVE VTE Score ___2___    IMPROVE Score 0-1: Low Risk, No VTE prophylaxis required for most patients, encourage ambulation.   IMPROVE Score 2-3: At risk, pharmacologic VTE prophylaxis is indicated for most patients (in the absence of a contraindication)  IMPROVE Score > or = 4: High Risk, pharmacologic VTE prophylaxis is indicated for most patients (in the absence of a contraindication)

## 2020-09-03 NOTE — PROGRESS NOTE ADULT - PROBLEM SELECTOR PLAN 1
- patient with new fever on 9/2 and had workup performed  - patient on rocephin for UTI, pending UCx which showed GNR  - will likely be able to transition to oral vantin tmr pending sensitivities for total of 7 days abx treatment  - BCx prelim no growth   - okay to continue with rocephin, not contraindicated with his liver function unless develops LEBRON and liver failure with high INR  - ID, Dr. Lay, consulted recs appreciated sepsis, likely 2/2 UTI and considered complicated (male)  - c/w rocephin for UTI, pending UCx which showed GNR  - will likely be able to transition to oral vantin tmr pending sensitivities for total of 7 days abx treatment as he has been afebrile w/ improvement in clinical symptoms so we can suspect that the organism is sensitive to cephalosporin  - BCx prelim no growth   - okay to continue with rocephin, not contraindicated with his liver function unless develops LEBRON, liver failure or coagulopathy  - ID, Dr. Lay, consulted recs appreciated

## 2020-09-03 NOTE — PROGRESS NOTE ADULT - PROBLEM SELECTOR PLAN 5
- Pt with transaminitis, hyperbilirubinemia persists, likely acute liver failure, Tbili now stable tho still elevated   - abd ultrasound: steatosis noted, no obstruction  - Avoid hepatotoxic drugs  - LFTs improving and patient can have MRCP/MRI and endo outpatient for further evaluation of obstruction and UGIB respectively   - continue thiamine/folate/mvi; transaminitis, c/w protonix for dark vomitus upon admission  - MELD score 21- would benefit from hepatologist follow up outpatient regarding liver transplant   - daily INR and LFT  - apprec GI recs
- Pt with transaminitis, hyperbilirubinemia, likely acute liver failure  - F/u PT/INR, hepatitis panel  - F/u abd ultrasound: steatosis noted  - Avoid hepatotoxic drugs  - GI consulted per ED, Dr Torres
- Pt with transaminitis, hyperbilirubinemia persists, likely acute liver failure  -PT/INR, hepatitis panel reviewed  - F/u abd ultrasound: steatosis noted  - Avoid hepatotoxic drugs  - apprec GI recs: serial meld labs Meld today 23  may need  upper gastrointestinal endoscopy prior to d/c  -as per ICU plan of care:  po diet, alcoholic liver disease/steatosis - TB and LFTs stable, no biliary obstruction on US, on PPI per GI for suspected GIB on admission - may need EGD prior to discharge   -continue thiamine/folate/mvi; transaminitis, c/w protonix for dark vomitus upon admission
- Pt with transaminitis, hyperbilirubinemia persists, likely acute liver failure, Tbili now stable tho still elevated   - abd ultrasound: steatosis noted, no obstruction  - MRCP performed which hepatomegaly and no other acute findings (no choledocholithiasis or obstruction)  - will need EGD outpatient for UGIB evalutation  - LFTs improving, MELD score today 17  - would benefit from hepatologist follow up outpatient regarding liver transplant   - Avoid hepatotoxic drugs  - daily INR and LFT  - apprec GI recs
- Pt with transaminitis, hyperbilirubinemia, likely acute liver failure  - F/u PT/INR, hepatitis panel  - F/u abd ultrasound: steatosis noted  - Avoid hepatotoxic drugs  - apprec GI recs: serial meld labs Meld today 23  may need  upper gastrointestinal endoscopy prior to d/c  -as per ICU plan of care:  bilirubin noted to increase to 19 today, LFTs otherwise stable; lipase added (443) - given bump in bili with mild pancreatitis concern for obstruction despite previous US findings - will get repeat US today to assess, NPO pending results; continue thiamine/folate/mvi; transaminitis, c/w protonix for dark vomitus upon admission
- resolved  - Nephro consulted   - Urine studies reviewed
- Pt with transaminitis, hyperbilirubinemia, likely acute liver failure  -PT/INR, hepatitis panel reviewed  - F/u abd ultrasound: steatosis noted  - Avoid hepatotoxic drugs  - apprec GI recs: serial meld labs Meld today 23  may need  upper gastrointestinal endoscopy prior to d/c  -as per ICU plan of care:  bilirubin noted to increase to 20 today, LFTs otherwise stable; ) - given bump in bili with mild pancreatitis concern for obstruction despite previous US findings - repeated US abdomen,: BENIGN  -continue thiamine/folate/mvi; transaminitis, c/w protonix for dark vomitus upon admission

## 2020-09-03 NOTE — PROGRESS NOTE ADULT - PROBLEM SELECTOR PLAN 3
- Admitted to icu, monitor on tele  - S/p ativan 2mg x 2  - Seizure precautions  - Ativan prn for seizure activity
- Last drink 3am 8/27/2020 had 12 beers daily for past 30 days, brother has hx of etoh abuse as well  - CIWA protocol: required no PRN ativan overnight  - patient on ativan ATC, will taper to 0.5mg q8hrs today
- Admitted to icu, monitor on tele, sec to hyponatremia  - S/p ativan 2mg x 2  - Seizure precautions  - Ativan prn for seizure activity
- Last drink 3am 8/27/2020 had 12 beers daily for past 30 days, brother has hx of etoh abuse as well  - CIWA protocol: required no PRN ativan overnight  - tapered off ativan
- Admitted to icu, monitor on tele, sec to hyponatremia  - S/p ativan 2mg x 2  - Seizure precautions  - Ativan prn for seizure activity
- Admitted to icu, monitor on tele, sec to hyponatremia no further episodes of seizures since admission  - Seizure precautions  - Ativan prn for seizure activity and CIWA
- Admitted to icu, monitor on tele, sec to hyponatremia  - S/p ativan 2mg x 2  - Seizure precautions  - Ativan prn for seizure activity

## 2020-09-03 NOTE — PROGRESS NOTE ADULT - PROBLEM SELECTOR PROBLEM 2
Hyponatremia
Metabolic encephalopathy
Hyponatremia
Metabolic encephalopathy
Alcoholic intoxication with complication
Hyponatremia
Hyponatremia

## 2020-09-03 NOTE — PROGRESS NOTE ADULT - PROBLEM SELECTOR PROBLEM 8
Thrombocytopenia
DVT prophylaxis
Thrombocytopenia
Thrombocytopenia

## 2020-09-03 NOTE — PROGRESS NOTE ADULT - ASSESSMENT
etoh hepatitis  etoh abuse  elev lfts    repeat abd u/s showing fatty liver wo gs/biliary dilatation   prior lfts downtrending; hgb has been stable  f/u am labs  eventual mri/mrcp when calm, can be done as outpatient given improving lfts  cont ppi daily and laura tid  diet as tolerated  ciwa protocol   daily meld labs etoh hepatitis  etoh abuse  elev lfts    repeat abd u/s showing fatty liver wo gs/biliary dilatation   prior lfts downtrending; hgb has been stable  f/u am labs  rec mri/mrcp when calm, can be done as outpatient given improving lfts  cont ppi daily and laura tid  diet as tolerated  ciwa protocol   daily meld labs etoh hepatitis  etoh abuse  elev lfts    repeat abd u/s showing fatty liver wo gs/biliary dilatation   prior lfts downtrending; hgb has been stable  f/u am labs  if pt to remain inhouse rec mri/mrcp, otherwise can be done as outpatient  cont ppi daily and laura tid  diet as tolerated  wa protocol   daily meld labs

## 2020-09-03 NOTE — PROGRESS NOTE ADULT - PROBLEM SELECTOR PLAN 2
- improving, apprec icu plan of care, likely sec to daniella asher  - Nephro consulted in ED (Dr Silva)  - F/u urine studies  - Serial BMPs
- resolved  - Multifactorial; liver failure, hyponatremia, alcohol intoxication  - Head CT neg for acute bleed  - continue with CIWA protocol and ativan ATC, tapered to 0.5mg q8hrs  - seen by Dr. Hernandez, has capacity to make own decisions  - Dr. Garcia consulted, recs appreciated
- resolved  - Multifactorial; liver failure, hyponatremia, alcohol intoxication  - Head CT neg for acute bleed  - ATC ativan tapered off, continue with CIWA protocol although did not require any overnight  - seen by Dr. Hernandez, has capacity to make own decisions  - Dr. Garcia consulted, recs appreciated
-improved  - Nephro consulted   - Urine studies reviewed  - Serial BMPs
- Last drink 3am 8/27/2020 had 12 beers daily for past 30 days, brother has hx of etoh abuse as well  - CIWA protocol: required 2 doses of ativan 2mg overnight  - patient on ativan ATC, will taper to 1mg q8hrs today
- improving, apprec icu plan of care, likely sec to beer potomania  as per ICU plan of carE: Na again correcting rapidly with water diuresis 10L/24hrs, received desmopressin x2 overnight and restarted on D5, continue q4hr BMP (stable 130 previous 3 checks); hypokalemia resolved with aggressive repletion  - Nephro consulted in ED (Dr Silva)  - F/u urine studies  - Serial BMPs
- improving, apprec icu plan of care, likely sec to beer potomania  as per ICU plan of carE:   Na stable at 130 : UOP slowing down, received desmopressin x1 overnight . was restarted on D5 now dced; hypokalemia resolved with aggressive repletion  - Nephro consulted   - Urine studies reviewed  - Serial BMPs

## 2020-09-03 NOTE — PROGRESS NOTE ADULT - SUBJECTIVE AND OBJECTIVE BOX
Date/Time Patient Seen:  		  Referring MD:   Data Reviewed	       Patient is a 35y old  Male who presents with a chief complaint of Seizure (02 Sep 2020 14:53)      Subjective/HPI     PAST MEDICAL & SURGICAL HISTORY:  No pertinent past medical history  S/P appendectomy  H/O hernia repair        Medication list         MEDICATIONS  (STANDING):  cefTRIAXone   IVPB 1000 milliGRAM(s) IV Intermittent every 24 hours  cefTRIAXone   IVPB      enoxaparin Injectable 40 milliGRAM(s) SubCutaneous daily  folic acid 1 milliGRAM(s) Oral daily  multivitamin 1 Tablet(s) Oral daily  pantoprazole    Tablet 40 milliGRAM(s) Oral before breakfast  thiamine 100 milliGRAM(s) Oral daily  ursodiol Capsule 300 milliGRAM(s) Oral three times a day    MEDICATIONS  (PRN):  LORazepam   Injectable 2 milliGRAM(s) IV Push every 4 hours PRN CIWA > 8  melatonin 5 milliGRAM(s) Oral at bedtime PRN Insomnia         Vitals log        ICU Vital Signs Last 24 Hrs  T(C): 37.7 (02 Sep 2020 20:21), Max: 37.7 (02 Sep 2020 20:21)  T(F): 99.8 (02 Sep 2020 20:21), Max: 99.8 (02 Sep 2020 20:21)  HR: 101 (02 Sep 2020 20:21) (95 - 101)  BP: 111/76 (02 Sep 2020 20:21) (111/76 - 127/84)  BP(mean): --  ABP: --  ABP(mean): --  RR: 17 (02 Sep 2020 20:21) (16 - 17)  SpO2: 96% (02 Sep 2020 20:21) (95% - 96%)           Input and Output:  I&O's Detail      Lab Data                        12.0   7.29  )-----------( 204      ( 02 Sep 2020 07:27 )             36.7     09-02    132<L>  |  99  |  9   ----------------------------<  98  3.4<L>   |  26  |  0.93    Ca    8.6      02 Sep 2020 07:27  Mg     2.5     09-02    TPro  6.6  /  Alb  2.3<L>  /  TBili  15.0<HH>  /  DBili  x   /  AST  183<H>  /  ALT  159<H>  /  AlkPhos  328<H>  09-02            Review of Systems	      Objective     Physical Examination    heart s1s2  lung dec BS  abd soft      Pertinent Lab findings & Imaging      Heredia:  NO   Adequate UO     I&O's Detail           Discussed with:     Cultures:	        Radiology

## 2020-09-03 NOTE — PROGRESS NOTE ADULT - PROBLEM SELECTOR PLAN 6
- Last drink 3am 8/27/2020  - ETOH of 314on admission  - [facility] protocol  - F/u b12/b9 levels  -apprec addiction med recs dr haddad
- Na 132 today, mental status remains unchanged  - Na stable  - Nephro consulted, recs appreicated
- Last drink 3am 8/27/2020 had 12 beers daily for past 30 days, brother has hx of etoh abuse as well  - ETOH of 314on admission  - CIWA protocol: received 6mg ativan last 12hrs, likely withdrawing   - F/u b12/b9 levels  -apprec addiction med recs dr haddad
- Na 134 today, mental status remains unchanged  - Na stable  - Nephro consulted, recs appreciated
- Last drink 3am 8/27/2020  - ETOH of 314on admission  - [facility] protocol  - F/u b12/b9 levels  -apprec addiction med recs dr haddad
- resolved  - trend and replete prn
- Last drink 3am 8/27/2020  - ETOH of 314on admission  - [facility] protocol  - F/u b12/b9 levels  -apprec addiction med recs dr haddad

## 2020-09-03 NOTE — PROGRESS NOTE ADULT - SUBJECTIVE AND OBJECTIVE BOX
INTERVAL HPI/OVERNIGHT EVENTS:  pt seen and examined  denies  n/v/d/abd pain  nicole po    MEDICATIONS  (STANDING):  dextrose 5%. 1000 milliLiter(s) (50 mL/Hr) IV Continuous <Continuous>  enoxaparin Injectable 40 milliGRAM(s) SubCutaneous daily  folic acid 1 milliGRAM(s) Oral daily  multivitamin 1 Tablet(s) Oral daily  pantoprazole  Injectable 40 milliGRAM(s) IV Push daily  thiamine 100 milliGRAM(s) Oral daily  ursodiol Capsule 300 milliGRAM(s) Oral three times a day    MEDICATIONS  (PRN):  LORazepam   Injectable 2 milliGRAM(s) IV Push every 4 hours PRN CIWA > 8      Allergies    No Known Allergies    Intolerances        General:  No wt loss, fevers, chills, night sweats, fatigue,   Eyes:  Good vision, no reported pain  ENT:  No sore throat, pain, runny nose, dysphagia  CV:  No pain, palpitations, hypo/hypertension  Resp:  No dyspnea, cough, tachypnea, wheezing  GI: see above  :  No pain, bleeding, incontinence, nocturia  Muscle:  No pain, weakness  Neuro:  No weakness, tingling, memory problems  Psych:  No fatigue, insomnia, mood problems, depression  Endocrine:  No polyuria, polydipsia, cold/heat intolerance  Heme:  No petechiae, ecchymosis, easy bruisability  Skin:  No rash, tattoos, scars, edema    PHYSICAL EXAM:     Vital Signs Last 24 Hrs  T(C): 37 (30 Aug 2020 04:00), Max: 37.2 (30 Aug 2020 00:00)  T(F): 98.6 (30 Aug 2020 04:00), Max: 99 (30 Aug 2020 00:00)  HR: 127 (30 Aug 2020 09:00) (109 - 149)  BP: 118/86 (30 Aug 2020 09:00) (109/69 - 142/80)  BP(mean): 98 (30 Aug 2020 09:00) (83 - 104)  RR: 30 (30 Aug 2020 09:00) (18 - 37)  SpO2: 96% (30 Aug 2020 09:00) (93% - 97%)      27 Aug 2020 07:01  -  28 Aug 2020 07:00  --------------------------------------------------------  IN: 7750.9 mL / OUT: 7547 mL / NET: 203.9 mL    28 Aug 2020 07:01  -  28 Aug 2020 11:46  --------------------------------------------------------  IN: 1380 mL / OUT: 1550 mL / NET: -170 mL        GENERAL: lying in bed  HEENT:  NC/AT  ABDOMEN:  Soft, non-tender, non-distended  EXTREMITIES:  no  edema  SKIN:  jaundice  NEURO:  Awake alert appropriate               LABS:                        13.1   3.72  )-----------( 101      ( 30 Aug 2020 06:20 )             38.0     08-30    137  |  102  |  6<L>  ----------------------------<  97  3.9   |  28  |  1.10    Ca    8.3<L>      30 Aug 2020 06:20  Phos  2.8     08-30  Mg     2.3     08-30    TPro  6.1  /  Alb  2.4<L>  /  TBili  19.8<HH>  /  DBili  15.60<H>  /  AST  247<H>  /  ALT  189<H>  /  AlkPhos  283<H>  08-30    PT/INR - ( 29 Aug 2020 07:34 )   PT: 12.1 sec;   INR: 1.04 ratio         PTT - ( 29 Aug 2020 07:34 )  PTT:31.8 sec      08-29-20 @ 07:01  -  08-30-20 @ 07:00  --------------------------------------------------------  IN: 2620 mL / OUT: 7500 mL / NET: -4880 mL    08-30-20 @ 07:01 - 08-30-20 @ 10:22  --------------------------------------------------------  IN: 175 mL / OUT: 0 mL / NET: 175 mL      RADIOLOGY & ADDITIONAL TESTS:

## 2020-09-03 NOTE — PROGRESS NOTE ADULT - PROBLEM SELECTOR PROBLEM 6
Alcoholic intoxication with complication
Hyponatremia
Alcoholic intoxication with complication
Hyponatremia
Alcoholic intoxication with complication
Hypokalemia
Alcoholic intoxication with complication

## 2020-09-03 NOTE — PROGRESS NOTE ADULT - SUBJECTIVE AND OBJECTIVE BOX
Patient is a 35y old  Male who presents with a chief complaint of Seizure (28 Aug 2020 11:46)  Patient seen in follow up for hyponatremia.     Pt resting in bed. No distress.        PAST MEDICAL HISTORY:  No pertinent past medical history    MEDICATIONS  (STANDING):  cefTRIAXone   IVPB 1000 milliGRAM(s) IV Intermittent every 24 hours  cefTRIAXone   IVPB      enoxaparin Injectable 40 milliGRAM(s) SubCutaneous daily  folic acid 1 milliGRAM(s) Oral daily  multivitamin 1 Tablet(s) Oral daily  pantoprazole    Tablet 40 milliGRAM(s) Oral before breakfast  potassium chloride   Powder 40 milliEquivalent(s) Oral every 4 hours  thiamine 100 milliGRAM(s) Oral daily  ursodiol Capsule 300 milliGRAM(s) Oral three times a day    MEDICATIONS  (PRN):  LORazepam   Injectable 2 milliGRAM(s) IV Push every 4 hours PRN CIWA > 8  melatonin 5 milliGRAM(s) Oral at bedtime PRN Insomnia    T(C): 37.4 (20 @ 05:46), Max: 39.3 (20 @ 04:59)  HR: 98 (20 @ 05:46) (95 - 115)  BP: 110/74 (20 @ 05:46) (102/69 - 127/84)  RR: 18 (20 @ 05:46)  SpO2: 95% (20 @ 05:46)  Wt(kg): --  I&O's Detail        PHYSICAL EXAM:  General: No distress  Respiratory: b/l air entry  Cardiovascular: S1 S2  Gastrointestinal: soft  Extremities:  no edema                        LABORATORY:                        12.0   7.29  )-----------( 204      ( 02 Sep 2020 07:27 )             36.7         134<L>  |  102  |  10  ----------------------------<  96  3.3<L>   |  24  |  0.76    Ca    9.2      03 Sep 2020 08:48  Mg     2.5         TPro  6.8  /  Alb  2.3<L>  /  TBili  9.5<H>  /  DBili  8.30<H>  /  AST  167<H>  /  ALT  144<H>  /  AlkPhos  292<H>      Sodium, Serum: 134 mmol/L ( @ 08:48)  Sodium, Serum: 132 mmol/L ( @ 07:27)    Potassium, Serum: 3.3 mmol/L ( @ 08:48)  Potassium, Serum: 3.4 mmol/L ( @ 07:27)    Hemoglobin: 12.0 g/dL ( @ 07:27)  Hemoglobin: 13.0 g/dL ( @ 05:29)    Creatinine, Serum 0.76 ( @ 08:48)  Creatinine, Serum 0.93 ( @ 07:27)  Creatinine, Serum 1.00 ( @ 05:29)  Creatinine, Serum 0.96 ( @ 13:48)        LIVER FUNCTIONS - ( 03 Sep 2020 08:48 )  Alb: 2.3 g/dL / Pro: 6.8 g/dL / ALK PHOS: 292 U/L / ALT: 144 U/L / AST: 167 U/L / GGT: x           Urinalysis Basic - ( 02 Sep 2020 06:10 )    Color: Yellow / Appearance: Slightly Turbid / S.010 / pH: x  Gluc: x / Ketone: Negative  / Bili: Large / Urobili: 8   Blood: x / Protein: 30 mg/dL / Nitrite: Positive   Leuk Esterase: Moderate / RBC: 0-2 /HPF / WBC >50   Sq Epi: x / Non Sq Epi: Occasional / Bacteria: TNTC

## 2020-09-03 NOTE — PROGRESS NOTE ADULT - PROBLEM SELECTOR PROBLEM 5
Acute liver failure
Hyponatremia
Acute liver failure

## 2020-09-04 VITALS
HEART RATE: 101 BPM | SYSTOLIC BLOOD PRESSURE: 134 MMHG | OXYGEN SATURATION: 94 % | TEMPERATURE: 100 F | RESPIRATION RATE: 18 BRPM | DIASTOLIC BLOOD PRESSURE: 87 MMHG

## 2020-09-04 LAB
ALBUMIN SERPL ELPH-MCNC: 2.4 G/DL — LOW (ref 3.3–5)
ALP SERPL-CCNC: 306 U/L — HIGH (ref 40–120)
ALT FLD-CCNC: 177 U/L — HIGH (ref 12–78)
ANION GAP SERPL CALC-SCNC: 6 MMOL/L — SIGNIFICANT CHANGE UP (ref 5–17)
APTT BLD: 32.8 SEC — SIGNIFICANT CHANGE UP (ref 27.5–35.5)
AST SERPL-CCNC: 212 U/L — HIGH (ref 15–37)
BASOPHILS # BLD AUTO: 0.06 K/UL — SIGNIFICANT CHANGE UP (ref 0–0.2)
BASOPHILS NFR BLD AUTO: 1.2 % — SIGNIFICANT CHANGE UP (ref 0–2)
BILIRUB SERPL-MCNC: 6.9 MG/DL — HIGH (ref 0.2–1.2)
BUN SERPL-MCNC: 9 MG/DL — SIGNIFICANT CHANGE UP (ref 7–23)
CALCIUM SERPL-MCNC: 9.5 MG/DL — SIGNIFICANT CHANGE UP (ref 8.5–10.1)
CHLORIDE SERPL-SCNC: 101 MMOL/L — SIGNIFICANT CHANGE UP (ref 96–108)
CO2 SERPL-SCNC: 27 MMOL/L — SIGNIFICANT CHANGE UP (ref 22–31)
CREAT SERPL-MCNC: 0.66 MG/DL — SIGNIFICANT CHANGE UP (ref 0.5–1.3)
EOSINOPHIL # BLD AUTO: 0.05 K/UL — SIGNIFICANT CHANGE UP (ref 0–0.5)
EOSINOPHIL NFR BLD AUTO: 1 % — SIGNIFICANT CHANGE UP (ref 0–6)
GLUCOSE SERPL-MCNC: 96 MG/DL — SIGNIFICANT CHANGE UP (ref 70–99)
HCT VFR BLD CALC: 35.3 % — LOW (ref 39–50)
HGB BLD-MCNC: 11.1 G/DL — LOW (ref 13–17)
IMM GRANULOCYTES NFR BLD AUTO: 2.6 % — HIGH (ref 0–1.5)
INR BLD: 0.96 RATIO — SIGNIFICANT CHANGE UP (ref 0.88–1.16)
LYMPHOCYTES # BLD AUTO: 1.04 K/UL — SIGNIFICANT CHANGE UP (ref 1–3.3)
LYMPHOCYTES # BLD AUTO: 20.4 % — SIGNIFICANT CHANGE UP (ref 13–44)
MCHC RBC-ENTMCNC: 29 PG — SIGNIFICANT CHANGE UP (ref 27–34)
MCHC RBC-ENTMCNC: 31.4 GM/DL — LOW (ref 32–36)
MCV RBC AUTO: 92.2 FL — SIGNIFICANT CHANGE UP (ref 80–100)
MONOCYTES # BLD AUTO: 0.67 K/UL — SIGNIFICANT CHANGE UP (ref 0–0.9)
MONOCYTES NFR BLD AUTO: 13.2 % — SIGNIFICANT CHANGE UP (ref 2–14)
NEUTROPHILS # BLD AUTO: 3.14 K/UL — SIGNIFICANT CHANGE UP (ref 1.8–7.4)
NEUTROPHILS NFR BLD AUTO: 61.6 % — SIGNIFICANT CHANGE UP (ref 43–77)
NRBC # BLD: 0 /100 WBCS — SIGNIFICANT CHANGE UP (ref 0–0)
PLATELET # BLD AUTO: 425 K/UL — HIGH (ref 150–400)
POTASSIUM SERPL-MCNC: 3.9 MMOL/L — SIGNIFICANT CHANGE UP (ref 3.5–5.3)
POTASSIUM SERPL-SCNC: 3.9 MMOL/L — SIGNIFICANT CHANGE UP (ref 3.5–5.3)
PROT SERPL-MCNC: 7.1 G/DL — SIGNIFICANT CHANGE UP (ref 6–8.3)
PROTHROM AB SERPL-ACNC: 11.2 SEC — SIGNIFICANT CHANGE UP (ref 10.6–13.6)
RBC # BLD: 3.83 M/UL — LOW (ref 4.2–5.8)
RBC # FLD: 17.2 % — HIGH (ref 10.3–14.5)
SODIUM SERPL-SCNC: 134 MMOL/L — LOW (ref 135–145)
WBC # BLD: 5.09 K/UL — SIGNIFICANT CHANGE UP (ref 3.8–10.5)
WBC # FLD AUTO: 5.09 K/UL — SIGNIFICANT CHANGE UP (ref 3.8–10.5)

## 2020-09-04 PROCEDURE — 83615 LACTATE (LD) (LDH) ENZYME: CPT

## 2020-09-04 PROCEDURE — 84100 ASSAY OF PHOSPHORUS: CPT

## 2020-09-04 PROCEDURE — 87186 SC STD MICRODIL/AGAR DIL: CPT

## 2020-09-04 PROCEDURE — 83935 ASSAY OF URINE OSMOLALITY: CPT

## 2020-09-04 PROCEDURE — 82746 ASSAY OF FOLIC ACID SERUM: CPT

## 2020-09-04 PROCEDURE — 93005 ELECTROCARDIOGRAM TRACING: CPT

## 2020-09-04 PROCEDURE — 87086 URINE CULTURE/COLONY COUNT: CPT

## 2020-09-04 PROCEDURE — 99285 EMERGENCY DEPT VISIT HI MDM: CPT

## 2020-09-04 PROCEDURE — 82436 ASSAY OF URINE CHLORIDE: CPT

## 2020-09-04 PROCEDURE — 81001 URINALYSIS AUTO W/SCOPE: CPT

## 2020-09-04 PROCEDURE — 80048 BASIC METABOLIC PNL TOTAL CA: CPT

## 2020-09-04 PROCEDURE — 85610 PROTHROMBIN TIME: CPT

## 2020-09-04 PROCEDURE — 76700 US EXAM ABDOM COMPLETE: CPT

## 2020-09-04 PROCEDURE — 85027 COMPLETE CBC AUTOMATED: CPT

## 2020-09-04 PROCEDURE — 84300 ASSAY OF URINE SODIUM: CPT

## 2020-09-04 PROCEDURE — 82150 ASSAY OF AMYLASE: CPT

## 2020-09-04 PROCEDURE — 80076 HEPATIC FUNCTION PANEL: CPT

## 2020-09-04 PROCEDURE — 80053 COMPREHEN METABOLIC PANEL: CPT

## 2020-09-04 PROCEDURE — 82248 BILIRUBIN DIRECT: CPT

## 2020-09-04 PROCEDURE — 82570 ASSAY OF URINE CREATININE: CPT

## 2020-09-04 PROCEDURE — 87040 BLOOD CULTURE FOR BACTERIA: CPT

## 2020-09-04 PROCEDURE — 96376 TX/PRO/DX INJ SAME DRUG ADON: CPT

## 2020-09-04 PROCEDURE — 82140 ASSAY OF AMMONIA: CPT

## 2020-09-04 PROCEDURE — 86769 SARS-COV-2 COVID-19 ANTIBODY: CPT

## 2020-09-04 PROCEDURE — 83735 ASSAY OF MAGNESIUM: CPT

## 2020-09-04 PROCEDURE — 70450 CT HEAD/BRAIN W/O DYE: CPT

## 2020-09-04 PROCEDURE — 83930 ASSAY OF BLOOD OSMOLALITY: CPT

## 2020-09-04 PROCEDURE — 83605 ASSAY OF LACTIC ACID: CPT

## 2020-09-04 PROCEDURE — 36415 COLL VENOUS BLD VENIPUNCTURE: CPT

## 2020-09-04 PROCEDURE — 74181 MRI ABDOMEN W/O CONTRAST: CPT

## 2020-09-04 PROCEDURE — 71045 X-RAY EXAM CHEST 1 VIEW: CPT

## 2020-09-04 PROCEDURE — 82607 VITAMIN B-12: CPT

## 2020-09-04 PROCEDURE — U0003: CPT

## 2020-09-04 PROCEDURE — 99232 SBSQ HOSP IP/OBS MODERATE 35: CPT

## 2020-09-04 PROCEDURE — 80307 DRUG TEST PRSMV CHEM ANLYZR: CPT

## 2020-09-04 PROCEDURE — 99239 HOSP IP/OBS DSCHRG MGMT >30: CPT

## 2020-09-04 PROCEDURE — 96374 THER/PROPH/DIAG INJ IV PUSH: CPT

## 2020-09-04 PROCEDURE — 83690 ASSAY OF LIPASE: CPT

## 2020-09-04 PROCEDURE — 85730 THROMBOPLASTIN TIME PARTIAL: CPT

## 2020-09-04 PROCEDURE — 84145 PROCALCITONIN (PCT): CPT

## 2020-09-04 PROCEDURE — 80074 ACUTE HEPATITIS PANEL: CPT

## 2020-09-04 RX ORDER — THIAMINE MONONITRATE (VIT B1) 100 MG
1 TABLET ORAL
Qty: 7 | Refills: 0
Start: 2020-09-04 | End: 2020-09-10

## 2020-09-04 RX ORDER — URSODIOL 250 MG/1
1 TABLET, FILM COATED ORAL
Qty: 21 | Refills: 0
Start: 2020-09-04 | End: 2020-09-10

## 2020-09-04 RX ORDER — FOLIC ACID 0.8 MG
1 TABLET ORAL
Qty: 7 | Refills: 0
Start: 2020-09-04 | End: 2020-09-10

## 2020-09-04 RX ORDER — PANTOPRAZOLE SODIUM 20 MG/1
1 TABLET, DELAYED RELEASE ORAL
Qty: 7 | Refills: 0
Start: 2020-09-04 | End: 2020-09-10

## 2020-09-04 RX ORDER — CEFPODOXIME PROXETIL 100 MG
1 TABLET ORAL
Qty: 16 | Refills: 0
Start: 2020-09-04 | End: 2020-09-11

## 2020-09-04 RX ADMIN — URSODIOL 300 MILLIGRAM(S): 250 TABLET, FILM COATED ORAL at 06:12

## 2020-09-04 RX ADMIN — PANTOPRAZOLE SODIUM 40 MILLIGRAM(S): 20 TABLET, DELAYED RELEASE ORAL at 06:12

## 2020-09-04 NOTE — PROGRESS NOTE ADULT - PROVIDER SPECIALTY LIST ADULT
Addiction Medicine
Critical Care
Gastroenterology
Hospitalist
Infectious Disease
Infectious Disease
Nephrology
Hospitalist

## 2020-09-04 NOTE — PROGRESS NOTE ADULT - SUBJECTIVE AND OBJECTIVE BOX
INTERVAL HPI/OVERNIGHT EVENTS:  pt seen and examined  offers no gi complaints  mri reviewed    MEDICATIONS  (STANDING):  dextrose 5%. 1000 milliLiter(s) (50 mL/Hr) IV Continuous <Continuous>  enoxaparin Injectable 40 milliGRAM(s) SubCutaneous daily  folic acid 1 milliGRAM(s) Oral daily  multivitamin 1 Tablet(s) Oral daily  pantoprazole  Injectable 40 milliGRAM(s) IV Push daily  thiamine 100 milliGRAM(s) Oral daily  ursodiol Capsule 300 milliGRAM(s) Oral three times a day    MEDICATIONS  (PRN):  LORazepam   Injectable 2 milliGRAM(s) IV Push every 4 hours PRN CIWA > 8      Allergies    No Known Allergies    Intolerances        General:  No wt loss, fevers, chills, night sweats, fatigue,   Eyes:  Good vision, no reported pain  ENT:  No sore throat, pain, runny nose, dysphagia  CV:  No pain, palpitations, hypo/hypertension  Resp:  No dyspnea, cough, tachypnea, wheezing  GI: see above  :  No pain, bleeding, incontinence, nocturia  Muscle:  No pain, weakness  Neuro:  No weakness, tingling, memory problems  Psych:  No fatigue, insomnia, mood problems, depression  Endocrine:  No polyuria, polydipsia, cold/heat intolerance  Heme:  No petechiae, ecchymosis, easy bruisability  Skin:  No rash, tattoos, scars, edema    PHYSICAL EXAM:     Vital Signs Last 24 Hrs  T(C): 37 (30 Aug 2020 04:00), Max: 37.2 (30 Aug 2020 00:00)  T(F): 98.6 (30 Aug 2020 04:00), Max: 99 (30 Aug 2020 00:00)  HR: 127 (30 Aug 2020 09:00) (109 - 149)  BP: 118/86 (30 Aug 2020 09:00) (109/69 - 142/80)  BP(mean): 98 (30 Aug 2020 09:00) (83 - 104)  RR: 30 (30 Aug 2020 09:00) (18 - 37)  SpO2: 96% (30 Aug 2020 09:00) (93% - 97%)      27 Aug 2020 07:01  -  28 Aug 2020 07:00  --------------------------------------------------------  IN: 7750.9 mL / OUT: 7547 mL / NET: 203.9 mL    28 Aug 2020 07:01  -  28 Aug 2020 11:46  --------------------------------------------------------  IN: 1380 mL / OUT: 1550 mL / NET: -170 mL        GENERAL: lying in bed  HEENT:  NC/AT  ABDOMEN:  Soft, non-tender, non-distended  EXTREMITIES:  no  edema  SKIN:  jaundice  NEURO:  Awake alert appropriate               LABS:                        13.1   3.72  )-----------( 101      ( 30 Aug 2020 06:20 )             38.0     08-30    137  |  102  |  6<L>  ----------------------------<  97  3.9   |  28  |  1.10    Ca    8.3<L>      30 Aug 2020 06:20  Phos  2.8     08-30  Mg     2.3     08-30    TPro  6.1  /  Alb  2.4<L>  /  TBili  19.8<HH>  /  DBili  15.60<H>  /  AST  247<H>  /  ALT  189<H>  /  AlkPhos  283<H>  08-30    PT/INR - ( 29 Aug 2020 07:34 )   PT: 12.1 sec;   INR: 1.04 ratio         PTT - ( 29 Aug 2020 07:34 )  PTT:31.8 sec      08-29-20 @ 07:01 - 08-30-20 @ 07:00  --------------------------------------------------------  IN: 2620 mL / OUT: 7500 mL / NET: -4880 mL    08-30-20 @ 07:01 - 08-30-20 @ 10:22  --------------------------------------------------------  IN: 175 mL / OUT: 0 mL / NET: 175 mL      RADIOLOGY & ADDITIONAL TESTS:    < from: MR MRCP No Cont (09.03.20 @ 12:50) >    EXAM:  MR MRCP                            PROCEDURE DATE:  09/03/2020          INTERPRETATION:  CLINICAL INFORMATION: Elevated liver function tests    COMPARISON: Ultrasound dated 8/29/2020    PROCEDURE:  MRI of the abdomen was performed without intravenous contrast.  IV Contrast: None. 0 cc administered, 0 cc discarded.  MRCP was performed.    FINDINGS:  LOWER CHEST: Within normal limits.    LIVER: Hepatic steatosis. Enlarged.  BILE DUCTS: Normal caliber. CBD 4 mm. No choledocholithiasis.  GALLBLADDER: Within normal limits.  SPLEEN: Within normal limits.  PANCREAS: Within normal limits.  ADRENALS: Within normal limits.  KIDNEYS/URETERS: 4 mm left renal cyst. No hydronephrosis.    VISUALIZED PORTIONS:  BOWEL: Within normal limits.  PERITONEUM: No ascites.  VESSELS: Within normal limits.  RETROPERITONEUM/LYMPH NODES: No lymphadenopathy.  ABDOMINAL WALL: Within normal limits.  BONES: Within normal limits.    IMPRESSION:  Hepatomegaly and hepatic steatosis.  No cholelithiasis, choledocholithiasis, or biliary ductal dilatation.              DAVID QUEZADA M.D., ATTENDING RADIOLOGIST  This document has been electronically signed. Sep  3 2020  1:18PM    < end of copied text >

## 2020-09-04 NOTE — PROGRESS NOTE ADULT - ASSESSMENT
etoh hepatitis  etoh abuse  elev lfts    repeat abd u/s showing fatty liver wo gs/biliary dilatation   mrcp neg for acute pathology, showing fatty liver  elevated lfts much improved  cont ppi daily and laura tid  diet as tolerated  etoh abstinence; avoid hepatotoxins  dc planning w op gi f/u

## 2020-09-04 NOTE — PROGRESS NOTE ADULT - REASON FOR ADMISSION
Seizure

## 2020-09-04 NOTE — DISCHARGE NOTE NURSING/CASE MANAGEMENT/SOCIAL WORK - PATIENT PORTAL LINK FT
You can access the FollowMyHealth Patient Portal offered by [facility] by registering at the following website: [email or web address]. By joining [employer]’s FollowMyHealth portal, you will also be able to view your health information using other applications (apps) compatible with our system.

## 2020-09-04 NOTE — PROGRESS NOTE ADULT - SUBJECTIVE AND OBJECTIVE BOX
[facility]  INFECTIOUS DISEASES   =======================================================    N-[id number]  DIANE GARAY     Follow up; UTI    No complain. no fever, no urinary symptoms.  Tolerating regular diet.     PAST MEDICAL & SURGICAL HISTORY:  No pertinent past medical history  S/P appendectomy  H/O hernia repair    Social Hx: no smoking or drugs,     FAMILY HISTORY:  Family history of renal disease: Uncle    Allergies  No Known Allergies    Antibiotics:  Ceftriaxone     REVIEW OF SYSTEMS:  CONSTITUTIONAL:  No Fever or chills  HEENT:  No diplopia or blurred vision.  No sore throat or runny nose.  CARDIOVASCULAR:  No chest pain or SOB.  RESPIRATORY:  No cough, shortness of breath, PND or orthopnea.  GASTROINTESTINAL:  No nausea, vomiting or diarrhea.  GENITOURINARY:  No dysuria, no frequency & urgency. no abdominal pain   MUSCULOSKELETAL:  no joint aches, no muscle pain  SKIN:  No change in skin, hair or nails.  NEUROLOGIC:  No paresthesias, fasciculations, seizures or weakness.  PSYCHIATRIC:  No disorder of thought or mood.  ENDOCRINE:  No heat or cold intolerance, polyuria or polydipsia.  HEMATOLOGICAL:  No easy bruising or bleeding.     Physical Exam:  Vital Signs Last 24 Hrs  T(C): 37.5 (04 Sep 2020 05:15), Max: 37.5 (04 Sep 2020 05:15)  T(F): 99.5 (04 Sep 2020 05:15), Max: 99.5 (04 Sep 2020 05:15)  HR: 101 (04 Sep 2020 05:15) (98 - 119)  BP: 134/87 (04 Sep 2020 05:15) (119/72 - 143/88)  BP(mean): --  RR: 18 (04 Sep 2020 05:15) (16 - 18)  SpO2: 94% (04 Sep 2020 05:15) (93% - 96%)  GEN: NAD  HEENT: normocephalic and atraumatic. EOMI. PERRL.    NECK: Supple.  No lymphadenopathy   LUNGS: Clear to auscultation.  HEART: Regular rate and rhythm without murmur.  ABDOMEN: Soft, no tenderness, and nondistended.  Positive bowel sounds.    : No CVA tenderness  EXTREMITIES: Without any cyanosis, clubbing, rash, lesions or edema.  NEUROLOGIC: grossly intact.  PSYCHIATRIC: Appropriate affect .  SKIN: No ulceration or induration present.      Labs:                        11.1   5.09  )-----------( 425      ( 04 Sep 2020 08:27 )             35.3      09-04    134<L>  |  101  |  9   ----------------------------<  96  3.9   |  27  |  0.66    Ca    9.5      04 Sep 2020 08:27    TPro  7.1  /  Alb  2.4<L>  /  TBili  6.9<H>  /  DBili  5.90<H>  /  AST  212<H>  /  ALT  177<H>  /  AlkPhos  306<H>  09-04    Culture - Blood (collected 09-02-20 @ 12:17)  Source: .Blood Blood-Peripheral    Culture - Blood (collected 09-02-20 @ 12:17)  Source: .Blood Blood-Peripheral    Culture - Urine (collected 09-02-20 @ 08:58)  Source: .Urine Clean Catch (Midstream)      WBC Count: 5.09 K/uL (09-04-20 @ 08:27)  WBC Count: 4.89 K/uL (09-03-20 @ 08:48)  WBC Count: 7.29 K/uL (09-02-20 @ 07:27)  WBC Count: 8.31 K/uL (09-01-20 @ 05:29)  WBC Count: 5.26 K/uL (08-31-20 @ 05:23)    Creatinine, Serum: 0.66 mg/dL (09-04-20 @ 08:27)  Creatinine, Serum: 0.76 mg/dL (09-03-20 @ 08:48)  Creatinine, Serum: 0.93 mg/dL (09-02-20 @ 07:27)  Creatinine, Serum: 1.00 mg/dL (09-01-20 @ 05:29)  Creatinine, Serum: 0.96 mg/dL (08-31-20 @ 13:48)  Creatinine, Serum: 1.10 mg/dL (08-31-20 @ 05:23)  Creatinine, Serum: 1.00 mg/dL (08-30-20 @ 22:09)  Creatinine, Serum: 1.00 mg/dL (08-30-20 @ 16:28)    Procalcitonin, Serum: 1.11 ng/mL (09-03-20 @ 08:48)    COVID-19 PCR: NotDetec (08-27-20 @ 08:12)    All imaging and other data have been reviewed.  < from: MR MRCP No Cont (09.03.20 @ 12:50) >  IMPRESSION:  Hepatomegaly and hepatic steatosis.  No cholelithiasis, choledocholithiasis, or biliary ductal dilatation.      Assessment and Plan:   34y/o man with ETOH abuse was admitted on 8/27 after a new onset seizure related to alcohol abuse and withdrawal. He was in MICU initially and then transferred to medical floor.   LFTs are high for which had a negative acute hepatitis panel, most likely alcoholic hepatitis, now LFTs trending down.   Last night had fever for which work up was done clinically and based on UA looks like source is UTI.     Fever, UTI:  - Blood culture NGTD  - UA with high WBC and nitrate, UC with Ecoli, sensitivity pending   - No leukocytosis   - Since he wants to leave and never had UTI with MDRO, can switch to oral vantin 200mg q12 to complete total 7days.     Alcoholic hepatitis:   - LFTs improving   - Acute hep panel is neg  - Alcohol rehab   - MRCP neg for any cholestasis     Will sign off please call with any question.     Reji Lay MD  Division of Infectious Diseases   [phone]

## 2020-09-04 NOTE — PROGRESS NOTE ADULT - NSHPATTENDINGPLANDISCUSS_GEN_ALL_CORE
Dr. Lagos.
Dr. Lagos.
patient
1E IDR team
Dr Padilla ICU attending
Dr Morfin ICU attending
Dr Padilla ICU attending, ICU KYREE Blackmon
pt, SW, CM, RN, residency team, ID, GI - re: tx plan, disposition planning, above detailed plan
pt, SW, CM, RN, residency team, ID, GI - re: tx plan, disposition planning, above detailed plan

## 2020-09-05 LAB
-  AMIKACIN: SIGNIFICANT CHANGE UP
-  AMIKACIN: SIGNIFICANT CHANGE UP
-  AMOXICILLIN/CLAVULANIC ACID: SIGNIFICANT CHANGE UP
-  AMOXICILLIN/CLAVULANIC ACID: SIGNIFICANT CHANGE UP
-  AMPICILLIN/SULBACTAM: SIGNIFICANT CHANGE UP
-  AMPICILLIN/SULBACTAM: SIGNIFICANT CHANGE UP
-  AMPICILLIN: SIGNIFICANT CHANGE UP
-  AMPICILLIN: SIGNIFICANT CHANGE UP
-  AZTREONAM: SIGNIFICANT CHANGE UP
-  AZTREONAM: SIGNIFICANT CHANGE UP
-  CEFAZOLIN: SIGNIFICANT CHANGE UP
-  CEFAZOLIN: SIGNIFICANT CHANGE UP
-  CEFEPIME: SIGNIFICANT CHANGE UP
-  CEFEPIME: SIGNIFICANT CHANGE UP
-  CEFOXITIN: SIGNIFICANT CHANGE UP
-  CEFOXITIN: SIGNIFICANT CHANGE UP
-  CEFTRIAXONE: SIGNIFICANT CHANGE UP
-  CEFTRIAXONE: SIGNIFICANT CHANGE UP
-  CIPROFLOXACIN: SIGNIFICANT CHANGE UP
-  CIPROFLOXACIN: SIGNIFICANT CHANGE UP
-  ERTAPENEM: SIGNIFICANT CHANGE UP
-  ERTAPENEM: SIGNIFICANT CHANGE UP
-  GENTAMICIN: SIGNIFICANT CHANGE UP
-  GENTAMICIN: SIGNIFICANT CHANGE UP
-  IMIPENEM: SIGNIFICANT CHANGE UP
-  IMIPENEM: SIGNIFICANT CHANGE UP
-  LEVOFLOXACIN: SIGNIFICANT CHANGE UP
-  LEVOFLOXACIN: SIGNIFICANT CHANGE UP
-  MEROPENEM: SIGNIFICANT CHANGE UP
-  MEROPENEM: SIGNIFICANT CHANGE UP
-  NITROFURANTOIN: SIGNIFICANT CHANGE UP
-  NITROFURANTOIN: SIGNIFICANT CHANGE UP
-  PIPERACILLIN/TAZOBACTAM: SIGNIFICANT CHANGE UP
-  PIPERACILLIN/TAZOBACTAM: SIGNIFICANT CHANGE UP
-  TIGECYCLINE: SIGNIFICANT CHANGE UP
-  TIGECYCLINE: SIGNIFICANT CHANGE UP
-  TOBRAMYCIN: SIGNIFICANT CHANGE UP
-  TOBRAMYCIN: SIGNIFICANT CHANGE UP
-  TRIMETHOPRIM/SULFAMETHOXAZOLE: SIGNIFICANT CHANGE UP
-  TRIMETHOPRIM/SULFAMETHOXAZOLE: SIGNIFICANT CHANGE UP
CULTURE RESULTS: SIGNIFICANT CHANGE UP
METHOD TYPE: SIGNIFICANT CHANGE UP
METHOD TYPE: SIGNIFICANT CHANGE UP
ORGANISM # SPEC MICROSCOPIC CNT: SIGNIFICANT CHANGE UP
SPECIMEN SOURCE: SIGNIFICANT CHANGE UP

## 2020-09-07 LAB
CULTURE RESULTS: SIGNIFICANT CHANGE UP
CULTURE RESULTS: SIGNIFICANT CHANGE UP
SPECIMEN SOURCE: SIGNIFICANT CHANGE UP
SPECIMEN SOURCE: SIGNIFICANT CHANGE UP

## 2023-01-03 ENCOUNTER — EMERGENCY (EMERGENCY)
Facility: HOSPITAL | Age: 38
LOS: 1 days | Discharge: LEFT BEFORE DISCHARGE | End: 2023-01-03
Attending: STUDENT IN AN ORGANIZED HEALTH CARE EDUCATION/TRAINING PROGRAM | Admitting: STUDENT IN AN ORGANIZED HEALTH CARE EDUCATION/TRAINING PROGRAM
Payer: MEDICAID

## 2023-01-03 VITALS
OXYGEN SATURATION: 98 % | RESPIRATION RATE: 20 BRPM | WEIGHT: 220.02 LBS | TEMPERATURE: 98 F | HEART RATE: 95 BPM | DIASTOLIC BLOOD PRESSURE: 105 MMHG | SYSTOLIC BLOOD PRESSURE: 158 MMHG

## 2023-01-03 DIAGNOSIS — Z98.890 OTHER SPECIFIED POSTPROCEDURAL STATES: Chronic | ICD-10-CM

## 2023-01-03 DIAGNOSIS — Z90.49 ACQUIRED ABSENCE OF OTHER SPECIFIED PARTS OF DIGESTIVE TRACT: Chronic | ICD-10-CM

## 2023-01-03 LAB
ALBUMIN SERPL ELPH-MCNC: 3.8 G/DL — SIGNIFICANT CHANGE UP (ref 3.3–5)
ALP SERPL-CCNC: 96 U/L — SIGNIFICANT CHANGE UP (ref 40–120)
ALT FLD-CCNC: 261 U/L — HIGH (ref 12–78)
ANION GAP SERPL CALC-SCNC: 11 MMOL/L — SIGNIFICANT CHANGE UP (ref 5–17)
AST SERPL-CCNC: 260 U/L — HIGH (ref 15–37)
BASOPHILS # BLD AUTO: 0.05 K/UL — SIGNIFICANT CHANGE UP (ref 0–0.2)
BASOPHILS NFR BLD AUTO: 0.9 % — SIGNIFICANT CHANGE UP (ref 0–2)
BILIRUB SERPL-MCNC: 0.5 MG/DL — SIGNIFICANT CHANGE UP (ref 0.2–1.2)
BUN SERPL-MCNC: 10 MG/DL — SIGNIFICANT CHANGE UP (ref 7–23)
CALCIUM SERPL-MCNC: 8.3 MG/DL — LOW (ref 8.5–10.1)
CHLORIDE SERPL-SCNC: 105 MMOL/L — SIGNIFICANT CHANGE UP (ref 96–108)
CK MB CFR SERPL CALC: 1.4 NG/ML — SIGNIFICANT CHANGE UP (ref 0–3.6)
CO2 SERPL-SCNC: 24 MMOL/L — SIGNIFICANT CHANGE UP (ref 22–31)
CREAT SERPL-MCNC: 0.88 MG/DL — SIGNIFICANT CHANGE UP (ref 0.5–1.3)
EGFR: 114 ML/MIN/1.73M2 — SIGNIFICANT CHANGE UP
EOSINOPHIL # BLD AUTO: 0.01 K/UL — SIGNIFICANT CHANGE UP (ref 0–0.5)
EOSINOPHIL NFR BLD AUTO: 0.2 % — SIGNIFICANT CHANGE UP (ref 0–6)
ETHANOL SERPL-MCNC: 404 MG/DL — HIGH (ref 0–10)
FLUAV AG NPH QL: SIGNIFICANT CHANGE UP
FLUBV AG NPH QL: SIGNIFICANT CHANGE UP
GLUCOSE SERPL-MCNC: 139 MG/DL — HIGH (ref 70–99)
HCT VFR BLD CALC: 48.5 % — SIGNIFICANT CHANGE UP (ref 39–50)
HGB BLD-MCNC: 16.6 G/DL — SIGNIFICANT CHANGE UP (ref 13–17)
IMM GRANULOCYTES NFR BLD AUTO: 0.4 % — SIGNIFICANT CHANGE UP (ref 0–0.9)
LIDOCAIN IGE QN: 260 U/L — SIGNIFICANT CHANGE UP (ref 73–393)
LYMPHOCYTES # BLD AUTO: 2.01 K/UL — SIGNIFICANT CHANGE UP (ref 1–3.3)
LYMPHOCYTES # BLD AUTO: 37.9 % — SIGNIFICANT CHANGE UP (ref 13–44)
MCHC RBC-ENTMCNC: 27.6 PG — SIGNIFICANT CHANGE UP (ref 27–34)
MCHC RBC-ENTMCNC: 34.2 GM/DL — SIGNIFICANT CHANGE UP (ref 32–36)
MCV RBC AUTO: 80.7 FL — SIGNIFICANT CHANGE UP (ref 80–100)
MONOCYTES # BLD AUTO: 0.59 K/UL — SIGNIFICANT CHANGE UP (ref 0–0.9)
MONOCYTES NFR BLD AUTO: 11.1 % — SIGNIFICANT CHANGE UP (ref 2–14)
NEUTROPHILS # BLD AUTO: 2.63 K/UL — SIGNIFICANT CHANGE UP (ref 1.8–7.4)
NEUTROPHILS NFR BLD AUTO: 49.5 % — SIGNIFICANT CHANGE UP (ref 43–77)
NRBC # BLD: 0 /100 WBCS — SIGNIFICANT CHANGE UP (ref 0–0)
PLATELET # BLD AUTO: 211 K/UL — SIGNIFICANT CHANGE UP (ref 150–400)
POTASSIUM SERPL-MCNC: 3.7 MMOL/L — SIGNIFICANT CHANGE UP (ref 3.5–5.3)
POTASSIUM SERPL-SCNC: 3.7 MMOL/L — SIGNIFICANT CHANGE UP (ref 3.5–5.3)
PROT SERPL-MCNC: 8.1 G/DL — SIGNIFICANT CHANGE UP (ref 6–8.3)
RBC # BLD: 6.01 M/UL — HIGH (ref 4.2–5.8)
RBC # FLD: 13.6 % — SIGNIFICANT CHANGE UP (ref 10.3–14.5)
RSV RNA NPH QL NAA+NON-PROBE: SIGNIFICANT CHANGE UP
SARS-COV-2 RNA SPEC QL NAA+PROBE: SIGNIFICANT CHANGE UP
SODIUM SERPL-SCNC: 140 MMOL/L — SIGNIFICANT CHANGE UP (ref 135–145)
TROPONIN I, HIGH SENSITIVITY RESULT: 6.5 NG/L — SIGNIFICANT CHANGE UP
WBC # BLD: 5.31 K/UL — SIGNIFICANT CHANGE UP (ref 3.8–10.5)
WBC # FLD AUTO: 5.31 K/UL — SIGNIFICANT CHANGE UP (ref 3.8–10.5)

## 2023-01-03 PROCEDURE — 87637 SARSCOV2&INF A&B&RSV AMP PRB: CPT

## 2023-01-03 PROCEDURE — 99285 EMERGENCY DEPT VISIT HI MDM: CPT | Mod: 25

## 2023-01-03 PROCEDURE — 71045 X-RAY EXAM CHEST 1 VIEW: CPT

## 2023-01-03 PROCEDURE — 99284 EMERGENCY DEPT VISIT MOD MDM: CPT | Mod: 25

## 2023-01-03 PROCEDURE — 96375 TX/PRO/DX INJ NEW DRUG ADDON: CPT

## 2023-01-03 PROCEDURE — 82553 CREATINE MB FRACTION: CPT

## 2023-01-03 PROCEDURE — 71045 X-RAY EXAM CHEST 1 VIEW: CPT | Mod: 26

## 2023-01-03 PROCEDURE — 80307 DRUG TEST PRSMV CHEM ANLYZR: CPT

## 2023-01-03 PROCEDURE — 80053 COMPREHEN METABOLIC PANEL: CPT

## 2023-01-03 PROCEDURE — 93005 ELECTROCARDIOGRAM TRACING: CPT

## 2023-01-03 PROCEDURE — 93010 ELECTROCARDIOGRAM REPORT: CPT

## 2023-01-03 PROCEDURE — 84484 ASSAY OF TROPONIN QUANT: CPT

## 2023-01-03 PROCEDURE — 36415 COLL VENOUS BLD VENIPUNCTURE: CPT

## 2023-01-03 PROCEDURE — 85025 COMPLETE CBC W/AUTO DIFF WBC: CPT

## 2023-01-03 PROCEDURE — 83690 ASSAY OF LIPASE: CPT

## 2023-01-03 PROCEDURE — 96374 THER/PROPH/DIAG INJ IV PUSH: CPT

## 2023-01-03 PROCEDURE — 99285 EMERGENCY DEPT VISIT HI MDM: CPT

## 2023-01-03 RX ORDER — METOCLOPRAMIDE HCL 10 MG
10 TABLET ORAL ONCE
Refills: 0 | Status: COMPLETED | OUTPATIENT
Start: 2023-01-03 | End: 2023-01-03

## 2023-01-03 RX ORDER — SODIUM CHLORIDE 9 MG/ML
1000 INJECTION INTRAMUSCULAR; INTRAVENOUS; SUBCUTANEOUS ONCE
Refills: 0 | Status: COMPLETED | OUTPATIENT
Start: 2023-01-03 | End: 2023-01-03

## 2023-01-03 RX ORDER — FAMOTIDINE 10 MG/ML
20 INJECTION INTRAVENOUS ONCE
Refills: 0 | Status: COMPLETED | OUTPATIENT
Start: 2023-01-03 | End: 2023-01-03

## 2023-01-03 RX ADMIN — FAMOTIDINE 20 MILLIGRAM(S): 10 INJECTION INTRAVENOUS at 18:22

## 2023-01-03 RX ADMIN — Medication 50 MILLIGRAM(S): at 18:20

## 2023-01-03 RX ADMIN — SODIUM CHLORIDE 1000 MILLILITER(S): 9 INJECTION INTRAMUSCULAR; INTRAVENOUS; SUBCUTANEOUS at 18:20

## 2023-01-03 RX ADMIN — Medication 10 MILLIGRAM(S): at 18:21

## 2023-01-03 NOTE — ED ADULT NURSE NOTE - OBJECTIVE STATEMENT
36y/o male A&ox3, ambulatory received in t1. pt c/o generalized body aches/pain, n/v since yesterday. endorsing drinking alcohol yesterday. denies daily alcohol use. 20g Iv placed in RAC, labs sent, covid sent. md at bedside for eval. bed in lowest position, side rails up, call bell in hand, safety maintained. awaiting further orders. will continue to monitor.

## 2023-01-03 NOTE — ED ADULT TRIAGE NOTE - CHIEF COMPLAINT QUOTE
37yr old male a&ox4 arrives to ED co chest pain radiating to left arm x1 day, respiration even and unlabored, pt denies any other complaints at this time.

## 2023-01-03 NOTE — ED PROVIDER NOTE - OBJECTIVE STATEMENT
Patient is a 37-year-old male with past medical drug abuse with withdrawal seizures coming in for diffuse body aches pickups and chest pain.  Patient states has been drinking heavily for the last 10 days.  Patient denies any nausea or vomiting.  Patient having subjective fevers and chills no cough no shortness of breath. Patient is a 37-year-old male with past medical hx of alcohol abuse with withdrawal seizures coming in for diffuse body aches pickups and chest pain.  Patient states has been drinking heavily for the last 10 days.  Patient denies any nausea or vomiting.  Patient having subjective fevers and chills no cough no shortness of breath.

## 2023-01-03 NOTE — ED PROVIDER NOTE - CARE PLAN
Principal Discharge DX:	Alcohol abuse  Secondary Diagnosis:	Gastritis  Secondary Diagnosis:	Chest pain   1

## 2023-01-03 NOTE — ED PROVIDER NOTE - CLINICAL SUMMARY MEDICAL DECISION MAKING FREE TEXT BOX
37-year-old male history of alcohol abuse and withdrawal with seizures presents with body aches and chest pain since last night patient states he has been drinking heavily for the last 10 days but in the last couple days he has been having hiccups body aches and chest pains.  Patient denies nausea or vomiting most severe complaint is the hiccups.  Has had subjective fevers and chills no cough no abdominal pain no shortness of breath.  Patient hiccuping exam abdomen nontender lungs clear CV regular rate and rhythm we will check labs lipase will give dose of Librium to prevent withdrawal check alcohol level check troponin EKG chest x-ray reassess

## 2023-01-11 ENCOUNTER — INPATIENT (INPATIENT)
Facility: HOSPITAL | Age: 38
LOS: 3 days | Discharge: LEFT BEFORE DISCHARGE | DRG: 894 | End: 2023-01-15
Attending: STUDENT IN AN ORGANIZED HEALTH CARE EDUCATION/TRAINING PROGRAM | Admitting: INTERNAL MEDICINE
Payer: MEDICAID

## 2023-01-11 VITALS
DIASTOLIC BLOOD PRESSURE: 79 MMHG | WEIGHT: 199.96 LBS | TEMPERATURE: 98 F | RESPIRATION RATE: 16 BRPM | SYSTOLIC BLOOD PRESSURE: 157 MMHG | OXYGEN SATURATION: 99 % | HEIGHT: 67 IN | HEART RATE: 130 BPM

## 2023-01-11 DIAGNOSIS — F10.10 ALCOHOL ABUSE, UNCOMPLICATED: ICD-10-CM

## 2023-01-11 DIAGNOSIS — Z90.49 ACQUIRED ABSENCE OF OTHER SPECIFIED PARTS OF DIGESTIVE TRACT: Chronic | ICD-10-CM

## 2023-01-11 DIAGNOSIS — F10.939 ALCOHOL USE, UNSPECIFIED WITH WITHDRAWAL, UNSPECIFIED: ICD-10-CM

## 2023-01-11 DIAGNOSIS — Z29.9 ENCOUNTER FOR PROPHYLACTIC MEASURES, UNSPECIFIED: ICD-10-CM

## 2023-01-11 DIAGNOSIS — Z98.890 OTHER SPECIFIED POSTPROCEDURAL STATES: Chronic | ICD-10-CM

## 2023-01-11 DIAGNOSIS — E87.1 HYPO-OSMOLALITY AND HYPONATREMIA: ICD-10-CM

## 2023-01-11 DIAGNOSIS — E87.5 HYPERKALEMIA: ICD-10-CM

## 2023-01-11 DIAGNOSIS — R74.01 ELEVATION OF LEVELS OF LIVER TRANSAMINASE LEVELS: ICD-10-CM

## 2023-01-11 LAB
ALBUMIN SERPL ELPH-MCNC: 2.9 G/DL — LOW (ref 3.3–5)
ALBUMIN SERPL ELPH-MCNC: 3.2 G/DL — LOW (ref 3.3–5)
ALP SERPL-CCNC: 193 U/L — HIGH (ref 40–120)
ALP SERPL-CCNC: 212 U/L — HIGH (ref 40–120)
ALT FLD-CCNC: 481 U/L — HIGH (ref 12–78)
ALT FLD-CCNC: 555 U/L — HIGH (ref 12–78)
AMPHET UR-MCNC: NEGATIVE — SIGNIFICANT CHANGE UP
ANION GAP SERPL CALC-SCNC: 12 MMOL/L — SIGNIFICANT CHANGE UP (ref 5–17)
ANION GAP SERPL CALC-SCNC: 15 MMOL/L — SIGNIFICANT CHANGE UP (ref 5–17)
ANION GAP SERPL CALC-SCNC: 6 MMOL/L — SIGNIFICANT CHANGE UP (ref 5–17)
APPEARANCE UR: CLEAR — SIGNIFICANT CHANGE UP
APTT BLD: 31.8 SEC — SIGNIFICANT CHANGE UP (ref 27.5–35.5)
AST SERPL-CCNC: 639 U/L — HIGH (ref 15–37)
AST SERPL-CCNC: 884 U/L — HIGH (ref 15–37)
BACTERIA # UR AUTO: ABNORMAL
BARBITURATES UR SCN-MCNC: NEGATIVE — SIGNIFICANT CHANGE UP
BASOPHILS # BLD AUTO: 0.01 K/UL — SIGNIFICANT CHANGE UP (ref 0–0.2)
BASOPHILS NFR BLD AUTO: 0.2 % — SIGNIFICANT CHANGE UP (ref 0–2)
BENZODIAZ UR-MCNC: NEGATIVE — SIGNIFICANT CHANGE UP
BILIRUB SERPL-MCNC: 3.8 MG/DL — HIGH (ref 0.2–1.2)
BILIRUB SERPL-MCNC: 4.2 MG/DL — HIGH (ref 0.2–1.2)
BILIRUB UR-MCNC: NEGATIVE — SIGNIFICANT CHANGE UP
BUN SERPL-MCNC: 3 MG/DL — LOW (ref 7–23)
BUN SERPL-MCNC: 4 MG/DL — LOW (ref 7–23)
BUN SERPL-MCNC: 5 MG/DL — LOW (ref 7–23)
CALCIUM SERPL-MCNC: 5.9 MG/DL — CRITICAL LOW (ref 8.5–10.1)
CALCIUM SERPL-MCNC: 7.5 MG/DL — LOW (ref 8.5–10.1)
CALCIUM SERPL-MCNC: 7.5 MG/DL — LOW (ref 8.5–10.1)
CHLORIDE SERPL-SCNC: 104 MMOL/L — SIGNIFICANT CHANGE UP (ref 96–108)
CHLORIDE SERPL-SCNC: 111 MMOL/L — HIGH (ref 96–108)
CHLORIDE SERPL-SCNC: 88 MMOL/L — LOW (ref 96–108)
CK MB CFR SERPL CALC: 47.7 NG/ML — HIGH (ref 0–3.6)
CK SERPL-CCNC: 7627 U/L — HIGH (ref 26–308)
CK SERPL-CCNC: 8690 U/L — HIGH (ref 26–308)
CK SERPL-CCNC: SIGNIFICANT CHANGE UP U/L (ref 26–308)
CO2 SERPL-SCNC: 23 MMOL/L — SIGNIFICANT CHANGE UP (ref 22–31)
CO2 SERPL-SCNC: 25 MMOL/L — SIGNIFICANT CHANGE UP (ref 22–31)
CO2 SERPL-SCNC: 26 MMOL/L — SIGNIFICANT CHANGE UP (ref 22–31)
COCAINE METAB.OTHER UR-MCNC: NEGATIVE — SIGNIFICANT CHANGE UP
COLOR SPEC: YELLOW — SIGNIFICANT CHANGE UP
CREAT SERPL-MCNC: 0.46 MG/DL — LOW (ref 0.5–1.3)
CREAT SERPL-MCNC: 0.63 MG/DL — SIGNIFICANT CHANGE UP (ref 0.5–1.3)
CREAT SERPL-MCNC: 0.63 MG/DL — SIGNIFICANT CHANGE UP (ref 0.5–1.3)
DIFF PNL FLD: ABNORMAL
EGFR: 126 ML/MIN/1.73M2 — SIGNIFICANT CHANGE UP
EGFR: 126 ML/MIN/1.73M2 — SIGNIFICANT CHANGE UP
EGFR: 138 ML/MIN/1.73M2 — SIGNIFICANT CHANGE UP
EOSINOPHIL # BLD AUTO: 0 K/UL — SIGNIFICANT CHANGE UP (ref 0–0.5)
EOSINOPHIL NFR BLD AUTO: 0 % — SIGNIFICANT CHANGE UP (ref 0–6)
EPI CELLS # UR: NEGATIVE — SIGNIFICANT CHANGE UP
ETHANOL SERPL-MCNC: 277 MG/DL — HIGH (ref 0–10)
ETHANOL SERPL-MCNC: 83 MG/DL — HIGH (ref 0–10)
GLUCOSE SERPL-MCNC: 107 MG/DL — HIGH (ref 70–99)
GLUCOSE SERPL-MCNC: 125 MG/DL — HIGH (ref 70–99)
GLUCOSE SERPL-MCNC: 89 MG/DL — SIGNIFICANT CHANGE UP (ref 70–99)
GLUCOSE UR QL: NEGATIVE — SIGNIFICANT CHANGE UP
HCT VFR BLD CALC: 40.4 % — SIGNIFICANT CHANGE UP (ref 39–50)
HGB BLD-MCNC: 14.8 G/DL — SIGNIFICANT CHANGE UP (ref 13–17)
IMM GRANULOCYTES NFR BLD AUTO: 0.2 % — SIGNIFICANT CHANGE UP (ref 0–0.9)
INR BLD: 1.15 RATIO — SIGNIFICANT CHANGE UP (ref 0.88–1.16)
KETONES UR-MCNC: NEGATIVE — SIGNIFICANT CHANGE UP
LACTATE SERPL-SCNC: 1.9 MMOL/L — SIGNIFICANT CHANGE UP (ref 0.7–2)
LACTATE SERPL-SCNC: 2.4 MMOL/L — HIGH (ref 0.7–2)
LEUKOCYTE ESTERASE UR-ACNC: ABNORMAL
LIDOCAIN IGE QN: 317 U/L — SIGNIFICANT CHANGE UP (ref 73–393)
LYMPHOCYTES # BLD AUTO: 0.9 K/UL — LOW (ref 1–3.3)
LYMPHOCYTES # BLD AUTO: 14.7 % — SIGNIFICANT CHANGE UP (ref 13–44)
MAGNESIUM SERPL-MCNC: 1.4 MG/DL — LOW (ref 1.6–2.6)
MAGNESIUM SERPL-MCNC: 1.7 MG/DL — SIGNIFICANT CHANGE UP (ref 1.6–2.6)
MANUAL SMEAR VERIFICATION: YES — SIGNIFICANT CHANGE UP
MCHC RBC-ENTMCNC: 27.8 PG — SIGNIFICANT CHANGE UP (ref 27–34)
MCHC RBC-ENTMCNC: 36.6 GM/DL — HIGH (ref 32–36)
MCV RBC AUTO: 75.8 FL — LOW (ref 80–100)
METHADONE UR-MCNC: NEGATIVE — SIGNIFICANT CHANGE UP
MONOCYTES # BLD AUTO: 0.43 K/UL — SIGNIFICANT CHANGE UP (ref 0–0.9)
MONOCYTES NFR BLD AUTO: 7 % — SIGNIFICANT CHANGE UP (ref 2–14)
NEUTROPHILS # BLD AUTO: 4.78 K/UL — SIGNIFICANT CHANGE UP (ref 1.8–7.4)
NEUTROPHILS NFR BLD AUTO: 77.9 % — HIGH (ref 43–77)
NITRITE UR-MCNC: NEGATIVE — SIGNIFICANT CHANGE UP
NRBC # BLD: 0 /100 WBCS — SIGNIFICANT CHANGE UP (ref 0–0)
OPIATES UR-MCNC: NEGATIVE — SIGNIFICANT CHANGE UP
PCP SPEC-MCNC: SIGNIFICANT CHANGE UP
PCP UR-MCNC: NEGATIVE — SIGNIFICANT CHANGE UP
PH UR: 7 — SIGNIFICANT CHANGE UP (ref 5–8)
PHOSPHATE SERPL-MCNC: 2.1 MG/DL — LOW (ref 2.5–4.5)
PHOSPHATE SERPL-MCNC: 2.9 MG/DL — SIGNIFICANT CHANGE UP (ref 2.5–4.5)
PLAT MORPH BLD: NORMAL — SIGNIFICANT CHANGE UP
PLATELET # BLD AUTO: 76 K/UL — LOW (ref 150–400)
POTASSIUM SERPL-MCNC: 2.9 MMOL/L — CRITICAL LOW (ref 3.5–5.3)
POTASSIUM SERPL-MCNC: 3.3 MMOL/L — LOW (ref 3.5–5.3)
POTASSIUM SERPL-MCNC: 7.5 MMOL/L — CRITICAL HIGH (ref 3.5–5.3)
POTASSIUM SERPL-SCNC: 2.9 MMOL/L — CRITICAL LOW (ref 3.5–5.3)
POTASSIUM SERPL-SCNC: 3.3 MMOL/L — LOW (ref 3.5–5.3)
POTASSIUM SERPL-SCNC: 7.5 MMOL/L — CRITICAL HIGH (ref 3.5–5.3)
PROT SERPL-MCNC: 6.4 G/DL — SIGNIFICANT CHANGE UP (ref 6–8.3)
PROT SERPL-MCNC: 6.6 G/DL — SIGNIFICANT CHANGE UP (ref 6–8.3)
PROT UR-MCNC: NEGATIVE — SIGNIFICANT CHANGE UP
PROTHROM AB SERPL-ACNC: 13.5 SEC — HIGH (ref 10.5–13.4)
RBC # BLD: 5.33 M/UL — SIGNIFICANT CHANGE UP (ref 4.2–5.8)
RBC # FLD: 13.8 % — SIGNIFICANT CHANGE UP (ref 10.3–14.5)
RBC BLD AUTO: SIGNIFICANT CHANGE UP
RBC CASTS # UR COMP ASSIST: SIGNIFICANT CHANGE UP /HPF (ref 0–4)
SARS-COV-2 RNA SPEC QL NAA+PROBE: SIGNIFICANT CHANGE UP
SODIUM SERPL-SCNC: 129 MMOL/L — LOW (ref 135–145)
SODIUM SERPL-SCNC: 140 MMOL/L — SIGNIFICANT CHANGE UP (ref 135–145)
SODIUM SERPL-SCNC: 141 MMOL/L — SIGNIFICANT CHANGE UP (ref 135–145)
SP GR SPEC: 1 — LOW (ref 1.01–1.02)
THC UR QL: NEGATIVE — SIGNIFICANT CHANGE UP
TROPONIN I, HIGH SENSITIVITY RESULT: 42.6 NG/L — SIGNIFICANT CHANGE UP
UROBILINOGEN FLD QL: NEGATIVE — SIGNIFICANT CHANGE UP
WBC # BLD: 6.13 K/UL — SIGNIFICANT CHANGE UP (ref 3.8–10.5)
WBC # FLD AUTO: 6.13 K/UL — SIGNIFICANT CHANGE UP (ref 3.8–10.5)
WBC UR QL: SIGNIFICANT CHANGE UP

## 2023-01-11 PROCEDURE — 99291 CRITICAL CARE FIRST HOUR: CPT

## 2023-01-11 PROCEDURE — 93010 ELECTROCARDIOGRAM REPORT: CPT | Mod: 77

## 2023-01-11 PROCEDURE — 99223 1ST HOSP IP/OBS HIGH 75: CPT | Mod: GC

## 2023-01-11 PROCEDURE — 74177 CT ABD & PELVIS W/CONTRAST: CPT | Mod: 26,MA

## 2023-01-11 PROCEDURE — 93010 ELECTROCARDIOGRAM REPORT: CPT

## 2023-01-11 RX ORDER — FOLIC ACID 0.8 MG
1 TABLET ORAL DAILY
Refills: 0 | Status: DISCONTINUED | OUTPATIENT
Start: 2023-01-11 | End: 2023-01-15

## 2023-01-11 RX ORDER — METOCLOPRAMIDE HCL 10 MG
10 TABLET ORAL ONCE
Refills: 0 | Status: COMPLETED | OUTPATIENT
Start: 2023-01-11 | End: 2023-01-11

## 2023-01-11 RX ORDER — SODIUM CHLORIDE 9 MG/ML
1000 INJECTION, SOLUTION INTRAVENOUS
Refills: 0 | Status: DISCONTINUED | OUTPATIENT
Start: 2023-01-11 | End: 2023-01-11

## 2023-01-11 RX ORDER — POTASSIUM CHLORIDE 20 MEQ
10 PACKET (EA) ORAL
Refills: 0 | Status: COMPLETED | OUTPATIENT
Start: 2023-01-11 | End: 2023-01-11

## 2023-01-11 RX ORDER — THIAMINE MONONITRATE (VIT B1) 100 MG
100 TABLET ORAL DAILY
Refills: 0 | Status: DISCONTINUED | OUTPATIENT
Start: 2023-01-11 | End: 2023-01-15

## 2023-01-11 RX ORDER — SODIUM CHLORIDE 9 MG/ML
1000 INJECTION INTRAMUSCULAR; INTRAVENOUS; SUBCUTANEOUS
Refills: 0 | Status: DISCONTINUED | OUTPATIENT
Start: 2023-01-11 | End: 2023-01-11

## 2023-01-11 RX ORDER — POTASSIUM CHLORIDE 20 MEQ
40 PACKET (EA) ORAL EVERY 4 HOURS
Refills: 0 | Status: COMPLETED | OUTPATIENT
Start: 2023-01-11 | End: 2023-01-11

## 2023-01-11 RX ORDER — SODIUM CHLORIDE 9 MG/ML
1000 INJECTION INTRAMUSCULAR; INTRAVENOUS; SUBCUTANEOUS ONCE
Refills: 0 | Status: COMPLETED | OUTPATIENT
Start: 2023-01-11 | End: 2023-01-11

## 2023-01-11 RX ORDER — ENOXAPARIN SODIUM 100 MG/ML
40 INJECTION SUBCUTANEOUS EVERY 24 HOURS
Refills: 0 | Status: DISCONTINUED | OUTPATIENT
Start: 2023-01-11 | End: 2023-01-15

## 2023-01-11 RX ORDER — MAGNESIUM SULFATE 500 MG/ML
2 VIAL (ML) INJECTION ONCE
Refills: 0 | Status: COMPLETED | OUTPATIENT
Start: 2023-01-11 | End: 2023-01-11

## 2023-01-11 RX ORDER — SODIUM CHLORIDE 9 MG/ML
1000 INJECTION INTRAMUSCULAR; INTRAVENOUS; SUBCUTANEOUS
Refills: 0 | Status: DISCONTINUED | OUTPATIENT
Start: 2023-01-11 | End: 2023-01-15

## 2023-01-11 RX ORDER — ACETAMINOPHEN 500 MG
650 TABLET ORAL EVERY 6 HOURS
Refills: 0 | Status: DISCONTINUED | OUTPATIENT
Start: 2023-01-11 | End: 2023-01-15

## 2023-01-11 RX ORDER — FAMOTIDINE 10 MG/ML
20 INJECTION INTRAVENOUS ONCE
Refills: 0 | Status: COMPLETED | OUTPATIENT
Start: 2023-01-11 | End: 2023-01-11

## 2023-01-11 RX ADMIN — Medication 10 MILLIEQUIVALENT(S): at 10:11

## 2023-01-11 RX ADMIN — ENOXAPARIN SODIUM 40 MILLIGRAM(S): 100 INJECTION SUBCUTANEOUS at 14:19

## 2023-01-11 RX ADMIN — Medication 25 GRAM(S): at 15:06

## 2023-01-11 RX ADMIN — Medication 10 MILLIGRAM(S): at 04:43

## 2023-01-11 RX ADMIN — Medication 100 MILLIEQUIVALENT(S): at 09:11

## 2023-01-11 RX ADMIN — Medication 10 MILLIEQUIVALENT(S): at 08:18

## 2023-01-11 RX ADMIN — SODIUM CHLORIDE 1000 MILLILITER(S): 9 INJECTION INTRAMUSCULAR; INTRAVENOUS; SUBCUTANEOUS at 04:43

## 2023-01-11 RX ADMIN — Medication 2 MILLIGRAM(S): at 13:15

## 2023-01-11 RX ADMIN — Medication 2 MILLIGRAM(S): at 21:35

## 2023-01-11 RX ADMIN — Medication 100 MILLIEQUIVALENT(S): at 11:28

## 2023-01-11 RX ADMIN — Medication 40 MILLIEQUIVALENT(S): at 16:48

## 2023-01-11 RX ADMIN — Medication 2 MILLIGRAM(S): at 08:38

## 2023-01-11 RX ADMIN — SODIUM CHLORIDE 120 MILLILITER(S): 9 INJECTION INTRAMUSCULAR; INTRAVENOUS; SUBCUTANEOUS at 13:52

## 2023-01-11 RX ADMIN — FAMOTIDINE 20 MILLIGRAM(S): 10 INJECTION INTRAVENOUS at 04:43

## 2023-01-11 RX ADMIN — Medication 1 MILLIGRAM(S): at 14:19

## 2023-01-11 RX ADMIN — SODIUM CHLORIDE 1000 MILLILITER(S): 9 INJECTION INTRAMUSCULAR; INTRAVENOUS; SUBCUTANEOUS at 10:11

## 2023-01-11 RX ADMIN — Medication 40 MILLIEQUIVALENT(S): at 21:35

## 2023-01-11 RX ADMIN — Medication 2 MILLIGRAM(S): at 16:48

## 2023-01-11 RX ADMIN — Medication 1 TABLET(S): at 14:19

## 2023-01-11 RX ADMIN — Medication 1 MILLIGRAM(S): at 04:53

## 2023-01-11 RX ADMIN — Medication 100 MILLIEQUIVALENT(S): at 07:18

## 2023-01-11 RX ADMIN — Medication 1 MILLIGRAM(S): at 05:10

## 2023-01-11 RX ADMIN — SODIUM CHLORIDE 150 MILLILITER(S): 9 INJECTION INTRAMUSCULAR; INTRAVENOUS; SUBCUTANEOUS at 21:37

## 2023-01-11 RX ADMIN — SODIUM CHLORIDE 1000 MILLILITER(S): 9 INJECTION INTRAMUSCULAR; INTRAVENOUS; SUBCUTANEOUS at 09:11

## 2023-01-11 RX ADMIN — SODIUM CHLORIDE 150 MILLILITER(S): 9 INJECTION INTRAMUSCULAR; INTRAVENOUS; SUBCUTANEOUS at 14:18

## 2023-01-11 RX ADMIN — Medication 1 MILLIGRAM(S): at 19:36

## 2023-01-11 RX ADMIN — Medication 100 MILLIGRAM(S): at 14:19

## 2023-01-11 RX ADMIN — SODIUM CHLORIDE 200 MILLILITER(S): 9 INJECTION, SOLUTION INTRAVENOUS at 07:18

## 2023-01-11 NOTE — ED ADULT NURSE NOTE - OBJECTIVE STATEMENT
Pt to ED states he has been binge drinking, hx ETOH abuse. Pt states last drink was Tuesday AM, SO states pt had p[ossible seizure yesterday afternoon, pt is now anxious, has been vomiting, CIWA 16.

## 2023-01-11 NOTE — ED PROVIDER NOTE - OBJECTIVE STATEMENT
37 year old male with a history of alcohol abuse, alcohol withdrawal seizures presents with chest pain, HTN, body aches, sweating that started last night.  History provided through  [id number].  Patient has been binge-drinking alcohol for several weeks.  Was seen in ED on 1/3 for similar complaints, was advised to be admitted but left.  Significant other at bedside states patient has continued to drink alcohol and feel unwell.  His last drink was approximately 24 hours ago.  He had a withdrawal seizure last night but refused to come to the hospital at that time.  No history of rehab or detox.  Patient denies drug use.  No PMD

## 2023-01-11 NOTE — CONSULT NOTE ADULT - ASSESSMENT
37 year old male with a history of alcohol abuse, alcohol withdrawal seizures presents with chest pain, HTN, body aches, sweating that started last night. Patient has been binge-drinking alcohol for several weeks    jennifer  seisures  etoh use disorder  atelectasis  chest pain  HTN      hydration  serial LFTs  serial labs  replete lytes  fall prec  assist with needs  CIWA monitoring  Ativan ATC with Hold parameters  Ativan PRN as per CIWA  folic acid  thiamine  ct reviewed  education - counseling - AA referral - emotional support  monitored unit

## 2023-01-11 NOTE — ED PROVIDER NOTE - CONSIDERATION OF ADMISSION OBSERVATION
clinical evidence of alcohol withdrawal, initial CIWA of 16, tachycardic, h/o seizure last night, CPK >15,000 Consideration of Admission/Observation

## 2023-01-11 NOTE — ED PROVIDER NOTE - MUSCULOSKELETAL, MLM
Spine appears normal, range of motion is not limited, no muscle or joint tenderness, +UE tremors, +tongue fasciculations

## 2023-01-11 NOTE — ED ADULT NURSE REASSESSMENT NOTE - NS ED NURSE REASSESS COMMENT FT1
0900 : patient returned back from CT stable and responds to sound 0900 : patient returned back from CT stable and able to be awoken from rest to sound.  patient placed back on monitor, fluids and potassium still running

## 2023-01-11 NOTE — CONSULT NOTE ADULT - ASSESSMENT
elevated lfts  etoh abuse     suspected alcoholic hepatitis  low DF  no role for steriods  monitor for etoh withdrawl  liver imaging noted  no need for further imaging

## 2023-01-11 NOTE — H&P ADULT - HISTORY OF PRESENT ILLNESS
37 year old male with a history of alcohol abuse, alcohol withdrawal seizures presents with chest pain, HTN, body aches, sweating that started last night. Patient has been binge-drinking alcohol for several weeks.  Was seen in ED on 1/3 for similar complaints, was advised to be admitted but left.  Significant other at bedside states patient has continued to drink alcohol and feel unwell.  His last drink was approximately 24 hours ago.  He had a withdrawal seizure last night but refused to come to the hospital at that time, wife convinced him last night to come in.     In the ED,   Vitals: T: 100.3, HR: 110, BP: 129/80, RR: 20, O2: 96% on RA   Significant labs: Lactate: 2.4, Na: 129, K: 2.9, Bili: 4.2, Alk phos: 212, AST/ALT: 884/555, CK: 8690, CKMB: 47.7, Alcohol: 277   EKG:  Imaging:   CT Abdomen and pelvis: Hepatomegaly and hepatic steatosis  Interventions: 1L NS x2, Reglan x1, 3 K-riders, Pepcid x1, Lorazpam 1mg IVP x1, Lorazpam 2mg IVP x1   37 year old male with a history of alcohol abuse, alcohol withdrawal seizures presents with signs of alcohol withdrawal. Patient poor historian and  used.  Patient has been binge-drinking alcohol for last 10 day per patient.  Was seen in ED on 1/3 for similar complaints, was advised to be admitted but left.  His last drink was approximately 24 hours ago. States he drank "a lot" of whiskey then started drinking approximately 10 beers /day. He had a withdrawal seizure last night but refused to come to the hospital at that time, wife convinced him last night to come in. States he does experience visional hallucinations though currently not experiencing. Admit to a little abdominal pain and SOB. Denies CP at this time. Patient seen and examined at bedside w/ active signs of withdrawal such as tremors and continues to urinate himself.     In the ED,   Vitals: T: 100.3, HR: 110, BP: 129/80, RR: 20, O2: 96% on RA   Significant labs: Lactate: 2.4, Na: 129, K: 2.9, Bili: 4.2, Alk phos: 212, AST/ALT: 884/555, CK: 8690, CKMB: 47.7, Alcohol: 277   EKG: NSR HR 97 BPM   Imaging:   CT Abdomen and pelvis: Hepatomegaly and hepatic steatosis  Interventions: 1L NS x2, Reglan x1, 3 K-riders, Pepcid x1, Lorazpam 1mg IVP x1, Lorazpam 2mg IVP x1

## 2023-01-11 NOTE — CONSULT NOTE ADULT - SUBJECTIVE AND OBJECTIVE BOX
DIANE GARAY  MRN-[id number]  Patient is a 37y old  Male who presents with a chief complaint of seizure     HPI:  37 year old male with a history of alcohol abuse, alcohol withdrawal seizures presents with chest pain, HTN, body aches, sweating that started last night. Patient has been binge-drinking alcohol for several weeks.  Was seen in ED on 1/3 for similar complaints, was advised to be admitted but left.  Significant other at bedside states patient has continued to drink alcohol and feel unwell.  His last drink was approximately 24 hours ago.  He had a withdrawal seizure last night but refused to come to the hospital at that time, wife convinced him last night to come in     On admission pt was in apparent withdrawal, had some hypokalemia with K+ of 2.9, ETOH hepatitis, dehydration, and elevated CK's likely elevated from seizure and not true rhabdo related  Pt received IVF X2 in her and was started on IVF @ 200, and was given Ativan 1mg IVP X1 mg IVP X2 mg IVP in ER for elevated CIWA. ICU c/s called for        Events in last 24 hours:     REVIEW OF SYSTEMS:    ROS not able to be obtained, pt received ativan on eval and is lethargic at this time       Physical Exam:  Vital Signs Last 24 Hrs  T(C): 37.9 (2023 09:42), Max: 37.9 (2023 09:42)  T(F): 100.3 (2023 09:42), Max: 100.3 (2023 09:42)  HR: 110 (2023 09:42) (106 - 130)  BP: 129/80 (2023 09:42) (129/80 - 157/79)  BP(mean): --  RR: 20 (2023 09:42) (16 - 24)  SpO2: 96% (2023 09:42) (96% - 99%)    Parameters below as of 2023 09:42  Patient On (Oxygen Delivery Method): room air        Gen:  Awake, arousable, lethargic from ativan   CNS: non focal, awakens to name 5/5 motor function in upper extremity, oriented to self and   Neck: no JVD  RES : clear , no wheezing                      CVS: Regular  rhythm. Normal S1/S2  Abd: Soft, non-distended. Bowel sounds present.  Skin: No rash.  Ext:  no edema    ============================I/O===========================   I&O's Detail    ============================ LABS =========================                        14.8   6.13  )-----------( 76       ( 2023 04:48 )             40.4     -    129<L>  |  88<L>  |  5<L>  ----------------------------<  125<H>  2.9<LL>   |  26  |  0.63    Ca    7.5<L>      2023 04:48    TPro  6.6  /  Alb  3.2<L>  /  TBili  4.2<H>  /  DBili  x   /  AST  884<H>  /  ALT  555<H>  /  AlkPhos  212<H>      LIVER FUNCTIONS - ( 2023 04:48 )  Alb: 3.2 g/dL / Pro: 6.6 g/dL / ALK PHOS: 212 U/L / ALT: 555 U/L / AST: 884 U/L / GGT: x               Urinalysis Basic - ( 2023 05:00 )    Color: Yellow / Appearance: Clear / S.005 / pH: x  Gluc: x / Ketone: Negative  / Bili: Negative / Urobili: Negative   Blood: x / Protein: Negative / Nitrite: Negative   Leuk Esterase: Moderate / RBC: 0-2 /HPF / WBC 0-2   Sq Epi: x / Non Sq Epi: Negative / Bacteria: Few      ======================================================  PAST MEDICAL & SURGICAL HISTORY:  Alcohol abuse      H/O hernia repair      S/P appendectomy        ====================ASSESSMENT ==============  1. ETOH abuse   2. ETOH withdrawal   3. ETOH withdrawal seizure    4. hypokalemia   5. Elevated CK's likely not Rhabdo, likely elevated from seizure  6. ETOH hepatitis     Plan:  -Can admit to tele with   -Pt on Day 1 without ETOH ingestion  -ETOH level 277  -Utox (-)  -CIWA protocol    -Start on Ativan taper with Ativan IVP 2mg q4-6h PRN for elevated CIWA   -Continue thiamine/Folic acid/MV   -No noted seizures at this time for DTt's   -1:1 constant observation, if needed, pt is not agitated at this time   -IVF hydration with NS @ 200, repeat and trend CK's   - KCL replacement 10mEq X3, please repeat BMP after runs are complete, will likely need more potassium replacement, consider KCL powder if able to tolerate PO   -CT head (-)  -Monitor LFT's in the setting of alcoholic hepatitis  -Diet as tolerated     Time Spent:  50 min

## 2023-01-11 NOTE — ED PROVIDER NOTE - CONSTITUTIONAL, MLM
normal... Well appearing, awake, alert, oriented to person, place, time/situation and  patient appears uncomfortable

## 2023-01-11 NOTE — ED ADULT NURSE REASSESSMENT NOTE - NS ED NURSE REASSESS COMMENT FT1
Patient received at 0700. patient resting in bed with periods of restlessness. patient A&Ox2. patient on CIWA protocol. IV intact. potassium and fluids infusing as tolerated.

## 2023-01-11 NOTE — CONSULT NOTE ADULT - SUBJECTIVE AND OBJECTIVE BOX
[facility]  Juliocesar Godfrey PA-C  [address] [place] [id number]  [phone]      Chief Complaint:  Patient is a 37y old  Male who presents with a chief complaint of Alcohol Withdrawal (2023 12:31)      HPI:37 year old male with a history of alcohol abuse, alcohol withdrawal seizures presents with signs of alcohol withdrawal. Patient poor historian and  used.  Patient has been binge-drinking alcohol for last 10 day per patient.  Was seen in ED on 1/3 for similar complaints, was advised to be admitted but left.  His last drink was approximately 24 hours ago. States he drank "a lot" of whiskey then started drinking approximately 10 beers /day. He had a withdrawal seizure last night but refused to come to the hospital at that time, wife convinced him last night to come in. States he does experience visional hallucinations though currently not experiencing. Admit to a little abdominal pain and SOB. Denies CP at this time. Patient seen and examined at bedside w/ active signs of withdrawal such as tremors and continues to urinate himself.     Allergies:  No Known Allergies      Medications:  acetaminophen     Tablet .. 650 milliGRAM(s) Oral every 6 hours PRN  enoxaparin Injectable 40 milliGRAM(s) SubCutaneous every 24 hours  folic acid 1 milliGRAM(s) Oral daily  LORazepam   Injectable   IV Push   LORazepam   Injectable 2 milliGRAM(s) IV Push every 30 minutes PRN  LORazepam   Injectable 2 milliGRAM(s) IV Push every 4 hours  multivitamin 1 Tablet(s) Oral daily  sodium chloride 0.9%. 1000 milliLiter(s) IV Continuous <Continuous>  thiamine 100 milliGRAM(s) Oral daily      PMHX/PSHX:  No pertinent past medical history    Alcohol abuse    H/O hernia repair    S/P appendectomy        Family history:  Family history of renal disease        Social History:     ROS:     General:  No wt loss, fevers, chills, night sweats, fatigue,   Eyes:  Good vision, no reported pain  ENT:  No sore throat, pain, runny nose, dysphagia  CV:  No pain, palpitations, hypo/hypertension  Resp:  No dyspnea, cough, tachypnea, wheezing  GI:  No pain, No nausea, No vomiting, No diarrhea, No constipation, No weight loss, No fever, No pruritis, No rectal bleeding, No tarry stools, No dysphagia,  :  No pain, bleeding, incontinence, nocturia  Muscle:  No pain, weakness  Neuro:  No weakness, tingling, memory problems  Psych:  No fatigue, insomnia, mood problems, depression  Endocrine:  No polyuria, polydipsia, cold/heat intolerance  Heme:  No petechiae, ecchymosis, easy bruisability  Skin:  No rash, tattoos, scars, edema      PHYSICAL EXAM:   Vital Signs:  Vital Signs Last 24 Hrs  T(C): 37.9 (2023 09:42), Max: 37.9 (2023 09:42)  T(F): 100.3 (2023 09:42), Max: 100.3 (2023 09:42)  HR: 110 (2023 09:42) (106 - 130)  BP: 129/80 (2023 09:42) (129/80 - 157/79)  BP(mean): --  RR: 20 (2023 09:42) (16 - 24)  SpO2: 96% (2023 09:42) (96% - 99%)    Parameters below as of 2023 09:42  Patient On (Oxygen Delivery Method): room air      Daily Height in cm: 170.18 (2023 03:36)    Daily     GENERAL:  Appears stated age,   HEENT:  NC/AT,    CHEST:  Full & symmetric excursion,   HEART:  Regular rhythm  ABDOMEN:  Soft, non-tender, non-distended,   EXTEREMITIES:  no cyanosis,clubbing or edema  SKIN:  No rash  NEURO:  Alert,    LABS:                        14.8   6.13  )-----------( 76       ( 2023 04:48 )             40.4     11    140  |  111<H>  |  3<L>  ----------------------------<  89  7.5<HH>   |  23  |  0.46<L>    Ca    5.9<LL>      2023 13:00  Phos  2.1       Mg     1.4         TPro  6.6  /  Alb  3.2<L>  /  TBili  4.2<H>  /  DBili  x   /  AST  884<H>  /  ALT  555<H>  /  AlkPhos  212<H>      LIVER FUNCTIONS - ( 2023 04:48 )  Alb: 3.2 g/dL / Pro: 6.6 g/dL / ALK PHOS: 212 U/L / ALT: 555 U/L / AST: 884 U/L / GGT: x           PT/INR - ( 2023 11:50 )   PT: 13.5 sec;   INR: 1.15 ratio         PTT - ( 2023 11:50 )  PTT:31.8 sec  Urinalysis Basic - ( 2023 05:00 )    Color: Yellow / Appearance: Clear / S.005 / pH: x  Gluc: x / Ketone: Negative  / Bili: Negative / Urobili: Negative   Blood: x / Protein: Negative / Nitrite: Negative   Leuk Esterase: Moderate / RBC: 0-2 /HPF / WBC 0-2   Sq Epi: x / Non Sq Epi: Negative / Bacteria: Few      Amylase Serum--      Lipase [id number]       Ammonia--      Imaging:

## 2023-01-11 NOTE — H&P ADULT - NSHPREVIEWOFSYSTEMS_GEN_ALL_CORE
CONSTITUTIONAL: denies fever, chills, fatigue, + weakness  HEENT: denies blurred vision, sore throat  SKIN: denies new lesions, rash  CARDIOVASCULAR: denies chest pain, chest pressure, + palpitations  RESPIRATORY: + shortness of breath, denies sputum production  GASTROINTESTINAL: denies nausea, vomiting, diarrhea, + abdominal pain  GENITOURINARY: denies dysuria, discharge  NEUROLOGICAL: denies numbness, headache, focal weakness  MUSCULOSKELETAL: denies new joint pain, muscle aches  HEMATOLOGIC: denies gross bleeding, bruising  LYMPHATICS: denies enlarged lymph nodes, extremity swelling  PSYCHIATRIC: + anxiety, depression, +tremors   ENDOCRINOLOGIC: denies sweating, cold or heat intolerance CONSTITUTIONAL: denies fever, chills, fatigue, + weakness  HEENT: denies blurred vision, sore throat  SKIN: denies new lesions, rash  CARDIOVASCULAR: denies chest pain, chest pressure, + palpitations  RESPIRATORY: denies shortness of breath, denies sputum production  GASTROINTESTINAL: denies nausea, vomiting, diarrhea, + abdominal pain. admits constipation, denies melena/hematochezia  GENITOURINARY: denies dysuria, discharge  NEUROLOGICAL: denies numbness, headache, focal weakness  MUSCULOSKELETAL: denies new joint pain, muscle aches  HEMATOLOGIC: denies gross bleeding, bruising  LYMPHATICS: denies enlarged lymph nodes, extremity swelling  PSYCHIATRIC: + anxiety, depression, +tremors   ENDOCRINOLOGIC: denies sweating, cold or heat intolerance

## 2023-01-11 NOTE — ED PROVIDER NOTE - CLINICAL SUMMARY MEDICAL DECISION MAKING FREE TEXT BOX
37 year old male with a history of alcohol abuse p/w chest pain, aches, SOB, diaphoresis and elevated BP.  Binge drinking for several weeks, seizure reported last night.  .  Check labs, lactate, serum etoh level, Utox, hydrate, IV ativan, and admit to alcohol withdrawal, seizure precautions.  Consult ICU 37 year old male with a history of alcohol abuse p/w chest pain, aches, SOB, diaphoresis and elevated BP.  Binge drinking for several weeks, seizure reported last night.  .  Check labs, lactate, serum etoh level, Utox, hydrate, IV ativan, and admit to alcohol withdrawal, seizure precautions.  Consult ICU    Palm: pt seen by ICU who declined admission stating pt is improving and is stable for ativan taper on tele

## 2023-01-11 NOTE — ED ADULT TRIAGE NOTE - CHIEF COMPLAINT QUOTE
Patient presents to ED with complaint of high blood pressure and high blood sugar BP in /79; was drinking alcohol at home FS in

## 2023-01-11 NOTE — ED ADULT NURSE NOTE - INTERVENTIONS DEFINITIONS
[place] to call system/Call bell, personal items and telephone within reach/Instruct patient to call for assistance/Physically safe environment: no spills, clutter or unnecessary equipment/Stretcher in lowest position, wheels locked, appropriate side rails in place/Monitor gait and stability

## 2023-01-11 NOTE — H&P ADULT - ATTENDING COMMENTS
.  Daija used [id number]  HPI as above      T(C): 37.9 (01-11-23 @ 09:42), Max: 37.9 (01-11-23 @ 09:42)  HR: 110 (01-11-23 @ 09:42) (106 - 130)  BP: 129/80 (01-11-23 @ 09:42) (129/80 - 157/79)  RR: 20 (01-11-23 @ 09:42) (16 - 24)  SpO2: 96% (01-11-23 @ 09:42) (96% - 99%)  Wt(kg): --    Physical Exam:   GENERAL: well-groomed, well-developed, tremulous  HEENT: head NC/AT; EOM intact, PERRLA, conjunctiva & sclera clear; hearing grossly intact, dry mucous membranes  NECK: supple, no JVD  RESPIRATORY: CTA B/L, no wheezing, rales, rhonchi or rubs  CARDIOVASCULAR: S1&S2, +tachy, no murmurs  ABDOMEN: soft, non-tender, non-distended, + Bowel sounds x4 quadrants, no guarding, rebound or rigidity  MUSCULOSKELETAL:  no clubbing, cyanosis or edema of all 4 extremities  LYMPH: no cervical lymphadenopathy  VASCULAR: Radial pulses 2+ bilaterally, no varicose veins   SKIN: warm and dry, color normal  NEUROLOGIC: AA&O X3, CN2-12 intact w/ no focal deficits, no sensory loss, motor Strength 5/5 in UE & LE B/L  Psych: anxious    Plan:  Admit for EtOH withdrawal:  WA protocol  -ativan as ordered  -monitor electrolytes  -will replete labs stat, suspect that repeat labs with K 7.5 erroneous and drawn from IV site where Potassium via IV is running.   EtOH Hepatitis: MDF 11 points, no indication for steroids  -trend LFT's  -monitor PT/INR  -counselled on etoh cessation.     Thrombocytopenia: likely due to hx of EtOH Dependence  -continue to monitor    DVT ppx: lovenox

## 2023-01-11 NOTE — CHART NOTE - NSCHARTNOTEFT_GEN_A_CORE
Called by RN as patient is sinus tachy 110-130s on tele monitor. As per nurse, patient has been having fluctuating heart rate's since his admission. Patient is admitted for alcohol withdrawal and is on ativan CIWA protocol. Patient last received ativan 2mg at 21:35 and current CIWA score is 7. Patient seen and examined at bedside. Patient _________________. EKG obtained and reviewed which reveals ____. Will order CMP, mg, phos stat to access for electrolyte abnormalities. Discussed w/ Dr. Clark. Rn to call with further changes. Called by RN as patient is sinus tachy 110-130s on tele monitor. As per nurse, patient has been having fluctuating heart rate's since his admission. Patient is admitted for alcohol withdrawal and is on ativan CIWA protocol. Patient last received ativan 2mg at 21:35 and current CIWA score is 7. Patient seen and examined at bedside. Patient seen and examined at bedside. Appears to be slightly anxious and has tremors. However denies chest pain, palpitations, and shortness of breath. EKG obtained and reviewed which reveals sinus tachycardia with w/ VR of 129bpm. Will order CMP, mg, phos stat to access for electrolyte abnormalities. Discussed w/ Dr. Clark. Rn to call with further changes.

## 2023-01-11 NOTE — H&P ADULT - PROBLEM SELECTOR PLAN 4
CT abdomen showed Hepatomegaly and hepatic steatosis and elevated LFTs  - Continue to monitor LFTs   - GI (Dr. Leach) consulted, f/u recs CT abdomen showed Hepatomegaly and hepatic steatosis and elevated LFTs  -likely has Alcoholic hepatitis  -Stephanie Dsicriminant function is 11.1, so no indication for steroids  - Continue to monitor LFTs   - GI (Dr. Leach) consulted, f/u recs

## 2023-01-11 NOTE — H&P ADULT - PROBLEM SELECTOR PLAN 3
2.9 on admission likely 2/2 to poor PO intake   - S/P 3 K riders   - Repeat STAT labs   - Monitor and replete PRN Hypokalemia  2.9 on admission likely 2/2 to poor PO intake   - S/P 3 K riders   - Repeat STAT labs   - Monitor and replete PRN

## 2023-01-11 NOTE — CONSULT NOTE ADULT - SUBJECTIVE AND OBJECTIVE BOX
Date/Time Patient Seen:  		  Referring MD:   Data Reviewed	       Patient is a 37y old  Male who presents with a chief complaint of Alcohol Withdrawal (11 Jan 2023 12:14)      Subjective/HPI   37 year old male with a history of alcohol abuse, alcohol withdrawal seizures presents with chest pain, HTN, body aches, sweating that started last night. Patient has been binge-drinking alcohol for several weeks.  Was seen in ED on 1/3 for similar complaints, was advised to be admitted but left.  Significant other at bedside states patient has continued to drink alcohol and feel unwell.  His last drink was approximately 24 hours ago.  He had a withdrawal seizure last night but refused to come to the hospital at that time, wife convinced him last night to come in.     In the ED,   Vitals: T: 100.3, HR: 110, BP: 129/80, RR: 20, O2: 96% on RA   Significant labs: Lactate: 2.4, Na: 129, K: 2.9, Bili: 4.2, Alk phos: 212, AST/ALT: 884/555, CK: 8690, CKMB: 47.7, Alcohol: 277   EKG:  Imaging:   CT Abdomen and pelvis: Hepatomegaly and hepatic steatosis  Interventions: 1L NS x2, Reglan x1, 3 K-riders, Pepcid x1, Lorazpam 1mg IVP x1, Lorazpam 2mg IVP x1    PAST MEDICAL & SURGICAL HISTORY:  No pertinent past medical history    Alcohol abuse    H/O hernia repair    S/P appendectomy    PAST MEDICAL HISTORY:  Alcohol abuse.     PAST SURGICAL HISTORY:  H/O hernia repair     S/P appendectomy.     FAMILY HISTORY:  Family history of renal disease, Uncle.     Tobacco Usage:  · Tobacco Usage	Never smoker    · Attestation Comment: I have reviewed and confirmed nurses' notes for patient's medications, allergies, medical history, and surgical history.    ALLERGIES AND HOME MEDICATIONS:   Allergies:        Allergies:  	No Known Allergies:     Home Medications:   * Patient Currently Takes Medications as of 04-Sep-2020 08:18 documented in Structured Notes  · 	Actigall 300 mg oral capsule: 1 cap(s) orally 3 times a day  · 	pantoprazole 40 mg oral delayed release tablet: 1 tab(s) orally once a day (before a meal)  · 	Multiple Vitamins oral tablet: 1 tab(s) orally once a day  · 	folic acid 1 mg oral tablet: 1 tab(s) orally once a day  · 	thiamine 100 mg oral tablet: 1 tab(s) orally once a day  · 	cefpodoxime 200 mg oral tablet: 1 tab(s) orally 2 times a day     REVIEW OF SYSTEMS:    Review of Systems:  · CONSTITUTIONAL: - - -  · Constitutional [+]: CHILLS  · Constitutional [-]: no fever, no night sweats, no weight loss  · CARDIOVASCULAR: - - -  · Cardiovascular [+]: CHEST PAIN  · Cardiovascular [-]: no palpitations, no peripheral edema, no syncope  · RESPIRATORY: - - -  · Respiratory [+]: SHORTNESS OF BREATH  · Respiratory [-]: no cough, no exertional dyspnea, no wheezing, no orthopnea  · GASTROINTESTINAL: - - -  · Gastrointestinal [-]: no abdominal pain, no diarrhea, no nausea, no vomiting  · MUSCULOSKELETAL: no back pain, no gout, no musculoskeletal pain, no neck pain, and no weakness.        Medication list         MEDICATIONS  (STANDING):  sodium chloride 0.9% 1000 milliLiter(s) (200 mL/Hr) IV Continuous <Continuous>    MEDICATIONS  (PRN):         Vitals log        ICU Vital Signs Last 24 Hrs  T(C): 37.9 (11 Jan 2023 09:42), Max: 37.9 (11 Jan 2023 09:42)  T(F): 100.3 (11 Jan 2023 09:42), Max: 100.3 (11 Jan 2023 09:42)  HR: 110 (11 Jan 2023 09:42) (106 - 130)  BP: 129/80 (11 Jan 2023 09:42) (129/80 - 157/79)  BP(mean): --  ABP: --  ABP(mean): --  RR: 20 (11 Jan 2023 09:42) (16 - 24)  SpO2: 96% (11 Jan 2023 09:42) (96% - 99%)    O2 Parameters below as of 11 Jan 2023 09:42  Patient On (Oxygen Delivery Method): room air                 Input and Output:  I&O's Detail      Lab Data                        14.8   6.13  )-----------( 76       ( 11 Jan 2023 04:48 )             40.4     01-11    129<L>  |  88<L>  |  5<L>  ----------------------------<  125<H>  2.9<LL>   |  26  |  0.63    Ca    7.5<L>      11 Jan 2023 04:48    TPro  6.6  /  Alb  3.2<L>  /  TBili  4.2<H>  /  DBili  x   /  AST  884<H>  /  ALT  555<H>  /  AlkPhos  212<H>  01-11      CARDIAC MARKERS ( 11 Jan 2023 09:22 )  x     / x     / 8690 U/L / x     / x      CARDIAC MARKERS ( 11 Jan 2023 04:48 )  x     / x     / 09658  H U/L / x     / 47.7 ng/mL        Review of Systems	  altered      Objective     Physical Examination    heart s1s2  lung dec BS  head nc      Pertinent Lab findings & Imaging      Heredia:  NO   Adequate UO     I&O's Detail           Discussed with:     Cultures:	        Radiology      ACC: [id number] EXAM:  CT ABDOMEN AND PELVIS IC                          PROCEDURE DATE:  01/11/2023          INTERPRETATION:  CLINICAL INFORMATION: Acute liver failure, alcohol use    COMPARISON: MR dated 9/3/2020    CONTRAST/COMPLICATIONS:  IV Contrast: Omnipaque 350  90 cc administered   10 cc discarded  Oral Contrast: NONE  Complications: None reported at time of study completion    PROCEDURE:  CT of the Abdomen and Pelvis was performed.  Sagittal and coronal reformats were performed.    FINDINGS:  LOWER CHEST: Basilar atelectasis.    LIVER: Steatosis. Enlarged.  BILE DUCTS: Normal caliber.  GALLBLADDER: Within normal limits.  SPLEEN: Within normal limits.  PANCREAS: Within normal limits.  ADRENALS: Within normal limits.  KIDNEYS/URETERS: Subcentimeter left renal hypodensity, too small to   further characterize. No hydronephrosis.    BLADDER: Within normal limits.  REPRODUCTIVE ORGANS: Prostate within normal limits.    BOWEL: No bowel obstruction. Appendix is not visualized. No evidence of   inflammation in the pericecal region.  PERITONEUM: No ascites.  VESSELS: Within normal limits.  RETROPERITONEUM/LYMPH NODES: No lymphadenopathy.  ABDOMINAL WALL: Right upper anterior abdominal wall metallic density.   Tiny fat-containing umbilical hernia.  BONES: Mild degenerative changes.    IMPRESSION:  Hepatomegaly and hepatic steatosis.        --- End of Report ---            DAVID QUEZADA MD; Attending Radiologist  This document has been electronically signed. Jan 11 2023  9:05AM

## 2023-01-11 NOTE — H&P ADULT - NSHPPHYSICALEXAM_GEN_ALL_CORE
VITALS:   T(C): 37.9 (01-11-23 @ 09:42), Max: 37.9 (01-11-23 @ 09:42)  HR: 110 (01-11-23 @ 09:42) (106 - 130)  BP: 129/80 (01-11-23 @ 09:42) (129/80 - 157/79)  RR: 20 (01-11-23 @ 09:42) (16 - 24)  SpO2: 96% (01-11-23 @ 09:42) (96% - 99%)    GENERAL: Laying in bed, active tremors, appears uncomfortable   HEAD:  Atraumatic, Normocephalic  EYES: EOMI, PERRLA, conjunctiva and sclera clear  ENT: Moist mucous membranes  NECK: Supple, No JVD  CHEST/LUNG: Clear to auscultation bilaterally; No rales, rhonchi, wheezing, or rubs. Unlabored respirations  HEART: Regular rate and rhythm; No murmurs, rubs, or gallops  ABDOMEN: BSx4; Soft, and nondistended. Diffuse abdominal tenderness   EXTREMITIES:  2+ Peripheral Pulses, brisk capillary refill. No clubbing, cyanosis, or edema  NERVOUS SYSTEM:  A&Ox3, no focal deficits   SKIN: No rashes or lesions

## 2023-01-11 NOTE — H&P ADULT - ASSESSMENT
37 year old male with a history of alcohol abuse, alcohol withdrawal seizures presents with signs of alcohol withdrawal.

## 2023-01-11 NOTE — H&P ADULT - PROBLEM SELECTOR PLAN 1
ETOH level of 277 and active signs of withdrawal   - Start maintenance IVF   - Ativan PRN as per CIWA   - Ativan taper as per Addiction Medicine   - Start on Thiamine, Folic aid and Multivitamin   - Social work consult for education and emotional support   - Keep on telemetry  - Diet at tolerated  - Addiction Medicine Consulted (Dr. Garcia), f/u recs ETOH level of 277 and active signs of withdrawal   - Start maintenance IVF   - Ativan PRN as per CIWA   - Ativan taper as per Addiction Medicine   - Start on Thiamine, Folic aid and Multivitamin   -seizure precuations  - Social work consult for education and emotional support   - Keep on telemetry  - Diet at tolerated  - Addiction Medicine Consulted (Dr. Garcia), f/u recs  -Dr Morales consulted due to reported seizure at home

## 2023-01-11 NOTE — ED ADULT NURSE REASSESSMENT NOTE - NS ED NURSE REASSESS COMMENT FT1
lab called for critical labs.  Dr. Palm made aware.  he will place another order for more repeat labs.

## 2023-01-12 DIAGNOSIS — M62.82 RHABDOMYOLYSIS: ICD-10-CM

## 2023-01-12 LAB
ALBUMIN SERPL ELPH-MCNC: 2.9 G/DL — LOW (ref 3.3–5)
ALBUMIN SERPL ELPH-MCNC: 3 G/DL — LOW (ref 3.3–5)
ALP SERPL-CCNC: 217 U/L — HIGH (ref 40–120)
ALP SERPL-CCNC: 231 U/L — HIGH (ref 40–120)
ALT FLD-CCNC: 426 U/L — HIGH (ref 12–78)
ALT FLD-CCNC: 444 U/L — HIGH (ref 12–78)
ANION GAP SERPL CALC-SCNC: 7 MMOL/L — SIGNIFICANT CHANGE UP (ref 5–17)
ANION GAP SERPL CALC-SCNC: 9 MMOL/L — SIGNIFICANT CHANGE UP (ref 5–17)
APTT BLD: 29.7 SEC — SIGNIFICANT CHANGE UP (ref 27.5–35.5)
AST SERPL-CCNC: 604 U/L — HIGH (ref 15–37)
AST SERPL-CCNC: 608 U/L — HIGH (ref 15–37)
BASOPHILS # BLD AUTO: 0.03 K/UL — SIGNIFICANT CHANGE UP (ref 0–0.2)
BASOPHILS NFR BLD AUTO: 0.8 % — SIGNIFICANT CHANGE UP (ref 0–2)
BILIRUB SERPL-MCNC: 4.3 MG/DL — HIGH (ref 0.2–1.2)
BILIRUB SERPL-MCNC: 4.5 MG/DL — HIGH (ref 0.2–1.2)
BUN SERPL-MCNC: 4 MG/DL — LOW (ref 7–23)
BUN SERPL-MCNC: 4 MG/DL — LOW (ref 7–23)
CALCIUM SERPL-MCNC: 7.9 MG/DL — LOW (ref 8.5–10.1)
CALCIUM SERPL-MCNC: 8.1 MG/DL — LOW (ref 8.5–10.1)
CHLORIDE SERPL-SCNC: 103 MMOL/L — SIGNIFICANT CHANGE UP (ref 96–108)
CHLORIDE SERPL-SCNC: 104 MMOL/L — SIGNIFICANT CHANGE UP (ref 96–108)
CK SERPL-CCNC: 4054 U/L — HIGH (ref 26–308)
CK SERPL-CCNC: 5960 U/L — HIGH (ref 26–308)
CO2 SERPL-SCNC: 26 MMOL/L — SIGNIFICANT CHANGE UP (ref 22–31)
CO2 SERPL-SCNC: 28 MMOL/L — SIGNIFICANT CHANGE UP (ref 22–31)
CREAT SERPL-MCNC: 0.64 MG/DL — SIGNIFICANT CHANGE UP (ref 0.5–1.3)
CREAT SERPL-MCNC: 0.7 MG/DL — SIGNIFICANT CHANGE UP (ref 0.5–1.3)
EGFR: 122 ML/MIN/1.73M2 — SIGNIFICANT CHANGE UP
EGFR: 125 ML/MIN/1.73M2 — SIGNIFICANT CHANGE UP
EOSINOPHIL # BLD AUTO: 0.04 K/UL — SIGNIFICANT CHANGE UP (ref 0–0.5)
EOSINOPHIL NFR BLD AUTO: 1 % — SIGNIFICANT CHANGE UP (ref 0–6)
GLUCOSE SERPL-MCNC: 104 MG/DL — HIGH (ref 70–99)
GLUCOSE SERPL-MCNC: 112 MG/DL — HIGH (ref 70–99)
HCT VFR BLD CALC: 39.6 % — SIGNIFICANT CHANGE UP (ref 39–50)
HGB BLD-MCNC: 13.5 G/DL — SIGNIFICANT CHANGE UP (ref 13–17)
IMM GRANULOCYTES NFR BLD AUTO: 0.3 % — SIGNIFICANT CHANGE UP (ref 0–0.9)
INR BLD: 1.07 RATIO — SIGNIFICANT CHANGE UP (ref 0.88–1.16)
LYMPHOCYTES # BLD AUTO: 0.97 K/UL — LOW (ref 1–3.3)
LYMPHOCYTES # BLD AUTO: 24.8 % — SIGNIFICANT CHANGE UP (ref 13–44)
MAGNESIUM SERPL-MCNC: 2.1 MG/DL — SIGNIFICANT CHANGE UP (ref 1.6–2.6)
MCHC RBC-ENTMCNC: 27.7 PG — SIGNIFICANT CHANGE UP (ref 27–34)
MCHC RBC-ENTMCNC: 34.1 GM/DL — SIGNIFICANT CHANGE UP (ref 32–36)
MCV RBC AUTO: 81.3 FL — SIGNIFICANT CHANGE UP (ref 80–100)
MONOCYTES # BLD AUTO: 0.27 K/UL — SIGNIFICANT CHANGE UP (ref 0–0.9)
MONOCYTES NFR BLD AUTO: 6.9 % — SIGNIFICANT CHANGE UP (ref 2–14)
NEUTROPHILS # BLD AUTO: 2.59 K/UL — SIGNIFICANT CHANGE UP (ref 1.8–7.4)
NEUTROPHILS NFR BLD AUTO: 66.2 % — SIGNIFICANT CHANGE UP (ref 43–77)
NRBC # BLD: 0 /100 WBCS — SIGNIFICANT CHANGE UP (ref 0–0)
PHOSPHATE SERPL-MCNC: 1.6 MG/DL — LOW (ref 2.5–4.5)
PLATELET # BLD AUTO: 59 K/UL — LOW (ref 150–400)
POTASSIUM SERPL-MCNC: 3.3 MMOL/L — LOW (ref 3.5–5.3)
POTASSIUM SERPL-MCNC: 3.4 MMOL/L — LOW (ref 3.5–5.3)
POTASSIUM SERPL-SCNC: 3.3 MMOL/L — LOW (ref 3.5–5.3)
POTASSIUM SERPL-SCNC: 3.4 MMOL/L — LOW (ref 3.5–5.3)
PROT SERPL-MCNC: 6.3 G/DL — SIGNIFICANT CHANGE UP (ref 6–8.3)
PROT SERPL-MCNC: 6.5 G/DL — SIGNIFICANT CHANGE UP (ref 6–8.3)
PROTHROM AB SERPL-ACNC: 12.5 SEC — SIGNIFICANT CHANGE UP (ref 10.5–13.4)
RBC # BLD: 4.87 M/UL — SIGNIFICANT CHANGE UP (ref 4.2–5.8)
RBC # FLD: 15.4 % — HIGH (ref 10.3–14.5)
SODIUM SERPL-SCNC: 138 MMOL/L — SIGNIFICANT CHANGE UP (ref 135–145)
SODIUM SERPL-SCNC: 139 MMOL/L — SIGNIFICANT CHANGE UP (ref 135–145)
WBC # BLD: 3.91 K/UL — SIGNIFICANT CHANGE UP (ref 3.8–10.5)
WBC # FLD AUTO: 3.91 K/UL — SIGNIFICANT CHANGE UP (ref 3.8–10.5)

## 2023-01-12 PROCEDURE — 99233 SBSQ HOSP IP/OBS HIGH 50: CPT | Mod: GC

## 2023-01-12 RX ORDER — SODIUM CHLORIDE 9 MG/ML
1000 INJECTION INTRAMUSCULAR; INTRAVENOUS; SUBCUTANEOUS
Refills: 0 | Status: DISCONTINUED | OUTPATIENT
Start: 2023-01-12 | End: 2023-01-15

## 2023-01-12 RX ORDER — POTASSIUM CHLORIDE 20 MEQ
40 PACKET (EA) ORAL EVERY 4 HOURS
Refills: 0 | Status: COMPLETED | OUTPATIENT
Start: 2023-01-12 | End: 2023-01-12

## 2023-01-12 RX ORDER — INFLUENZA VIRUS VACCINE 15; 15; 15; 15 UG/.5ML; UG/.5ML; UG/.5ML; UG/.5ML
0.5 SUSPENSION INTRAMUSCULAR ONCE
Refills: 0 | Status: DISCONTINUED | OUTPATIENT
Start: 2023-01-12 | End: 2023-01-15

## 2023-01-12 RX ORDER — POTASSIUM CHLORIDE 20 MEQ
40 PACKET (EA) ORAL ONCE
Refills: 0 | Status: DISCONTINUED | OUTPATIENT
Start: 2023-01-12 | End: 2023-01-12

## 2023-01-12 RX ADMIN — Medication 40 MILLIEQUIVALENT(S): at 14:44

## 2023-01-12 RX ADMIN — Medication 650 MILLIGRAM(S): at 06:22

## 2023-01-12 RX ADMIN — Medication 1.5 MILLIGRAM(S): at 17:48

## 2023-01-12 RX ADMIN — Medication 2 MILLIGRAM(S): at 14:46

## 2023-01-12 RX ADMIN — Medication 1 TABLET(S): at 12:05

## 2023-01-12 RX ADMIN — Medication 650 MILLIGRAM(S): at 17:48

## 2023-01-12 RX ADMIN — Medication 100 MILLIGRAM(S): at 12:05

## 2023-01-12 RX ADMIN — Medication 2 MILLIGRAM(S): at 20:42

## 2023-01-12 RX ADMIN — SODIUM CHLORIDE 75 MILLILITER(S): 9 INJECTION INTRAMUSCULAR; INTRAVENOUS; SUBCUTANEOUS at 14:44

## 2023-01-12 RX ADMIN — Medication 1 MILLIGRAM(S): at 12:05

## 2023-01-12 RX ADMIN — Medication 2 MILLIGRAM(S): at 06:18

## 2023-01-12 RX ADMIN — Medication 1.5 MILLIGRAM(S): at 22:09

## 2023-01-12 RX ADMIN — Medication 2 MILLIGRAM(S): at 00:55

## 2023-01-12 RX ADMIN — Medication 40 MILLIEQUIVALENT(S): at 12:05

## 2023-01-12 RX ADMIN — Medication 2 MILLIGRAM(S): at 09:51

## 2023-01-12 RX ADMIN — Medication 2 MILLIGRAM(S): at 02:01

## 2023-01-12 RX ADMIN — Medication 650 MILLIGRAM(S): at 18:48

## 2023-01-12 RX ADMIN — ENOXAPARIN SODIUM 40 MILLIGRAM(S): 100 INJECTION SUBCUTANEOUS at 12:04

## 2023-01-12 NOTE — PROGRESS NOTE ADULT - PROBLEM SELECTOR PLAN 1
ETOH level of 277 and active signs of withdrawal   - Continue IVF hydration NS @ 75 cc/hr   - Librium contraindicated given elevated LFTs   - Ativan PRN as per CIWA   - Ativan taper as per Addiction Medicine   - ICU consult for phenobarbital drip if breakthrough withdrawal symptoms occur   - Continue on Thiamine, Folic aid and Multivitamin   - Seizure precautions  - Keep on telemetry  - Diet at tolerated  - Addiction Medicine Consulted (Dr. Garcia), f/u recs  - Dr Morales consulted due to reported seizure at home ETOH level of 277 and active signs of withdrawal   - Continue IVF hydration NS @ 75 cc/hr   - Librium contraindicated given elevated LFTs   - Ativan PRN as per CIWA   - Ativan taper as per Addiction Medicine   - Continue on Thiamine, Folic aid and Multivitamin   - Seizure precautions  - Keep on telemetry  - Diet at tolerated  - Addiction Medicine Consulted (Dr. Garcia), f/u recs  - Dr Morales consulted due to reported seizure at home

## 2023-01-12 NOTE — PROGRESS NOTE ADULT - ASSESSMENT
37 year old male with a history of alcohol abuse, alcohol withdrawal seizures admitted for alcohol withdrawal

## 2023-01-12 NOTE — PROGRESS NOTE ADULT - SUBJECTIVE AND OBJECTIVE BOX
Patient is a 37y old  Male who presents with a chief complaint of Alcohol Withdrawal (2023 12:19)      INTERVAL HPI/OVERNIGHT EVENTS: Pt was seen and examined at bedside. Patient was admitted overnight and remained in Sinus Tachycardia (110s-130s). Pt states that he is feeling ok this morning. Denies and headache, confusion, abdominal pain, diaphoresis, N/V/D/C, or persistent tremor. Pt appears comfortable with no complaints at this time.     MEDICATIONS  (STANDING):  enoxaparin Injectable 40 milliGRAM(s) SubCutaneous every 24 hours  folic acid 1 milliGRAM(s) Oral daily  influenza   Vaccine 0.5 milliLiter(s) IntraMuscular once  LORazepam   Injectable   IV Push   LORazepam   Injectable 2 milliGRAM(s) IV Push every 4 hours  LORazepam   Injectable 1.5 milliGRAM(s) IV Push every 4 hours  multivitamin 1 Tablet(s) Oral daily  potassium chloride    Tablet ER 40 milliEquivalent(s) Oral every 4 hours  sodium chloride 0.9%. 1000 milliLiter(s) (150 mL/Hr) IV Continuous <Continuous>  thiamine 100 milliGRAM(s) Oral daily    MEDICATIONS  (PRN):  acetaminophen     Tablet .. 650 milliGRAM(s) Oral every 6 hours PRN Temp greater or equal to 38C (100.4F), Mild Pain (1 - 3)  LORazepam   Injectable 2 milliGRAM(s) IV Push every 30 minutes PRN for ciwa > 8      Allergies    No Known Allergies    Intolerances        REVIEW OF SYSTEMS:  CONSTITUTIONAL: No fever or chills  HEENT:  No headache, no sore throat  RESPIRATORY: No cough, wheezing, or shortness of breath  CARDIOVASCULAR: No chest pain, palpitations  GASTROINTESTINAL: No abd pain, nausea, vomiting, or diarrhea  GENITOURINARY: No dysuria, frequency, or hematuria  NEUROLOGICAL: no focal weakness or dizziness  MUSCULOSKELETAL: no myalgias     Vital Signs Last 24 Hrs  T(C): 37.2 (2023 11:00), Max: 37.3 (2023 14:04)  T(F): 98.9 (2023 11:00), Max: 99.2 (2023 14:04)  HR: 132 (2023 12:33) (110 - 132)  BP: 145/92 (2023 11:00) (130/70 - 145/92)  BP(mean): --  RR: 19 (2023 11:00) (19 - 21)  SpO2: 92% (2023 11:00) (92% - 98%)    Parameters below as of 2023 11:00  Patient On (Oxygen Delivery Method): room air        PHYSICAL EXAM:  GENERAL: NAD, overweight man, asleep in bed  HEENT:  scleral icterus appreciated, moist mucous membrane   CHEST/LUNG:  CTA b/l, no rales, wheezes, or rhonchi  HEART:  S1/S2, tachycardic on exam, no murmurs appreciated   ABDOMEN:  BS+x4, soft, nontender, nondistended, no epigastric pain with deep palpation   EXTREMITIES: no edema, cyanosis, or calf tenderness   NERVOUS SYSTEM: answers questions and follows commands appropriately    LABS:                        13.5   3.91  )-----------( 59       ( 2023 06:56 )             39.6     CBC Full  -  ( 2023 06:56 )  WBC Count : 3.91 K/uL  Hemoglobin : 13.5 g/dL  Hematocrit : 39.6 %  Platelet Count - Automated : 59 K/uL  Mean Cell Volume : 81.3 fl  Mean Cell Hemoglobin : 27.7 pg  Mean Cell Hemoglobin Concentration : 34.1 gm/dL  Auto Neutrophil # : 2.59 K/uL  Auto Lymphocyte # : 0.97 K/uL  Auto Monocyte # : 0.27 K/uL  Auto Eosinophil # : 0.04 K/uL  Auto Basophil # : 0.03 K/uL  Auto Neutrophil % : 66.2 %  Auto Lymphocyte % : 24.8 %  Auto Monocyte % : 6.9 %  Auto Eosinophil % : 1.0 %  Auto Basophil % : 0.8 %    2023 06:56    139    |  104    |  4      ----------------------------<  104    3.3     |  28     |  0.64     Ca    7.9        2023 06:56  Phos  2.9       2023 13:50  Mg     1.7       2023 13:50    TPro  6.3    /  Alb  2.9    /  TBili  4.3    /  DBili  2.7    /  AST  604    /  ALT  426    /  AlkPhos  217    2023 06:56    PT/INR - ( 2023 06:56 )   PT: 12.5 sec;   INR: 1.07 ratio         PTT - ( 2023 06:56 )  PTT:29.7 sec  Urinalysis Basic - ( 2023 05:00 )    Color: Yellow / Appearance: Clear / S.005 / pH: x  Gluc: x / Ketone: Negative  / Bili: Negative / Urobili: Negative   Blood: x / Protein: Negative / Nitrite: Negative   Leuk Esterase: Moderate / RBC: 0-2 /HPF / WBC 0-2   Sq Epi: x / Non Sq Epi: Negative / Bacteria: Few      CAPILLARY BLOOD GLUCOSE              RADIOLOGY & ADDITIONAL TESTS: ____    Personally reviewed.     Consultant(s) Notes Reviewed:  [x] YES  [ ] NO

## 2023-01-12 NOTE — PROGRESS NOTE ADULT - SUBJECTIVE AND OBJECTIVE BOX
[facility]  Juliocesar Godfrey PA-C  [address] [place] [address]  [phone]      INTERVAL HPI/OVERNIGHT EVENTS:    tachycardic overnight with elevated ciwa    MEDICATIONS  (STANDING):  enoxaparin Injectable 40 milliGRAM(s) SubCutaneous every 24 hours  folic acid 1 milliGRAM(s) Oral daily  influenza   Vaccine 0.5 milliLiter(s) IntraMuscular once  LORazepam   Injectable   IV Push   LORazepam   Injectable 2 milliGRAM(s) IV Push every 4 hours  LORazepam   Injectable 1.5 milliGRAM(s) IV Push every 4 hours  multivitamin 1 Tablet(s) Oral daily  potassium chloride    Tablet ER 40 milliEquivalent(s) Oral every 4 hours  sodium chloride 0.9%. 1000 milliLiter(s) (150 mL/Hr) IV Continuous <Continuous>  thiamine 100 milliGRAM(s) Oral daily    MEDICATIONS  (PRN):  acetaminophen     Tablet .. 650 milliGRAM(s) Oral every 6 hours PRN Temp greater or equal to 38C (100.4F), Mild Pain (1 - 3)  LORazepam   Injectable 2 milliGRAM(s) IV Push every 30 minutes PRN for ciwa > 8      Allergies    No Known Allergies    Intolerances        ROS:   General:  No wt loss, fevers, chills, night sweats, fatigue,   Eyes:  Good vision, no reported pain  ENT:  No sore throat, pain, runny nose, dysphagia  CV:  No pain, palpitations, hypo/hypertension  Resp:  No dyspnea, cough, tachypnea, wheezing  GI:  No pain, No nausea, No vomiting, No diarrhea, No constipation, No weight loss, No fever, No pruritis, No rectal bleeding, No tarry stools, No dysphagia,  :  No pain, bleeding, incontinence, nocturia  Muscle:  No pain, weakness  Neuro:  No weakness, tingling, memory problems  Psych:  No fatigue, insomnia, mood problems, depression  Endocrine:  No polyuria, polydipsia, cold/heat intolerance  Heme:  No petechiae, ecchymosis, easy bruisability  Skin:  No rash, tattoos, scars, edema      PHYSICAL EXAM:   Vital Signs:  Vital Signs Last 24 Hrs  T(C): 37.2 (2023 11:00), Max: 37.3 (2023 14:04)  T(F): 98.9 (2023 11:00), Max: 99.2 (2023 14:04)  HR: 122 (2023 11:00) (110 - 131)  BP: 145/92 (2023 11:00) (130/70 - 145/92)  BP(mean): --  RR: 19 (2023 11:00) (19 - 21)  SpO2: 92% (2023 11:00) (92% - 98%)    Parameters below as of 2023 11:00  Patient On (Oxygen Delivery Method): room air      Daily     Daily Weight in k.3 (2023 20:56)    GENERAL:  Appears stated age,   HEENT:  NC/AT,    CHEST:  Full & symmetric excursion,   HEART:  Regular rhythm,  ABDOMEN:  Soft, non-tender, non-distended,  EXTEREMITIES:  no cyanosis  SKIN:  No rash  NEURO:  Alert,       LABS:                        13.5   3.91  )-----------( 59       ( 2023 06:56 )             39.6     01-12    139  |  104  |  4<L>  ----------------------------<  104<H>  3.3<L>   |  28  |  0.64    Ca    7.9<L>      2023 06:56  Phos  2.9       Mg     1.7     11    TPro  6.3  /  Alb  2.9<L>  /  TBili  4.3<H>  /  DBili  2.7<H>  /  AST  604<H>  /  ALT  426<H>  /  AlkPhos  217<H>  -12    PT/INR - ( 2023 06:56 )   PT: 12.5 sec;   INR: 1.07 ratio         PTT - ( 2023 06:56 )  PTT:29.7 sec  Urinalysis Basic - ( 2023 05:00 )    Color: Yellow / Appearance: Clear / S.005 / pH: x  Gluc: x / Ketone: Negative  / Bili: Negative / Urobili: Negative   Blood: x / Protein: Negative / Nitrite: Negative   Leuk Esterase: Moderate / RBC: 0-2 /HPF / WBC 0-2   Sq Epi: x / Non Sq Epi: Negative / Bacteria: Few        RADIOLOGY & ADDITIONAL TESTS:

## 2023-01-12 NOTE — PROGRESS NOTE ADULT - SUBJECTIVE AND OBJECTIVE BOX
Date/Time Patient Seen:  		  Referring MD:   Data Reviewed	       Patient is a 37y old  Male who presents with a chief complaint of Alcohol Withdrawal (11 Jan 2023 14:07)      Subjective/HPI     PAST MEDICAL & SURGICAL HISTORY:  No pertinent past medical history    Alcohol abuse    H/O hernia repair    S/P appendectomy          Medication list         MEDICATIONS  (STANDING):  enoxaparin Injectable 40 milliGRAM(s) SubCutaneous every 24 hours  folic acid 1 milliGRAM(s) Oral daily  influenza   Vaccine 0.5 milliLiter(s) IntraMuscular once  LORazepam   Injectable   IV Push   LORazepam   Injectable 2 milliGRAM(s) IV Push every 4 hours  LORazepam   Injectable 1.5 milliGRAM(s) IV Push every 4 hours  multivitamin 1 Tablet(s) Oral daily  sodium chloride 0.9%. 1000 milliLiter(s) (150 mL/Hr) IV Continuous <Continuous>  thiamine 100 milliGRAM(s) Oral daily    MEDICATIONS  (PRN):  acetaminophen     Tablet .. 650 milliGRAM(s) Oral every 6 hours PRN Temp greater or equal to 38C (100.4F), Mild Pain (1 - 3)  LORazepam   Injectable 2 milliGRAM(s) IV Push every 30 minutes PRN for ciwa > 8         Vitals log        ICU Vital Signs Last 24 Hrs  T(C): 37.2 (11 Jan 2023 20:56), Max: 37.9 (11 Jan 2023 09:42)  T(F): 99 (11 Jan 2023 20:56), Max: 100.3 (11 Jan 2023 09:42)  HR: 122 (11 Jan 2023 20:56) (110 - 131)  BP: 143/91 (11 Jan 2023 20:56) (129/80 - 143/91)  BP(mean): --  ABP: --  ABP(mean): --  RR: 21 (11 Jan 2023 20:56) (19 - 21)  SpO2: 92% (11 Jan 2023 20:56) (92% - 98%)    O2 Parameters below as of 11 Jan 2023 20:56  Patient On (Oxygen Delivery Method): room air                 Input and Output:  I&O's Detail      Lab Data                        14.8   6.13  )-----------( 76       ( 11 Jan 2023 04:48 )             40.4     01-11    141  |  104  |  4<L>  ----------------------------<  107<H>  3.3<L>   |  25  |  0.63    Ca    7.5<L>      11 Jan 2023 13:50  Phos  2.9     01-11  Mg     1.7     01-11    TPro  6.4  /  Alb  2.9<L>  /  TBili  3.8<H>  /  DBili  x   /  AST  639<H>  /  ALT  481<H>  /  AlkPhos  193<H>  01-11      CARDIAC MARKERS ( 11 Jan 2023 13:00 )  x     / x     / 7627 U/L / x     / x      CARDIAC MARKERS ( 11 Jan 2023 09:22 )  x     / x     / 8690 U/L / x     / x      CARDIAC MARKERS ( 11 Jan 2023 04:48 )  x     / x     / 56300  H U/L / x     / 47.7 ng/mL  CARDIAC MARKERS ( 11 Jan 2023 00:19 )  x     / x     / 5960 U/L / x     / x            Review of Systems	      Objective     Physical Examination    heart s1s2  lung dec BS      Pertinent Lab findings & Imaging      Giovanna:  NO   Adequate UO     I&O's Detail           Discussed with:     Cultures:	        Radiology

## 2023-01-12 NOTE — PROGRESS NOTE ADULT - ASSESSMENT
37 year old male with a history of alcohol abuse, alcohol withdrawal seizures presents with chest pain, HTN, body aches, sweating that started last night. Patient has been binge-drinking alcohol for several weeks    jennifer  seisures  etoh use disorder  atelectasis  chest pain  HTN    remains with tremors - tachycardia - vs noted - labs reviewed  may need ICU eval for Phenobarbital trial consideration and or gtt - or more aggressive Benzo rx regimen,   pt with comorbidities and young age  would get ICU consult    hydration  serial LFTs  serial labs  replete lytes  fall prec  assist with needs  CIWA monitoring  Ativan ATC with Hold parameters  Ativan PRN as per CIWA  folic acid  thiamine  ct reviewed  education - counseling - AA referral - emotional support  monitored unit

## 2023-01-12 NOTE — PROGRESS NOTE ADULT - PROBLEM SELECTOR PLAN 2
Elevated Creatinine Kinase 5960, peak 20532   - Now downtrending, 4054   - S/p aggressive fluid resuscitation   - UA revealing moderate blood without evidence of blood cells 2/2 myoglobin secretion   - Continue IVF hydration NS @ 75 cc/hr   - Cr stable at 0.64   - Monitor renal function to ensure adequate clearance Elevated Creatinine Kinase 5960, peak 21711   - Now downtrending, 4054   - CKMD: 47.7 on admission   - S/p aggressive fluid resuscitation   - UA revealing moderate blood without evidence of blood cells 2/2 myoglobin secretion   - Continue IVF hydration NS @ 75 cc/hr   - Cr stable at 0.64   - Lactate 2.4 on admission, normalized to 1.9   - Monitor renal function to ensure adequate clearance

## 2023-01-13 LAB
ALBUMIN SERPL ELPH-MCNC: 2.9 G/DL — LOW (ref 3.3–5)
ALP SERPL-CCNC: 228 U/L — HIGH (ref 40–120)
ALT FLD-CCNC: 397 U/L — HIGH (ref 12–78)
AMMONIA BLD-MCNC: 102 UMOL/L — HIGH (ref 11–32)
ANION GAP SERPL CALC-SCNC: 8 MMOL/L — SIGNIFICANT CHANGE UP (ref 5–17)
AST SERPL-CCNC: 457 U/L — HIGH (ref 15–37)
BILIRUB SERPL-MCNC: 4.6 MG/DL — HIGH (ref 0.2–1.2)
BUN SERPL-MCNC: 4 MG/DL — LOW (ref 7–23)
CALCIUM SERPL-MCNC: 8.8 MG/DL — SIGNIFICANT CHANGE UP (ref 8.5–10.1)
CHLORIDE SERPL-SCNC: 101 MMOL/L — SIGNIFICANT CHANGE UP (ref 96–108)
CO2 SERPL-SCNC: 25 MMOL/L — SIGNIFICANT CHANGE UP (ref 22–31)
CREAT SERPL-MCNC: 0.72 MG/DL — SIGNIFICANT CHANGE UP (ref 0.5–1.3)
EGFR: 121 ML/MIN/1.73M2 — SIGNIFICANT CHANGE UP
GLUCOSE SERPL-MCNC: 118 MG/DL — HIGH (ref 70–99)
HCT VFR BLD CALC: 41.6 % — SIGNIFICANT CHANGE UP (ref 39–50)
HGB BLD-MCNC: 14.2 G/DL — SIGNIFICANT CHANGE UP (ref 13–17)
MCHC RBC-ENTMCNC: 28 PG — SIGNIFICANT CHANGE UP (ref 27–34)
MCHC RBC-ENTMCNC: 34.1 GM/DL — SIGNIFICANT CHANGE UP (ref 32–36)
MCV RBC AUTO: 82.1 FL — SIGNIFICANT CHANGE UP (ref 80–100)
NRBC # BLD: 0 /100 WBCS — SIGNIFICANT CHANGE UP (ref 0–0)
PLATELET # BLD AUTO: 79 K/UL — LOW (ref 150–400)
POTASSIUM SERPL-MCNC: 3.3 MMOL/L — LOW (ref 3.5–5.3)
POTASSIUM SERPL-SCNC: 3.3 MMOL/L — LOW (ref 3.5–5.3)
PROT SERPL-MCNC: 6.9 G/DL — SIGNIFICANT CHANGE UP (ref 6–8.3)
RBC # BLD: 5.07 M/UL — SIGNIFICANT CHANGE UP (ref 4.2–5.8)
RBC # FLD: 15.2 % — HIGH (ref 10.3–14.5)
SODIUM SERPL-SCNC: 134 MMOL/L — LOW (ref 135–145)
WBC # BLD: 5.47 K/UL — SIGNIFICANT CHANGE UP (ref 3.8–10.5)
WBC # FLD AUTO: 5.47 K/UL — SIGNIFICANT CHANGE UP (ref 3.8–10.5)

## 2023-01-13 PROCEDURE — 93010 ELECTROCARDIOGRAM REPORT: CPT

## 2023-01-13 PROCEDURE — 99233 SBSQ HOSP IP/OBS HIGH 50: CPT | Mod: GC

## 2023-01-13 RX ORDER — METOPROLOL TARTRATE 50 MG
2.5 TABLET ORAL ONCE
Refills: 0 | Status: COMPLETED | OUTPATIENT
Start: 2023-01-13 | End: 2023-01-13

## 2023-01-13 RX ORDER — POTASSIUM CHLORIDE 20 MEQ
40 PACKET (EA) ORAL ONCE
Refills: 0 | Status: COMPLETED | OUTPATIENT
Start: 2023-01-13 | End: 2023-01-13

## 2023-01-13 RX ORDER — LANOLIN ALCOHOL/MO/W.PET/CERES
3 CREAM (GRAM) TOPICAL ONCE
Refills: 0 | Status: COMPLETED | OUTPATIENT
Start: 2023-01-13 | End: 2023-01-13

## 2023-01-13 RX ADMIN — Medication 3 MILLIGRAM(S): at 20:25

## 2023-01-13 RX ADMIN — Medication 1.5 MILLIGRAM(S): at 09:08

## 2023-01-13 RX ADMIN — Medication 1 TABLET(S): at 12:33

## 2023-01-13 RX ADMIN — Medication 1.5 MILLIGRAM(S): at 14:10

## 2023-01-13 RX ADMIN — Medication 1.5 MILLIGRAM(S): at 02:01

## 2023-01-13 RX ADMIN — Medication 1.5 MILLIGRAM(S): at 05:32

## 2023-01-13 RX ADMIN — Medication 2 MILLIGRAM(S): at 23:42

## 2023-01-13 RX ADMIN — Medication 2.5 MILLIGRAM(S): at 20:25

## 2023-01-13 RX ADMIN — Medication 1 MILLIGRAM(S): at 21:28

## 2023-01-13 RX ADMIN — ENOXAPARIN SODIUM 40 MILLIGRAM(S): 100 INJECTION SUBCUTANEOUS at 14:10

## 2023-01-13 RX ADMIN — Medication 100 MILLIGRAM(S): at 12:33

## 2023-01-13 RX ADMIN — Medication 1 MILLIGRAM(S): at 12:33

## 2023-01-13 RX ADMIN — Medication 1 MILLIGRAM(S): at 17:20

## 2023-01-13 RX ADMIN — Medication 40 MILLIEQUIVALENT(S): at 14:09

## 2023-01-13 NOTE — PROGRESS NOTE ADULT - SUBJECTIVE AND OBJECTIVE BOX
[facility]  Juliocesar Godfrey PA-C  [address] [place] [address]  [phone]      INTERVAL HPI/OVERNIGHT EVENTS:  Pt s/e  Tolerating diet  Pt reports no N/V/D or further GI complaints    MEDICATIONS  (STANDING):  enoxaparin Injectable 40 milliGRAM(s) SubCutaneous every 24 hours  folic acid 1 milliGRAM(s) Oral daily  influenza   Vaccine 0.5 milliLiter(s) IntraMuscular once  LORazepam   Injectable   IV Push   LORazepam   Injectable 1.5 milliGRAM(s) IV Push every 4 hours  LORazepam   Injectable 1 milliGRAM(s) IV Push every 4 hours  multivitamin 1 Tablet(s) Oral daily  sodium chloride 0.9%. 1000 milliLiter(s) (150 mL/Hr) IV Continuous <Continuous>  sodium chloride 0.9%. 1000 milliLiter(s) (75 mL/Hr) IV Continuous <Continuous>  thiamine 100 milliGRAM(s) Oral daily    MEDICATIONS  (PRN):  acetaminophen     Tablet .. 650 milliGRAM(s) Oral every 6 hours PRN Temp greater or equal to 38C (100.4F), Mild Pain (1 - 3)  LORazepam   Injectable 2 milliGRAM(s) IV Push every 30 minutes PRN for ciwa > 8      Allergies    No Known Allergies      PHYSICAL EXAM:   Vital Signs:  Vital Signs Last 24 Hrs  T(C): 36.9 (2023 04:30), Max: 37.7 (2023 17:34)  T(F): 98.5 (2023 04:30), Max: 99.9 (2023 17:34)  HR: 123 (2023 04:30) (123 - 145)  BP: 141/87 (2023 04:30) (141/87 - 148/95)  BP(mean): --  RR: 20 (2023 04:30) (20 - 21)  SpO2: 95% (2023 04:30) (95% - 96%)    Parameters below as of 2023 04:30  Patient On (Oxygen Delivery Method): room air      Daily     Daily Weight in k.5 (2023 04:30)    GENERAL:  Appears stated age  HEENT:  NC/AT  CHEST:  Full & symmetric excursion  HEART:  Regular rhythm  ABDOMEN:  Soft, non-tender, non-distended  EXTEREMITIES:  no cyanosis  SKIN:  No rash  NEURO:  Alert      LABS:                        14.2   5.47  )-----------( 79       ( 2023 07:50 )             41.6         134<L>  |  101  |  4<L>  ----------------------------<  118<H>  3.3<L>   |  25  |  0.72    Ca    8.8      2023 07:50  Phos  2.9       Mg     1.7         TPro  6.9  /  Alb  2.9<L>  /  TBili  4.6<H>  /  DBili  x   /  AST  457<H>  /  ALT  397<H>  /  AlkPhos  228<H>      PT/INR - ( 2023 06:56 )   PT: 12.5 sec;   INR: 1.07 ratio         PTT - ( 2023 06:56 )  PTT:29.7 sec

## 2023-01-13 NOTE — CONSULT NOTE ADULT - ASSESSMENT
37M with pmhx as above now admitted for etoh abuse and withdrawal, also with suspected etoh w/d seizure prior to admission.    - would continue standing ativan taper as ordered as pt reports improvement in symptoms   - can use prn ativan for breakthrough as ordered   - no ICU needs at this time although he remains high risk in setting of likely seizure prior to admisssion   - Remainder of care per primary team   - Please re-consult should clinical condition change    37M with pmhx as above now admitted for etoh abuse and withdrawal, also with suspected etoh w/d seizure prior to admission.    - would continue standing ativan taper as ordered as pt reports improvement in symptoms   - can use prn ativan for breakthrough as ordered   - no ICU needs at this time although he remains high risk in setting of likely seizure prior to admission   - Remainder of care per primary team   - Please re-consult should clinical condition change     Above d/w Dr. Pollard, ICU attending, and primary team

## 2023-01-13 NOTE — PROGRESS NOTE ADULT - SUBJECTIVE AND OBJECTIVE BOX
Neurology follow up note    DIANE [id number]      Interval History:    Patient feels ok no new complaints.    Allergies    No Known Allergies    Intolerances        MEDICATIONS    acetaminophen     Tablet .. 650 milliGRAM(s) Oral every 6 hours PRN  enoxaparin Injectable 40 milliGRAM(s) SubCutaneous every 24 hours  folic acid 1 milliGRAM(s) Oral daily  influenza   Vaccine 0.5 milliLiter(s) IntraMuscular once  LORazepam   Injectable   IV Push   LORazepam   Injectable 2 milliGRAM(s) IV Push every 30 minutes PRN  LORazepam   Injectable 1.5 milliGRAM(s) IV Push every 4 hours  LORazepam   Injectable 1 milliGRAM(s) IV Push every 4 hours  multivitamin 1 Tablet(s) Oral daily  sodium chloride 0.9%. 1000 milliLiter(s) IV Continuous <Continuous>  sodium chloride 0.9%. 1000 milliLiter(s) IV Continuous <Continuous>  thiamine 100 milliGRAM(s) Oral daily              Vital Signs Last 24 Hrs  T(C): 36.9 (13 Jan 2023 04:30), Max: 37.7 (12 Jan 2023 17:34)  T(F): 98.5 (13 Jan 2023 04:30), Max: 99.9 (12 Jan 2023 17:34)  HR: 123 (13 Jan 2023 04:30) (121 - 145)  BP: 141/87 (13 Jan 2023 04:30) (141/87 - 148/95)  BP(mean): --  RR: 20 (13 Jan 2023 04:30) (19 - 21)  SpO2: 95% (13 Jan 2023 04:30) (92% - 96%)    Parameters below as of 13 Jan 2023 04:30  Patient On (Oxygen Delivery Method): room air          REVIEW OF SYSTEMS:  Limited secondary to the patient being disoriented.    PHYSICAL EXAMINATION:   HEENT:  Head:  Normocephalic, atraumatic.  Eyes:  No scleral icterus.  Ears:  Hearing hard to evaluate.  NECK:  Supple.  RESPIRATORY:  Decreased breath sounds bilaterally.  CARDIOVASCULAR:  S1 and S2 heard.  ABDOMEN:  Soft and nontender.  EXTREMITIES:  No clubbing or cyanosis was noted.      NEUROLOGIC:  The patient was arousable to verbal stimuli.  Location was home.  Extraocular movements were intact.  Speech was fluent.  Motor:  Bilateral upper and lower 4/5.  Positive bilateral hand tremors were noted.            LABS:  CBC Full  -  ( 12 Jan 2023 06:56 )  WBC Count : 3.91 K/uL  RBC Count : 4.87 M/uL  Hemoglobin : 13.5 g/dL  Hematocrit : 39.6 %  Platelet Count - Automated : 59 K/uL  Mean Cell Volume : 81.3 fl  Mean Cell Hemoglobin : 27.7 pg  Mean Cell Hemoglobin Concentration : 34.1 gm/dL  Auto Neutrophil # : 2.59 K/uL  Auto Lymphocyte # : 0.97 K/uL  Auto Monocyte # : 0.27 K/uL  Auto Eosinophil # : 0.04 K/uL  Auto Basophil # : 0.03 K/uL  Auto Neutrophil % : 66.2 %  Auto Lymphocyte % : 24.8 %  Auto Monocyte % : 6.9 %  Auto Eosinophil % : 1.0 %  Auto Basophil % : 0.8 %      01-12    139  |  104  |  4<L>  ----------------------------<  104<H>  3.3<L>   |  28  |  0.64    Ca    7.9<L>      12 Jan 2023 06:56  Phos  2.9     01-11  Mg     1.7     01-11    TPro  6.3  /  Alb  2.9<L>  /  TBili  4.3<H>  /  DBili  2.7<H>  /  AST  604<H>  /  ALT  426<H>  /  AlkPhos  217<H>  01-12    Hemoglobin A1C:     LIVER FUNCTIONS - ( 12 Jan 2023 06:56 )  Alb: 2.9 g/dL / Pro: 6.3 g/dL / ALK PHOS: 217 U/L / ALT: 426 U/L / AST: 604 U/L / GGT: x           Vitamin B12   PT/INR - ( 12 Jan 2023 06:56 )   PT: 12.5 sec;   INR: 1.07 ratio         PTT - ( 12 Jan 2023 06:56 )  PTT:29.7 sec      RADIOLOGY    ANALYSIS AND PLAN:  This is a 37-year-old with seizure event and altered mental status.  For episode of seizure event, suspect most likely secondary to alcohol withdrawal seizure.  At present, no need for antiepileptic medications.  Would recommend to continue the patient on a detoxification protocol.  For altered mental status most likely alcohol withdrawal, detoxification protocol with vitamin supplements.  We would recommend to check ammonia levels.    Attempted to contact spouse, Pat, at [phone] to obtain further information, no response.    Greater than 45 minutes of time was spent with the patient, plan of care, reviewing data, with greater than 50% of the visit was spent counseling and/or coordinating care with multidisciplinary healthcare team   Neurology follow up note    DIANE [id number]      Interval History:    Patient feels ok no new complaints wants to sleep     Allergies    No Known Allergies    Intolerances        MEDICATIONS    acetaminophen     Tablet .. 650 milliGRAM(s) Oral every 6 hours PRN  enoxaparin Injectable 40 milliGRAM(s) SubCutaneous every 24 hours  folic acid 1 milliGRAM(s) Oral daily  influenza   Vaccine 0.5 milliLiter(s) IntraMuscular once  LORazepam   Injectable   IV Push   LORazepam   Injectable 2 milliGRAM(s) IV Push every 30 minutes PRN  LORazepam   Injectable 1.5 milliGRAM(s) IV Push every 4 hours  LORazepam   Injectable 1 milliGRAM(s) IV Push every 4 hours  multivitamin 1 Tablet(s) Oral daily  sodium chloride 0.9%. 1000 milliLiter(s) IV Continuous <Continuous>  sodium chloride 0.9%. 1000 milliLiter(s) IV Continuous <Continuous>  thiamine 100 milliGRAM(s) Oral daily              Vital Signs Last 24 Hrs  T(C): 36.9 (13 Jan 2023 04:30), Max: 37.7 (12 Jan 2023 17:34)  T(F): 98.5 (13 Jan 2023 04:30), Max: 99.9 (12 Jan 2023 17:34)  HR: 123 (13 Jan 2023 04:30) (121 - 145)  BP: 141/87 (13 Jan 2023 04:30) (141/87 - 148/95)  BP(mean): --  RR: 20 (13 Jan 2023 04:30) (19 - 21)  SpO2: 95% (13 Jan 2023 04:30) (92% - 96%)    Parameters below as of 13 Jan 2023 04:30  Patient On (Oxygen Delivery Method): room air          REVIEW OF SYSTEMS:    Constitutional: No fever, chills, fatigue, weakness  Eyes: no eye pain, visual disturbances, or discharge  ENT:  No difficulty hearing, tinnitus, vertigo; No sinus or throat pain  Neck: No pain or stiffness  Respiratory: No cough, dyspnea, wheezing   Cardiovascular: No chest pain, palpitations,   Gastrointestinal: No abdominal or epigastric pain. No nausea, vomiting  No diarrhea or constipation.   Genitourinary: No dysuria, frequency, hematuria or incontinence  Neurological: No headaches, lightheadedness, vertigo, numbness or tremors  Psychiatric: No depression, anxiety, mood swings or difficulty sleeping  Musculoskeletal: No joint pain or swelling; No muscle, back or extremity pain  Skin: No itching, burning, rashes or lesions   Lymph Nodes: No enlarged glands  Endocrine: No heat or cold intolerance; No hair loss     PHYSICAL EXAMINATION:   HEENT:  Head:  Normocephalic, atraumatic.  Eyes:  No scleral icterus.  Ears:  Hearing hard to evaluate.  NECK:  Supple.  RESPIRATORY:  Decreased breath sounds bilaterally.  CARDIOVASCULAR:  S1 and S2 heard.  ABDOMEN:  Soft and nontender.  EXTREMITIES:  No clubbing or cyanosis was noted.      NEUROLOGIC:  The patient was awake   Location was home.  Extraocular movements were intact.  Speech was fluent.  Motor:  Bilateral upper and lower 4/5.  Positive bilateral hand tremors were noted.            LABS:  CBC Full  -  ( 12 Jan 2023 06:56 )  WBC Count : 3.91 K/uL  RBC Count : 4.87 M/uL  Hemoglobin : 13.5 g/dL  Hematocrit : 39.6 %  Platelet Count - Automated : 59 K/uL  Mean Cell Volume : 81.3 fl  Mean Cell Hemoglobin : 27.7 pg  Mean Cell Hemoglobin Concentration : 34.1 gm/dL  Auto Neutrophil # : 2.59 K/uL  Auto Lymphocyte # : 0.97 K/uL  Auto Monocyte # : 0.27 K/uL  Auto Eosinophil # : 0.04 K/uL  Auto Basophil # : 0.03 K/uL  Auto Neutrophil % : 66.2 %  Auto Lymphocyte % : 24.8 %  Auto Monocyte % : 6.9 %  Auto Eosinophil % : 1.0 %  Auto Basophil % : 0.8 %      01-12    139  |  104  |  4<L>  ----------------------------<  104<H>  3.3<L>   |  28  |  0.64    Ca    7.9<L>      12 Jan 2023 06:56  Phos  2.9     01-11  Mg     1.7     01-11    TPro  6.3  /  Alb  2.9<L>  /  TBili  4.3<H>  /  DBili  2.7<H>  /  AST  604<H>  /  ALT  426<H>  /  AlkPhos  217<H>  01-12    Hemoglobin A1C:     LIVER FUNCTIONS - ( 12 Jan 2023 06:56 )  Alb: 2.9 g/dL / Pro: 6.3 g/dL / ALK PHOS: 217 U/L / ALT: 426 U/L / AST: 604 U/L / GGT: x           Vitamin B12   PT/INR - ( 12 Jan 2023 06:56 )   PT: 12.5 sec;   INR: 1.07 ratio         PTT - ( 12 Jan 2023 06:56 )  PTT:29.7 sec      RADIOLOGY    ANALYSIS AND PLAN:  This is a 37-year-old with seizure event and altered mental status.  For episode of seizure event, suspect most likely secondary to alcohol withdrawal seizure.  At present, no need for antiepileptic medications.  Would recommend to continue the patient on a detoxification protocol.  For altered mental status most likely alcohol withdrawal, detoxification protocol with vitamin supplements.  We would recommend to check ammonia levels as needed     Attempted to contact spouse, Pat, at [phone] to obtain further information, no response.    Greater than 45 minutes of time was spent with the patient, plan of care, reviewing data, with greater than 50% of the visit was spent counseling and/or coordinating care with multidisciplinary healthcare team

## 2023-01-13 NOTE — CONSULT NOTE ADULT - NS PANP COMMENT GEN_ALL_CORE FT
36 yo man with Hx EtOH abuse, has been binge-drinking for several weeks.  He had suspected EtOH withdrawal sz at home and then came to ED the next day at urging of his family.  He was found to have rhabdo and EtOH hepatitis, both improving.  He is being treated for EtOH withdrawal on a standing IV ativan taper and has required 1 prn dose of ativan in last 24h.  He is calm and answering questions appropriately.    No need for ICU admission at this time.  Call if condition changes.

## 2023-01-13 NOTE — PROGRESS NOTE ADULT - ASSESSMENT
elevated lfts  etoh abuse     suspected alcoholic hepatitis  low DF  no role for steriods  monitor for etoh withdrawl  liver imaging noted  no need for further imaging     I reviewed the overnight course of events on the unit, re-confirming the patient history. I discussed the care with the patient and their family  Differential diagnosis and plan of care discussed with patient after the evaluation  35 minutes spent on total encounter of which more than fifty percent of the encounter was spent counseling and/or coordinating care by the attending physician.  Advanced care planning was discussed with patient and family.  Advanced care planning forms were reviewed and discussed.  Risks, benefits and alternatives of gastroenterologic procedures were discussed in detail and all questions were answered.

## 2023-01-13 NOTE — PROGRESS NOTE ADULT - SUBJECTIVE AND OBJECTIVE BOX
Patient is a 37y old  Male who presents with a chief complaint of Alcohol Withdrawal (13 Jan 2023 12:03)      Subjective:  INTERVAL HPI/OVERNIGHT EVENTS: Patient seen and examined at bedside. overnight and into this am pt remains tachycardic. has slight improvement with ativan. Pt this am is asking to go home. he was counseled extensively on risk of worsening withdrawal/seizure/death. he agrees to stay but wants to leave tomorrow. pt currently denies cp, palp, sob, abd pain, n/v/d   # [id number]      MEDICATIONS  (STANDING):  enoxaparin Injectable 40 milliGRAM(s) SubCutaneous every 24 hours  folic acid 1 milliGRAM(s) Oral daily  influenza   Vaccine 0.5 milliLiter(s) IntraMuscular once  LORazepam   Injectable   IV Push   LORazepam   Injectable 1.5 milliGRAM(s) IV Push every 4 hours  LORazepam   Injectable 1 milliGRAM(s) IV Push every 4 hours  multivitamin 1 Tablet(s) Oral daily  sodium chloride 0.9%. 1000 milliLiter(s) (150 mL/Hr) IV Continuous <Continuous>  sodium chloride 0.9%. 1000 milliLiter(s) (75 mL/Hr) IV Continuous <Continuous>  thiamine 100 milliGRAM(s) Oral daily    MEDICATIONS  (PRN):  acetaminophen     Tablet .. 650 milliGRAM(s) Oral every 6 hours PRN Temp greater or equal to 38C (100.4F), Mild Pain (1 - 3)  LORazepam   Injectable 2 milliGRAM(s) IV Push every 30 minutes PRN for ciwa > 8      Allergies    No Known Allergies    Intolerances        REVIEW OF SYSTEMS:  CONSTITUTIONAL: No fever or chills  HEENT:  No headache, no sore throat  RESPIRATORY: No cough, wheezing, or shortness of breath  CARDIOVASCULAR: No chest pain, palpitations  GASTROINTESTINAL: No abd pain, nausea, vomiting, or diarrhea  NEUROLOGICAL: no focal weakness or dizziness  MUSCULOSKELETAL: no myalgias     Objective:  Vital Signs Last 24 Hrs  T(C): 37.2 (13 Jan 2023 12:27), Max: 37.7 (12 Jan 2023 17:34)  T(F): 98.9 (13 Jan 2023 12:27), Max: 99.9 (12 Jan 2023 17:34)  HR: 137 (13 Jan 2023 12:27) (123 - 145)  BP: 138/85 (13 Jan 2023 12:27) (138/85 - 148/95)  BP(mean): --  RR: 19 (13 Jan 2023 12:27) (19 - 21)  SpO2: 93% (13 Jan 2023 12:27) (93% - 96%)    Parameters below as of 13 Jan 2023 12:27  Patient On (Oxygen Delivery Method): room air        PHYSICAL EXAM:  GENERAL: NAD, overweight man, mildly anxious appearing   HEENT:  scleral icterus appreciated, moist mucous membrane   CHEST/LUNG:  CTA b/l, no rales, wheezes, or rhonchi  HEART:  S1/S2, tachycardic on exam, no murmurs appreciated   ABDOMEN:  BS+x4, soft, nontender, nondistended, no epigastric pain with deep palpation   EXTREMITIES: no edema, cyanosis, or calf tenderness   NERVOUS SYSTEM: answers questions and follows commands appropriately    LABS:                        14.2   5.47  )-----------( 79       ( 13 Jan 2023 07:50 )             41.6     CBC Full  -  ( 13 Jan 2023 07:50 )  WBC Count : 5.47 K/uL  Hemoglobin : 14.2 g/dL  Hematocrit : 41.6 %  Platelet Count - Automated : 79 K/uL  Mean Cell Volume : 82.1 fl  Mean Cell Hemoglobin : 28.0 pg  Mean Cell Hemoglobin Concentration : 34.1 gm/dL  Auto Neutrophil # : x  Auto Lymphocyte # : x  Auto Monocyte # : x  Auto Eosinophil # : x  Auto Basophil # : x  Auto Neutrophil % : x  Auto Lymphocyte % : x  Auto Monocyte % : x  Auto Eosinophil % : x  Auto Basophil % : x    13 Jan 2023 07:50    134    |  101    |  4      ----------------------------<  118    3.3     |  25     |  0.72     Ca    8.8        13 Jan 2023 07:50    TPro  6.9    /  Alb  2.9    /  TBili  4.6    /  DBili  x      /  AST  457    /  ALT  397    /  AlkPhos  228    13 Jan 2023 07:50    PT/INR - ( 12 Jan 2023 06:56 )   PT: 12.5 sec;   INR: 1.07 ratio         PTT - ( 12 Jan 2023 06:56 )  PTT:29.7 sec    CAPILLARY BLOOD GLUCOSE            Culture - Urine (collected 01-11-23 @ 05:00)  Source: Clean Catch Clean Catch (Midstream)  Preliminary Report (01-12-23 @ 18:44):    >100,000 CFU/ml Escherichia coli        RADIOLOGY & ADDITIONAL TESTS:    Personally reviewed.     Consultant(s) Notes Reviewed:  [x] YES  [ ] NO

## 2023-01-13 NOTE — PROGRESS NOTE ADULT - SUBJECTIVE AND OBJECTIVE BOX
Date/Time Patient Seen:  		  Referring MD:   Data Reviewed	       Patient is a 37y old  Male who presents with a chief complaint of Alcohol Withdrawal (12 Jan 2023 12:52)      Subjective/HPI     PAST MEDICAL & SURGICAL HISTORY:  No pertinent past medical history    Alcohol abuse    H/O hernia repair    S/P appendectomy          Medication list         MEDICATIONS  (STANDING):  enoxaparin Injectable 40 milliGRAM(s) SubCutaneous every 24 hours  folic acid 1 milliGRAM(s) Oral daily  influenza   Vaccine 0.5 milliLiter(s) IntraMuscular once  LORazepam   Injectable   IV Push   LORazepam   Injectable 1.5 milliGRAM(s) IV Push every 4 hours  LORazepam   Injectable 1 milliGRAM(s) IV Push every 4 hours  multivitamin 1 Tablet(s) Oral daily  sodium chloride 0.9%. 1000 milliLiter(s) (150 mL/Hr) IV Continuous <Continuous>  sodium chloride 0.9%. 1000 milliLiter(s) (75 mL/Hr) IV Continuous <Continuous>  thiamine 100 milliGRAM(s) Oral daily    MEDICATIONS  (PRN):  acetaminophen     Tablet .. 650 milliGRAM(s) Oral every 6 hours PRN Temp greater or equal to 38C (100.4F), Mild Pain (1 - 3)  LORazepam   Injectable 2 milliGRAM(s) IV Push every 30 minutes PRN for ciwa > 8         Vitals log        ICU Vital Signs Last 24 Hrs  T(C): 36.9 (13 Jan 2023 04:30), Max: 37.7 (12 Jan 2023 17:34)  T(F): 98.5 (13 Jan 2023 04:30), Max: 99.9 (12 Jan 2023 17:34)  HR: 123 (13 Jan 2023 04:30) (121 - 145)  BP: 141/87 (13 Jan 2023 04:30) (141/87 - 148/95)  BP(mean): --  ABP: --  ABP(mean): --  RR: 20 (13 Jan 2023 04:30) (19 - 21)  SpO2: 95% (13 Jan 2023 04:30) (92% - 96%)    O2 Parameters below as of 13 Jan 2023 04:30  Patient On (Oxygen Delivery Method): room air                 Input and Output:  I&O's Detail    12 Jan 2023 07:01  -  13 Jan 2023 05:48  --------------------------------------------------------  IN:  Total IN: 0 mL    OUT:    Voided (mL): 900 mL  Total OUT: 900 mL    Total NET: -900 mL          Lab Data                        13.5   3.91  )-----------( 59       ( 12 Jan 2023 06:56 )             39.6     01-12    139  |  104  |  4<L>  ----------------------------<  104<H>  3.3<L>   |  28  |  0.64    Ca    7.9<L>      12 Jan 2023 06:56  Phos  2.9     01-11  Mg     1.7     01-11    TPro  6.3  /  Alb  2.9<L>  /  TBili  4.3<H>  /  DBili  2.7<H>  /  AST  604<H>  /  ALT  426<H>  /  AlkPhos  217<H>  01-12      CARDIAC MARKERS ( 12 Jan 2023 06:56 )  x     / x     / 4054 U/L / x     / x      CARDIAC MARKERS ( 11 Jan 2023 13:00 )  x     / x     / 7627 U/L / x     / x      CARDIAC MARKERS ( 11 Jan 2023 09:22 )  x     / x     / 8690 U/L / x     / x            Review of Systems	      Objective     Physical Examination    heart s1s2  lung dec BS      Pertinent Lab findings & Imaging      Giovanna:  NO   Adequate UO     I&O's Detail    12 Jan 2023 07:01  -  13 Jan 2023 05:48  --------------------------------------------------------  IN:  Total IN: 0 mL    OUT:    Voided (mL): 900 mL  Total OUT: 900 mL    Total NET: -900 mL               Discussed with:     Cultures:	        Radiology

## 2023-01-13 NOTE — CHART NOTE - NSCHARTNOTEFT_GEN_A_CORE
Called by RN as patient's HR on tele monitor is ranging from 140-160s sinus. Patient seen and examined at bedside. Patient is lying in bed comfortably, has moderate tremors, and appears to be slightly anxious. Patient surprised hearing about his HR's being so high. Patient denies chest pain, palpitations, and shortness of breath but states he is unable to fall asleep.    T(C): 36.6 (01-13-23 @ 19:36), Max: 37.2 (01-13-23 @ 12:27)  HR: 139 (01-13-23 @ 19:36) (123 - 139)  BP: 148/80 (01-13-23 @ 19:36) (138/85 - 148/80)  RR: 19 (01-13-23 @ 19:36) (19 - 20)  SpO2: 95% (01-13-23 @ 19:36) (93% - 95%)    PHYSICAL EXAM:  GENERAL: NAD, overweight man, slightly anxious appearing   HEENT:  scleral icterus appreciated, moist mucous membrane   CHEST/LUNG:  CTA b/l, no rales, wheezes, or rhonchi  HEART:  S1/S2, tachycardic on exam, no murmurs appreciated   ABDOMEN: soft, nontende  EXTREMITIES: no edema or cyanosis  NERVOUS SYSTEM: answers questions and follows commands appropriately    A/P: 37 year old male with a history of alcohol abuse, alcohol withdrawal seizures admitted for alcohol withdrawal. Called by RN for sinus tachycardia.  - Likely secondary to alcohol withdrawl, however HR's elevated from hospital course trend  - EKG obtained and reviewed; appears to be unchanged from prior however VR of 142bpm from 129bpm  - CIWA score of 7, due for 1mg ativan in 1hr according to taper   - Will give 2.5mg IV lopressor x1 STAT  - Cardiology consulted, will appreciate recs  - Discussed w/ Dr. Clark  - Rn to call with further changes

## 2023-01-13 NOTE — PROGRESS NOTE ADULT - PROBLEM SELECTOR PLAN 2
Elevated Creatinine Kinase 5960, peak [id number]   - downtrended  - S/p aggressive fluid resuscitation   - UA revealing moderate blood without evidence of RBC c/w rhabdo  - Continue IVF hydration   - renal function remains stable

## 2023-01-13 NOTE — CONSULT NOTE ADULT - SUBJECTIVE AND OBJECTIVE BOX
CC: etoh w/d     BRIEF HOSPITAL COURSE: 37M     Events last 24 hours: ***    ROS:   CONSTITUTIONAL: (-) fever, (-) chills  RESPIRATORY: (-) SOB, (-) cough  CV: (-) chest pain, (-) palpitations  GI: (-) abdominal pain, (-) nausea, (-) vomiting, (-) diarrhea, (-) constipation   NEURO: (-) headache, (-) weakness, (-) visual changes    [  ] Due to altered mental status/intubation, subjective information was not able to be obtained from the patient. History was obtained, to the extent possible, from review of the chart and collateral sources of information.    PMHx:   PSHx:  FamHx:   Social Hx:   Allergies:    PAST MEDICAL & SURGICAL HISTORY:  Alcohol abuse      H/O hernia repair      S/P appendectomy          SOCIAL HISTORY:  EtOH:   Smoking:   Recreational drug use:     Medications:        acetaminophen     Tablet .. 650 milliGRAM(s) Oral every 6 hours PRN  LORazepam   Injectable   IV Push   LORazepam   Injectable 2 milliGRAM(s) IV Push every 30 minutes PRN  LORazepam   Injectable 1.5 milliGRAM(s) IV Push every 4 hours  LORazepam   Injectable 1 milliGRAM(s) IV Push every 4 hours      enoxaparin Injectable 40 milliGRAM(s) SubCutaneous every 24 hours          folic acid 1 milliGRAM(s) Oral daily  multivitamin 1 Tablet(s) Oral daily  sodium chloride 0.9%. 1000 milliLiter(s) IV Continuous <Continuous>  sodium chloride 0.9%. 1000 milliLiter(s) IV Continuous <Continuous>  thiamine 100 milliGRAM(s) Oral daily    influenza   Vaccine 0.5 milliLiter(s) IntraMuscular once              ICU Vital Signs Last 24 Hrs  T(C): 36.9 (13 Jan 2023 04:30), Max: 37.7 (12 Jan 2023 17:34)  T(F): 98.5 (13 Jan 2023 04:30), Max: 99.9 (12 Jan 2023 17:34)  HR: 123 (13 Jan 2023 04:30) (123 - 145)  BP: 141/87 (13 Jan 2023 04:30) (141/87 - 148/95)  BP(mean): --  ABP: --  ABP(mean): --  RR: 20 (13 Jan 2023 04:30) (20 - 21)  SpO2: 95% (13 Jan 2023 04:30) (95% - 96%)    O2 Parameters below as of 13 Jan 2023 04:30  Patient On (Oxygen Delivery Method): room air            Physical Examination:    General: Laying in bed comfortably in no acute distress  HEENT: Pupils equal, reactive to light.  Symmetric.  PULM: Clear to auscultation bilaterally, unlabored respirations on room air   CVS: Regular rate and rhythm, no murmurs, rubs, or gallops  ABD: Soft, nondistended, nontender, normoactive bowel sounds, no masses  EXT: No edema, nontender  SKIN: Warm and well perfused  NEURO: interactive, nonfocal         I&O's Detail    12 Jan 2023 07:01  -  13 Jan 2023 07:00  --------------------------------------------------------  IN:  Total IN: 0 mL    OUT:    Voided (mL): 2600 mL  Total OUT: 2600 mL    Total NET: -2600 mL          LABS:                        14.2   5.47  )-----------( 79       ( 13 Jan 2023 07:50 )             41.6     01-13    134<L>  |  101  |  4<L>  ----------------------------<  118<H>  3.3<L>   |  25  |  0.72    Ca    8.8      13 Jan 2023 07:50  Phos  2.9     01-11  Mg     1.7     01-11    TPro  6.9  /  Alb  2.9<L>  /  TBili  4.6<H>  /  DBili  x   /  AST  457<H>  /  ALT  397<H>  /  AlkPhos  228<H>  01-13      CARDIAC MARKERS ( 12 Jan 2023 06:56 )  x     / x     / 4054 U/L / x     / x      CARDIAC MARKERS ( 11 Jan 2023 13:00 )  x     / x     / 7627 U/L / x     / x          CAPILLARY BLOOD GLUCOSE        PT/INR - ( 12 Jan 2023 06:56 )   PT: 12.5 sec;   INR: 1.07 ratio         PTT - ( 12 Jan 2023 06:56 )  PTT:29.7 sec    CULTURES:  Culture Results:   >100,000 CFU/ml Escherichia coli (01-11 @ 05:00)        RADIOLOGY: ***      INVASIVE LINES:  INDWELLING OSEGUERA:  VTE PROPHYLAXIS:  CODE STATUS:        CRITICAL CARE TIME SPENT: *** minutes spent performing frequent bedside reassessments and augmenting plan of care to address problems of acute critical illness that pose high probability of life threatening deterioration and/or end organ damage/dysfunction and discussing goals of care, non-inclusive of time spent on procedures performed. CC: etoh w/d     BRIEF HOSPITAL COURSE: 37M with pmhx etoh use and abuse, ?etoh w/d seizures, initially presented for body aches, HTN and sweating. Reports recent binge drinking starting on christmas eve after being sober for the past five years. States that he drinks 1L of liquor and 12 beers daily. He reports that his last drink was tuesday. ICU consulted this morning for a/w management of etoh w/d.     Patient seen and examined at bedside. Chart reveals that pt had had w/d seizure on tuesday but pt denies hx of seizures, states that he falls down when intoxicated.     ROS:   CONSTITUTIONAL: (-) fever, (-) chills  RESPIRATORY: (-) SOB, (-) cough  CV: (-) chest pain, (-) palpitations  GI: (-) abdominal pain, (-) nausea, (-) vomiting, (-) diarrhea, (-) constipation   NEURO: (-) headache, (-) weakness, (-) visual changes    PAST MEDICAL & SURGICAL HISTORY:  Alcohol abuse  H/O hernia repair  S/P appendectomy    SOCIAL HISTORY:  EtOH: 1L liquor + 12 beers daily since 12/24/22 after 5y period of sobriety   Smoking: denies   Recreational drug use: denies    Medications:  acetaminophen     Tablet .. 650 milliGRAM(s) Oral every 6 hours PRN  LORazepam   Injectable   IV Push   LORazepam   Injectable 2 milliGRAM(s) IV Push every 30 minutes PRN  LORazepam   Injectable 1.5 milliGRAM(s) IV Push every 4 hours  LORazepam   Injectable 1 milliGRAM(s) IV Push every 4 hours  enoxaparin Injectable 40 milliGRAM(s) SubCutaneous every 24 hours  folic acid 1 milliGRAM(s) Oral daily  multivitamin 1 Tablet(s) Oral daily  sodium chloride 0.9%. 1000 milliLiter(s) IV Continuous <Continuous>  sodium chloride 0.9%. 1000 milliLiter(s) IV Continuous <Continuous>  thiamine 100 milliGRAM(s) Oral daily  influenza   Vaccine 0.5 milliLiter(s) IntraMuscular once    VS  T 98.9      /85  RR 19   SpO2 95% on room air     Physical Examination:    General: Laying in bed comfortably in no acute distress  HEENT: Pupils equal, reactive to light.  Symmetric.  PULM: Clear to auscultation bilaterally, unlabored respirations on room air   CVS: Regular rate and rhythm, no murmurs, rubs, or gallops  ABD: Soft, nondistended, nontender, normoactive bowel sounds, no masses  EXT: No edema, nontende  SKIN: Warm and well perfused  NEURO: interactive, nonfocal     I&O's Detail    12 Jan 2023 07:01  -  13 Jan 2023 07:00  --------------------------------------------------------  IN:  Total IN: 0 mL    OUT:    Voided (mL): 2600 mL  Total OUT: 2600 mL    Total NET: -2600 mL    LABS:                        14.2   5.47  )-----------( 79       ( 13 Jan 2023 07:50 )             41.6     01-13    134<L>  |  101  |  4<L>  ----------------------------<  118<H>  3.3<L>   |  25  |  0.72    Ca    8.8      13 Jan 2023 07:50  Phos  2.9     01-11  Mg     1.7     01-11    TPro  6.9  /  Alb  2.9<L>  /  TBili  4.6<H>  /  DBili  x   /  AST  457<H>  /  ALT  397<H>  /  AlkPhos  228<H>  01-13      CARDIAC MARKERS ( 12 Jan 2023 06:56 )  x     / x     / 4054 U/L / x     / x      CARDIAC MARKERS ( 11 Jan 2023 13:00 )  x     / x     / 7627 U/L / x     / x          CAPILLARY BLOOD GLUCOSE        PT/INR - ( 12 Jan 2023 06:56 )   PT: 12.5 sec;   INR: 1.07 ratio         PTT - ( 12 Jan 2023 06:56 )  PTT:29.7 sec    CULTURES:  Culture Results:   >100,000 CFU/ml Escherichia coli (01-11 @ 05:00)        RADIOLOGY: ***      INVASIVE LINES:  INDWELLING OSEGUERA:  VTE PROPHYLAXIS:  CODE STATUS:        CRITICAL CARE TIME SPENT: *** minutes spent performing frequent bedside reassessments and augmenting plan of care to address problems of acute critical illness that pose high probability of life threatening deterioration and/or end organ damage/dysfunction and discussing goals of care, non-inclusive of time spent on procedures performed. CC: etoh w/d     BRIEF HOSPITAL COURSE: 37M with pmhx etoh use and abuse, ?etoh w/d seizures, initially presented for body aches, HTN and sweating. Reports recent binge drinking starting on christmas eve after being sober for the past five years. Admitted for etoh use, intoxication and withdrawal. States that he drinks 1L of liquor and 12 beers daily. He reports that his last drink was tuesday. Has been receiving standing ativan taper as ordered and only required one dose of ativan 2mg ivp as a prn. ICU consulted this morning for a/w management of etoh w/d.     Patient seen and examined at bedside, resting comfortably. Chart reveals that pt had had w/d seizure on tuesday but pt denies hx of seizures, states that he falls down when intoxicated. He reports improvement in his tremors. Offers no acute complaints.     ROS:   CONSTITUTIONAL: (-) fever, (-) chills  RESPIRATORY: (-) SOB, (-) cough  CV: (-) chest pain, (-) palpitations  GI: (-) abdominal pain, (-) nausea, (-) vomiting, (-) diarrhea, (-) constipation   NEURO: (-) headache, (-) weakness, (-) visual changes    PAST MEDICAL & SURGICAL HISTORY:  Alcohol abuse  H/O hernia repair  S/P appendectomy    SOCIAL HISTORY:  EtOH: 1L liquor + 12 beers daily since 12/24/22 after 5y period of sobriety   Smoking: denies   Recreational drug use: denies    Medications:  acetaminophen     Tablet .. 650 milliGRAM(s) Oral every 6 hours PRN  LORazepam   Injectable   IV Push   LORazepam   Injectable 2 milliGRAM(s) IV Push every 30 minutes PRN  LORazepam   Injectable 1.5 milliGRAM(s) IV Push every 4 hours  LORazepam   Injectable 1 milliGRAM(s) IV Push every 4 hours  enoxaparin Injectable 40 milliGRAM(s) SubCutaneous every 24 hours  folic acid 1 milliGRAM(s) Oral daily  multivitamin 1 Tablet(s) Oral daily  sodium chloride 0.9%. 1000 milliLiter(s) IV Continuous <Continuous>  sodium chloride 0.9%. 1000 milliLiter(s) IV Continuous <Continuous>  thiamine 100 milliGRAM(s) Oral daily  influenza   Vaccine 0.5 milliLiter(s) IntraMuscular once    VS  T 98.9      /85  RR 19   SpO2 95% on room air     Physical Examination:    General: Laying in bed comfortably in no acute distress  HEENT: Pupils equal, reactive to light.  Symmetric.  PULM: Clear to auscultation bilaterally, unlabored respirations on room air   CVS: Regular rate and rhythm, no murmurs, rubs, or gallops  ABD: Soft, nondistended, nontender, normoactive bowel sounds, no masses  EXT: No edema, nontender  SKIN: Warm and well perfused  NEURO: interactive, nonfocal, AOx3    I&O's Detail    12 Jan 2023 07:01  -  13 Jan 2023 07:00  --------------------------------------------------------  IN:  Total IN: 0 mL    OUT:    Voided (mL): 2600 mL  Total OUT: 2600 mL    Total NET: -2600 mL    LABS:                        14.2   5.47  )-----------( 79       ( 13 Jan 2023 07:50 )             41.6     01-13    134<L>  |  101  |  4<L>  ----------------------------<  118<H>  3.3<L>   |  25  |  0.72    Ca    8.8      13 Jan 2023 07:50  Phos  2.9     01-11  Mg     1.7     01-11    TPro  6.9  /  Alb  2.9<L>  /  TBili  4.6<H>  /  DBili  x   /  AST  457<H>  /  ALT  397<H>  /  AlkPhos  228<H>  01-13      CARDIAC MARKERS ( 12 Jan 2023 06:56 )  x     / x     / 4054 U/L / x     / x      CARDIAC MARKERS ( 11 Jan 2023 13:00 )  x     / x     / 7627 U/L / x     / x          CAPILLARY BLOOD GLUCOSE        PT/INR - ( 12 Jan 2023 06:56 )   PT: 12.5 sec;   INR: 1.07 ratio         PTT - ( 12 Jan 2023 06:56 )  PTT:29.7 sec    CULTURES:  Culture Results:   >100,000 CFU/ml Escherichia coli (01-11 @ 05:00)

## 2023-01-13 NOTE — PROGRESS NOTE ADULT - PROBLEM SELECTOR PLAN 1
on admission ETOH level of 277 with active signs of withdrawal   - Continue IVF hydration NS @ 75 cc/hr   - Librium contraindicated given elevated LFTs   - Ativan PRN as per CIWA   - Ativan taper as per Addiction Medicine   - Continue on Thiamine, Folic aid and Multivitamin   - Seizure precautions  - Keep on telemetry  - Diet at tolerated  - Addiction Medicine Consulted (Dr. Garcia), f/u recs    Pt is at high risk for DTs/complicated alcohol withdrawal and is unlikely to agree to remain hospitalized for remainder of taper. Pt has capacity. pt agrees at this time to stay until tomorrow. will continue to  patient and discourage leaving AMA

## 2023-01-13 NOTE — SBIRT NOTE ADULT - NSSBIRTALCPASSREFTXDET_GEN_A_CORE
pt states he will not drink anymore as this hospitalization is affecting his children. willing to accept referral on dc for etoh counseling.

## 2023-01-14 DIAGNOSIS — R00.0 TACHYCARDIA, UNSPECIFIED: ICD-10-CM

## 2023-01-14 LAB
-  AMIKACIN: SIGNIFICANT CHANGE UP
-  AMOXICILLIN/CLAVULANIC ACID: SIGNIFICANT CHANGE UP
-  AMPICILLIN/SULBACTAM: SIGNIFICANT CHANGE UP
-  AMPICILLIN: SIGNIFICANT CHANGE UP
-  AZTREONAM: SIGNIFICANT CHANGE UP
-  CEFAZOLIN: SIGNIFICANT CHANGE UP
-  CEFEPIME: SIGNIFICANT CHANGE UP
-  CEFOXITIN: SIGNIFICANT CHANGE UP
-  CEFTRIAXONE: SIGNIFICANT CHANGE UP
-  CEFUROXIME: SIGNIFICANT CHANGE UP
-  CIPROFLOXACIN: SIGNIFICANT CHANGE UP
-  ERTAPENEM: SIGNIFICANT CHANGE UP
-  GENTAMICIN: SIGNIFICANT CHANGE UP
-  IMIPENEM: SIGNIFICANT CHANGE UP
-  LEVOFLOXACIN: SIGNIFICANT CHANGE UP
-  MEROPENEM: SIGNIFICANT CHANGE UP
-  NITROFURANTOIN: SIGNIFICANT CHANGE UP
-  PIPERACILLIN/TAZOBACTAM: SIGNIFICANT CHANGE UP
-  TOBRAMYCIN: SIGNIFICANT CHANGE UP
-  TRIMETHOPRIM/SULFAMETHOXAZOLE: SIGNIFICANT CHANGE UP
ALBUMIN SERPL ELPH-MCNC: 2.9 G/DL — LOW (ref 3.3–5)
ALP SERPL-CCNC: 222 U/L — HIGH (ref 40–120)
ALT FLD-CCNC: 362 U/L — HIGH (ref 12–78)
ANION GAP SERPL CALC-SCNC: 9 MMOL/L — SIGNIFICANT CHANGE UP (ref 5–17)
AST SERPL-CCNC: 336 U/L — HIGH (ref 15–37)
BASOPHILS # BLD AUTO: 0.06 K/UL — SIGNIFICANT CHANGE UP (ref 0–0.2)
BASOPHILS NFR BLD AUTO: 1.3 % — SIGNIFICANT CHANGE UP (ref 0–2)
BILIRUB SERPL-MCNC: 3.8 MG/DL — HIGH (ref 0.2–1.2)
BUN SERPL-MCNC: 8 MG/DL — SIGNIFICANT CHANGE UP (ref 7–23)
CALCIUM SERPL-MCNC: 9 MG/DL — SIGNIFICANT CHANGE UP (ref 8.5–10.1)
CHLORIDE SERPL-SCNC: 103 MMOL/L — SIGNIFICANT CHANGE UP (ref 96–108)
CO2 SERPL-SCNC: 24 MMOL/L — SIGNIFICANT CHANGE UP (ref 22–31)
CREAT SERPL-MCNC: 0.77 MG/DL — SIGNIFICANT CHANGE UP (ref 0.5–1.3)
CULTURE RESULTS: SIGNIFICANT CHANGE UP
EGFR: 118 ML/MIN/1.73M2 — SIGNIFICANT CHANGE UP
EOSINOPHIL # BLD AUTO: 0.13 K/UL — SIGNIFICANT CHANGE UP (ref 0–0.5)
EOSINOPHIL NFR BLD AUTO: 2.8 % — SIGNIFICANT CHANGE UP (ref 0–6)
GLUCOSE SERPL-MCNC: 99 MG/DL — SIGNIFICANT CHANGE UP (ref 70–99)
HCT VFR BLD CALC: 42.2 % — SIGNIFICANT CHANGE UP (ref 39–50)
HGB BLD-MCNC: 14.3 G/DL — SIGNIFICANT CHANGE UP (ref 13–17)
IMM GRANULOCYTES NFR BLD AUTO: 0.2 % — SIGNIFICANT CHANGE UP (ref 0–0.9)
LYMPHOCYTES # BLD AUTO: 1.42 K/UL — SIGNIFICANT CHANGE UP (ref 1–3.3)
LYMPHOCYTES # BLD AUTO: 31 % — SIGNIFICANT CHANGE UP (ref 13–44)
MCHC RBC-ENTMCNC: 28.5 PG — SIGNIFICANT CHANGE UP (ref 27–34)
MCHC RBC-ENTMCNC: 33.9 GM/DL — SIGNIFICANT CHANGE UP (ref 32–36)
MCV RBC AUTO: 84.1 FL — SIGNIFICANT CHANGE UP (ref 80–100)
METHOD TYPE: SIGNIFICANT CHANGE UP
MONOCYTES # BLD AUTO: 0.5 K/UL — SIGNIFICANT CHANGE UP (ref 0–0.9)
MONOCYTES NFR BLD AUTO: 10.9 % — SIGNIFICANT CHANGE UP (ref 2–14)
NEUTROPHILS # BLD AUTO: 2.46 K/UL — SIGNIFICANT CHANGE UP (ref 1.8–7.4)
NEUTROPHILS NFR BLD AUTO: 53.8 % — SIGNIFICANT CHANGE UP (ref 43–77)
NRBC # BLD: 0 /100 WBCS — SIGNIFICANT CHANGE UP (ref 0–0)
ORGANISM # SPEC MICROSCOPIC CNT: SIGNIFICANT CHANGE UP
ORGANISM # SPEC MICROSCOPIC CNT: SIGNIFICANT CHANGE UP
PLATELET # BLD AUTO: 113 K/UL — LOW (ref 150–400)
POTASSIUM SERPL-MCNC: 3.5 MMOL/L — SIGNIFICANT CHANGE UP (ref 3.5–5.3)
POTASSIUM SERPL-SCNC: 3.5 MMOL/L — SIGNIFICANT CHANGE UP (ref 3.5–5.3)
PROT SERPL-MCNC: 7 G/DL — SIGNIFICANT CHANGE UP (ref 6–8.3)
RBC # BLD: 5.02 M/UL — SIGNIFICANT CHANGE UP (ref 4.2–5.8)
RBC # FLD: 15.2 % — HIGH (ref 10.3–14.5)
SODIUM SERPL-SCNC: 136 MMOL/L — SIGNIFICANT CHANGE UP (ref 135–145)
SPECIMEN SOURCE: SIGNIFICANT CHANGE UP
WBC # BLD: 4.58 K/UL — SIGNIFICANT CHANGE UP (ref 3.8–10.5)
WBC # FLD AUTO: 4.58 K/UL — SIGNIFICANT CHANGE UP (ref 3.8–10.5)

## 2023-01-14 PROCEDURE — 99222 1ST HOSP IP/OBS MODERATE 55: CPT

## 2023-01-14 PROCEDURE — 71275 CT ANGIOGRAPHY CHEST: CPT | Mod: 26

## 2023-01-14 PROCEDURE — 99233 SBSQ HOSP IP/OBS HIGH 50: CPT | Mod: GC

## 2023-01-14 RX ORDER — LANOLIN ALCOHOL/MO/W.PET/CERES
3 CREAM (GRAM) TOPICAL ONCE
Refills: 0 | Status: COMPLETED | OUTPATIENT
Start: 2023-01-14 | End: 2023-01-14

## 2023-01-14 RX ADMIN — ENOXAPARIN SODIUM 40 MILLIGRAM(S): 100 INJECTION SUBCUTANEOUS at 14:27

## 2023-01-14 RX ADMIN — Medication 1 TABLET(S): at 12:00

## 2023-01-14 RX ADMIN — Medication 1 MILLIGRAM(S): at 14:26

## 2023-01-14 RX ADMIN — Medication 1 MILLIGRAM(S): at 03:02

## 2023-01-14 RX ADMIN — Medication 1 MILLIGRAM(S): at 09:48

## 2023-01-14 RX ADMIN — Medication 100 MILLIGRAM(S): at 12:00

## 2023-01-14 RX ADMIN — Medication 2 MILLIGRAM(S): at 20:47

## 2023-01-14 RX ADMIN — Medication 0.5 MILLIGRAM(S): at 18:17

## 2023-01-14 RX ADMIN — Medication 1 MILLIGRAM(S): at 12:00

## 2023-01-14 RX ADMIN — Medication 1 MILLIGRAM(S): at 05:52

## 2023-01-14 NOTE — PROGRESS NOTE ADULT - PROBLEM SELECTOR PLAN 3
Elevated Creatinine Kinase 5960, peak 00715   - downtrended  - S/p aggressive fluid resuscitation   - UA revealing moderate blood without evidence of RBC c/w rhabdo  - renal function remains stable
129 on admission likely 2/2 to poor PO intake   - Resolved   - s/p fluid resuscitation   - Continue NS @75 cc/hr   - Continue to monitor on CMP
improved from admission  - s/p fluid resuscitation   - Continue NS @75 cc/hr   - Continue to monitor on CMP

## 2023-01-14 NOTE — PROGRESS NOTE ADULT - PROBLEM SELECTOR PLAN 5
CT abdomen showed Hepatomegaly and hepatic steatosis and elevated LFTs  - Suspected Alcoholic hepatitis  - [facility] Discriminant function is 11.1, so no indication for steroids  - Continue to monitor LFTs, downtrending currently   - GI (Dr. Leach) consulted, f/u recs
Hypokalemia  - Monitor and replete PRN
CT abdomen showed Hepatomegaly and hepatic steatosis and elevated LFTs  - Suspected Alcoholic hepatitis  - [facility] Discriminant function is 11.1, so no indication for steroids  - Continue to monitor LFTs, downtrending currently   - GI (Dr. Leach) consulted, f/u recs

## 2023-01-14 NOTE — PROGRESS NOTE ADULT - SUBJECTIVE AND OBJECTIVE BOX
Date/Time Patient Seen:  		  Referring MD:   Data Reviewed	       Patient is a 37y old  Male who presents with a chief complaint of Alcohol Withdrawal (14 Jan 2023 13:29)      Subjective/HPI     PAST MEDICAL & SURGICAL HISTORY:  No pertinent past medical history    Alcohol abuse    H/O hernia repair    S/P appendectomy          Medication list         MEDICATIONS  (STANDING):  enoxaparin Injectable 40 milliGRAM(s) SubCutaneous every 24 hours  folic acid 1 milliGRAM(s) Oral daily  influenza   Vaccine 0.5 milliLiter(s) IntraMuscular once  LORazepam   Injectable   IV Push   LORazepam   Injectable 0.5 milliGRAM(s) IV Push every 4 hours  multivitamin 1 Tablet(s) Oral daily  sodium chloride 0.9%. 1000 milliLiter(s) (150 mL/Hr) IV Continuous <Continuous>  sodium chloride 0.9%. 1000 milliLiter(s) (75 mL/Hr) IV Continuous <Continuous>  thiamine 100 milliGRAM(s) Oral daily    MEDICATIONS  (PRN):  acetaminophen     Tablet .. 650 milliGRAM(s) Oral every 6 hours PRN Temp greater or equal to 38C (100.4F), Mild Pain (1 - 3)  LORazepam   Injectable 2 milliGRAM(s) IV Push every 30 minutes PRN for ciwa > 8         Vitals log        ICU Vital Signs Last 24 Hrs  T(C): 37.2 (14 Jan 2023 11:54), Max: 37.2 (14 Jan 2023 11:54)  T(F): 98.9 (14 Jan 2023 11:54), Max: 98.9 (14 Jan 2023 11:54)  HR: 121 (14 Jan 2023 11:54) (121 - 139)  BP: 128/81 (14 Jan 2023 11:54) (123/81 - 148/80)  BP(mean): --  ABP: --  ABP(mean): --  RR: 19 (14 Jan 2023 11:54) (19 - 19)  SpO2: 94% (14 Jan 2023 11:54) (94% - 95%)    O2 Parameters below as of 14 Jan 2023 11:54  Patient On (Oxygen Delivery Method): room air                 Input and Output:  I&O's Detail    13 Jan 2023 07:01  -  14 Jan 2023 07:00  --------------------------------------------------------  IN:  Total IN: 0 mL    OUT:    Voided (mL): 2900 mL  Total OUT: 2900 mL    Total NET: -2900 mL      14 Jan 2023 07:01  -  14 Jan 2023 15:24  --------------------------------------------------------  IN:  Total IN: 0 mL    OUT:    Voided (mL): 1300 mL  Total OUT: 1300 mL    Total NET: -1300 mL          Lab Data                        14.3   4.58  )-----------( 113      ( 14 Jan 2023 07:09 )             42.2     01-14    136  |  103  |  8   ----------------------------<  99  3.5   |  24  |  0.77    Ca    9.0      14 Jan 2023 07:09    TPro  7.0  /  Alb  2.9<L>  /  TBili  3.8<H>  /  DBili  x   /  AST  336<H>  /  ALT  362<H>  /  AlkPhos  222<H>  01-14            Review of Systems	      Objective     Physical Examination    heart s1s2  lung dc BS      Pertinent Lab findings & Imaging      iGovanna:  NO   Adequate UO     I&O's Detail    13 Jan 2023 07:01  -  14 Jan 2023 07:00  --------------------------------------------------------  IN:  Total IN: 0 mL    OUT:    Voided (mL): 2900 mL  Total OUT: 2900 mL    Total NET: -2900 mL      14 Jan 2023 07:01  -  14 Jan 2023 15:24  --------------------------------------------------------  IN:  Total IN: 0 mL    OUT:    Voided (mL): 1300 mL  Total OUT: 1300 mL    Total NET: -1300 mL               Discussed with:     Cultures:	        Radiology

## 2023-01-14 NOTE — PROGRESS NOTE ADULT - PROBLEM SELECTOR PROBLEM 6
Addendum  created 04/07/20 0848 by Shaheed Savage MD    Clinical Note Signed
Need for prophylactic measure
Need for prophylactic measure
Transaminitis

## 2023-01-14 NOTE — CONSULT NOTE ADULT - ASSESSMENT
37 year old male with a history of alcohol abuse, alcohol withdrawal seizures presents with signs of alcohol withdrawal. Patient poor historian.    Cardiology called for sinus tachycardia.     tachycardia:  likely reactive in the setting of Etoh withdrawal   Tele reviewed - sinus tach 110's-120's  CTA chest pending to r/o PE  check TTE  encourage po hydration  monitor on tele  hold off on starting BB for now - will continue to reassess    - Monitor and replete lytes, keep K>4 and Mg >2  - Further cardiac workup will depend on clinical course.   - All other workup per primary team  - Thank you for this consult!

## 2023-01-14 NOTE — PROGRESS NOTE ADULT - PROBLEM SELECTOR PLAN 2
Persistent Sinus Tachycardia despite Ativan taper for withdrawal symptoms   - Tachycardia to 140-160 overnight, s/p 2.5 mg IVP Lopressor, returned to baseline sinus tach   - Cardiology consulted, Dr. Delgadillo, endorsing likely reactive to withdrawal   - TTE ordered, f/u results   - CTA to r/o PE ordered, f/u results

## 2023-01-14 NOTE — CONSULT NOTE ADULT - SUBJECTIVE AND OBJECTIVE BOX
[facility] - Alice Guevara, Navjot Anand Savella, [place]  Office Number: [phone]    Initial Consult Note    CHIEF COMPLAINT: Patient is a 37y old  Male who presents with a chief complaint of Alcohol Withdrawal (14 Jan 2023 11:19)      HPI:  37 year old male with a history of alcohol abuse, alcohol withdrawal seizures presents with signs of alcohol withdrawal. Patient poor historian.  On admission pt had been binge drinking.  Was seen in ED on 1/3 for similar complaints, was advised to be admitted but left.  His last drink was approximately 24 hours prior to admission. States he drank "a lot" of whiskey then started drinking approximately 10 beers /day. He had a withdrawal seizure the night prior to admission but refused to come to the hospital at that time, wife convinced him to come in. States he does experience visional hallucinations though currently not experiencing. Admit to a little abdominal pain and SOB. Denies CP at this time. Patient seen and examined at bedside  Cardiology called for sinus tachycardia.   Tele reviewed - sinus tach 110's-120's    In the ED,   Vitals: T: 100.3, HR: 110, BP: 129/80, RR: 20, O2: 96% on RA   Significant labs: Lactate: 2.4, Na: 129, K: 2.9, Bili: 4.2, Alk phos: 212, AST/ALT: 884/555, CK: 8690, CKMB: 47.7, Alcohol: 277   EKG: NSR HR 97 BPM   Imaging:   CT Abdomen and pelvis: Hepatomegaly and hepatic steatosis  Interventions: 1L NS x2, Reglan x1, 3 K-riders, Pepcid x1, Lorazpam 1mg IVP x1, Lorazpam 2mg IVP x1   (11 Jan 2023 12:14)      PAST MEDICAL & SURGICAL HISTORY:  Alcohol abuse      H/O hernia repair      S/P appendectomy          SOCIAL HISTORY:  No tobacco, ethanol, or drug abuse.    FAMILY HISTORY:  Family history of renal disease  Uncle      No family history of acute MI or sudden cardiac death.    MEDICATIONS  (STANDING):  enoxaparin Injectable 40 milliGRAM(s) SubCutaneous every 24 hours  folic acid 1 milliGRAM(s) Oral daily  influenza   Vaccine 0.5 milliLiter(s) IntraMuscular once  LORazepam   Injectable   IV Push   LORazepam   Injectable 1 milliGRAM(s) IV Push every 4 hours  LORazepam   Injectable 0.5 milliGRAM(s) IV Push every 4 hours  multivitamin 1 Tablet(s) Oral daily  sodium chloride 0.9%. 1000 milliLiter(s) (150 mL/Hr) IV Continuous <Continuous>  sodium chloride 0.9%. 1000 milliLiter(s) (75 mL/Hr) IV Continuous <Continuous>  thiamine 100 milliGRAM(s) Oral daily    MEDICATIONS  (PRN):  acetaminophen     Tablet .. 650 milliGRAM(s) Oral every 6 hours PRN Temp greater or equal to 38C (100.4F), Mild Pain (1 - 3)  LORazepam   Injectable 2 milliGRAM(s) IV Push every 30 minutes PRN for ciwa > 8      Allergies    No Known Allergies    Intolerances        REVIEW OF SYSTEMS:    CONSTITUTIONAL: No weakness, fevers or chills  EYES/ENT: No visual changes;  No vertigo or throat pain   NECK: No pain or stiffness  RESPIRATORY: No cough, wheezing, hemoptysis; No shortness of breath  CARDIOVASCULAR: No chest pain or palpitations  GASTROINTESTINAL: No abdominal pain. No nausea, vomiting, or hematemesis; No diarrhea or constipation. No melena or hematochezia.  GENITOURINARY: No dysuria, frequency or hematuria  NEUROLOGICAL: No numbness or weakness  SKIN: No itching or rash  All other review of systems is negative unless indicated above    VITAL SIGNS:   Vital Signs Last 24 Hrs  T(C): 37.2 (14 Jan 2023 11:54), Max: 37.2 (13 Jan 2023 12:27)  T(F): 98.9 (14 Jan 2023 11:54), Max: 98.9 (13 Jan 2023 12:27)  HR: 121 (14 Jan 2023 11:54) (121 - 139)  BP: 128/81 (14 Jan 2023 11:54) (123/81 - 148/80)  BP(mean): --  RR: 19 (14 Jan 2023 11:54) (19 - 19)  SpO2: 94% (14 Jan 2023 11:54) (93% - 95%)    Parameters below as of 14 Jan 2023 11:54  Patient On (Oxygen Delivery Method): room air        I&O's Summary    13 Jan 2023 07:01  -  14 Jan 2023 07:00  --------------------------------------------------------  IN: 0 mL / OUT: 2900 mL / NET: -2900 mL    14 Jan 2023 07:01  -  14 Jan 2023 11:56  --------------------------------------------------------  IN: 0 mL / OUT: 1300 mL / NET: -1300 mL        On Exam:    Constitutional: NAD, alert and oriented x 3  Lungs:  Non-labored, breath sounds are clear bilaterally, No wheezing, rales or rhonchi  Cardiovascular: RRR.  S1 and S2 positive.  No murmurs, rubs, gallops or clicks  Gastrointestinal: Bowel Sounds present, soft, nontender.   Lymph: No peripheral edema. No cervical lymphadenopathy.  Neurological: Alert, no focal deficits  Skin: No rashes or ulcers   Psych:  Mood & affect appropriate.    LABS: All Labs Reviewed:                        14.3   4.58  )-----------( 113      ( 14 Jan 2023 07:09 )             42.2                         14.2   5.47  )-----------( 79       ( 13 Jan 2023 07:50 )             41.6                         13.5   3.91  )-----------( 59       ( 12 Jan 2023 06:56 )             39.6     14 Jan 2023 07:09    136    |  103    |  8      ----------------------------<  99     3.5     |  24     |  0.77   13 Jan 2023 07:50    134    |  101    |  4      ----------------------------<  118    3.3     |  25     |  0.72   12 Jan 2023 06:56    139    |  104    |  4      ----------------------------<  104    3.3     |  28     |  0.64     Ca    9.0        14 Jan 2023 07:09  Ca    8.8        13 Jan 2023 07:50  Ca    7.9        12 Jan 2023 06:56  Phos  2.9       11 Jan 2023 13:50  Phos  2.1       11 Jan 2023 13:00  Mg     1.7       11 Jan 2023 13:50  Mg     1.4       11 Jan 2023 13:00    TPro  7.0    /  Alb  2.9    /  TBili  3.8    /  DBili  x      /  AST  336    /  ALT  362    /  AlkPhos  222    14 Jan 2023 07:09  TPro  6.9    /  Alb  2.9    /  TBili  4.6    /  DBili  x      /  AST  457    /  ALT  397    /  AlkPhos  228    13 Jan 2023 07:50  TPro  6.3    /  Alb  2.9    /  TBili  4.3    /  DBili  2.7    /  AST  604    /  ALT  426    /  AlkPhos  217    12 Jan 2023 06:56          Blood Culture: Organism Escherichia coli  Gram Stain Blood -- Gram Stain --  Specimen Source Clean Catch Clean Catch (Midstream)  Culture-Blood --            RADIOLOGY:    EKG:

## 2023-01-14 NOTE — PROGRESS NOTE ADULT - TIME BILLING
Note written by attending, see above.  Time spent: 40min. More than 50% of the visit was spent counseling the patient on medical condition and coordination of care
Pt seen + examined. Case discussed with resident. Agree with assessment and plan above (edited by me in detail):  Time spent: 40min. More than 50% of the visit was spent counseling the patient on medical condition and coordination of care
Pt seen + examined. Case discussed with resident. Agree with assessment and plan above (edited by me in detail):  Time spent: 40min. More than 50% of the visit was spent counseling the patient on medical condition and coordination of care

## 2023-01-14 NOTE — PROGRESS NOTE ADULT - PROBLEM SELECTOR PLAN 4
Resolved   - s/p fluid resuscitation   - Continue to monitor on CMP
Hypokalemia  - Monitor and replete PRN
Hypokalemia  2.9 on admission likely 2/2 to poor PO intake --> 3.3 today   - S/P 3 K riders and KCl 40 mEq x2   - Additional KCl 40 mEq x2 ordered now   - Monitor and replete PRN

## 2023-01-14 NOTE — PROGRESS NOTE ADULT - PROBLEM SELECTOR PLAN 1
on admission ETOH level of 277 with active signs of withdrawal   - Librium contraindicated given elevated LFTs   - Ativan taper per Addiction Medicine, with Ativan PRN as per CIWA   - Continue on Thiamine, Folic aid and Multivitamin   - Seizure precautions  - Keep on telemetry  - Addiction Medicine Consulted (Dr. Garcia), f/u recs    Pt is at high risk for DTs/complicated alcohol withdrawal and is unlikely to agree to remain hospitalized for remainder of taper. Pt has capacity. Pt agrees at this time to stay until tomorrow. Will continue to  patient and discourage leaving AMA

## 2023-01-14 NOTE — CHART NOTE - NSCHARTNOTEFT_GEN_A_CORE
Called by RN for patient with NSR HR 130s.  Patient seen and examined at bedside. He says he has tremors and is anxious.   States he wants to go home.   Denies fevers, chest pain, sob, n/v/d.      T(C): 36.6 (01-14-23 @ 20:16), Max: 37.2 (01-14-23 @ 11:54)  HR: 134 (01-14-23 @ 20:16) (121 - 134)  BP: 149/97 (01-14-23 @ 20:16) (123/81 - 149/97)  RR: 18 (01-14-23 @ 20:16) (18 - 19)  SpO2: 97% (01-14-23 @ 20:16) (94% - 97%)  Wt(kg): --    Physical Exam:  Gen: anxious appearing, NAD  HEENT: NCAT, PEERLA b/l, no conjunctival erythema  Cardio:  tachycardia, +s1s2, no murmurs, rubs, or gallops  Pulm: CTA b/l, no wheezes, rales or rhonchi  Abdomen: soft, nontender, nondistended, +BS x4 quadrants, no guarding  Extremities: no clubbing, cyanosis or edema, +2 pedal pulses  Neuro: AAOx3, moving all 4 extremities, sensation intact, +tremors  Skin: warm and dry    Assessment/Plan  37 year old male with a history of alcohol abuse, alcohol withdrawal seizures admitted for alcohol withdrawal.  Called to evaluate tachycardia.    Tachycardia - NSR HR 130s  - Tachycardia likely 2/2 alcohol withdrawal   - Continue Ativan PRN per CIWA  - Holding off on lopressor per cardio  - Encourage po intake  - RN to call with any changes    ADDENDUM 2100  Called by RN for patient attempting to leave.  - Given ativan per CIWA  - RN to call with any changes

## 2023-01-14 NOTE — PROGRESS NOTE ADULT - SUBJECTIVE AND OBJECTIVE BOX
Neurology follow up note    DIANE [id number]      Interval History:    Patient feels ok no new complaints wants to leave     Allergies    No Known Allergies    Intolerances        MEDICATIONS    acetaminophen     Tablet .. 650 milliGRAM(s) Oral every 6 hours PRN  enoxaparin Injectable 40 milliGRAM(s) SubCutaneous every 24 hours  folic acid 1 milliGRAM(s) Oral daily  influenza   Vaccine 0.5 milliLiter(s) IntraMuscular once  LORazepam   Injectable   IV Push   LORazepam   Injectable 2 milliGRAM(s) IV Push every 30 minutes PRN  LORazepam   Injectable 1 milliGRAM(s) IV Push every 4 hours  LORazepam   Injectable 0.5 milliGRAM(s) IV Push every 4 hours  multivitamin 1 Tablet(s) Oral daily  sodium chloride 0.9%. 1000 milliLiter(s) IV Continuous <Continuous>  sodium chloride 0.9%. 1000 milliLiter(s) IV Continuous <Continuous>  thiamine 100 milliGRAM(s) Oral daily              Vital Signs Last 24 Hrs  T(C): 37.1 (14 Jan 2023 04:35), Max: 37.2 (13 Jan 2023 12:27)  T(F): 98.8 (14 Jan 2023 04:35), Max: 98.9 (13 Jan 2023 12:27)  HR: 122 (14 Jan 2023 04:35) (122 - 139)  BP: 123/81 (14 Jan 2023 04:35) (123/81 - 148/80)  BP(mean): --  RR: 19 (14 Jan 2023 04:35) (19 - 19)  SpO2: 95% (14 Jan 2023 04:35) (93% - 95%)    Parameters below as of 14 Jan 2023 04:35  Patient On (Oxygen Delivery Method): room air        REVIEW OF SYSTEMS:    Constitutional: No fever, chills, fatigue, weakness  Eyes: no eye pain, visual disturbances, or discharge  ENT:  No difficulty hearing, tinnitus, vertigo; No sinus or throat pain  Neck: No pain or stiffness  Respiratory: No cough, dyspnea, wheezing   Cardiovascular: No chest pain, palpitations,   Gastrointestinal: No abdominal or epigastric pain. No nausea, vomiting  No diarrhea or constipation.   Genitourinary: No dysuria, frequency, hematuria or incontinence  Neurological: No headaches, lightheadedness, vertigo, numbness or tremors  Psychiatric: No depression, anxiety, mood swings or difficulty sleeping  Musculoskeletal: No joint pain or swelling; No muscle, back or extremity pain  Skin: No itching, burning, rashes or lesions   Lymph Nodes: No enlarged glands  Endocrine: No heat or cold intolerance; No hair loss     PHYSICAL EXAMINATION:   HEENT:  Head:  Normocephalic, atraumatic.  Eyes:  No scleral icterus.  Ears:  Hearing hard to evaluate.  NECK:  Supple.  RESPIRATORY:  Decreased breath sounds bilaterally.  CARDIOVASCULAR:  S1 and S2 heard.  ABDOMEN:  Soft and nontender.  EXTREMITIES:  No clubbing or cyanosis was noted.      NEUROLOGIC:  The patient was awake   Location was home.  Extraocular movements were intact.  Speech was fluent.  Motor:  Bilateral upper and lower 4/5.  Positive bilateral hand tremors were noted.                 LABS:  CBC Full  -  ( 13 Jan 2023 07:50 )  WBC Count : 5.47 K/uL  RBC Count : 5.07 M/uL  Hemoglobin : 14.2 g/dL  Hematocrit : 41.6 %  Platelet Count - Automated : 79 K/uL  Mean Cell Volume : 82.1 fl  Mean Cell Hemoglobin : 28.0 pg  Mean Cell Hemoglobin Concentration : 34.1 gm/dL  Auto Neutrophil # : x  Auto Lymphocyte # : x  Auto Monocyte # : x  Auto Eosinophil # : x  Auto Basophil # : x  Auto Neutrophil % : x  Auto Lymphocyte % : x  Auto Monocyte % : x  Auto Eosinophil % : x  Auto Basophil % : x      01-13    134<L>  |  101  |  4<L>  ----------------------------<  118<H>  3.3<L>   |  25  |  0.72    Ca    8.8      13 Jan 2023 07:50    TPro  6.9  /  Alb  2.9<L>  /  TBili  4.6<H>  /  DBili  x   /  AST  457<H>  /  ALT  397<H>  /  AlkPhos  228<H>  01-13    Hemoglobin A1C:     LIVER FUNCTIONS - ( 13 Jan 2023 07:50 )  Alb: 2.9 g/dL / Pro: 6.9 g/dL / ALK PHOS: 228 U/L / ALT: 397 U/L / AST: 457 U/L / GGT: x           Vitamin B12         RADIOLOGY    ANALYSIS AND PLAN:  This is a 37-year-old with seizure event and altered mental status.  For episode of seizure event, suspect most likely secondary to alcohol withdrawal seizure.  At present, no need for antiepileptic medications.  Would recommend to continue the patient on a detoxification protocol.  For altered mental status most likely alcohol withdrawal, detoxification protocol with vitamin supplements.  monitor ammonia levels as needed     Attempted to contact spouse, Pat, at [phone] in the past to obtain further information, no response.    Greater than 45 minutes of time was spent with the patient, plan of care, reviewing data, with greater than 50% of the visit was spent counseling and/or coordinating care with multidisciplinary healthcare team

## 2023-01-14 NOTE — PROGRESS NOTE ADULT - PROBLEM SELECTOR PLAN 6
- Lovenox sub q for DVT PPx  - Keep on fall and seizure precautions
- Lovenox sub q for DVT PPx  - Keep on fall and seizure precautions
CT abdomen showed Hepatomegaly and hepatic steatosis and elevated LFTs  - Suspected Alcoholic hepatitis  - [facility] Discriminant function is 11.1, so no indication for steroids  - Continue to monitor LFTs, downtrending currently   - GI (Dr. Leach) consulted, f/u recs

## 2023-01-14 NOTE — PROGRESS NOTE ADULT - SUBJECTIVE AND OBJECTIVE BOX
Patient is a 37y old  Male who presents with a chief complaint of Alcohol Withdrawal (14 Jan 2023 11:55)      INTERVAL HPI/OVERNIGHT EVENTS: Overnight, Pt experienced a run of tachycardia from 140-160 with associated moderate tremors and palpitations. EKG revealed  up from 129, CIWA 7, given 1 mg Ativan. 2.5 mg IVP Lopressor was administered and HR returned to baseline tachycardia. Pt was seen and examined at bedside. Pt states that he wants to go home today, regardless of his continuous symptoms. Explained to pt the risk of leaving without completing his taper, including worsening condition, seizure, and even death, pt stated that he "does not care, I need my family." When asked for one additional day to workup his persistent tachycardia, pt became agreeable.  Pt denies headache, dizziness, lightheadedness, fever, chills, body aches, CP, SOB, palpitations, abdominal pain, n/v, numbness/tingling. No other complaints at this time.     MEDICATIONS  (STANDING):  enoxaparin Injectable 40 milliGRAM(s) SubCutaneous every 24 hours  folic acid 1 milliGRAM(s) Oral daily  influenza   Vaccine 0.5 milliLiter(s) IntraMuscular once  LORazepam   Injectable   IV Push   LORazepam   Injectable 1 milliGRAM(s) IV Push every 4 hours  LORazepam   Injectable 0.5 milliGRAM(s) IV Push every 4 hours  multivitamin 1 Tablet(s) Oral daily  sodium chloride 0.9%. 1000 milliLiter(s) (150 mL/Hr) IV Continuous <Continuous>  sodium chloride 0.9%. 1000 milliLiter(s) (75 mL/Hr) IV Continuous <Continuous>  thiamine 100 milliGRAM(s) Oral daily    MEDICATIONS  (PRN):  acetaminophen     Tablet .. 650 milliGRAM(s) Oral every 6 hours PRN Temp greater or equal to 38C (100.4F), Mild Pain (1 - 3)  LORazepam   Injectable 2 milliGRAM(s) IV Push every 30 minutes PRN for ciwa > 8      Allergies    No Known Allergies    Intolerances        REVIEW OF SYSTEMS:  CONSTITUTIONAL: No fever or chills  HEENT:  No headache, no sore throat  RESPIRATORY: No cough, wheezing, or shortness of breath  CARDIOVASCULAR: No chest pain, palpitations  GASTROINTESTINAL: No abd pain, nausea, vomiting, or diarrhea  NEUROLOGICAL: no focal weakness or dizziness  MUSCULOSKELETAL: no myalgias       Vital Signs Last 24 Hrs  T(C): 37.2 (14 Jan 2023 11:54), Max: 37.2 (14 Jan 2023 11:54)  T(F): 98.9 (14 Jan 2023 11:54), Max: 98.9 (14 Jan 2023 11:54)  HR: 121 (14 Jan 2023 11:54) (121 - 139)  BP: 128/81 (14 Jan 2023 11:54) (123/81 - 148/80)  BP(mean): --  RR: 19 (14 Jan 2023 11:54) (19 - 19)  SpO2: 94% (14 Jan 2023 11:54) (94% - 95%)    Parameters below as of 14 Jan 2023 11:54  Patient On (Oxygen Delivery Method): room air        PHYSICAL EXAM:  GENERAL: NAD, overweight man, mildly anxious appearing   HEENT:  mild scleral icterus appreciated, moist mucous membrane   CHEST/LUNG:  CTA b/l, no rales, wheezes, or rhonchi  HEART:  S1/S2, tachycardic on exam, no murmurs appreciated   ABDOMEN:  BS+x4, soft, nontender, nondistended, no epigastric pain with deep palpation   EXTREMITIES: no edema, cyanosis, or calf tenderness   NERVOUS SYSTEM: answers questions and follows commands appropriately    LABS:                        14.3   4.58  )-----------( 113      ( 14 Jan 2023 07:09 )             42.2     CBC Full  -  ( 14 Jan 2023 07:09 )  WBC Count : 4.58 K/uL  Hemoglobin : 14.3 g/dL  Hematocrit : 42.2 %  Platelet Count - Automated : 113 K/uL  Mean Cell Volume : 84.1 fl  Mean Cell Hemoglobin : 28.5 pg  Mean Cell Hemoglobin Concentration : 33.9 gm/dL  Auto Neutrophil # : 2.46 K/uL  Auto Lymphocyte # : 1.42 K/uL  Auto Monocyte # : 0.50 K/uL  Auto Eosinophil # : 0.13 K/uL  Auto Basophil # : 0.06 K/uL  Auto Neutrophil % : 53.8 %  Auto Lymphocyte % : 31.0 %  Auto Monocyte % : 10.9 %  Auto Eosinophil % : 2.8 %  Auto Basophil % : 1.3 %    14 Jan 2023 07:09    136    |  103    |  8      ----------------------------<  99     3.5     |  24     |  0.77     Ca    9.0        14 Jan 2023 07:09    TPro  7.0    /  Alb  2.9    /  TBili  3.8    /  DBili  x      /  AST  336    /  ALT  362    /  AlkPhos  222    14 Jan 2023 07:09        CAPILLARY BLOOD GLUCOSE            Culture - Urine (collected 01-11-23 @ 05:00)  Source: Clean Catch Clean Catch (Midstream)  Final Report (01-14-23 @ 00:26):    >100,000 CFU/ml Escherichia coli  Organism: Escherichia coli (01-14-23 @ 00:26)  Organism: Escherichia coli (01-14-23 @ 00:26)      -  Amikacin: S <=16      -  Amoxicillin/Clavulanic Acid: S <=8/4      -  Ampicillin: R >16 These ampicillin results predict results for amoxicillin      -  Ampicillin/Sulbactam: I 16/8 Enterobacter, Klebsiella aerogenes, Citrobacter, and Serratia may develop resistance during prolonged therapy (3-4 days)      -  Aztreonam: S <=4      -  Cefazolin: S <=2 For uncomplicated UTI with K. pneumoniae, E. coli, or P. mirablis: CANDIDA <=16 is sensitive and CANDIDA >=32 is resistant. This also predicts results for oral agents cefaclor, cefdinir, cefpodoxime, cefprozil, cefuroxime axetil, cephalexin and locarbef for uncomplicated UTI. Note that some isolates may be susceptible to these agents while testing resistant to cefazolin.      -  Cefepime: S <=2      -  Cefoxitin: S <=8      -  Ceftriaxone: S <=1 Enterobacter, Klebsiella aerogenes, Citrobacter, and Serratia may develop resistance during prolonged therapy      -  Cefuroxime: S <=4      -  Ciprofloxacin: S <=0.25      -  Ertapenem: S <=0.5      -  Gentamicin: S <=2      -  Imipenem: S <=1      -  Levofloxacin: S <=0.5      -  Meropenem: S <=1      -  Nitrofurantoin: S <=32 Should not be used to treat pyelonephritis      -  Piperacillin/Tazobactam: S <=8      -  Tobramycin: S <=2      -  Trimethoprim/Sulfamethoxazole: S <=0.5/9.5      Method Type: CANDIDA        RADIOLOGY & ADDITIONAL TESTS: ____    Personally reviewed.     Consultant(s) Notes Reviewed:  [x] YES  [ ] NO

## 2023-01-14 NOTE — PROGRESS NOTE ADULT - SUBJECTIVE AND OBJECTIVE BOX
[facility]  Juliocesar Godfrey PA-C  [address] [place] [address]  [phone]      INTERVAL HPI/OVERNIGHT EVENTS:  Pt s/e  Overnight events noted  No new GI complaints    MEDICATIONS  (STANDING):  enoxaparin Injectable 40 milliGRAM(s) SubCutaneous every 24 hours  folic acid 1 milliGRAM(s) Oral daily  influenza   Vaccine 0.5 milliLiter(s) IntraMuscular once  LORazepam   Injectable   IV Push   LORazepam   Injectable 1 milliGRAM(s) IV Push every 4 hours  LORazepam   Injectable 0.5 milliGRAM(s) IV Push every 4 hours  multivitamin 1 Tablet(s) Oral daily  sodium chloride 0.9%. 1000 milliLiter(s) (150 mL/Hr) IV Continuous <Continuous>  sodium chloride 0.9%. 1000 milliLiter(s) (75 mL/Hr) IV Continuous <Continuous>  thiamine 100 milliGRAM(s) Oral daily    MEDICATIONS  (PRN):  acetaminophen     Tablet .. 650 milliGRAM(s) Oral every 6 hours PRN Temp greater or equal to 38C (100.4F), Mild Pain (1 - 3)  LORazepam   Injectable 2 milliGRAM(s) IV Push every 30 minutes PRN for ciwa > 8      Allergies    No Known Allergies      PHYSICAL EXAM:   Vital Signs:  Vital Signs Last 24 Hrs  T(C): 37.1 (2023 04:35), Max: 37.2 (2023 12:27)  T(F): 98.8 (2023 04:35), Max: 98.9 (2023 12:27)  HR: 122 (2023 04:35) (122 - 139)  BP: 123/81 (2023 04:35) (123/81 - 148/80)  BP(mean): --  RR: 19 (2023 04:35) (19 - 19)  SpO2: 95% (2023 04:35) (93% - 95%)    Parameters below as of 2023 04:35  Patient On (Oxygen Delivery Method): room air      Daily     Daily Weight in k.7 (2023 04:35)    GENERAL:  Appears stated age  HEENT:  NC/AT  CHEST:  Full & symmetric excursion  HEART:  Regular rhythm  ABDOMEN:  Soft, non-tender, non-distended  EXTEREMITIES:  no cyanosis  SKIN:  No rash  NEURO:  Alert      LABS:                        14.3   4.58  )-----------( 113      ( 2023 07:09 )             42.2         136  |  103  |  8   ----------------------------<  99  3.5   |  24  |  0.77    Ca    9.0      2023 07:09    TPro  7.0  /  Alb  2.9<L>  /  TBili  3.8<H>  /  DBili  x   /  AST  336<H>  /  ALT  362<H>  /  AlkPhos  222<H>

## 2023-01-15 VITALS
HEART RATE: 116 BPM | SYSTOLIC BLOOD PRESSURE: 125 MMHG | RESPIRATION RATE: 17 BRPM | DIASTOLIC BLOOD PRESSURE: 80 MMHG | TEMPERATURE: 99 F | OXYGEN SATURATION: 91 %

## 2023-01-15 LAB
ALBUMIN SERPL ELPH-MCNC: 3 G/DL — LOW (ref 3.3–5)
ALP SERPL-CCNC: 200 U/L — HIGH (ref 40–120)
ALT FLD-CCNC: 379 U/L — HIGH (ref 12–78)
ANION GAP SERPL CALC-SCNC: 7 MMOL/L — SIGNIFICANT CHANGE UP (ref 5–17)
AST SERPL-CCNC: 289 U/L — HIGH (ref 15–37)
BASOPHILS # BLD AUTO: 0.06 K/UL — SIGNIFICANT CHANGE UP (ref 0–0.2)
BASOPHILS NFR BLD AUTO: 1.4 % — SIGNIFICANT CHANGE UP (ref 0–2)
BILIRUB SERPL-MCNC: 2.8 MG/DL — HIGH (ref 0.2–1.2)
BUN SERPL-MCNC: 10 MG/DL — SIGNIFICANT CHANGE UP (ref 7–23)
CALCIUM SERPL-MCNC: 8.8 MG/DL — SIGNIFICANT CHANGE UP (ref 8.5–10.1)
CHLORIDE SERPL-SCNC: 101 MMOL/L — SIGNIFICANT CHANGE UP (ref 96–108)
CO2 SERPL-SCNC: 28 MMOL/L — SIGNIFICANT CHANGE UP (ref 22–31)
CREAT SERPL-MCNC: 0.79 MG/DL — SIGNIFICANT CHANGE UP (ref 0.5–1.3)
EGFR: 117 ML/MIN/1.73M2 — SIGNIFICANT CHANGE UP
EOSINOPHIL # BLD AUTO: 0.1 K/UL — SIGNIFICANT CHANGE UP (ref 0–0.5)
EOSINOPHIL NFR BLD AUTO: 2.4 % — SIGNIFICANT CHANGE UP (ref 0–6)
GLUCOSE SERPL-MCNC: 105 MG/DL — HIGH (ref 70–99)
HCT VFR BLD CALC: 41.3 % — SIGNIFICANT CHANGE UP (ref 39–50)
HGB BLD-MCNC: 13.4 G/DL — SIGNIFICANT CHANGE UP (ref 13–17)
IMM GRANULOCYTES NFR BLD AUTO: 0.2 % — SIGNIFICANT CHANGE UP (ref 0–0.9)
LYMPHOCYTES # BLD AUTO: 1.11 K/UL — SIGNIFICANT CHANGE UP (ref 1–3.3)
LYMPHOCYTES # BLD AUTO: 26.7 % — SIGNIFICANT CHANGE UP (ref 13–44)
MAGNESIUM SERPL-MCNC: 2.2 MG/DL — SIGNIFICANT CHANGE UP (ref 1.6–2.6)
MCHC RBC-ENTMCNC: 28 PG — SIGNIFICANT CHANGE UP (ref 27–34)
MCHC RBC-ENTMCNC: 32.4 GM/DL — SIGNIFICANT CHANGE UP (ref 32–36)
MCV RBC AUTO: 86.4 FL — SIGNIFICANT CHANGE UP (ref 80–100)
MONOCYTES # BLD AUTO: 0.62 K/UL — SIGNIFICANT CHANGE UP (ref 0–0.9)
MONOCYTES NFR BLD AUTO: 14.9 % — HIGH (ref 2–14)
NEUTROPHILS # BLD AUTO: 2.26 K/UL — SIGNIFICANT CHANGE UP (ref 1.8–7.4)
NEUTROPHILS NFR BLD AUTO: 54.4 % — SIGNIFICANT CHANGE UP (ref 43–77)
NRBC # BLD: 0 /100 WBCS — SIGNIFICANT CHANGE UP (ref 0–0)
PHOSPHATE SERPL-MCNC: 3.9 MG/DL — SIGNIFICANT CHANGE UP (ref 2.5–4.5)
PLATELET # BLD AUTO: 153 K/UL — SIGNIFICANT CHANGE UP (ref 150–400)
POTASSIUM SERPL-MCNC: 3.5 MMOL/L — SIGNIFICANT CHANGE UP (ref 3.5–5.3)
POTASSIUM SERPL-SCNC: 3.5 MMOL/L — SIGNIFICANT CHANGE UP (ref 3.5–5.3)
PROT SERPL-MCNC: 7.2 G/DL — SIGNIFICANT CHANGE UP (ref 6–8.3)
RBC # BLD: 4.78 M/UL — SIGNIFICANT CHANGE UP (ref 4.2–5.8)
RBC # FLD: 16 % — HIGH (ref 10.3–14.5)
SODIUM SERPL-SCNC: 136 MMOL/L — SIGNIFICANT CHANGE UP (ref 135–145)
WBC # BLD: 4.16 K/UL — SIGNIFICANT CHANGE UP (ref 3.8–10.5)
WBC # FLD AUTO: 4.16 K/UL — SIGNIFICANT CHANGE UP (ref 3.8–10.5)

## 2023-01-15 PROCEDURE — 83605 ASSAY OF LACTIC ACID: CPT

## 2023-01-15 PROCEDURE — 36415 COLL VENOUS BLD VENIPUNCTURE: CPT

## 2023-01-15 PROCEDURE — 82248 BILIRUBIN DIRECT: CPT

## 2023-01-15 PROCEDURE — 71275 CT ANGIOGRAPHY CHEST: CPT

## 2023-01-15 PROCEDURE — 83735 ASSAY OF MAGNESIUM: CPT

## 2023-01-15 PROCEDURE — 85730 THROMBOPLASTIN TIME PARTIAL: CPT

## 2023-01-15 PROCEDURE — 87635 SARS-COV-2 COVID-19 AMP PRB: CPT

## 2023-01-15 PROCEDURE — 96365 THER/PROPH/DIAG IV INF INIT: CPT

## 2023-01-15 PROCEDURE — 99291 CRITICAL CARE FIRST HOUR: CPT | Mod: 25

## 2023-01-15 PROCEDURE — 99232 SBSQ HOSP IP/OBS MODERATE 35: CPT

## 2023-01-15 PROCEDURE — 82553 CREATINE MB FRACTION: CPT

## 2023-01-15 PROCEDURE — 96366 THER/PROPH/DIAG IV INF ADDON: CPT

## 2023-01-15 PROCEDURE — 93005 ELECTROCARDIOGRAM TRACING: CPT

## 2023-01-15 PROCEDURE — 80307 DRUG TEST PRSMV CHEM ANLYZR: CPT

## 2023-01-15 PROCEDURE — 87186 SC STD MICRODIL/AGAR DIL: CPT

## 2023-01-15 PROCEDURE — 99239 HOSP IP/OBS DSCHRG MGMT >30: CPT

## 2023-01-15 PROCEDURE — 85027 COMPLETE CBC AUTOMATED: CPT

## 2023-01-15 PROCEDURE — 80048 BASIC METABOLIC PNL TOTAL CA: CPT

## 2023-01-15 PROCEDURE — 96375 TX/PRO/DX INJ NEW DRUG ADDON: CPT

## 2023-01-15 PROCEDURE — 84100 ASSAY OF PHOSPHORUS: CPT

## 2023-01-15 PROCEDURE — 96376 TX/PRO/DX INJ SAME DRUG ADON: CPT

## 2023-01-15 PROCEDURE — 82140 ASSAY OF AMMONIA: CPT

## 2023-01-15 PROCEDURE — 84484 ASSAY OF TROPONIN QUANT: CPT

## 2023-01-15 PROCEDURE — 80053 COMPREHEN METABOLIC PANEL: CPT

## 2023-01-15 PROCEDURE — 83690 ASSAY OF LIPASE: CPT

## 2023-01-15 PROCEDURE — 82962 GLUCOSE BLOOD TEST: CPT

## 2023-01-15 PROCEDURE — 85610 PROTHROMBIN TIME: CPT

## 2023-01-15 PROCEDURE — 87086 URINE CULTURE/COLONY COUNT: CPT

## 2023-01-15 PROCEDURE — 74177 CT ABD & PELVIS W/CONTRAST: CPT | Mod: MA

## 2023-01-15 PROCEDURE — 81001 URINALYSIS AUTO W/SCOPE: CPT

## 2023-01-15 PROCEDURE — 82550 ASSAY OF CK (CPK): CPT

## 2023-01-15 PROCEDURE — 85025 COMPLETE CBC W/AUTO DIFF WBC: CPT

## 2023-01-15 RX ORDER — FOLIC ACID 0.8 MG
1 TABLET ORAL
Qty: 0 | Refills: 0 | DISCHARGE
Start: 2023-01-15

## 2023-01-15 RX ORDER — THIAMINE MONONITRATE (VIT B1) 100 MG
1 TABLET ORAL
Qty: 0 | Refills: 0 | DISCHARGE
Start: 2023-01-15

## 2023-01-15 RX ADMIN — Medication 2 MILLIGRAM(S): at 07:55

## 2023-01-15 RX ADMIN — Medication 1 MILLIGRAM(S): at 11:27

## 2023-01-15 RX ADMIN — Medication 0.5 MILLIGRAM(S): at 10:12

## 2023-01-15 RX ADMIN — Medication 100 MILLIGRAM(S): at 11:27

## 2023-01-15 RX ADMIN — Medication 3 MILLIGRAM(S): at 00:07

## 2023-01-15 RX ADMIN — Medication 0.5 MILLIGRAM(S): at 05:46

## 2023-01-15 RX ADMIN — Medication 0.5 MILLIGRAM(S): at 01:58

## 2023-01-15 RX ADMIN — Medication 1 TABLET(S): at 11:27

## 2023-01-15 NOTE — DISCHARGE NOTE PROVIDER - HOSPITAL COURSE
ADMISSION H+P:    HPI:  37 year old male with a history of alcohol abuse, alcohol withdrawal seizures presents with signs of alcohol withdrawal. Patient poor historian and  used.  Patient has been binge-drinking alcohol for last 10 day per patient.  Was seen in ED on 1/3 for similar complaints, was advised to be admitted but left.  His last drink was approximately 24 hours ago. States he drank "a lot" of whiskey then started drinking approximately 10 beers /day. He had a withdrawal seizure last night but refused to come to the hospital at that time, wife convinced him last night to come in. States he does experience visional hallucinations though currently not experiencing. Admit to a little abdominal pain and SOB. Denies CP at this time. Patient seen and examined at bedside w/ active signs of withdrawal such as tremors and continues to urinate himself.     In the ED,   Vitals: T: 100.3, HR: 110, BP: 129/80, RR: 20, O2: 96% on RA   Significant labs: Lactate: 2.4, Na: 129, K: 2.9, Bili: 4.2, Alk phos: 212, AST/ALT: 884/555, CK: 8690, CKMB: 47.7, Alcohol: 277   EKG: NSR HR 97 BPM   Imaging:   CT Abdomen and pelvis: Hepatomegaly and hepatic steatosis  Interventions: 1L NS x2, Reglan x1, 3 K-riders, Pepcid x1, Lorazpam 1mg IVP x1, Lorazpam 2mg IVP x1   (11 Jan 2023 12:14)      ---  HOSPITAL COURSE:   Pt was admitted for alcohol withdrawal. Addiction medicine was consulted. CIWA protocol was initiated and pt's symptoms were managed with IV Ativan. GI was consulted given pt's elevated liver enzymes and recommended avoidance of hepatotoxic meds. Pt was found to have altered mentals status. Neuro was consulted an mental status changes were attributed to active alcohol withdrawal and ammonia levels were checked PRN. Pt was persistently tachycardic in the setting of withdrawal. Cardio was consulted. CTA chest was negative for PE. Echo was performed and showed______.    **Updated through 1/15**.     Patient was medically optimized and improved clinically throughout hospital course. Patient seen and examined on day of discharge.    Vital Signs  T(C): 37.1 (15 Good 2023 11:07), Max: 37.1 (15 Good 2023 11:07)  T(F): 98.7 (15 Good 2023 11:07), Max: 98.7 (15 Good 2023 11:07)  HR: 116 (15 Good 2023 11:07) (105 - 137)  BP: 125/80 (15 Good 2023 11:07) (119/80 - 149/97)  RR: 17 (15 Good 2023 11:07) (16 - 18)  SpO2: 91% (15 Good 2023 11:07) (91% - 97%)    Physical Exam:  General: well-developed, well-nourished, NAD  HEENT: normocephalic, atraumatic, EOMI, moist mucous membranes   Neck: supple, non-tender, no masses  Neurology: AAOx3, sensation intact  Respiratory: clear to auscultation bilaterally; no wheezes, rhonchi, or rales  CV: regular rate and rhythm, soft S1/S2, no murmurs, rubs, or gallops  Abdominal: soft, non-tender, non-distended, bowel sounds present  Extremities: no clubbing, cyanosis, or edema; palpable peripheral pulses  Musculoskeletal: no joint erythema or warmth, no joint swelling   Skin: warm, dry, normal color    Patient is medically stable for discharge to ____ with outpatient follow up.  ---  CONSULTANTS:     ---  TIME SPENT:   I, the attending physician, was physically present for the key portions of the evaluation and management (E/M) service provided. The total amount of time spent reviewing the hospital course, laboratory values, imaging findings, assessing/counseling the patient, discussing with consultant physicians, social work, nursing staff was -- minutes.     ---  FINAL DISCHARGE DIAGNOSIS LIST:  Please see last daily progress note for final discharge diagnoses    ---  Primary care provider was made aware of plan for discharge:  [    ] NO     [     ] YES       ADMISSION H+P:    HPI:  37 year old male with a history of alcohol abuse, alcohol withdrawal seizures presents with signs of alcohol withdrawal. Patient poor historian and  used.  Patient has been binge-drinking alcohol for last 10 day per patient.  Was seen in ED on 1/3 for similar complaints, was advised to be admitted but left.  His last drink was approximately 24 hours ago. States he drank "a lot" of whiskey then started drinking approximately 10 beers /day. He had a withdrawal seizure last night but refused to come to the hospital at that time, wife convinced him last night to come in. States he does experience visional hallucinations though currently not experiencing. Admit to a little abdominal pain and SOB. Denies CP at this time. Patient seen and examined at bedside w/ active signs of withdrawal such as tremors and continues to urinate himself.     In the ED,   Vitals: T: 100.3, HR: 110, BP: 129/80, RR: 20, O2: 96% on RA   Significant labs: Lactate: 2.4, Na: 129, K: 2.9, Bili: 4.2, Alk phos: 212, AST/ALT: 884/555, CK: 8690, CKMB: 47.7, Alcohol: 277   EKG: NSR HR 97 BPM   Imaging:   CT Abdomen and pelvis: Hepatomegaly and hepatic steatosis  Interventions: 1L NS x2, Reglan x1, 3 K-riders, Pepcid x1, Lorazpam 1mg IVP x1, Lorazpam 2mg IVP x1   (11 Jan 2023 12:14)      ---  HOSPITAL COURSE:   Pt was admitted for alcohol withdrawal. Addiction medicine was consulted. CIWA protocol was initiated and pt's symptoms were managed with IV Ativan. GI was consulted given pt's elevated liver enzymes and recommended avoidance of hepatotoxic meds. Pt was found to have altered mentals status. Neuro was consulted an mental status changes were attributed to active alcohol withdrawal and ammonia levels were checked PRN. Pt was persistently tachycardic in the setting of withdrawal. Cardio was consulted. CTA chest was negative for PE. Echo was pending    CK and lfts improved and alc withdrawal improved but he did not finish taper  Pt signed out AMA and aware of all risks including death  Discussed with partner         Vital Signs  T(C): 37.1 (15 Good 2023 11:07), Max: 37.1 (15 Good 2023 11:07)  T(F): 98.7 (15 Good 2023 11:07), Max: 98.7 (15 Good 2023 11:07)  HR: 116 (15 Good 2023 11:07) (105 - 137)  BP: 125/80 (15 Good 2023 11:07) (119/80 - 149/97)  RR: 17 (15 Good 2023 11:07) (16 - 18)  SpO2: 91% (15 Good 2023 11:07) (91% - 97%)      ---

## 2023-01-15 NOTE — PROVIDER CONTACT NOTE (OTHER) - ASSESSMENT
CIWA score 7
say that he wanted to leave and he understood risks.  IV removed, monitor removed, pt signed AMA form and pt signed out AMA at 1225.
Pt alert and oriented, anxious-appearing, tremulous. Pt admits to feeling anxious, wants to go home, denies CP, SOB, or any other symptoms.

## 2023-01-15 NOTE — CHART NOTE - NSCHARTNOTEFT_GEN_A_CORE
Called by RN that patient wants to sign out AMA  Pt says he wants to go home today and that he feels better  Denies abd pain, n/v, tolerating diet  HAs mild tremor on exam and is not done with ativan taper yet  Pt oriented x 4, also spoke to wife on phone who wants to come pick him up.  Denies headache, n/v, hallucinations, cp, sob  Pt has full capacity to make decisions.  Discussed with addiction medicine who agrees that patient has capacity  Discussed risk of signing out AMA which include but are not limited to accidental deaths, falls, deadly seizure, trauma, heart attack, multiorgan failure, resp failure, cardiac arrest and even death.  Also discussed risk of renal and liver failure in setting of alcohol use, transaminitis and improving rhabdo.   Also has TTE pending, refusing to wait  Pt fully understood above and signed out AMA  Above was also reintegrated by residents  No benzos on discharge given likelihood to drink again today  spent 45 min on d/c time

## 2023-01-15 NOTE — PROGRESS NOTE ADULT - ASSESSMENT
cont detox protocol  pt is considering AMA discharge  discussed with PMD  LAWSON  Etoh use disorder  ativan  vitamins

## 2023-01-15 NOTE — CHART NOTE - NSCHARTNOTEFT_GEN_A_CORE
Called by RN, as patient wishes to leave AMA. Patient seen at bedside to further discuss plan of care. Discussed risks of leaving against medical advice, which includes worsening of current medical condition, up to and including death. Patient understands risks and still wishes to leave. AMA paperwork signed and placed in chart. Dr. Dave made aware.     Will continue to follow, RN to call if any changes.

## 2023-01-15 NOTE — PROVIDER CONTACT NOTE (OTHER) - BACKGROUND
alcohol withdrawal
36 y/o M admitted for alcohol withdrawal
should stay to complete his treatment.  PT advised on risks of leaving against medical advice but was adamant about leaving.  Pt seen by Dr. Duran and informed of risks of leaving.  Pt continued to

## 2023-01-15 NOTE — PROGRESS NOTE ADULT - SUBJECTIVE AND OBJECTIVE BOX
[facility] -- Ismael Greene,  Alice, Navjot Anand Savella, Goodger  Office # [phone]    Follow Up:  Tachycardia     Subjective/Observations: seen and examined, pt anxious and wants to go home, denies any chest pain, SOB, palpitations, on room air.     REVIEW OF SYSTEMS: All other review of systems is negative unless indicated above  PAST MEDICAL & SURGICAL HISTORY:  Alcohol abuse      H/O hernia repair      S/P appendectomy        MEDICATIONS  (STANDING):  enoxaparin Injectable 40 milliGRAM(s) SubCutaneous every 24 hours  folic acid 1 milliGRAM(s) Oral daily  influenza   Vaccine 0.5 milliLiter(s) IntraMuscular once  LORazepam   Injectable   IV Push   LORazepam   Injectable 0.5 milliGRAM(s) IV Push every 4 hours  multivitamin 1 Tablet(s) Oral daily  sodium chloride 0.9%. 1000 milliLiter(s) (150 mL/Hr) IV Continuous <Continuous>  sodium chloride 0.9%. 1000 milliLiter(s) (75 mL/Hr) IV Continuous <Continuous>  thiamine 100 milliGRAM(s) Oral daily    MEDICATIONS  (PRN):  acetaminophen     Tablet .. 650 milliGRAM(s) Oral every 6 hours PRN Temp greater or equal to 38C (100.4F), Mild Pain (1 - 3)  LORazepam   Injectable 2 milliGRAM(s) IV Push every 30 minutes PRN for ciwa > 8    Allergies    No Known Allergies    Intolerances      Vital Signs Last 24 Hrs  T(C): 36.4 (15 Good 2023 04:24), Max: 37.2 (14 Jan 2023 11:54)  T(F): 97.5 (15 Good 2023 04:24), Max: 98.9 (14 Jan 2023 11:54)  HR: 105 (15 Good 2023 08:25) (105 - 137)  BP: 121/86 (15 Good 2023 08:25) (119/80 - 149/97)  BP(mean): --  RR: 18 (15 Good 2023 08:25) (16 - 19)  SpO2: 95% (15 Good 2023 08:25) (93% - 97%)    Parameters below as of 15 Good 2023 08:25  Patient On (Oxygen Delivery Method): room air      I&O's Summary    14 Jan 2023 07:01  -  15 Good 2023 07:00  --------------------------------------------------------  IN: 0 mL / OUT: 2800 mL / NET: -2800 mL        TELE: 's- 130's nonsustained, up to 150's when ambulating,   PHYSICAL EXAM:  Constitutional: NAD, awake and alert, well-developed  HEENT: Moist Mucous Membranes, Anicteric  Pulmonary: Non-labored, breath sounds are clear bilaterally, No wheezing, rales or rhonchi  Cardiovascular: Regular, S1 and S2, No murmurs, rubs, gallops or clicks  Gastrointestinal: Bowel Sounds present, soft, nontender.   Lymph: No peripheral edema. No lymphadenopathy.  Skin: No visible rashes or ulcers.  Psych:  Mood & affect appropriate  LABS: All Labs Reviewed:                        13.4   4.16  )-----------( 153      ( 15 Good 2023 08:18 )             41.3                         14.3   4.58  )-----------( 113      ( 14 Jan 2023 07:09 )             42.2                         14.2   5.47  )-----------( 79       ( 13 Jan 2023 07:50 )             41.6     15 Good 2023 08:18    136    |  101    |  10     ----------------------------<  105    3.5     |  28     |  0.79   14 Jan 2023 07:09    136    |  103    |  8      ----------------------------<  99     3.5     |  24     |  0.77   13 Jan 2023 07:50    134    |  101    |  4      ----------------------------<  118    3.3     |  25     |  0.72     Ca    8.8        15 Good 2023 08:18  Ca    9.0        14 Jan 2023 07:09  Ca    8.8        13 Jan 2023 07:50  Phos  3.9       15 Good 2023 08:18  Mg     2.2       15 Jan 2023 08:18    TPro  7.2    /  Alb  3.0    /  TBili  2.8    /  DBili  x      /  AST  289    /  ALT  379    /  AlkPhos  200    15 Jan 2023 08:18  TPro  7.0    /  Alb  2.9    /  TBili  3.8    /  DBili  x      /  AST  336    /  ALT  362    /  AlkPhos  222    14 Jan 2023 07:09  TPro  6.9    /  Alb  2.9    /  TBili  4.6    /  DBili  x      /  AST  457    /  ALT  397    /  AlkPhos  228    13 Jan 2023 07:50          12 Lead ECG:   Ventricular Rate 142 BPM    Atrial Rate 142 BPM    P-R Interval 136 ms    QRS Duration 80 ms    Q-T Interval 274 ms    QTC Calculation(Bazett) 421 ms    P Axis 35 degrees    R Axis -3 degrees    T Axis 36 degrees    Diagnosis Line Sinus tachycardia  Otherwise normal ECG  When compared with ECG of 13-JAN-2023 19:41, (Unconfirmed)  No significant change was found  Confirmed by Ayala Delgadillo ([id number]) on 1/14/2023 8:11:02 AM (01-13-23 @ 19:41)

## 2023-01-15 NOTE — DISCHARGE NOTE PROVIDER - NSFOLLOWUPCLINICS_GEN_ALL_ED_FT
[facility] - Primary Care  Primary Care  [address] [place] [address]  Phone: [phone]  Fax:

## 2023-01-15 NOTE — PROGRESS NOTE ADULT - REASON FOR ADMISSION
Alcohol Withdrawal

## 2023-01-15 NOTE — PROVIDER CONTACT NOTE (OTHER) - SITUATION
Pt admitted for detox.  Pt is alert and oriented x 4.  States he needs to go home because he wants to see his kids and a family member is sick.  Pt instructed that he is still detoxing and that he
HR increasing, 130-140s ST  md aware, dt w/d, CIWA7, temp99.9 to give ativan & tylenol and call back,  1 hr later pt HR still 130's, pt lying comfortably in bed, md aware, continue to monitor etoh w/d
Called by tele tech for 's to 140's  20:45 Pt very anxious and agitated, attempting to leave the unit
alcohol withdrawal

## 2023-01-15 NOTE — PROGRESS NOTE ADULT - PROVIDER SPECIALTY LIST ADULT
Addiction Medicine
Addiction Medicine
Gastroenterology
Neurology
Cardiology
Gastroenterology
Gastroenterology
Neurology
Addiction Medicine
Gastroenterology
Addiction Medicine
Hospitalist

## 2023-01-15 NOTE — PROGRESS NOTE ADULT - ASSESSMENT
37 year old male with a history of alcohol abuse, alcohol withdrawal seizures presents with signs of alcohol withdrawal. Patient poor historian.      Tachycardia:  - likely reactive in the setting of ETOH  withdrawal, dehydration   - Tele reviewed: 's- 130's nonsustained, up to 150's when ambulating  - CTA chest neg PE   - f/u TTE    hold off on starting BB for now - will continue to reassess    - Monitor and replete lytes, keep K>4 and Mg >2  - Further cardiac workup will depend on clinical course.   - All other workup per primary team  - Thank you for this consult!      37 year old male with a history of alcohol abuse, alcohol withdrawal seizures presents with signs of alcohol withdrawal. Patient poor historian.      Tachycardia:  - likely reactive in the setting of ETOH  withdrawal, dehydration   - Tele reviewed: 's- 130's nonsustained, up to 150's when ambulating  - CTA chest neg PE   - f/u TTE  - hold off on starting BB for now, will continue to reassess    - no signs of volume overload  - denies anginal complaints     - CIWA protocol per primary   - Monitor and replete Lytes. Keep K > 4 and Mg > 2  - Will continue to follow.    Veena Salmeron, [facility]  Nurse Practitioner - Cardiology   Spectra #[id number]/ [phone]

## 2023-01-15 NOTE — DISCHARGE NOTE PROVIDER - NSDCCPCAREPLAN_GEN_ALL_CORE_FT
PRINCIPAL DISCHARGE DIAGNOSIS  Diagnosis: Alcohol dependence with withdrawal  Assessment and Plan of Treatment: you signed out AMA      SECONDARY DISCHARGE DIAGNOSES  Diagnosis: Transaminitis  Assessment and Plan of Treatment: you signed out AMA    Diagnosis: Rhabdomyolysis  Assessment and Plan of Treatment: you signed out AMA

## 2023-01-15 NOTE — PROGRESS NOTE ADULT - SUBJECTIVE AND OBJECTIVE BOX
[facility]  Juliocesar Godfrey PA-C  [address] [place] [address]  [phone]      INTERVAL HPI/OVERNIGHT EVENTS:  Pt s/e  Overnight events noted  No new GI complaints    MEDICATIONS  (STANDING):  enoxaparin Injectable 40 milliGRAM(s) SubCutaneous every 24 hours  folic acid 1 milliGRAM(s) Oral daily  influenza   Vaccine 0.5 milliLiter(s) IntraMuscular once  LORazepam   Injectable   IV Push   LORazepam   Injectable 1 milliGRAM(s) IV Push every 4 hours  LORazepam   Injectable 0.5 milliGRAM(s) IV Push every 4 hours  multivitamin 1 Tablet(s) Oral daily  sodium chloride 0.9%. 1000 milliLiter(s) (150 mL/Hr) IV Continuous <Continuous>  sodium chloride 0.9%. 1000 milliLiter(s) (75 mL/Hr) IV Continuous <Continuous>  thiamine 100 milliGRAM(s) Oral daily    MEDICATIONS  (PRN):  acetaminophen     Tablet .. 650 milliGRAM(s) Oral every 6 hours PRN Temp greater or equal to 38C (100.4F), Mild Pain (1 - 3)  LORazepam   Injectable 2 milliGRAM(s) IV Push every 30 minutes PRN for ciwa > 8      Allergies    No Known Allergies      PHYSICAL EXAM:   Vital Signs Last 24 Hrs  T(C): 37.1 (15 Good 2023 11:07), Max: 37.1 (15 Good 2023 11:07)  T(F): 98.7 (15 Good 2023 11:07), Max: 98.7 (15 Good 2023 11:07)  HR: 116 (15 Good 2023 11:07) (105 - 137)  BP: 125/80 (15 Good 2023 11:07) (119/80 - 149/97)  BP(mean): --  RR: 17 (15 Good 2023 11:07) (16 - 18)  SpO2: 91% (15 Good 2023 11:07) (91% - 97%)    Parameters below as of 15 Good 2023 11:07  Patient On (Oxygen Delivery Method): room air    Daily     Daily Weight in k.7 (2023 04:35)    GENERAL:  Appears stated age  HEENT:  NC/AT  CHEST:  Full & symmetric excursion  HEART:  Regular rhythm  ABDOMEN:  Soft, non-tender, non-distended  EXTEREMITIES:  no cyanosis  SKIN:  No rash  NEURO:  Alert      LABS:                        13.4   4.16  )-----------( 153      ( 15 Good 2023 08:18 )             41.3     -15    136  |  101  |  10  ----------------------------<  105<H>  3.5   |  28  |  0.79    Ca    8.8      15 Good 2023 08:18  Phos  3.9     01-15  Mg     2.2     01-15    TPro  7.2  /  Alb  3.0<L>  /  TBili  2.8<H>  /  DBili  x   /  AST  289<H>  /  ALT  379<H>  /  AlkPhos  200<H>  01-15

## 2023-01-15 NOTE — PROGRESS NOTE ADULT - SUBJECTIVE AND OBJECTIVE BOX
Date/Time Patient Seen:  		  Referring MD:   Data Reviewed	       Patient is a 37y old  Male who presents with a chief complaint of Alcohol Withdrawal (14 Jan 2023 15:24)      Subjective/HPI     PAST MEDICAL & SURGICAL HISTORY:  No pertinent past medical history    Alcohol abuse    H/O hernia repair    S/P appendectomy          Medication list         MEDICATIONS  (STANDING):  enoxaparin Injectable 40 milliGRAM(s) SubCutaneous every 24 hours  folic acid 1 milliGRAM(s) Oral daily  influenza   Vaccine 0.5 milliLiter(s) IntraMuscular once  LORazepam   Injectable   IV Push   LORazepam   Injectable 0.5 milliGRAM(s) IV Push every 4 hours  multivitamin 1 Tablet(s) Oral daily  sodium chloride 0.9%. 1000 milliLiter(s) (150 mL/Hr) IV Continuous <Continuous>  sodium chloride 0.9%. 1000 milliLiter(s) (75 mL/Hr) IV Continuous <Continuous>  thiamine 100 milliGRAM(s) Oral daily    MEDICATIONS  (PRN):  acetaminophen     Tablet .. 650 milliGRAM(s) Oral every 6 hours PRN Temp greater or equal to 38C (100.4F), Mild Pain (1 - 3)  LORazepam   Injectable 2 milliGRAM(s) IV Push every 30 minutes PRN for ciwa > 8         Vitals log        ICU Vital Signs Last 24 Hrs  T(C): 36.4 (15 Good 2023 04:24), Max: 37.2 (14 Jan 2023 11:54)  T(F): 97.5 (15 Good 2023 04:24), Max: 98.9 (14 Jan 2023 11:54)  HR: 105 (15 Good 2023 08:25) (105 - 137)  BP: 121/86 (15 Good 2023 08:25) (119/80 - 149/97)  BP(mean): --  ABP: --  ABP(mean): --  RR: 18 (15 Good 2023 08:25) (16 - 19)  SpO2: 95% (15 Good 2023 08:25) (93% - 97%)    O2 Parameters below as of 15 Good 2023 08:25  Patient On (Oxygen Delivery Method): room air                 Input and Output:  I&O's Detail    14 Jan 2023 07:01  -  15 Good 2023 07:00  --------------------------------------------------------  IN:  Total IN: 0 mL    OUT:    Voided (mL): 2800 mL  Total OUT: 2800 mL    Total NET: -2800 mL          Lab Data                        13.4   4.16  )-----------( 153      ( 15 Good 2023 08:18 )             41.3     01-15    136  |  101  |  10  ----------------------------<  105<H>  3.5   |  28  |  0.79    Ca    8.8      15 Good 2023 08:18  Phos  3.9     01-15  Mg     2.2     01-15    TPro  7.2  /  Alb  3.0<L>  /  TBili  2.8<H>  /  DBili  x   /  AST  289<H>  /  ALT  379<H>  /  AlkPhos  200<H>  01-15            Review of Systems	      Objective     Physical Examination        Pertinent Lab findings & Imaging      Giovanna:  NO   Adequate UO     I&O's Detail    14 Jan 2023 07:01  -  15 Good 2023 07:00  --------------------------------------------------------  IN:  Total IN: 0 mL    OUT:    Voided (mL): 2800 mL  Total OUT: 2800 mL    Total NET: -2800 mL               Discussed with:     Cultures:	        Radiology

## 2023-01-16 ENCOUNTER — EMERGENCY (EMERGENCY)
Facility: HOSPITAL | Age: 38
LOS: 1 days | Discharge: HOME | End: 2023-01-16
Attending: EMERGENCY MEDICINE | Admitting: EMERGENCY MEDICINE
Payer: MEDICAID

## 2023-01-16 VITALS
RESPIRATION RATE: 18 BRPM | TEMPERATURE: 97 F | HEIGHT: 67 IN | SYSTOLIC BLOOD PRESSURE: 149 MMHG | DIASTOLIC BLOOD PRESSURE: 106 MMHG | HEART RATE: 133 BPM | WEIGHT: 198.42 LBS | OXYGEN SATURATION: 100 %

## 2023-01-16 VITALS
RESPIRATION RATE: 18 BRPM | OXYGEN SATURATION: 99 % | SYSTOLIC BLOOD PRESSURE: 142 MMHG | HEART RATE: 100 BPM | DIASTOLIC BLOOD PRESSURE: 99 MMHG | TEMPERATURE: 98 F

## 2023-01-16 DIAGNOSIS — Z98.890 OTHER SPECIFIED POSTPROCEDURAL STATES: Chronic | ICD-10-CM

## 2023-01-16 DIAGNOSIS — Z90.49 ACQUIRED ABSENCE OF OTHER SPECIFIED PARTS OF DIGESTIVE TRACT: Chronic | ICD-10-CM

## 2023-01-16 LAB
ALBUMIN SERPL ELPH-MCNC: 3 G/DL — LOW (ref 3.3–5)
ALP SERPL-CCNC: 230 U/L — HIGH (ref 40–120)
ALT FLD-CCNC: 370 U/L — HIGH (ref 12–78)
ANION GAP SERPL CALC-SCNC: 5 MMOL/L — SIGNIFICANT CHANGE UP (ref 5–17)
AST SERPL-CCNC: 289 U/L — HIGH (ref 15–37)
BASOPHILS # BLD AUTO: 0 K/UL — SIGNIFICANT CHANGE UP (ref 0–0.2)
BASOPHILS NFR BLD AUTO: 0 % — SIGNIFICANT CHANGE UP (ref 0–2)
BILIRUB SERPL-MCNC: 1.7 MG/DL — HIGH (ref 0.2–1.2)
BUN SERPL-MCNC: 10 MG/DL — SIGNIFICANT CHANGE UP (ref 7–23)
CALCIUM SERPL-MCNC: 8.7 MG/DL — SIGNIFICANT CHANGE UP (ref 8.5–10.1)
CHLORIDE SERPL-SCNC: 105 MMOL/L — SIGNIFICANT CHANGE UP (ref 96–108)
CO2 SERPL-SCNC: 26 MMOL/L — SIGNIFICANT CHANGE UP (ref 22–31)
CREAT SERPL-MCNC: 0.84 MG/DL — SIGNIFICANT CHANGE UP (ref 0.5–1.3)
EGFR: 115 ML/MIN/1.73M2 — SIGNIFICANT CHANGE UP
EOSINOPHIL # BLD AUTO: 0.1 K/UL — SIGNIFICANT CHANGE UP (ref 0–0.5)
EOSINOPHIL NFR BLD AUTO: 2 % — SIGNIFICANT CHANGE UP (ref 0–6)
GLUCOSE SERPL-MCNC: 114 MG/DL — HIGH (ref 70–99)
HCT VFR BLD CALC: 38.7 % — LOW (ref 39–50)
HGB BLD-MCNC: 12.6 G/DL — LOW (ref 13–17)
LIDOCAIN IGE QN: 447 U/L — HIGH (ref 73–393)
LYMPHOCYTES # BLD AUTO: 1.52 K/UL — SIGNIFICANT CHANGE UP (ref 1–3.3)
LYMPHOCYTES # BLD AUTO: 30 % — SIGNIFICANT CHANGE UP (ref 13–44)
MCHC RBC-ENTMCNC: 28 PG — SIGNIFICANT CHANGE UP (ref 27–34)
MCHC RBC-ENTMCNC: 32.6 GM/DL — SIGNIFICANT CHANGE UP (ref 32–36)
MCV RBC AUTO: 86 FL — SIGNIFICANT CHANGE UP (ref 80–100)
MONOCYTES # BLD AUTO: 0.81 K/UL — SIGNIFICANT CHANGE UP (ref 0–0.9)
MONOCYTES NFR BLD AUTO: 16 % — HIGH (ref 2–14)
NEUTROPHILS # BLD AUTO: 2.23 K/UL — SIGNIFICANT CHANGE UP (ref 1.8–7.4)
NEUTROPHILS NFR BLD AUTO: 41 % — LOW (ref 43–77)
NRBC # BLD: SIGNIFICANT CHANGE UP /100 WBCS (ref 0–0)
PLATELET # BLD AUTO: 182 K/UL — SIGNIFICANT CHANGE UP (ref 150–400)
POTASSIUM SERPL-MCNC: 3.9 MMOL/L — SIGNIFICANT CHANGE UP (ref 3.5–5.3)
POTASSIUM SERPL-SCNC: 3.9 MMOL/L — SIGNIFICANT CHANGE UP (ref 3.5–5.3)
PROT SERPL-MCNC: 6.9 G/DL — SIGNIFICANT CHANGE UP (ref 6–8.3)
RBC # BLD: 4.5 M/UL — SIGNIFICANT CHANGE UP (ref 4.2–5.8)
RBC # FLD: 16.2 % — HIGH (ref 10.3–14.5)
SODIUM SERPL-SCNC: 136 MMOL/L — SIGNIFICANT CHANGE UP (ref 135–145)
TROPONIN I, HIGH SENSITIVITY RESULT: 5.8 NG/L — SIGNIFICANT CHANGE UP
WBC # BLD: 5.07 K/UL — SIGNIFICANT CHANGE UP (ref 3.8–10.5)
WBC # FLD AUTO: 5.07 K/UL — SIGNIFICANT CHANGE UP (ref 3.8–10.5)

## 2023-01-16 PROCEDURE — 36415 COLL VENOUS BLD VENIPUNCTURE: CPT

## 2023-01-16 PROCEDURE — 71045 X-RAY EXAM CHEST 1 VIEW: CPT | Mod: 26

## 2023-01-16 PROCEDURE — 84484 ASSAY OF TROPONIN QUANT: CPT

## 2023-01-16 PROCEDURE — 93010 ELECTROCARDIOGRAM REPORT: CPT

## 2023-01-16 PROCEDURE — 80053 COMPREHEN METABOLIC PANEL: CPT

## 2023-01-16 PROCEDURE — 99285 EMERGENCY DEPT VISIT HI MDM: CPT | Mod: 25

## 2023-01-16 PROCEDURE — 71045 X-RAY EXAM CHEST 1 VIEW: CPT

## 2023-01-16 PROCEDURE — 93005 ELECTROCARDIOGRAM TRACING: CPT

## 2023-01-16 PROCEDURE — 85025 COMPLETE CBC W/AUTO DIFF WBC: CPT

## 2023-01-16 PROCEDURE — 83690 ASSAY OF LIPASE: CPT

## 2023-01-16 PROCEDURE — 99285 EMERGENCY DEPT VISIT HI MDM: CPT

## 2023-01-16 PROCEDURE — 96375 TX/PRO/DX INJ NEW DRUG ADDON: CPT

## 2023-01-16 PROCEDURE — 96372 THER/PROPH/DIAG INJ SC/IM: CPT | Mod: XU

## 2023-01-16 PROCEDURE — 96374 THER/PROPH/DIAG INJ IV PUSH: CPT

## 2023-01-16 RX ORDER — KETOROLAC TROMETHAMINE 30 MG/ML
30 SYRINGE (ML) INJECTION ONCE
Refills: 0 | Status: DISCONTINUED | OUTPATIENT
Start: 2023-01-16 | End: 2023-01-16

## 2023-01-16 RX ORDER — FAMOTIDINE 10 MG/ML
20 INJECTION INTRAVENOUS ONCE
Refills: 0 | Status: COMPLETED | OUTPATIENT
Start: 2023-01-16 | End: 2023-01-16

## 2023-01-16 RX ADMIN — FAMOTIDINE 20 MILLIGRAM(S): 10 INJECTION INTRAVENOUS at 03:10

## 2023-01-16 RX ADMIN — Medication 0.5 MILLIGRAM(S): at 03:10

## 2023-01-16 RX ADMIN — Medication 30 MILLIGRAM(S): at 03:10

## 2023-01-16 RX ADMIN — Medication 25 MILLIGRAM(S): at 05:07

## 2023-01-16 NOTE — ED PROVIDER NOTE - INTERPRETATION
I asked Vivi,  to call patient to schedule MRI and follow up.  
MRI Department requesting we contact patient to advise them MRI needs to be rescheduled.     MRI needs to wait till 6 weeks after loop recorder is in.     Please call patient to advise and reschedule.   
sinus tachycardia

## 2023-01-16 NOTE — ED PROVIDER NOTE - NSFOLLOWUPINSTRUCTIONS_ED_ALL_ED_FT
Follow up with your doctor and specialist as advised upon discharge yesterday.    Chest Pain    Chest pain can be caused by many different conditions which may or may not be dangerous. Causes include heartburn, lung infections, heart attack, blood clot in lungs, skin infections, strain or damage to muscle, cartilage, or bones, etc. In addition to a history and physical examination, an electrocardiogram (ECG) or other lab tests may have been performed to determine the cause of your chest pain. Follow up with your primary care provider or with a cardiologist as instructed.     SEEK IMMEDIATE MEDICAL CARE IF YOU HAVE ANY OF THE FOLLOWING SYMPTOMS: worsening chest pain, coughing up blood, unexplained back/neck/jaw pain, severe abdominal pain, dizziness or lightheadedness, fainting, shortness of breath, sweaty or clammy skin, vomiting, or racing heart beat. These symptoms may represent a serious problem that is an emergency. Do not wait to see if the symptoms will go away. Get medical help right away. Call 911 and do not drive yourself to the hospital.

## 2023-01-16 NOTE — ED PROVIDER NOTE - CLINICAL SUMMARY MEDICAL DECISION MAKING FREE TEXT BOX
37-year-old male with history of alcohol abuse and recent admission for alcohol withdrawal presenting for reproducible left-sided chest wall pain.  Patient also noted to be very anxious and tachycardic likely secondary to his anxiety.  EKG shows sinus tach without ischemia.  Differential diagnosis includes anxiety, gastritis, ACS, alcohol withdrawal, musculoskeletal etiology.  Will administer IV pain management and IV Ativan.  Will check labs, EKG, chest x-ray.  Will reassess.

## 2023-01-16 NOTE — ED PROVIDER NOTE - PROGRESS NOTE DETAILS
Pt now comfortable, no linger anxious appearing. Pt reports he is feeling alcohol w/d, unlikely as he states he has not had a drink in 15 days. Will give 1 dose of po librium. Pt's tachycardia has improved to 90s. Pt feels better. Stable for discharge

## 2023-01-16 NOTE — ED PROVIDER NOTE - OBJECTIVE STATEMENT
37-year-old male with history of alcohol abuse presenting for left-sided chest pain that woke him up from his sleep.  He reports his heart is racing.  Patient looks anxious.  Denies any injury or trauma.  Denies shortness of breath.  States he was sleeping when it happened.  Patient was discharged yesterday after being admitted for alcohol withdrawal.  Patient denied alcohol use to me however records show he has history of alcohol abuse.  When questioned again patient reported that he has not drank for 15 days.

## 2023-01-16 NOTE — ED PROVIDER NOTE - PATIENT PORTAL LINK FT
You can access the FollowMyHealth Patient Portal offered by [facility] by registering at the following website: [email or web address]. By joining [facility]’s FollowMyHealth portal, you will also be able to view your health information using other applications (apps) compatible with our system.

## 2023-01-16 NOTE — ED ADULT NURSE NOTE - OBJECTIVE STATEMENT
Received Pt awake and alert, c/o left chest, arm and abdominal pain started tonight, sts was in the Er yesterday for drinking alcohol.

## 2023-01-19 PROBLEM — F10.10 ALCOHOL ABUSE, UNCOMPLICATED: Chronic | Status: ACTIVE | Noted: 2023-01-11

## 2023-03-02 NOTE — H&P ADULT - PROBLEM SELECTOR PROBLEM 9
----- Message from Marah Cox PA-C sent at 3/1/2023  4:28 PM CST -----  No f/u w me on file.  Can she come in the next week?  Adriana   DVT prophylaxis

## 2023-03-21 NOTE — DISCHARGE NOTE PROVIDER - PREFACE STATEMENT FOR MINUTES SPENT
Quality 111:Pneumonia Vaccination Status For Older Adults: Pneumococcal vaccine (PPSV23) administered on or after patient’s 60th birthday and before the end of the measurement period Detail Level: Detailed Quality 110: Preventive Care And Screening: Influenza Immunization: Influenza Immunization previously received during influenza season Quality 138: Melanoma: Coordination Of Care: A treatment plan was communicated to the physicians providing continuing care within one month of diagnosis outlining: diagnosis, tumor thickness and a plan for surgery or alternate care. I personally spent

## 2023-05-16 ENCOUNTER — INPATIENT (INPATIENT)
Facility: HOSPITAL | Age: 38
LOS: 11 days | Discharge: HOME | DRG: 896 | End: 2023-05-28
Attending: INTERNAL MEDICINE | Admitting: INTERNAL MEDICINE
Payer: MEDICAID

## 2023-05-16 VITALS — OXYGEN SATURATION: 96 %

## 2023-05-16 DIAGNOSIS — Z98.890 OTHER SPECIFIED POSTPROCEDURAL STATES: Chronic | ICD-10-CM

## 2023-05-16 DIAGNOSIS — Z90.49 ACQUIRED ABSENCE OF OTHER SPECIFIED PARTS OF DIGESTIVE TRACT: Chronic | ICD-10-CM

## 2023-05-16 PROCEDURE — 99285 EMERGENCY DEPT VISIT HI MDM: CPT

## 2023-05-16 NOTE — ED ADULT NURSE NOTE - NSFALLHARMRISKINTERV_ED_ALL_ED
Assistance OOB with selected safe patient handling equipment if applicable/Assistance with ambulation/Communicate risk of Fall with Harm to all staff, patient, and family/Monitor gait and stability/Monitor for mental status changes and reorient to person, place, and time, as needed/Move patient closer to nursing station/within visual sight of ED staff/Provide patient with walking aids/Provide visual cue: red socks, yellow wristband, yellow gown, etc/Reinforce activity limits and safety measures with patient and family/Toileting schedule using arm’s reach rule for commode and bathroom/Use of alarms - bed, stretcher, chair and/or video monitoring/Bed in lowest position, wheels locked, appropriate side rails in place/Call bell, personal items and telephone in reach/Instruct patient to call for assistance before getting out of bed/chair/stretcher/Non-slip footwear applied when patient is off stretcher/[place] to call system/Physically safe environment - no spills, clutter or unnecessary equipment/Purposeful Proactive Rounding/Room/bathroom lighting operational, light cord in reach

## 2023-05-16 NOTE — ED ADULT TRIAGE NOTE - CHIEF COMPLAINT QUOTE
Patient pushed to Triage in wheelchair. Patient drooling and unable to speak. Patient's ,family states that he drank too much beer and he fell.  Blood covering both eyes.  After cleaning small lacerations above right eye noted.  Patient is unable to walk or even sit strait in wheelchair.  Patient brought directly to room.

## 2023-05-17 DIAGNOSIS — R74.01 ELEVATION OF LEVELS OF LIVER TRANSAMINASE LEVELS: ICD-10-CM

## 2023-05-17 DIAGNOSIS — F10.10 ALCOHOL ABUSE, UNCOMPLICATED: ICD-10-CM

## 2023-05-17 DIAGNOSIS — E87.6 HYPOKALEMIA: ICD-10-CM

## 2023-05-17 DIAGNOSIS — D69.6 THROMBOCYTOPENIA, UNSPECIFIED: ICD-10-CM
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
## 2023-05-17 DIAGNOSIS — U07.1 COVID-19: ICD-10-CM

## 2023-05-17 DIAGNOSIS — R74.8 ABNORMAL LEVELS OF OTHER SERUM ENZYMES: ICD-10-CM

## 2023-05-17 DIAGNOSIS — Z29.9 ENCOUNTER FOR PROPHYLACTIC MEASURES, UNSPECIFIED: ICD-10-CM

## 2023-05-17 DIAGNOSIS — R41.82 ALTERED MENTAL STATUS, UNSPECIFIED: ICD-10-CM

## 2023-05-17 DIAGNOSIS — F10.239 ALCOHOL DEPENDENCE WITH WITHDRAWAL, UNSPECIFIED: ICD-10-CM

## 2023-05-17 DIAGNOSIS — E83.51 HYPOCALCEMIA: ICD-10-CM

## 2023-05-17 DIAGNOSIS — E87.1 HYPO-OSMOLALITY AND HYPONATREMIA: ICD-10-CM

## 2023-05-17 DIAGNOSIS — R33.9 RETENTION OF URINE, UNSPECIFIED: ICD-10-CM

## 2023-05-17 LAB
ALBUMIN SERPL ELPH-MCNC: 2.4 G/DL — LOW (ref 3.3–5)
ALBUMIN SERPL ELPH-MCNC: 2.4 G/DL — LOW (ref 3.3–5)
ALBUMIN SERPL ELPH-MCNC: 2.7 G/DL — LOW (ref 3.3–5)
ALBUMIN SERPL ELPH-MCNC: 2.9 G/DL — LOW (ref 3.3–5)
ALP SERPL-CCNC: 229 U/L — HIGH (ref 40–120)
ALP SERPL-CCNC: 232 U/L — HIGH (ref 40–120)
ALP SERPL-CCNC: 242 U/L — HIGH (ref 40–120)
ALP SERPL-CCNC: 281 U/L — HIGH (ref 40–120)
ALT FLD-CCNC: 283 U/L — HIGH (ref 12–78)
ALT FLD-CCNC: 284 U/L — HIGH (ref 12–78)
ALT FLD-CCNC: 306 U/L — HIGH (ref 12–78)
ALT FLD-CCNC: 347 U/L — HIGH (ref 12–78)
AMMONIA BLD-MCNC: 48 UMOL/L — HIGH (ref 11–32)
ANION GAP SERPL CALC-SCNC: 10 MMOL/L — SIGNIFICANT CHANGE UP (ref 5–17)
ANION GAP SERPL CALC-SCNC: 10 MMOL/L — SIGNIFICANT CHANGE UP (ref 5–17)
ANION GAP SERPL CALC-SCNC: 11 MMOL/L — SIGNIFICANT CHANGE UP (ref 5–17)
ANION GAP SERPL CALC-SCNC: 11 MMOL/L — SIGNIFICANT CHANGE UP (ref 5–17)
ANION GAP SERPL CALC-SCNC: 8 MMOL/L — SIGNIFICANT CHANGE UP (ref 5–17)
APPEARANCE UR: ABNORMAL
APPEARANCE UR: CLEAR — SIGNIFICANT CHANGE UP
APTT BLD: 34.7 SEC — SIGNIFICANT CHANGE UP (ref 27.5–35.5)
AST SERPL-CCNC: 425 U/L — HIGH (ref 15–37)
AST SERPL-CCNC: 425 U/L — HIGH (ref 15–37)
AST SERPL-CCNC: 453 U/L — HIGH (ref 15–37)
AST SERPL-CCNC: 511 U/L — HIGH (ref 15–37)
BACTERIA # UR AUTO: ABNORMAL
BASOPHILS # BLD AUTO: 0.04 K/UL — SIGNIFICANT CHANGE UP (ref 0–0.2)
BASOPHILS NFR BLD AUTO: 0.7 % — SIGNIFICANT CHANGE UP (ref 0–2)
BILIRUB DIRECT SERPL-MCNC: 7.2 MG/DL — HIGH (ref 0–0.3)
BILIRUB INDIRECT FLD-MCNC: 1.8 MG/DL — HIGH (ref 0.2–1)
BILIRUB SERPL-MCNC: 9 MG/DL — HIGH (ref 0.2–1.2)
BILIRUB SERPL-MCNC: 9.2 MG/DL — HIGH (ref 0.2–1.2)
BILIRUB SERPL-MCNC: 9.2 MG/DL — HIGH (ref 0.2–1.2)
BILIRUB SERPL-MCNC: 9.6 MG/DL — HIGH (ref 0.2–1.2)
BILIRUB UR-MCNC: ABNORMAL
BILIRUB UR-MCNC: NEGATIVE — SIGNIFICANT CHANGE UP
BUN SERPL-MCNC: 4 MG/DL — LOW (ref 7–23)
BUN SERPL-MCNC: 7 MG/DL — SIGNIFICANT CHANGE UP (ref 7–23)
BUN SERPL-MCNC: 7 MG/DL — SIGNIFICANT CHANGE UP (ref 7–23)
BUN SERPL-MCNC: 8 MG/DL — SIGNIFICANT CHANGE UP (ref 7–23)
BUN SERPL-MCNC: 9 MG/DL — SIGNIFICANT CHANGE UP (ref 7–23)
CALCIUM SERPL-MCNC: 6.5 MG/DL — CRITICAL LOW (ref 8.5–10.1)
CALCIUM SERPL-MCNC: 6.6 MG/DL — LOW (ref 8.5–10.1)
CALCIUM SERPL-MCNC: 6.7 MG/DL — LOW (ref 8.5–10.1)
CALCIUM SERPL-MCNC: 7.2 MG/DL — LOW (ref 8.5–10.1)
CALCIUM SERPL-MCNC: 7.2 MG/DL — LOW (ref 8.5–10.1)
CHLORIDE SERPL-SCNC: 92 MMOL/L — LOW (ref 96–108)
CHLORIDE SERPL-SCNC: 95 MMOL/L — LOW (ref 96–108)
CHLORIDE SERPL-SCNC: 96 MMOL/L — SIGNIFICANT CHANGE UP (ref 96–108)
CHLORIDE SERPL-SCNC: 97 MMOL/L — SIGNIFICANT CHANGE UP (ref 96–108)
CHLORIDE SERPL-SCNC: 98 MMOL/L — SIGNIFICANT CHANGE UP (ref 96–108)
CK SERPL-CCNC: 2225 U/L — HIGH (ref 26–308)
CK SERPL-CCNC: 2989 U/L — HIGH (ref 26–308)
CO2 SERPL-SCNC: 22 MMOL/L — SIGNIFICANT CHANGE UP (ref 22–31)
CO2 SERPL-SCNC: 22 MMOL/L — SIGNIFICANT CHANGE UP (ref 22–31)
CO2 SERPL-SCNC: 24 MMOL/L — SIGNIFICANT CHANGE UP (ref 22–31)
CO2 SERPL-SCNC: 25 MMOL/L — SIGNIFICANT CHANGE UP (ref 22–31)
CO2 SERPL-SCNC: 26 MMOL/L — SIGNIFICANT CHANGE UP (ref 22–31)
COLOR SPEC: YELLOW — SIGNIFICANT CHANGE UP
COLOR SPEC: YELLOW — SIGNIFICANT CHANGE UP
CREAT SERPL-MCNC: 0.5 MG/DL — SIGNIFICANT CHANGE UP (ref 0.5–1.3)
CREAT SERPL-MCNC: 0.55 MG/DL — SIGNIFICANT CHANGE UP (ref 0.5–1.3)
CREAT SERPL-MCNC: 0.56 MG/DL — SIGNIFICANT CHANGE UP (ref 0.5–1.3)
CREAT SERPL-MCNC: 0.61 MG/DL — SIGNIFICANT CHANGE UP (ref 0.5–1.3)
CREAT SERPL-MCNC: 0.64 MG/DL — SIGNIFICANT CHANGE UP (ref 0.5–1.3)
DIFF PNL FLD: ABNORMAL
DIFF PNL FLD: ABNORMAL
EGFR: 124 ML/MIN/1.73M2 — SIGNIFICANT CHANGE UP
EGFR: 126 ML/MIN/1.73M2 — SIGNIFICANT CHANGE UP
EGFR: 129 ML/MIN/1.73M2 — SIGNIFICANT CHANGE UP
EGFR: 130 ML/MIN/1.73M2 — SIGNIFICANT CHANGE UP
EGFR: 134 ML/MIN/1.73M2 — SIGNIFICANT CHANGE UP
EOSINOPHIL # BLD AUTO: 0.14 K/UL — SIGNIFICANT CHANGE UP (ref 0–0.5)
EOSINOPHIL NFR BLD AUTO: 2.4 % — SIGNIFICANT CHANGE UP (ref 0–6)
EPI CELLS # UR: SIGNIFICANT CHANGE UP
ETHANOL SERPL-MCNC: 423 MG/DL — HIGH (ref 0–10)
GLUCOSE SERPL-MCNC: 106 MG/DL — HIGH (ref 70–99)
GLUCOSE SERPL-MCNC: 108 MG/DL — HIGH (ref 70–99)
GLUCOSE SERPL-MCNC: 113 MG/DL — HIGH (ref 70–99)
GLUCOSE SERPL-MCNC: 135 MG/DL — HIGH (ref 70–99)
GLUCOSE SERPL-MCNC: 99 MG/DL — SIGNIFICANT CHANGE UP (ref 70–99)
GLUCOSE UR QL: NEGATIVE — SIGNIFICANT CHANGE UP
GLUCOSE UR QL: NEGATIVE — SIGNIFICANT CHANGE UP
HCT VFR BLD CALC: 40.8 % — SIGNIFICANT CHANGE UP (ref 39–50)
HGB BLD-MCNC: 14.9 G/DL — SIGNIFICANT CHANGE UP (ref 13–17)
IMM GRANULOCYTES NFR BLD AUTO: 0.2 % — SIGNIFICANT CHANGE UP (ref 0–0.9)
INR BLD: 1.24 RATIO — HIGH (ref 0.88–1.16)
KETONES UR-MCNC: NEGATIVE — SIGNIFICANT CHANGE UP
KETONES UR-MCNC: NEGATIVE — SIGNIFICANT CHANGE UP
LACTATE SERPL-SCNC: 2.8 MMOL/L — HIGH (ref 0.7–2)
LACTATE SERPL-SCNC: 4 MMOL/L — CRITICAL HIGH (ref 0.7–2)
LACTATE SERPL-SCNC: 4 MMOL/L — CRITICAL HIGH (ref 0.7–2)
LACTATE SERPL-SCNC: 4.3 MMOL/L — CRITICAL HIGH (ref 0.7–2)
LEUKOCYTE ESTERASE UR-ACNC: ABNORMAL
LEUKOCYTE ESTERASE UR-ACNC: ABNORMAL
LIDOCAIN IGE QN: 432 U/L — HIGH (ref 73–393)
LYMPHOCYTES # BLD AUTO: 1.21 K/UL — SIGNIFICANT CHANGE UP (ref 1–3.3)
LYMPHOCYTES # BLD AUTO: 21 % — SIGNIFICANT CHANGE UP (ref 13–44)
MAGNESIUM SERPL-MCNC: 1.5 MG/DL — LOW (ref 1.6–2.6)
MAGNESIUM SERPL-MCNC: 1.6 MG/DL — SIGNIFICANT CHANGE UP (ref 1.6–2.6)
MAGNESIUM SERPL-MCNC: 2.5 MG/DL — SIGNIFICANT CHANGE UP (ref 1.6–2.6)
MCHC RBC-ENTMCNC: 26.8 PG — LOW (ref 27–34)
MCHC RBC-ENTMCNC: 36.5 GM/DL — HIGH (ref 32–36)
MCV RBC AUTO: 73.5 FL — LOW (ref 80–100)
MONOCYTES # BLD AUTO: 0.41 K/UL — SIGNIFICANT CHANGE UP (ref 0–0.9)
MONOCYTES NFR BLD AUTO: 7.1 % — SIGNIFICANT CHANGE UP (ref 2–14)
NEUTROPHILS # BLD AUTO: 3.95 K/UL — SIGNIFICANT CHANGE UP (ref 1.8–7.4)
NEUTROPHILS NFR BLD AUTO: 68.6 % — SIGNIFICANT CHANGE UP (ref 43–77)
NITRITE UR-MCNC: NEGATIVE — SIGNIFICANT CHANGE UP
NITRITE UR-MCNC: NEGATIVE — SIGNIFICANT CHANGE UP
NRBC # BLD: 0 /100 WBCS — SIGNIFICANT CHANGE UP (ref 0–0)
PCP SPEC-MCNC: SIGNIFICANT CHANGE UP
PH UR: 5 — SIGNIFICANT CHANGE UP (ref 5–8)
PH UR: 6.5 — SIGNIFICANT CHANGE UP (ref 5–8)
PHOSPHATE SERPL-MCNC: 2.2 MG/DL — LOW (ref 2.5–4.5)
PHOSPHATE SERPL-MCNC: 2.7 MG/DL — SIGNIFICANT CHANGE UP (ref 2.5–4.5)
PHOSPHATE SERPL-MCNC: 2.8 MG/DL — SIGNIFICANT CHANGE UP (ref 2.5–4.5)
PLATELET # BLD AUTO: 65 K/UL — LOW (ref 150–400)
POTASSIUM SERPL-MCNC: 2.8 MMOL/L — CRITICAL LOW (ref 3.5–5.3)
POTASSIUM SERPL-MCNC: 2.8 MMOL/L — CRITICAL LOW (ref 3.5–5.3)
POTASSIUM SERPL-MCNC: 3 MMOL/L — LOW (ref 3.5–5.3)
POTASSIUM SERPL-MCNC: 3.1 MMOL/L — LOW (ref 3.5–5.3)
POTASSIUM SERPL-MCNC: 3.1 MMOL/L — LOW (ref 3.5–5.3)
POTASSIUM SERPL-SCNC: 2.8 MMOL/L — CRITICAL LOW (ref 3.5–5.3)
POTASSIUM SERPL-SCNC: 2.8 MMOL/L — CRITICAL LOW (ref 3.5–5.3)
POTASSIUM SERPL-SCNC: 3 MMOL/L — LOW (ref 3.5–5.3)
POTASSIUM SERPL-SCNC: 3.1 MMOL/L — LOW (ref 3.5–5.3)
POTASSIUM SERPL-SCNC: 3.1 MMOL/L — LOW (ref 3.5–5.3)
PROT SERPL-MCNC: 5.4 G/DL — LOW (ref 6–8.3)
PROT SERPL-MCNC: 5.4 G/DL — LOW (ref 6–8.3)
PROT SERPL-MCNC: 5.8 G/DL — LOW (ref 6–8.3)
PROT SERPL-MCNC: 6.6 G/DL — SIGNIFICANT CHANGE UP (ref 6–8.3)
PROT UR-MCNC: 15
PROT UR-MCNC: NEGATIVE — SIGNIFICANT CHANGE UP
PROTHROM AB SERPL-ACNC: 14.5 SEC — HIGH (ref 10.5–13.4)
RBC # BLD: 5.55 M/UL — SIGNIFICANT CHANGE UP (ref 4.2–5.8)
RBC # FLD: 14.2 % — SIGNIFICANT CHANGE UP (ref 10.3–14.5)
RBC CASTS # UR COMP ASSIST: >50 /HPF (ref 0–4)
SODIUM SERPL-SCNC: 128 MMOL/L — LOW (ref 135–145)
SODIUM SERPL-SCNC: 129 MMOL/L — LOW (ref 135–145)
SODIUM SERPL-SCNC: 132 MMOL/L — LOW (ref 135–145)
SP GR SPEC: 1.01 — SIGNIFICANT CHANGE UP (ref 1.01–1.02)
SP GR SPEC: 1.01 — SIGNIFICANT CHANGE UP (ref 1.01–1.02)
UROBILINOGEN FLD QL: 1
UROBILINOGEN FLD QL: NEGATIVE — SIGNIFICANT CHANGE UP
WBC # BLD: 5.76 K/UL — SIGNIFICANT CHANGE UP (ref 3.8–10.5)
WBC # FLD AUTO: 5.76 K/UL — SIGNIFICANT CHANGE UP (ref 3.8–10.5)
WBC UR QL: SIGNIFICANT CHANGE UP

## 2023-05-17 PROCEDURE — 72125 CT NECK SPINE W/O DYE: CPT | Mod: 26,MA

## 2023-05-17 PROCEDURE — 70450 CT HEAD/BRAIN W/O DYE: CPT | Mod: 26,77

## 2023-05-17 PROCEDURE — 99291 CRITICAL CARE FIRST HOUR: CPT

## 2023-05-17 PROCEDURE — 74177 CT ABD & PELVIS W/CONTRAST: CPT | Mod: 26,MA

## 2023-05-17 PROCEDURE — 76705 ECHO EXAM OF ABDOMEN: CPT | Mod: 26

## 2023-05-17 PROCEDURE — 93010 ELECTROCARDIOGRAM REPORT: CPT

## 2023-05-17 PROCEDURE — 70486 CT MAXILLOFACIAL W/O DYE: CPT | Mod: 26,MA

## 2023-05-17 PROCEDURE — 70450 CT HEAD/BRAIN W/O DYE: CPT | Mod: 26,MA

## 2023-05-17 RX ORDER — FOLIC ACID 0.8 MG
1 TABLET ORAL ONCE
Refills: 0 | Status: DISCONTINUED | OUTPATIENT
Start: 2023-05-17 | End: 2023-05-17

## 2023-05-17 RX ORDER — LANOLIN ALCOHOL/MO/W.PET/CERES
3 CREAM (GRAM) TOPICAL AT BEDTIME
Refills: 0 | Status: DISCONTINUED | OUTPATIENT
Start: 2023-05-17 | End: 2023-05-17

## 2023-05-17 RX ORDER — MORPHINE SULFATE 50 MG/1
4 CAPSULE, EXTENDED RELEASE ORAL ONCE
Refills: 0 | Status: DISCONTINUED | OUTPATIENT
Start: 2023-05-17 | End: 2023-05-17

## 2023-05-17 RX ORDER — SODIUM CHLORIDE 9 MG/ML
1000 INJECTION INTRAMUSCULAR; INTRAVENOUS; SUBCUTANEOUS ONCE
Refills: 0 | Status: COMPLETED | OUTPATIENT
Start: 2023-05-17 | End: 2023-05-17

## 2023-05-17 RX ORDER — SODIUM CHLORIDE 9 MG/ML
1000 INJECTION, SOLUTION INTRAVENOUS
Refills: 0 | Status: DISCONTINUED | OUTPATIENT
Start: 2023-05-17 | End: 2023-05-19

## 2023-05-17 RX ORDER — MAGNESIUM SULFATE 500 MG/ML
2 VIAL (ML) INJECTION ONCE
Refills: 0 | Status: COMPLETED | OUTPATIENT
Start: 2023-05-17 | End: 2023-05-17

## 2023-05-17 RX ORDER — ONDANSETRON 8 MG/1
4 TABLET, FILM COATED ORAL ONCE
Refills: 0 | Status: COMPLETED | OUTPATIENT
Start: 2023-05-17 | End: 2023-05-17

## 2023-05-17 RX ORDER — CHLORPROMAZINE HCL 10 MG
25 TABLET ORAL EVERY 8 HOURS
Refills: 0 | Status: DISCONTINUED | OUTPATIENT
Start: 2023-05-17 | End: 2023-05-21

## 2023-05-17 RX ORDER — TETANUS TOXOID, REDUCED DIPHTHERIA TOXOID AND ACELLULAR PERTUSSIS VACCINE, ADSORBED 5; 2.5; 8; 8; 2.5 [IU]/.5ML; [IU]/.5ML; UG/.5ML; UG/.5ML; UG/.5ML
0.5 SUSPENSION INTRAMUSCULAR ONCE
Refills: 0 | Status: COMPLETED | OUTPATIENT
Start: 2023-05-17 | End: 2023-05-17

## 2023-05-17 RX ORDER — CHLORHEXIDINE GLUCONATE 213 G/1000ML
1 SOLUTION TOPICAL
Refills: 0 | Status: DISCONTINUED | OUTPATIENT
Start: 2023-05-17 | End: 2023-05-18

## 2023-05-17 RX ORDER — HALOPERIDOL DECANOATE 100 MG/ML
5 INJECTION INTRAMUSCULAR ONCE
Refills: 0 | Status: COMPLETED | OUTPATIENT
Start: 2023-05-17 | End: 2023-05-17

## 2023-05-17 RX ORDER — THIAMINE MONONITRATE (VIT B1) 100 MG
100 TABLET ORAL ONCE
Refills: 0 | Status: DISCONTINUED | OUTPATIENT
Start: 2023-05-17 | End: 2023-05-17

## 2023-05-17 RX ORDER — FAMOTIDINE 10 MG/ML
20 INJECTION INTRAVENOUS ONCE
Refills: 0 | Status: COMPLETED | OUTPATIENT
Start: 2023-05-17 | End: 2023-05-17

## 2023-05-17 RX ORDER — KETOROLAC TROMETHAMINE 30 MG/ML
30 SYRINGE (ML) INJECTION ONCE
Refills: 0 | Status: DISCONTINUED | OUTPATIENT
Start: 2023-05-17 | End: 2023-05-17

## 2023-05-17 RX ORDER — THIAMINE MONONITRATE (VIT B1) 100 MG
100 TABLET ORAL ONCE
Refills: 0 | Status: COMPLETED | OUTPATIENT
Start: 2023-05-17 | End: 2023-05-17

## 2023-05-17 RX ORDER — THIAMINE MONONITRATE (VIT B1) 100 MG
200 TABLET ORAL DAILY
Refills: 0 | Status: DISCONTINUED | OUTPATIENT
Start: 2023-05-17 | End: 2023-05-18

## 2023-05-17 RX ORDER — POTASSIUM CHLORIDE 20 MEQ
10 PACKET (EA) ORAL
Refills: 0 | Status: DISCONTINUED | OUTPATIENT
Start: 2023-05-17 | End: 2023-05-17

## 2023-05-17 RX ORDER — FOLIC ACID 0.8 MG
1 TABLET ORAL DAILY
Refills: 0 | Status: DISCONTINUED | OUTPATIENT
Start: 2023-05-17 | End: 2023-05-22

## 2023-05-17 RX ORDER — SODIUM CHLORIDE 9 MG/ML
1000 INJECTION, SOLUTION INTRAVENOUS ONCE
Refills: 0 | Status: COMPLETED | OUTPATIENT
Start: 2023-05-17 | End: 2023-05-17

## 2023-05-17 RX ORDER — POTASSIUM CHLORIDE 20 MEQ
10 PACKET (EA) ORAL
Refills: 0 | Status: COMPLETED | OUTPATIENT
Start: 2023-05-17 | End: 2023-05-17

## 2023-05-17 RX ORDER — DEXMEDETOMIDINE HYDROCHLORIDE IN 0.9% SODIUM CHLORIDE 4 UG/ML
0.1 INJECTION INTRAVENOUS
Qty: 400 | Refills: 0 | Status: DISCONTINUED | OUTPATIENT
Start: 2023-05-17 | End: 2023-05-21

## 2023-05-17 RX ORDER — ACETAMINOPHEN 500 MG
650 TABLET ORAL EVERY 6 HOURS
Refills: 0 | Status: DISCONTINUED | OUTPATIENT
Start: 2023-05-17 | End: 2023-05-17

## 2023-05-17 RX ORDER — DEXMEDETOMIDINE HYDROCHLORIDE IN 0.9% SODIUM CHLORIDE 4 UG/ML
0.1 INJECTION INTRAVENOUS
Qty: 200 | Refills: 0 | Status: DISCONTINUED | OUTPATIENT
Start: 2023-05-17 | End: 2023-05-17

## 2023-05-17 RX ORDER — ACETAMINOPHEN 500 MG
650 TABLET ORAL ONCE
Refills: 0 | Status: COMPLETED | OUTPATIENT
Start: 2023-05-17 | End: 2023-05-17

## 2023-05-17 RX ORDER — PANTOPRAZOLE SODIUM 20 MG/1
40 TABLET, DELAYED RELEASE ORAL DAILY
Refills: 0 | Status: DISCONTINUED | OUTPATIENT
Start: 2023-05-17 | End: 2023-05-22

## 2023-05-17 RX ORDER — PIPERACILLIN AND TAZOBACTAM 4; .5 G/20ML; G/20ML
3.38 INJECTION, POWDER, LYOPHILIZED, FOR SOLUTION INTRAVENOUS ONCE
Refills: 0 | Status: COMPLETED | OUTPATIENT
Start: 2023-05-17 | End: 2023-05-17

## 2023-05-17 RX ORDER — POTASSIUM CHLORIDE 20 MEQ
40 PACKET (EA) ORAL ONCE
Refills: 0 | Status: COMPLETED | OUTPATIENT
Start: 2023-05-17 | End: 2023-05-17

## 2023-05-17 RX ORDER — ONDANSETRON 8 MG/1
4 TABLET, FILM COATED ORAL EVERY 8 HOURS
Refills: 0 | Status: DISCONTINUED | OUTPATIENT
Start: 2023-05-17 | End: 2023-05-17

## 2023-05-17 RX ORDER — CYCLOBENZAPRINE HYDROCHLORIDE 10 MG/1
10 TABLET, FILM COATED ORAL ONCE
Refills: 0 | Status: COMPLETED | OUTPATIENT
Start: 2023-05-17 | End: 2023-05-17

## 2023-05-17 RX ORDER — SODIUM CHLORIDE 9 MG/ML
1000 INJECTION INTRAMUSCULAR; INTRAVENOUS; SUBCUTANEOUS
Refills: 0 | Status: DISCONTINUED | OUTPATIENT
Start: 2023-05-17 | End: 2023-05-17

## 2023-05-17 RX ORDER — FOLIC ACID 0.8 MG
1 TABLET ORAL ONCE
Refills: 0 | Status: COMPLETED | OUTPATIENT
Start: 2023-05-17 | End: 2023-05-17

## 2023-05-17 RX ORDER — POTASSIUM CHLORIDE 20 MEQ
40 PACKET (EA) ORAL ONCE
Refills: 0 | Status: DISCONTINUED | OUTPATIENT
Start: 2023-05-17 | End: 2023-05-17

## 2023-05-17 RX ORDER — SODIUM CHLORIDE 9 MG/ML
1000 INJECTION, SOLUTION INTRAVENOUS
Refills: 0 | Status: DISCONTINUED | OUTPATIENT
Start: 2023-05-17 | End: 2023-05-17

## 2023-05-17 RX ORDER — CALCIUM GLUCONATE 100 MG/ML
2 VIAL (ML) INTRAVENOUS ONCE
Refills: 0 | Status: COMPLETED | OUTPATIENT
Start: 2023-05-17 | End: 2023-05-17

## 2023-05-17 RX ADMIN — Medication 2 MILLIGRAM(S): at 21:57

## 2023-05-17 RX ADMIN — PIPERACILLIN AND TAZOBACTAM 3.38 GRAM(S): 4; .5 INJECTION, POWDER, LYOPHILIZED, FOR SOLUTION INTRAVENOUS at 12:24

## 2023-05-17 RX ADMIN — Medication 2 MILLIGRAM(S): at 09:25

## 2023-05-17 RX ADMIN — Medication 100 MILLIEQUIVALENT(S): at 21:57

## 2023-05-17 RX ADMIN — Medication 1 MILLIGRAM(S): at 11:39

## 2023-05-17 RX ADMIN — Medication 100 MILLIEQUIVALENT(S): at 19:17

## 2023-05-17 RX ADMIN — Medication 100 MILLIEQUIVALENT(S): at 14:44

## 2023-05-17 RX ADMIN — Medication 1 MILLIGRAM(S): at 09:40

## 2023-05-17 RX ADMIN — Medication 100 MILLIGRAM(S): at 09:39

## 2023-05-17 RX ADMIN — Medication 100 MILLIEQUIVALENT(S): at 20:36

## 2023-05-17 RX ADMIN — Medication 100 MILLIEQUIVALENT(S): at 12:23

## 2023-05-17 RX ADMIN — Medication 650 MILLIGRAM(S): at 03:15

## 2023-05-17 RX ADMIN — Medication 2 MILLIGRAM(S): at 17:10

## 2023-05-17 RX ADMIN — HALOPERIDOL DECANOATE 5 MILLIGRAM(S): 100 INJECTION INTRAMUSCULAR at 02:51

## 2023-05-17 RX ADMIN — SODIUM CHLORIDE 1000 MILLILITER(S): 9 INJECTION, SOLUTION INTRAVENOUS at 17:59

## 2023-05-17 RX ADMIN — Medication 1 MILLIGRAM(S): at 17:59

## 2023-05-17 RX ADMIN — SODIUM CHLORIDE 125 MILLILITER(S): 9 INJECTION, SOLUTION INTRAVENOUS at 11:56

## 2023-05-17 RX ADMIN — Medication 150 GRAM(S): at 14:44

## 2023-05-17 RX ADMIN — Medication 200 GRAM(S): at 12:23

## 2023-05-17 RX ADMIN — Medication 200 MILLIGRAM(S): at 17:59

## 2023-05-17 RX ADMIN — DEXMEDETOMIDINE HYDROCHLORIDE IN 0.9% SODIUM CHLORIDE 2.38 MICROGRAM(S)/KG/HR: 4 INJECTION INTRAVENOUS at 20:11

## 2023-05-17 RX ADMIN — Medication 100 MILLIEQUIVALENT(S): at 13:34

## 2023-05-17 RX ADMIN — SODIUM CHLORIDE 150 MILLILITER(S): 9 INJECTION, SOLUTION INTRAVENOUS at 23:38

## 2023-05-17 RX ADMIN — PIPERACILLIN AND TAZOBACTAM 200 GRAM(S): 4; .5 INJECTION, POWDER, LYOPHILIZED, FOR SOLUTION INTRAVENOUS at 09:39

## 2023-05-17 RX ADMIN — MORPHINE SULFATE 4 MILLIGRAM(S): 50 CAPSULE, EXTENDED RELEASE ORAL at 02:14

## 2023-05-17 RX ADMIN — Medication 2 MILLIGRAM(S): at 14:43

## 2023-05-17 RX ADMIN — SODIUM CHLORIDE 1000 MILLILITER(S): 9 INJECTION INTRAMUSCULAR; INTRAVENOUS; SUBCUTANEOUS at 11:42

## 2023-05-17 RX ADMIN — PANTOPRAZOLE SODIUM 40 MILLIGRAM(S): 20 TABLET, DELAYED RELEASE ORAL at 11:56

## 2023-05-17 RX ADMIN — CYCLOBENZAPRINE HYDROCHLORIDE 10 MILLIGRAM(S): 10 TABLET, FILM COATED ORAL at 06:37

## 2023-05-17 RX ADMIN — TETANUS TOXOID, REDUCED DIPHTHERIA TOXOID AND ACELLULAR PERTUSSIS VACCINE, ADSORBED 0.5 MILLILITER(S): 5; 2.5; 8; 8; 2.5 SUSPENSION INTRAMUSCULAR at 00:21

## 2023-05-17 RX ADMIN — FAMOTIDINE 20 MILLIGRAM(S): 10 INJECTION INTRAVENOUS at 02:12

## 2023-05-17 RX ADMIN — SODIUM CHLORIDE 1000 MILLILITER(S): 9 INJECTION INTRAMUSCULAR; INTRAVENOUS; SUBCUTANEOUS at 09:13

## 2023-05-17 RX ADMIN — Medication 25 GRAM(S): at 11:41

## 2023-05-17 RX ADMIN — DEXMEDETOMIDINE HYDROCHLORIDE IN 0.9% SODIUM CHLORIDE 2.38 MICROGRAM(S)/KG/HR: 4 INJECTION INTRAVENOUS at 23:38

## 2023-05-17 RX ADMIN — Medication 40 MILLIEQUIVALENT(S): at 06:37

## 2023-05-17 RX ADMIN — Medication 100 MILLIEQUIVALENT(S): at 15:32

## 2023-05-17 RX ADMIN — Medication 1 MILLIGRAM(S): at 19:13

## 2023-05-17 RX ADMIN — Medication 650 MILLIGRAM(S): at 02:12

## 2023-05-17 RX ADMIN — MORPHINE SULFATE 4 MILLIGRAM(S): 50 CAPSULE, EXTENDED RELEASE ORAL at 02:30

## 2023-05-17 RX ADMIN — ONDANSETRON 4 MILLIGRAM(S): 8 TABLET, FILM COATED ORAL at 02:14

## 2023-05-17 RX ADMIN — Medication 1 MILLIGRAM(S): at 18:00

## 2023-05-17 RX ADMIN — SODIUM CHLORIDE 125 MILLILITER(S): 9 INJECTION, SOLUTION INTRAVENOUS at 17:14

## 2023-05-17 RX ADMIN — SODIUM CHLORIDE 1000 MILLILITER(S): 9 INJECTION INTRAMUSCULAR; INTRAVENOUS; SUBCUTANEOUS at 02:12

## 2023-05-17 RX ADMIN — SODIUM CHLORIDE 1000 MILLILITER(S): 9 INJECTION INTRAMUSCULAR; INTRAVENOUS; SUBCUTANEOUS at 05:13

## 2023-05-17 RX ADMIN — Medication 30 MILLIGRAM(S): at 23:41

## 2023-05-17 RX ADMIN — SODIUM CHLORIDE 1000 MILLILITER(S): 9 INJECTION INTRAMUSCULAR; INTRAVENOUS; SUBCUTANEOUS at 03:30

## 2023-05-17 NOTE — PATIENT PROFILE ADULT - FALL HARM RISK - HARM RISK INTERVENTIONS
Assistance with ambulation/Assistance OOB with selected safe patient handling equipment/Communicate Risk of Fall with Harm to all staff/Monitor for mental status changes/Monitor gait and stability/Reinforce activity limits and safety measures with patient and family/Tailored Fall Risk Interventions/Toileting schedule using arm’s reach rule for commode and bathroom/Use of alarms - bed, chair and/or voice tab/Visual Cue: Yellow wristband and red socks/Bed in lowest position, wheels locked, appropriate side rails in place/Call bell, personal items and telephone in reach/Instruct patient to call for assistance before getting out of bed or chair/Non-slip footwear when patient is out of bed/[place] to call system/Physically safe environment - no spills, clutter or unnecessary equipment/Purposeful Proactive Rounding/Room/bathroom lighting operational, light cord in reach

## 2023-05-17 NOTE — H&P ADULT - PROBLEM SELECTOR PLAN 7
As per nurse patient was having abd pain which resolved after patient voided  ~600cc  - CT A/P- No hydronephrosis. 6.3 mm nonobstructing stone lower pole left kidney.  - squires ordered  - continue to monitor s/p unwitnessed fall with small abrasions of face  - CT head/cervical spine/ maxillofacial non-con: No acute intracranial abnormalities. Mild right periorbital subcutaneous soft tissue swelling. No evidence of orbital or other facial fracture. No vertebral fracture, collapse or subluxation.  - f/u repeat CT head noncon to evaluate right posterior fossa for SDH

## 2023-05-17 NOTE — H&P ADULT - ATTENDING COMMENTS
38M PMH of alcohol abuse, alcohol withdrawal seizures, fatty liver presents with signs of alcohol abuse/withdrawal. Admitted for AMS , alcohol withdrawal.   transaminitis ,Alcoholic hepatitis & Hyponatremia. Low K, Low Ca, Low Mag .  Pt  seen, Examined, case & care plan d/w , residents at detail.  Consults:  GI-Dr Leach  Detox -Dr Garcia  Social service eval  Care as per ICU Team.  AM labs   DVT PPx  NPO, IVF.

## 2023-05-17 NOTE — ED PROVIDER NOTE - PROGRESS NOTE DETAILS
Patient was signed out to me by Dr. Shanks to follow-up CT head, CT maxillofacial and CT C-spine.  Patient subsequently complained of abdominal pain but due to intoxication history is limited.  Labs were ordered in addition to IV fluids and pain management and CAT scan of his abdomen and pelvis.  CT abdomen pelvis shows a nonobstructing kidney stone in the left lower pole of the kidney.  UA is negative.  Lactic acid was elevated.  Patient was given IV fluids.  Patient also noted to be hyponatremic, low potassium.  Alcohol level is over 400.  No traumatic injury on CTs.  Will repeat CMP and lactic acid.  Patient previously has elevated LFTs due to fatty liver.  We will continue to follow and reassess. Rpt CMP reviewed. Persistently elevated tbili. RUQ sono ordered. Blood cultures and Abx ordered for possible ascending cholangitis. VM left for Dr. Leach for consult.   Pt signed out to Dr. Palm to follow.

## 2023-05-17 NOTE — ED PROVIDER NOTE - CLINICAL SUMMARY MEDICAL DECISION MAKING FREE TEXT BOX
Patient appears intoxicated at this time.  Has evidence of facial injury, however no large laceration requiring repair.  Will update tetanus shot.  Will check CT scan for traumatic abnormality.  Patient endorsing generalized headache at this time.  No other evidence of trauma seen on physical exam.  Will observe for clinical sobriety, obtain imaging.  Social work consult placed as family is requesting possible rehab placement pending patient becoming clinically sober.

## 2023-05-17 NOTE — H&P ADULT - CARDIOVASCULAR
normal/regular rate and rhythm/S1 S2 present/no gallops/no rub/no murmur normal/regular rate and rhythm/S1 S2 present/no gallops/no rub/no murmur/no JVD/no pedal edema

## 2023-05-17 NOTE — H&P ADULT - HISTORY OF PRESENT ILLNESS
37 y/o M w/ PMHx of alcoholism     In the ED    Vitals: , T 98, Spo2 95% /80     Labs: Plt 65, Na 128, K 3.0, Tbili 9.6, , , , Lactate 4.0, Lipase 432, UA + for bacteria and leuk esterase, Blood alc level 423     Imaging  RUQ U/S Visualization of the gallbladder is limited by overlying bowel gas. No   gross gallstones, gallbladder wall thickening, or pericholecystic fluid. No ultrasonic Grayson's sign epatomegaly and hepatic steatosis.  CT A/P- satosis and hepatomegaly.No hydronephrosis. 6.3 mm nonobstructing stone lower pole left kidney.    Meds Given    dTAP x1, Zofran x 1, Morphine x1, Pepcid x1,2 NS, 5mg haldol x1, 40meq k, 10mg flexiril, zosyn x1, thiamine, folic acid,     EKG  38 year old male with a history of alcohol abuse, alcohol withdrawal seizures presents with signs of alcohol overuse.     In the ED    Vitals: , T 98, Spo2 95% /80     Labs: Plt 65, Na 128, K 3.0, Tbili 9.6, , , , Lactate 4.0, Lipase 432, UA + for bacteria and leuk esterase, Blood alc level 423     Imaging  RUQ U/S Visualization of the gallbladder is limited by overlying bowel gas. No   gross gallstones, gallbladder wall thickening, or pericholecystic fluid. No ultrasonic Grayson's sign epatomegaly and hepatic steatosis.  CT A/P- satosis and hepatomegaly.No hydronephrosis. 6.3 mm nonobstructing stone lower pole left kidney.    Meds Given    dTAP x1, Zofran x 1, Morphine x1, Pepcid x1,2 NS, 5mg haldol x1, 40meq k, 10mg flexiril, zosyn x1, thiamine, folic acid,     EKG  38 year old male with a history of alcohol abuse, alcohol withdrawal seizures, fatty liver presents with signs of alcohol withdrawal. History obtained from wife. Patient has been drinking for the past 16 days of unknown amount and had unwitnessed fall from the bed so pt was taken to the ED.    In the ED    Vitals: , T 98, Spo2 95% /80     Labs: Plt 65, Na 128, K 3.0, Tbili 9.6, , , , Lactate 4.0, Lipase 432, UA + for bacteria and leuk esterase, Blood alc level 423     Imaging  RUQ U/S Visualization of the gallbladder is limited by overlying bowel gas. No   gross gallstones, gallbladder wall thickening, or pericholecystic fluid. No ultrasonic Grayson's sign epatomegaly and hepatic steatosis.  CT A/P- steatosis and hepatomegaly. No hydronephrosis. 6.3 mm nonobstructing stone lower pole left kidney.    Meds Given    dTAP x1, Zofran x 1, Morphine x1, Pepcid x1,2 NS, 5mg haldol x1, 40meq k, 10mg flexiril, zosyn x1, thiamine, folic acid,  38 year old male with a history of alcohol abuse, alcohol withdrawal seizures, fatty liver presents with signs of alcohol withdrawal. History obtained from wife with  over phone. Patient has been drinking for the past 16 days of unknown amount and had unwitnessed fall from the bed so pt was taken to the ED. Seen and examined pt at bedside. Unable to obtain history from patient due to lethargy in setting of recent ativan use.    In the ED    Vitals: , T 98, Spo2 95% /80     Labs: Plt 65, Na 128, K 3.0, Tbili 9.6, , , , Lactate 4.0, Lipase 432, UA + for bacteria and leuk esterase, Blood alc level 423     Imaging  RUQ U/S Visualization of the gallbladder is limited by overlying bowel gas. No   gross gallstones, gallbladder wall thickening, or pericholecystic fluid. No ultrasonic Grayson's sign epatomegaly and hepatic steatosis.  CT A/P- steatosis and hepatomegaly. No hydronephrosis. 6.3 mm nonobstructing stone lower pole left kidney.    Meds Given    dTAP x1, Zofran x 1, Morphine x1, Pepcid x1,2 NS, 5mg haldol x1, 40meq k, 10mg flexiril, zosyn x1, thiamine, folic acid,  38 year old male with a history of alcohol abuse, alcohol withdrawal seizures, fatty liver presents with signs of alcohol abuse/withdrawal. History obtained from wife with  over phone. Patient has been drinking for the past 16 days of unknown amount and had unwitnessed fall from the bed so pt was taken to the ED. Seen and examined pt at bedside. Unable to obtain history from patient due to lethargy in setting of recent ativan use.    In the ED    Vitals: , T 98, Spo2 95% /80     Labs: Plt 65, Na 128, K 3.0, Tbili 9.6, , , , Lactate 4.0, Lipase 432, UA + for bacteria and leuk esterase, Blood alc level 423     Imaging  RUQ U/S Visualization of the gallbladder is limited by overlying bowel gas. No   gross gallstones, gallbladder wall thickening, or pericholecystic fluid. No ultrasonic Grayson's sign epatomegaly and hepatic steatosis.  CT A/P- steatosis and hepatomegaly. No hydronephrosis. 6.3 mm nonobstructing stone lower pole left kidney.    Meds Given    dTAP x1, Zofran x 1, Morphine x1, Pepcid x1,2 NS, 5mg haldol x1, 40meq k, 10mg flexiril, zosyn x1, thiamine, folic acid,  38 year old male with a history of alcohol abuse, alcohol withdrawal seizures, fatty liver presents with signs of alcohol abuse/withdrawal. History obtained from wife with  over phone. Patient has been drinking for the past 16 days of unknown amount and had unwitnessed fall from the bed so pt was taken to the ED. Seen and examined pt at bedside. Unable to obtain history from patient due to lethargy in setting of recent ativan use.    In the ED    Vitals: , T 98, Spo2 95% /80     Labs: Plt 65, Na 128, K 3.0, Tbili 9.6, , , , Lactate 4.0, Lipase 432, UA + for bacteria and leuk esterase, Blood alc level 423     Imaging  RUQ U/S Visualization of the gallbladder is limited by overlying bowel gas. No   gross gallstones, gallbladder wall thickening, or pericholecystic fluid. No ultrasonic Grayson's sign epatomegaly and hepatic steatosis.  CT A/P- steatosis and hepatomegaly. No hydronephrosis. 6.3 mm nonobstructing stone lower pole left kidney.  CT head/cervical spine/ maxillofacial non-con: No acute intracranial abnormalities. Mild right periorbital subcutaneous soft tissue swelling. No evidence of orbital or other facial fracture. No vertebral fracture, collapse or subluxation.    Meds Given    dTAP x1, Zofran x 1, Morphine x1, Pepcid x1,2 NS, 5mg haldol x1, 40meq k, 10mg flexiril, zosyn x1, thiamine, folic acid,  38 year old male with a history of alcohol abuse, alcohol withdrawal seizures, fatty liver presents with signs of alcohol abuse/withdrawal. History obtained from wife with  over phone. Patient has been drinking for the past 16 days of unknown amount and had unwitnessed fall from the bed so pt was taken to the ED. Seen and examined pt at bedside. Unable to obtain history from patient due to lethargy in setting of recent ativan use.    In the ED    Vitals: , T 98, Spo2 95% /80     Labs: Plt 65, Na 128, K 3.0, Tbili 9.6, , , , Lactate 4.0, Lipase 432, UA + for bacteria and leuk esterase, Blood alc level 423     Imaging  RUQ U/S Visualization of the gallbladder is limited by overlying bowel gas. No   gross gallstones, gallbladder wall thickening, or pericholecystic fluid. No ultrasonic Grayson's sign epatomegaly and hepatic steatosis.  CT A/P- steatosis and hepatomegaly. No hydronephrosis. 6.3 mm nonobstructing stone lower pole left kidney.  CT head/cervical spine/ maxillofacial non-con: No acute intracranial abnormalities. Mild right periorbital subcutaneous soft tissue swelling. No evidence of orbital or other facial fracture. No vertebral fracture, collapse or subluxation.    ECG: Sinus tachycardia, 115 BPM    Meds Given    dTAP x1, Zofran x 1, Morphine x1, Pepcid x1,2 NS, 5mg haldol x1, 40meq k, 10mg flexiril, zosyn x1, thiamine, folic acid,  38 year old male with a history of alcohol abuse, alcohol withdrawal seizures, fatty liver presents with signs of alcohol abuse/withdrawal. History obtained from wife with  over phone. Patient has been drinking for the past 16 days of unknown amount and had unwitnessed fall from the bed so pt was taken to the ED. Seen and examined pt at bedside. Unable to obtain history from patient due to somulence in setting of recent ativan use.    In the ED    Vitals: , T 98, Spo2 95% /80     Labs: Plt 65, Na 128, K 3.0, Tbili 9.6, , , , Lactate 4.0, Lipase 432, UA + for bacteria and leuk esterase, Blood alc level 423     Imaging  RUQ U/S Visualization of the gallbladder is limited by overlying bowel gas. No   gross gallstones, gallbladder wall thickening, or pericholecystic fluid. No ultrasonic Grayson's sign epatomegaly and hepatic steatosis.  CT A/P- steatosis and hepatomegaly. No hydronephrosis. 6.3 mm nonobstructing stone lower pole left kidney.  CT head/cervical spine/ maxillofacial non-con: No acute intracranial abnormalities. Mild right periorbital subcutaneous soft tissue swelling. No evidence of orbital or other facial fracture. No vertebral fracture, collapse or subluxation.    ECG: Sinus tachycardia, 115 BPM    Meds Given    dTAP x1, Zofran x 1, Morphine x1, Pepcid x1,2 NS, 5mg haldol x1, 40meq k, 10mg flexiril, zosyn x1, thiamine, folic acid,  38M PMH of alcohol abuse, alcohol withdrawal seizures, fatty liver presents with signs of alcohol abuse/withdrawal. History obtained from wife with  over phone. Patient has been drinking for the past 16 days of unknown amount and had unwitnessed fall from the bed so pt was taken to the ED. Seen and examined pt at bedside. Unable to obtain history from patient due to somulence in setting of recent ativan use.    In the ED    Vitals: , T 98, Spo2 95% /80     Labs: Plt 65, Na 128, K 3.0, Tbili 9.6, , , , Lactate 4.0, Lipase 432, UA + for bacteria and leuk esterase, Blood alc level 423     Imaging  RUQ U/S Visualization of the gallbladder is limited by overlying bowel gas. No   gross gallstones, gallbladder wall thickening, or pericholecystic fluid. No ultrasonic Grayson's sign epatomegaly and hepatic steatosis.  CT A/P- steatosis and hepatomegaly. No hydronephrosis. 6.3 mm nonobstructing stone lower pole left kidney.  CT head/cervical spine/ maxillofacial non-con: No acute intracranial abnormalities. Mild right periorbital subcutaneous soft tissue swelling. No evidence of orbital or other facial fracture. No vertebral fracture, collapse or subluxation.    ECG: Sinus tachycardia, 115 BPM    Meds Given    dTAP x1, Zofran x 1, Morphine x1, Pepcid x1,2 NS, 5mg haldol x1, 40meq k, 10mg flexiril, zosyn x1, thiamine, folic acid,  38M PMH of alcohol abuse, alcohol withdrawal seizures, fatty liver presents with signs of alcohol abuse/withdrawal. History obtained from wife with  over phone. Patient has been drinking for the past 16 days of unknown amount and had unwitnessed fall from the bed so pt was taken to the ED. Seen and examined pt at bedside. Unable to obtain history from patient due to somnolence in setting of recent ativan use.    In the ED    Vitals: , T 98, Spo2 95% /80     Labs: Plt 65, Na 128, K 3.0, Tbili 9.6, , , , Lactate 4.0, Lipase 432, UA + for bacteria and leuk esterase, Blood alc level 423     Imaging  RUQ U/S Visualization of the gallbladder is limited by overlying bowel gas. No   gross gallstones, gallbladder wall thickening, or pericholecystic fluid. No ultrasonic Grayson's sign epatomegaly and hepatic steatosis.  CT A/P- steatosis and hepatomegaly. No hydronephrosis. 6.3 mm nonobstructing stone lower pole left kidney.  CT head/cervical spine/ maxillofacial non-con: No acute intracranial abnormalities. Mild right periorbital subcutaneous soft tissue swelling. No evidence of orbital or other facial fracture. No vertebral fracture, collapse or subluxation.    ECG: Sinus tachycardia, 115 BPM    Meds Given    dTAP x1, Zofran x 1, Morphine x1, Pepcid x1,2 NS, 5mg haldol x1, 40meq k, 10mg flexiril, zosyn x1, thiamine, folic acid,   Pt seen by ICU team, admitted to ICU for ETOH intoxication & withdrawal.

## 2023-05-17 NOTE — CARE COORDINATION ASSESSMENT. - OTHER PERTINENT REFERRAL INFORMATION
Pt known to SW from prior admission. Pt alert and oriented X4 but very groggy.  Wife at bedside. As per wife pt did not follow thru with obtaining ETOH counseling from last admission. Pt did stop drinking but started again a few weeks ago. Wife would like pt to go to rehab upon discharge. Discussed possible options but explained that it would be the patients decision whether or not to go. 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 Wife feels pt has depression but pt not responsive to questions at this time. SW to follow up when pt is better medically.

## 2023-05-17 NOTE — H&P ADULT - NSHPSOCIALHISTORY_GEN_ALL_CORE
Tobacco: denies  EtOH: chronic heavy drinker  Recreational drug use: denies  Lives with: patient lives with fie  Ambulates: independent  ADLs: independent Tobacco: denies  EtOH: chronic heavy drinker  Recreational drug use: denies  Lives with: patient lives with wife  Ambulates: independent  ADLs: independent

## 2023-05-17 NOTE — PROGRESS NOTE ADULT - ASSESSMENT
38M with alcohol abuse and fatty liver presents s/p fall found to be in alcohol withdrawal, rhabdomyolysis, and liver failure 2/2 alcoholic hepatitis    Neuro: ETOH withdrawal with high CIWA, continue high risk ativan taper with PRN ativan; trend CIWA score; ammonia mildly elevated but likely not contributing to mental status  Pulm: no active issues  CV: tachycardia 2/2 alcohol withdrawal, EKG sinus tachycardia; continue tele monitoring  Skin/Lines: squires catheter  GI: Liver failure 2/2 alcoholic hepatitis; trend MELD daily; GI consulted, appreciate recommendations;  DF 23, hold steroids for now  /Renal: lactic acidosis likely 2/2 liver failure, also with rhabdomyolysis, continue IVF, trend I&Os, BMPs,  Heme/DVT PPX: SCDs given thrombocytopenia; thrombocytopenia likely 2/2 liver dysfunction and alcoholism; trend BMPs  Endo: No active issues, trend glucose on BMP  ID: Observe off abx for now, s/p zosyn in ED. Follow up cultures  Ethics: Full code, plan d/w wife at bedside

## 2023-05-17 NOTE — H&P ADULT - GASTROINTESTINAL
normal/soft/nontender/nondistended/normal active bowel sounds normal/soft/nontender/nondistended/normal active bowel sounds/no guarding/no rigidity/no organomegaly/no palpable soumya/no masses palpable

## 2023-05-17 NOTE — H&P ADULT - PROBLEM SELECTOR PLAN 5
SCDs b/l dvt ppx given age and thrombocytopenia As per nurse patient was having abd pain which resolved after patient voided  ~600cc  - CT A/P- No hydronephrosis. 6.3 mm nonobstructing stone lower pole left kidney.  - squires ordered  - continue to monitor Na 128 on admission  s/p 3L NS in ED  C/w IVF NS 75cc/hr   Daily CMPs  replete lytes as needed Na 128 on admission  - s/p 3L NS in ED  - c/w LR IVF  - Daily CMPs  - replete lytes as needed 6.7 -> 6.5  - ordered calcium gluconate 2g x1  - replete as needed

## 2023-05-17 NOTE — H&P ADULT - PROBLEM SELECTOR PLAN 2
Chronic  CIWA Protocol  Fall precautions  2mg ativan qh PRN   IVF, thiamine, folate supplementation  F/u Addiction Medicine recs (Radha)   ICU (aud[facility]) Consulted Chronic known hx of alcohol use disorder  CIWA Protocol  Fall precautions  2mg ativan q2h PRN   IVF, thiamine, folate supplementation  F/u Addiction Medicine recs (Radha)   ICU ([facility]) Consulted Chronic known hx of alcohol use disorder  CIWA Protocol  Fall precautions  2mg ativan q2h PRN   IVF, thiamine, folate supplementation  NPO except meds  F/u Addiction Medicine recs (Radha)   ICU ([facility]) Consulted Chronic known hx of alcohol use disorder  CIWA Protocol  Fall precautions  2mg ativan q2h PRN   IVF, thiamine, folate supplementation  NPO except meds  F/u CT head  F/u Addiction Medicine recs (Radha)   ICU ([facility]) Consulted Chronic known hx of alcohol use disorder  - CIWA Protocol  - Fall precautions  - 2mg ativan q2h PRN   - IVF, thiamine, folate supplementation  - NPO except meds  - F/u CT head  - F/u Addiction Medicine recs (Radha)   - ICU (aud[facility]) Consulted, f/u recs Chronic known hx of alcohol use disorder  - CIWA Protocol  - Fall precautions  - PRN ativan  - IVF, thiamine, folate supplementation  - NPO except meds  - F/u CT head  - F/u Addiction Medicine recs (Radha)   - ICU (aud[facility]) Consulted, recs appreciated  - management as per ICU Tbili 9.6, , , , Lipase 432 on admission w/ known hx of alcohol abuse   - CT Abd/Pelv and RUQ U/S showing steatohepatitis without evidence of biliary obstruction or gallstones   - RUQ U/S Visualization of the gallbladder is limited by overlying bowel gas. No gross gallstones, gallbladder wall thickening, or pericholecystic fluid. No ultrasonic Grayson's sign   - Likely 2/2 alcohol abuse in the setting of acute intoxication   - Avoid hepatotoxic agents   - Daily CMP  -Elevated serum Lipase-? Alcoholic pancreatitis   - GI Consulted - recs appreciated  - ICU (aud[facility]) Consulted, recs appreciated  - management as per ICU

## 2023-05-17 NOTE — ED PROVIDER NOTE - PHYSICAL EXAMINATION
Constitutional: Awake, Alert, appears intoxicated  HEAD: Normocephalic, abrasion above right eye  EYES: PERRL, EOM intact, conjunctiva and sclera are clear bilaterally. no proptosis noted  ENT: External ears normal. No rhinorrhea, no tracheal deviation   NECK: Supple, non-tender  CARDIOVASCULAR: tachycardic rate and rhythm.  RESPIRATORY: Normal respiratory effort; breath sounds CTAB, no wheezes, rhonchi, or rales. No accessory muscle use.   ABDOMEN: Soft; non-tender, non-distended. No rebound or guarding.   MSK:  no lower extremity edema, no deformities, No C, T, or L spine tenderness or obvious step-offs.  SKIN: Warm, dry, abrasion as above  NEURO: alert to voice, moving all extremities

## 2023-05-17 NOTE — H&P ADULT - PROBLEM SELECTOR PLAN 9
SCDs b/l dvt ppx given age and thrombocytopenia Plt 65 on admission likely in setting of Chronic alcoholic liver disease & Likely BM suppression 2/2 ETOH   - no signs of acute bleed at this time  - daily CBC

## 2023-05-17 NOTE — ED PROVIDER NOTE - CARE PLAN
1 Principal Discharge DX:	Alcoholic hepatitis  Secondary Diagnosis:	Alcohol dependence with withdrawal

## 2023-05-17 NOTE — H&P ADULT - PROBLEM SELECTOR PLAN 4
SCDs b/l dvt ppx given age and thrombocytopenia As per nurse patient was having abd pain which resolved after patient voided  ~600cc  - squires ordered  - continue to monitor As per nurse patient was having abd pain which resolved after patient voided  ~600cc  - CT A/P- No hydronephrosis. 6.3 mm nonobstructing stone lower pole left kidney.  - squires ordered  - continue to monitor Na 128 on admission  s/p 3L NS in ED  C/w IVF NS 75cc/hr   Daily CMPs Na 128 on admission  s/p 3L NS in ED  C/w IVF NS 75cc/hr   Daily CMPs  replete lytes as needed 6.7 -> 6.5  - ordered calcium gluconate 2g x1  - replete as needed K 3.1 -> 2.8  - ordered IV KCl 10mEq x 4 doses  - replete as needed  - Daily CMP

## 2023-05-17 NOTE — H&P ADULT - NSHPREVIEWOFSYSTEMS_GEN_ALL_CORE
CONSTITUTIONAL: denies fever, chills, fatigue, weakness  HEENT: denies blurred visions, sore throat  SKIN: denies new lesions, rash  CARDIOVASCULAR: denies chest pain, chest pressure, palpitations  RESPIRATORY: denies shortness of breath, sputum production  GASTROINTESTINAL: denies nausea, vomiting, diarrhea, abdominal pain  GENITOURINARY: denies dysuria, discharge  NEUROLOGICAL: denies numbness, headache, focal weakness  MUSCULOSKELETAL: denies new joint pain, muscle aches  HEMATOLOGIC: denies gross bleeding, bruising  LYMPHATICS: denies enlarged lymph nodes, extremity swelling  PSYCHIATRIC: denies recent changes in anxiety, depression Unable to obtain history due to lethargy Unable to obtain history due to lethargy in setting of recent ativan use Unable to obtain ROS due to somnolence  in setting of recent ativan use

## 2023-05-17 NOTE — H&P ADULT - PROBLEM SELECTOR PLAN 1
Tbili 9.6, , , , Lipase 432 on admission w/ known hx of alcohol abuse   CT Abd/Pelv and RUQ U/S showing steatohepatitis without evidence of biliary obstruction or gallstones   Likely 2/2 alcohol abuse in the setting of acute intoxication   NPO   IVF   Avoid hepatotoxic agents   Daily CMP  GI Consulted - f/u recs  ICU ([facility]) Consulted Tbili 9.6, , , , Lipase 432 on admission w/ known hx of alcohol abuse   CT Abd/Pelv and RUQ U/S showing steatohepatitis without evidence of biliary obstruction or gallstones   Likely 2/2 alcohol abuse in the setting of acute intoxication   NPO   IVF   Avoid hepatotoxic agents   Daily CMP  GI Consulted - recs appreciated  ICU ([facility]) Consulted, f/u recs  low threshold for ICU admission Tbili 9.6, , , , Lipase 432 on admission w/ known hx of alcohol abuse   CT Abd/Pelv and RUQ U/S showing steatohepatitis without evidence of biliary obstruction or gallstones   Likely 2/2 alcohol abuse in the setting of acute intoxication   NPO except meds  NS IVF at 75cc /hr  Avoid hepatotoxic agents   Daily CMP  GI Consulted - recs appreciated  ICU ([facility]) Consulted, f/u recs  low threshold for ICU admission Tbili 9.6, , , , Lipase 432 on admission w/ known hx of alcohol abuse   - CT Abd/Pelv and RUQ U/S showing steatohepatitis without evidence of biliary obstruction or gallstones   - RUQ U/S Visualization of the gallbladder is limited by overlying bowel gas. No gross gallstones, gallbladder wall thickening, or pericholecystic fluid. No ultrasonic Grayson's sign   - Likely 2/2 alcohol abuse in the setting of acute intoxication   - NPO except meds  - NS IVF at 75cc /hr  - Avoid hepatotoxic agents   - Daily CMP  - GI Consulted - recs appreciated  - ICU ([facility]) Consulted, f/u recs  - low threshold for ICU admission Tbili 9.6, , , , Lipase 432 on admission w/ known hx of alcohol abuse   - CT Abd/Pelv and RUQ U/S showing steatohepatitis without evidence of biliary obstruction or gallstones   - RUQ U/S Visualization of the gallbladder is limited by overlying bowel gas. No gross gallstones, gallbladder wall thickening, or pericholecystic fluid. No ultrasonic Grayson's sign   - Likely 2/2 alcohol abuse in the setting of acute intoxication   - NPO except meds  - NS IVF at 75cc /hr  - Avoid hepatotoxic agents   - Daily CMP  - f/u drug screen  - GI Consulted - recs appreciated  - ICU ([facility]) Consulted, f/u recs  - low threshold for ICU admission Tbili 9.6, , , , Lipase 432 on admission w/ known hx of alcohol abuse   - CT Abd/Pelv and RUQ U/S showing steatohepatitis without evidence of biliary obstruction or gallstones   - RUQ U/S Visualization of the gallbladder is limited by overlying bowel gas. No gross gallstones, gallbladder wall thickening, or pericholecystic fluid. No ultrasonic Grayson's sign   - Likely 2/2 alcohol abuse in the setting of acute intoxication   - NPO except meds  - NS IVF at 75cc /hr  - calcium gluconate ordered  - Avoid hepatotoxic agents   - Daily CMP  - f/u drug screen  - GI Consulted - recs appreciated  - ICU ([facility]) Consulted, f/u recs  - low threshold for ICU admission Tbili 9.6, , , , Lipase 432 on admission w/ known hx of alcohol abuse   - CT Abd/Pelv and RUQ U/S showing steatohepatitis without evidence of biliary obstruction or gallstones   - RUQ U/S Visualization of the gallbladder is limited by overlying bowel gas. No gross gallstones, gallbladder wall thickening, or pericholecystic fluid. No ultrasonic Grayson's sign   - Likely 2/2 alcohol abuse in the setting of acute intoxication   - NPO except meds  - NS IVF at 75cc /hr  - calcium gluconate ordered  - Avoid hepatotoxic agents   - Daily CMP  - f/u drug screen  - GI Consulted - recs appreciated  - ICU ([facility]) Consulted, recs appreciated  - management as per ICU Tbili 9.6, , , , Lipase 432 on admission w/ known hx of alcohol abuse   - CT Abd/Pelv and RUQ U/S showing steatohepatitis without evidence of biliary obstruction or gallstones   - RUQ U/S Visualization of the gallbladder is limited by overlying bowel gas. No gross gallstones, gallbladder wall thickening, or pericholecystic fluid. No ultrasonic Grayson's sign   - Likely 2/2 alcohol abuse in the setting of acute intoxication   - Admit to ICU  - NPO except meds  - continuous LR IVF  - calcium gluconate ordered  - Avoid hepatotoxic agents   - Daily CMP  - f/u drug screen  - GI Consulted - recs appreciated  - ICU ([facility]) Consulted, recs appreciated  - management as per ICU Chronic known hx of alcohol use disorder  - Admit to ICU  - CIWA Protocol  - Fall precautions  - Ativan RTC &  PRN ativan  - IVF, thiamine, folate supplementation  - NPO except meds  - F/u Addiction Medicine recs (Radha)   - ICU ([facility]) Consulted, recs appreciated  - management as per ICU

## 2023-05-17 NOTE — H&P ADULT - PROBLEM SELECTOR PLAN 6
SCDs b/l dvt ppx given age and thrombocytopenia s/p unwitnessed fall with small abrasions of face  - CT head/cervical spine/ maxillofacial non-con: No acute intracranial abnormalities. Mild right periorbital subcutaneous soft tissue swelling. No evidence of orbital or other facial fracture. No vertebral fracture, collapse or subluxation.  - f/u repeat CT head noncon to evaluate right posterior fossa for SDH Na 128 on admission  - s/p 3L NS in ED  - c/w LR IVF  - Daily CMPs  - replete lytes as needed

## 2023-05-17 NOTE — H&P ADULT - PROBLEM SELECTOR PLAN 8
SCDs b/l dvt ppx given age and thrombocytopenia Plt 65 on admission likely in setting of transaminitis  - no signs of acute bleed at this time  - continue to monitor  - daily CBC As per nurse patient was having abd pain which resolved after patient voided  ~600cc  - CT A/P- No hydronephrosis. 6.3 mm nonobstructing stone lower pole left kidney.  - squires ordered  - continue to monitor

## 2023-05-17 NOTE — CONSULT NOTE ADULT - SUBJECTIVE AND OBJECTIVE BOX
[facility]  Juliocesar Godfrey PA-C  [address] [place] [address] [id number] [phone]      Chief Complaint:  Patient is a 38y old  Male who presents with a chief complaint of Hepatitis (17 May 2023 09:51)      HPI:38 year old male with a history of alcohol abuse, alcohol withdrawal seizures presents with signs of alcohol overuse.     In the ED    Vitals: , T 98, Spo2 95% /80     Labs: Plt 65, Na 128, K 3.0, Tbili 9.6, , , , Lactate 4.0, Lipase 432, UA + for bacteria and leuk esterase, Blood alc level 423     Imaging  RUQ U/S Visualization of the gallbladder is limited by overlying bowel gas. No   gross gallstones, gallbladder wall thickening, or pericholecystic fluid. No ultrasonic Grayson's sign epatomegaly and hepatic steatosis.  CT A/P- satosis and hepatomegaly.No hydronephrosis. 6.3 mm nonobstructing stone lower pole left kidney.    Allergies:  No Known Allergies      Medications:  acetaminophen     Tablet .. 650 milliGRAM(s) Oral every 6 hours PRN  aluminum hydroxide/magnesium hydroxide/simethicone Suspension 30 milliLiter(s) Oral every 4 hours PRN  LORazepam   Injectable 2 milliGRAM(s) IV Push every 2 hours PRN  LORazepam   Injectable 2 milliGRAM(s) IV Push every 1 hour PRN  melatonin 3 milliGRAM(s) Oral at bedtime PRN  ondansetron Injectable 4 milliGRAM(s) IV Push every 8 hours PRN  sodium chloride 0.9%. 1000 milliLiter(s) IV Continuous <Continuous>      PMHX/PSHX:  No pertinent past medical history    Alcohol abuse    H/O hernia repair    S/P appendectomy        Family history:  Family history of renal disease        Social History:     ROS:   unable to obtain        PHYSICAL EXAM:   Vital Signs:  Vital Signs Last 24 Hrs  T(C): 36.8 (17 May 2023 08:58), Max: 36.8 (17 May 2023 08:58)  T(F): 98.3 (17 May 2023 08:58), Max: 98.3 (17 May 2023 08:58)  HR: 129 (17 May 2023 08:58) (114 - 129)  BP: 123/80 (17 May 2023 08:58) (123/80 - 132/93)  BP(mean): --  RR: 18 (17 May 2023 08:58) (18 - 18)  SpO2: 95% (17 May 2023 08:58) (94% - 96%)    Parameters below as of 17 May 2023 00:07  Patient On (Oxygen Delivery Method): room air      Daily Height in cm: 170.18 (16 May 2023 23:41)    Daily     nad  confused  frail  non toxic  soft, nt  no edema      LABS:                        14.9   5.76  )-----------( 65       ( 17 May 2023 02:25 )             40.8     05-    129<L>  |  95<L>  |  8   ----------------------------<  113<H>  3.1<L>   |  24  |  0.61    Ca    6.7<L>      17 May 2023 06:36    TPro  5.8<L>  /  Alb  2.7<L>  /  TBili  9.2<H>  /  DBili  x   /  AST  453<H>  /  ALT  306<H>  /  AlkPhos  242<H>  0517    LIVER FUNCTIONS - ( 17 May 2023 06:36 )  Alb: 2.7 g/dL / Pro: 5.8 g/dL / ALK PHOS: 242 U/L / ALT: 306 U/L / AST: 453 U/L / GGT: x           PT/INR - ( 17 May 2023 08:30 )   PT: 14.5 sec;   INR: 1.24 ratio         PTT - ( 17 May 2023 08:30 )  PTT:34.7 sec  Urinalysis Basic - ( 17 May 2023 05:44 )    Color: Yellow / Appearance: Slightly Turbid / S.010 / pH: x  Gluc: x / Ketone: Negative  / Bili: Small / Urobili: 1   Blood: x / Protein: 15 / Nitrite: Negative   Leuk Esterase: Small / RBC: 0-2 /HPF / WBC 3-5   Sq Epi: x / Non Sq Epi: x / Bacteria: Many      Amylase Serum--      Lipase [id number]       Ammonia--      Imaging:

## 2023-05-17 NOTE — CONSULT NOTE ADULT - SUBJECTIVE AND OBJECTIVE BOX
Date/Time Patient Seen:  		  Referring MD:   Data Reviewed	       Patient is a 38y old  Male who presents with a chief complaint of Hepatitis (17 May 2023 11:00)      Subjective/HPI     38 year old male with a history of alcohol abuse, alcohol withdrawal seizures, fatty liver presents with signs of alcohol abuse/withdrawal. History obtained from wife with  over phone. Patient has been drinking for the past 16 days of unknown amount and had unwitnessed fall from the bed so pt was taken to the ED. Seen and examined pt at bedside. Unable to obtain history from patient due to lethargy in setting of recent ativan use.  PAST MEDICAL & SURGICAL HISTORY:  No pertinent past medical history    Alcohol abuse    H/O hernia repair    S/P appendectomy          Medication list         MEDICATIONS  (STANDING):  calcium gluconate IVPB 2 Gram(s) IV Intermittent once  chlorhexidine 4% Liquid 1 Application(s) Topical <User Schedule>  lactated ringers. 1000 milliLiter(s) (125 mL/Hr) IV Continuous <Continuous>  LORazepam   Injectable 1 milliGRAM(s) IV Push once  pantoprazole  Injectable 40 milliGRAM(s) IV Push daily  sodium chloride 0.9%. 1000 milliLiter(s) (75 mL/Hr) IV Continuous <Continuous>    MEDICATIONS  (PRN):  acetaminophen     Tablet .. 650 milliGRAM(s) Oral every 6 hours PRN Temp greater or equal to 38C (100.4F), Mild Pain (1 - 3)  aluminum hydroxide/magnesium hydroxide/simethicone Suspension 30 milliLiter(s) Oral every 4 hours PRN Dyspepsia  melatonin 3 milliGRAM(s) Oral at bedtime PRN Insomnia  ondansetron Injectable 4 milliGRAM(s) IV Push every 8 hours PRN Nausea and/or Vomiting  FAMILY HISTORY:  Family history of renal disease, Uncle.     Social History:  · Substance use	Yes  · Alcohol use	Y  · Social History (marital status, living situation, occupation, and sexual history)	Tobacco: denies  EtOH: chronic heavy drinker  Recreational drug use: denies  Lives with: patient lives with wife  Ambulates: independent  ADLs: independent     Tobacco Screening:  · Core Measure Site	Yes  · Has the patient used tobacco in the past 30 days?	No    Risk Assessment:    Present on Admission:  Deep Venous Thrombosis	no  Pulmonary Embolus	no     HIV Screening:  · In accordance with [place] State law, we offer every patient who comes to our ED an HIV test. Would you like to be tested today?	Opt out         Vitals log        ICU Vital Signs Last 24 Hrs  T(C): 37.6 (17 May 2023 10:44), Max: 37.6 (17 May 2023 10:44)  T(F): 99.7 (17 May 2023 10:44), Max: 99.7 (17 May 2023 10:44)  HR: 116 (17 May 2023 10:44) (114 - 129)  BP: 122/63 (17 May 2023 10:44) (122/63 - 132/93)  BP(mean): --  ABP: --  ABP(mean): --  RR: 24 (17 May 2023 10:44) (18 - 24)  SpO2: 96% (17 May 2023 10:44) (94% - 96%)    O2 Parameters below as of 17 May 2023 10:44  Patient On (Oxygen Delivery Method): nasal cannula  O2 Flow (L/min): 2               Input and Output:  I&O's Detail      Lab Data                        14.9   5.76  )-----------( 65       ( 17 May 2023 02:25 )             40.8     05-17    129<L>  |  95<L>  |  8   ----------------------------<  113<H>  3.1<L>   |  24  |  0.61    Ca    6.7<L>      17 May 2023 06:36  Phos  2.8     05-17  Mg     1.6     05-17    TPro  5.8<L>  /  Alb  2.7<L>  /  TBili  9.2<H>  /  DBili  x   /  AST  453<H>  /  ALT  306<H>  /  AlkPhos  242<H>  05-17            Review of Systems	  intox    Objective     Physical Examination    heart s1s2  lung dec BS  headnc      Pertinent Lab findings & Imaging      Heredia:  NO   Adequate UO     I&O's Detail           Discussed with:     Cultures:	        Radiology      ACC: [id number] EXAM:  US ABDOMEN RT UPR QUADRANT   ORDERED BY:  ROMEL BARTLETT     PROCEDURE DATE:  05/17/2023          INTERPRETATION:  CLINICAL INFORMATION: Elevated liver enzymes and total   bilirubin. History of alcohol use. The patient is currently intoxicated.    COMPARISON: Abdominal ultrasound dated 8/29/2020. Abdominal CT dated   5/17/2023.    TECHNIQUE: Sonography of the right upper quadrant.    FINDINGS:  Liver: Hepatic steatosis again noted. Hepatomegaly measuring 20.4 cm.  Bile ducts: Common bile duct not visualized.  Gallbladder: Visualization of the gallbladder is limited by overlying   bowel gas. No gross gallstones, gallbladder wall thickening, or   pericholecystic fluid. No ultrasonic Grayson's sign.  Pancreas: Not visualized due to overlying bowel gas.  Right kidney: 13.0 cm. No hydronephrosis. Within normal limits.  Ascites: None.  IVC: Not visualized due to overlying bowel gas.    IMPRESSION:  Visualization of the gallbladder is limited by overlying bowel gas. No   gross gallstones, gallbladder wall thickening, or pericholecystic fluid.   No ultrasonic Grayson's sign.    Hepatomegaly and hepatic steatosis.    --- End of Report ---            ALEX ELLIS MD; Attending Radiologist  This document has been electronically signed. May 17 2023  9:04AM

## 2023-05-17 NOTE — CARE COORDINATION ASSESSMENT. - NSCAREPROVIDERS_GEN_ALL_CORE_FT
CARE PROVIDERS:  Accepting Physician: Anand Palm  Administration: Porsha Ulloa  Admitting: Anand Palm  Attending: Anand Palm  Consultant: Kevin Mayes  Consultant: Navneet Leach  Consultant: Angel Garcia  Consultant: Shavon Lobo  Consultant: Lisha Bobo  Consultant: Dory Godfrey  Covering Team: Cliff Melo  ED Attending: Earnest Shanks  ED Nurse: Ekaterina Parker  Nurse: Kia Escobedo  Nurse: Ekaterina Parker  Nurse: Nadine Biswas  Nurse: Toyin Goetz  Nurse: Sejal Bynum  Nurse: Vinita Dumont  Nurse: Hien Lopez  Nurse: Brielle Martinez  Ordered: ADM, User  Ordered: Doctor, Unknown  Outpatient Provider: Wayne Morales  Outpatient Provider: Alvaro Silva  Override: Kia Escobedo  PCA/Nursing Assistant: Lucas Santana  Primary Team: Darin Almarza  Primary Team: Jack Lauren  Primary Team: Christian Lott  Primary Team: Pratik Silvestre  Primary Team: Kori Palm  : Korin Wyatt  UR// Supp. Assoc.: Shavon Mackay  UR// Supp. Assoc.: Misbah Roblero

## 2023-05-17 NOTE — H&P ADULT - NSHPPHYSICALEXAM_GEN_ALL_CORE
T(C): 36.8 (05-17-23 @ 08:58), Max: 36.8 (05-17-23 @ 08:58)  HR: 129 (05-17-23 @ 08:58) (114 - 129)  BP: 123/80 (05-17-23 @ 08:58) (123/80 - 132/93)  RR: 18 (05-17-23 @ 08:58) (18 - 18)  SpO2: 95% (05-17-23 @ 08:58) (94% - 96%)    GENERAL: patient appears well, no acute distress, appropriate, pleasant  EYES: sclera clear, no exudates  ENMT: oropharynx clear without erythema, no exudates, moist mucous membranes  NECK: supple, soft, no thyromegaly noted  LUNGS: good air entry bilaterally, clear to auscultation, symmetric breath sounds, no wheezing or rhonchi appreciated  HEART: soft S1/S2, regular rate and rhythm, no murmurs noted, no lower extremity edema  GASTROINTESTINAL: abdomen is soft, nontender, nondistended, normoactive bowel sounds, no palpable masses  INTEGUMENT: good skin turgor, no lesions noted  MUSCULOSKELETAL: no clubbing or cyanosis, no obvious deformity  NEUROLOGIC: awake, alert, oriented x3, good muscle tone in 4 extremities, no obvious sensory deficits  PSYCHIATRIC: mood is good, affect is congruent, linear and logical thought process T(C): 36.8 (05-17-23 @ 08:58), Max: 36.8 (05-17-23 @ 08:58)  HR: 129 (05-17-23 @ 08:58) (114 - 129)  BP: 123/80 (05-17-23 @ 08:58) (123/80 - 132/93)  RR: 18 (05-17-23 @ 08:58) (18 - 18)  SpO2: 95% (05-17-23 @ 08:58) (94% - 96%)    GENERAL: patient appears lethargic,   EYES: sclera clear, no exudates  ENMT: oropharynx clear without erythema, no exudates, moist mucous membranes  NECK: supple, soft, no thyromegaly noted  LUNGS: good air entry bilaterally, clear to auscultation, symmetric breath sounds, no wheezing or rhonchi appreciated  HEART: soft S1/S2, regular rate and rhythm, no murmurs noted, no lower extremity edema  GASTROINTESTINAL: abdomen is soft, nontender, nondistended, normoactive bowel sounds, no palpable masses  INTEGUMENT: good skin turgor, no lesions noted  MUSCULOSKELETAL: no clubbing or cyanosis, no obvious deformity  NEUROLOGIC: awake, alert, oriented x1, good muscle tone in 4 extremities, no obvious sensory deficits  PSYCHIATRIC: mood is good, affect is congruent, linear and logical thought process

## 2023-05-17 NOTE — CONSULT NOTE ADULT - SUBJECTIVE AND OBJECTIVE BOX
REASON FOR CONSULT: Alcoholic Hepatitis, Alcoholic Withdrawal    CONSULT REQUESTED BY: Dr. Palm    Patient is a 38y old  Male who presents with a chief complaint of Hepatitis (17 May 2023 10:36)    BRIEF HOSPITAL COURSE: Patient is 37 yo male with PMH of alcohol abuse and history of withdrawal with previous admission for aforementioned most recently back in January at which time he left AMA and back in  that required ICU admission. He presented on the evening of 23 from home after a fall. Initially patient intoxicated with EtOH level in 400s. Was complaining of abdominal pain at the time, CT Abd/Pelvis showed hepatomegaly; Abd U/S showed no stones. Lipase 400s. Elevated liver enzymes. Patient with periods of agitation     Events last 24 hours: ***    PAST MEDICAL & SURGICAL HISTORY:  Alcohol abuse      H/O hernia repair      S/P appendectomy        Allergies    No Known Allergies    Intolerances      FAMILY HISTORY:  Family history of renal disease  Uncle        Review of Systems:  CONSTITUTIONAL: No fever, chills, or fatigue  EYES: No eye pain, visual disturbances, or discharge  ENMT:  No difficulty hearing, tinnitus, vertigo; No sinus or throat pain  NECK: No pain or stiffness  RESPIRATORY: No cough, wheezing, chills or hemoptysis; No shortness of breath  CARDIOVASCULAR: No chest pain, palpitations, dizziness, or leg swelling  GASTROINTESTINAL: No abdominal or epigastric pain. No nausea, vomiting, or hematemesis; No diarrhea or constipation. No melena or hematochezia.  GENITOURINARY: No dysuria, frequency, hematuria, or incontinence  NEUROLOGICAL: No headaches, memory loss, loss of strength, numbness, or tremors  SKIN: No itching, burning, rashes, or lesions   MUSCULOSKELETAL: No joint pain or swelling; No muscle, back, or extremity pain  PSYCHIATRIC: No depression, anxiety, mood swings, or difficulty sleeping      Medications:        acetaminophen     Tablet .. 650 milliGRAM(s) Oral every 6 hours PRN  melatonin 3 milliGRAM(s) Oral at bedtime PRN  ondansetron Injectable 4 milliGRAM(s) IV Push every 8 hours PRN        aluminum hydroxide/magnesium hydroxide/simethicone Suspension 30 milliLiter(s) Oral every 4 hours PRN  pantoprazole  Injectable 40 milliGRAM(s) IV Push daily        calcium gluconate IVPB 2 Gram(s) IV Intermittent once  lactated ringers. 1000 milliLiter(s) IV Continuous <Continuous>  sodium chloride 0.9%. 1000 milliLiter(s) IV Continuous <Continuous>      chlorhexidine 4% Liquid 1 Application(s) Topical <User Schedule>            ICU Vital Signs Last 24 Hrs  T(C): 37.6 (17 May 2023 10:44), Max: 37.6 (17 May 2023 10:44)  T(F): 99.7 (17 May 2023 10:44), Max: 99.7 (17 May 2023 10:44)  HR: 116 (17 May 2023 10:44) (114 - 129)  BP: 122/63 (17 May 2023 10:44) (122/63 - 132/93)  BP(mean): --  ABP: --  ABP(mean): --  RR: 24 (17 May 2023 10:44) (18 - 24)  SpO2: 96% (17 May 2023 10:44) (94% - 96%)    O2 Parameters below as of 17 May 2023 10:44  Patient On (Oxygen Delivery Method): nasal cannula  O2 Flow (L/min): 2        Vital Signs Last 24 Hrs  T(C): 37.6 (17 May 2023 10:44), Max: 37.6 (17 May 2023 10:44)  T(F): 99.7 (17 May 2023 10:44), Max: 99.7 (17 May 2023 10:44)  HR: 116 (17 May 2023 10:44) (114 - 129)  BP: 122/63 (17 May 2023 10:44) (122/63 - 132/93)  BP(mean): --  RR: 24 (17 May 2023 10:44) (18 - 24)  SpO2: 96% (17 May 2023 10:44) (94% - 96%)    Parameters below as of 17 May 2023 10:44  Patient On (Oxygen Delivery Method): nasal cannula  O2 Flow (L/min): 2          I&O's Detail        LABS:                        14.9   5.76  )-----------( 65       ( 17 May 2023 02:25 )             40.8     05-17    129<L>  |  95<L>  |  8   ----------------------------<  113<H>  3.1<L>   |  24  |  0.61    Ca    6.7<L>      17 May 2023 06:36    TPro  5.8<L>  /  Alb  2.7<L>  /  TBili  9.2<H>  /  DBili  x   /  AST  453<H>  /  ALT  306<H>  /  AlkPhos  242<H>  -          CAPILLARY BLOOD GLUCOSE      POCT Blood Glucose.: 119 mg/dL (17 May 2023 00:37)    PT/INR - ( 17 May 2023 08:30 )   PT: 14.5 sec;   INR: 1.24 ratio         PTT - ( 17 May 2023 08:30 )  PTT:34.7 sec  Urinalysis Basic - ( 17 May 2023 05:44 )    Color: Yellow / Appearance: Slightly Turbid / S.010 / pH: x  Gluc: x / Ketone: Negative  / Bili: Small / Urobili: 1   Blood: x / Protein: 15 / Nitrite: Negative   Leuk Esterase: Small / RBC: 0-2 /HPF / WBC 3-5   Sq Epi: x / Non Sq Epi: x / Bacteria: Many      CULTURES:      Physical Examination:    General: No acute distress.  Alert, oriented, interactive, nonfocal    HEENT: Pupils equal, reactive to light.  Symmetric.    PULM: Clear to auscultation bilaterally, no significant sputum production    CVS: Regular rate and rhythm, no murmurs, rubs, or gallops    ABD: Soft, nondistended, nontender, normoactive bowel sounds, no masses    EXT: No edema, nontender    SKIN: Warm and well perfused, no rashes noted.    RADIOLOGY: ***    CRITICAL CARE TIME SPENT: ***   REASON FOR CONSULT: Alcoholic Hepatitis, Alcoholic Withdrawal    CONSULT REQUESTED BY: Dr. Palm    Patient is a 38y old  Male who presents with a chief complaint of Hepatitis (17 May 2023 10:36)    BRIEF HOSPITAL COURSE: Patient is 39 yo male with PMH of alcohol abuse and history of withdrawal with previous admission for aforementioned most recently back in January at which time he left AMA and back in  that required ICU admission. He presented on the evening of 23 from home after a fall. Initially patient intoxicated with EtOH level in 400s. Was complaining of abdominal pain at the time, CT Abd/Pelvis showed hepatomegaly; Abd U/S showed no stones. Lipase 400s. Elevated liver enzymes. Patient with periods of agitation, received Haldol around 2:45am. CIWA 19, ICU called. Ativan 2mg IVP given prior to my arrival for assessment. Patient somnolent therefore history somewhat limited. Patient endorsing lower pelvic discomfort.     PAST MEDICAL & SURGICAL HISTORY:  Alcohol abuse  H/O hernia repair  S/P appendectomy      Allergies    No Known Allergies    Intolerances      FAMILY HISTORY:  Family history of renal disease  Uncle        Review of Systems:  +Lower abdominal discomfort      Medications:  acetaminophen     Tablet .. 650 milliGRAM(s) Oral every 6 hours PRN  melatonin 3 milliGRAM(s) Oral at bedtime PRN  ondansetron Injectable 4 milliGRAM(s) IV Push every 8 hours PRN  aluminum hydroxide/magnesium hydroxide/simethicone Suspension 30 milliLiter(s) Oral every 4 hours PRN  pantoprazole  Injectable 40 milliGRAM(s) IV Push daily  calcium gluconate IVPB 2 Gram(s) IV Intermittent once  lactated ringers. 1000 milliLiter(s) IV Continuous <Continuous>  sodium chloride 0.9%. 1000 milliLiter(s) IV Continuous <Continuous>  chlorhexidine 4% Liquid 1 Application(s) Topical <User Schedule>    ICU Vital Signs Last 24 Hrs  T(C): 37.6 (17 May 2023 10:44), Max: 37.6 (17 May 2023 10:44)  T(F): 99.7 (17 May 2023 10:44), Max: 99.7 (17 May 2023 10:44)  HR: 116 (17 May 2023 10:44) (114 - 129)  BP: 122/63 (17 May 2023 10:44) (122/63 - 132/93)  RR: 24 (17 May 2023 10:44) (18 - 24)  SpO2: 96% (17 May 2023 10:44) (94% - 96%)    O2 Parameters below as of 17 May 2023 10:44  Patient On (Oxygen Delivery Method): nasal cannula  O2 Flow (L/min): 2    Vital Signs Last 24 Hrs  T(C): 37.6 (17 May 2023 10:44), Max: 37.6 (17 May 2023 10:44)  T(F): 99.7 (17 May 2023 10:44), Max: 99.7 (17 May 2023 10:44)  HR: 116 (17 May 2023 10:44) (114 - 129)  BP: 122/63 (17 May 2023 10:44) (122/63 - 132/93)  RR: 24 (17 May 2023 10:44) (18 - 24)  SpO2: 96% (17 May 2023 10:44) (94% - 96%)    Parameters below as of 17 May 2023 10:44  Patient On (Oxygen Delivery Method): nasal cannula  O2 Flow (L/min): 2          I&O's Detail        LABS:                        14.9   5.76  )-----------( 65       ( 17 May 2023 02:25 )             40.8     05-17    129<L>  |  95<L>  |  8   ----------------------------<  113<H>  3.1<L>   |  24  |  0.61    Ca    6.7<L>      17 May 2023 06:36    TPro  5.8<L>  /  Alb  2.7<L>  /  TBili  9.2<H>  /  DBili  x   /  AST  453<H>  /  ALT  306<H>  /  AlkPhos  242<H>  05-17          CAPILLARY BLOOD GLUCOSE      POCT Blood Glucose.: 119 mg/dL (17 May 2023 00:37)    PT/INR - ( 17 May 2023 08:30 )   PT: 14.5 sec;   INR: 1.24 ratio         PTT - ( 17 May 2023 08:30 )  PTT:34.7 sec  Urinalysis Basic - ( 17 May 2023 05:44 )    Color: Yellow / Appearance: Slightly Turbid / S.010 / pH: x  Gluc: x / Ketone: Negative  / Bili: Small / Urobili: 1   Blood: x / Protein: 15 / Nitrite: Negative   Leuk Esterase: Small / RBC: 0-2 /HPF / WBC 3-5   Sq Epi: x / Non Sq Epi: x / Bacteria: Many      CULTURES:      Physical Examination:    General: Diaphoretic    HEENT: Pupils equal, reactive to light.  Symmetric. R eyelid abrasion. +Sclera icteric.     PULM: Clear to auscultation bilaterally, no significant sputum production    CVS: Tachycardic, regular rhythm    ABD: Soft, nondistended, mild tenderness to epigastrum and suprapubic region, no guarding, no rebound tenderness, no palpable masses    EXT: No edema, nontender    SKIN: Warm and well perfused, no rashes noted

## 2023-05-17 NOTE — H&P ADULT - PROBLEM SELECTOR PLAN 3
Na 128 on admission  s/p 3L NS in ED  C/w IVF NS 75cc/hr   Daily CMPs s/p unwitnessed fall with small abrasions of face  - CT head/cervical spine/ maxillofacial non-con: No acute intracranial abnormalities. Mild right periorbital subcutaneous soft tissue swelling. No evidence of orbital or other facial fracture. No vertebral fracture, collapse or subluxation.  - f/u repeat CT head noncon to evaluate right posterior fossa for SDH K 3.1 -> 2.8  - ordered IV KCl 10mEq x 4 doses  - replete as needed  - Daily CMP -Likely metabolic Encephelopathy   s/p unwitnessed fall with small abrasions of face  - CT head/cervical spine/ maxillofacial non-con: No acute intracranial abnormalities. Mild right periorbital subcutaneous soft tissue swelling. No evidence of orbital or other facial fracture. No vertebral fracture, collapse or subluxation.  - f/u repeat CT head noncon to evaluate right posterior fossa for SDH with fall  Check Urine Tox & Ammonia level

## 2023-05-17 NOTE — H&P ADULT - RESPIRATORY
+agonal breathing, hiccups/clear to auscultation bilaterally/no wheezes/no rales/no rhonchi/use of accessory muscles +hiccups, +accessory muscle use/clear to auscultation bilaterally/no wheezes/no rales/no rhonchi/use of accessory muscles

## 2023-05-17 NOTE — ED PROVIDER NOTE - OBJECTIVE STATEMENT
Patient with past medical history of alcohol use is presenting with concern for intoxication and fall.  Patient brought in by family.  States that he was drinking this evening and had an unwitnessed fall.  He was found in bed.  Unknown last tetanus shot.  Does not take anticoagulation.  States that he drinks regularly.  Family denies any recent depression or suicidal thoughts.  utilized in person RN

## 2023-05-17 NOTE — PROGRESS NOTE ADULT - SUBJECTIVE AND OBJECTIVE BOX
[facility] Division of Hospital Medicine  Shavon Lobo MD  Available via MS Teams    SUBJECTIVE / OVERNIGHT EVENTS: Pt received in ICU with intermittent agitation and tachycardia which responds to ativan. Pt reports hiccups. Denies chest pain, SOB, ab dpain, n/v/d    ADDITIONAL REVIEW OF SYSTEMS: negative    MEDICATIONS  (STANDING):  chlorhexidine 4% Liquid 1 Application(s) Topical <User Schedule>  folic acid Injectable 1 milliGRAM(s) IV Push daily  lactated ringers. 1000 milliLiter(s) (150 mL/Hr) IV Continuous <Continuous>  LORazepam   Injectable 2 milliGRAM(s) IV Push every 4 hours  LORazepam   Injectable   IV Push   pantoprazole  Injectable 40 milliGRAM(s) IV Push daily  thiamine Injectable 200 milliGRAM(s) IV Push daily    MEDICATIONS  (PRN):  chlorproMAZINE    Injectable 25 milliGRAM(s) IntraMuscular every 8 hours PRN Hiccups  LORazepam   Injectable 1 milliGRAM(s) IV Push every 2 hours PRN Symptom-triggered: 2 point increase in CIWA -Ar score and a total score of 7 or LESS  LORazepam   Injectable 1 milliGRAM(s) IV Push every 1 hour PRN Symptom-triggered: each CIWA -Ar score 8 or GREATER      I&O's Summary    17 May 2023 07:01  -  17 May 2023 18:52  --------------------------------------------------------  IN: 2325 mL / OUT: 3950 mL / NET: -1625 mL        PHYSICAL EXAM:  Vital Signs Last 24 Hrs  T(C): 36.2 (17 May 2023 16:51), Max: 37.6 (17 May 2023 10:44)  T(F): 97.2 (17 May 2023 16:51), Max: 99.7 (17 May 2023 10:44)  HR: 123 (17 May 2023 18:00) (109 - 138)  BP: 134/77 (17 May 2023 18:00) (122/57 - 142/84)  BP(mean): 100 (17 May 2023 18:00) (80 - 105)  RR: 30 (17 May 2023 18:00) (18 - 30)  SpO2: 93% (17 May 2023 18:00) (93% - 97%)    Parameters below as of 17 May 2023 18:00  Patient On (Oxygen Delivery Method): nasal cannula  O2 Flow (L/min): 2    CONSTITUTIONAL: dissheveled  EYES: PERRLA; conjunctiva and sclera clear  ENMT: right eye ecchymosis  NECK: Supple, no palpable masses; no thyromegaly  RESPIRATORY: Normal respiratory effort; lungs are clear to auscultation bilaterally  CARDIOVASCULAR: tachycardia, normal S1 and S2, no murmur/rub/gallop; No lower extremity edema; Peripheral pulses are 2+ bilaterally  ABDOMEN: Nontender to palpation, normoactive bowel sounds, no rebound/guarding; No hepatosplenomegaly  MUSCULOSKELETAL:   no clubbing or cyanosis of digits; no joint swelling or tenderness to palpation  PSYCH: awake, oriented to self, agitated intermittently   NEUROLOGY: hand and tongue tremors, no focal deficits, sensation intact in all extremities, pt moving all extremities   SKIN: ecchymosis over right eye with superficial abrasions    LABS:                        14.9   5.76  )-----------( 65       ( 17 May 2023 02:25 )             40.8     Hb Trend: 14.9<--  05-    129<L>  |  97  |  7   ----------------------------<  106<H>  2.8<LL>   |  22  |  0.55    Ca    6.6<L>      17 May 2023 11:00  Phos  2.7       Mg     1.5         TPro  5.4<L>  /  Alb  2.4<L>  /  TBili  9.2<H>  /  DBili  x   /  AST  425<H>  /  ALT  283<H>  /  AlkPhos  229<H>      Creatinine Trend: 0.55<--, 0.56<--, 0.61<--, 0.64<--  PT/INR - ( 17 May 2023 08:30 )   PT: 14.5 sec;   INR: 1.24 ratio         PTT - ( 17 May 2023 08:30 )  PTT:34.7 sec  CARDIAC MARKERS ( 17 May 2023 11:00 )  x     / x     / 2989 U/L / x     / x          Urinalysis Basic - ( 17 May 2023 15:50 )    Color: Yellow / Appearance: Clear / S.010 / pH: x  Gluc: x / Ketone: Negative  / Bili: Negative / Urobili: Negative   Blood: x / Protein: Negative / Nitrite: Negative   Leuk Esterase: Trace / RBC: >50 /HPF / WBC 3-5   Sq Epi: x / Non Sq Epi: x / Bacteria: Occasional        COVID-19 PCR: NotDetec (2023 04:54)      RADIOLOGY & ADDITIONAL TESTS:  Repeat CT-head with no acute findings, personally reviewed    COORDINATION OF CARE:  N/A

## 2023-05-17 NOTE — H&P ADULT - NEUROLOGICAL
lethargic, AAOx1, responds to name after sternal rub, no seizures/tremors noted/sensation intact lethargic, AAOx1, responds to name after sternal rub, no seizures/tremors noted/sensation intact/responds to pain/responds to verbal commands

## 2023-05-17 NOTE — CARE COORDINATION ASSESSMENT. - LIVING ARRANGEMENTS, PROFILE
Pre-Op call completed. Medications list reviewed and updated as needed.  Instructed patient to hold all medications am of surgery.      Patient instructed to arrive for surgery at 630 on 10/21/22 at [facility].    Pre-op instructions reviewed, verbalized understanding.  Questions answered.  Call back number of [phone] given in case other concerns or questions arise.    Patient instructed to bring in current list of medications (name of medication, dose and frequency of daily use) day of surgery.     Pre-op Teaching Completed:    OR - Procedure, OR - Time and time of arrival, Location of Registration, NPO, Medications to take Day of Surgery, Skin Prep, Equipment to bring day of surgery and Discharge Policy: Ride/Responsibile Adult      Patient instructed to notify surgeon if patient has or develops any fever, cold or flu like symptoms, other signs of general illness, or any exposure to COVID19.  Patient also notified of current hospital visitor restrictions and hospital entrance screening.    Patient encouraged to self quarantine prior to surgery.    
house

## 2023-05-17 NOTE — H&P ADULT - ASSESSMENT
39 y/o M w/ PMHx of alcoholism presents w/  37 y/o M w/ PMHx of alcoholism presents w/ alcohol withdrawal 8 year old male with a history of alcohol abuse, alcohol withdrawal seizures, fatty liver presents with signs of alcohol abuse/withdrawal.  38M PMH of alcohol abuse, alcohol withdrawal seizures, fatty liver presents with signs of alcohol abuse/withdrawal. Admitted for transaminitis, alcohol withdrawal. 38M PMH of alcohol abuse, alcohol withdrawal seizures, fatty liver presents with signs of alcohol abuse/withdrawal. Admitted for AMS , alcohol withdrawal.   transaminitis ,Alcoholic hepatitis & Hyponatremia. Low K, Low Ca, Low Mag

## 2023-05-18 DIAGNOSIS — R78.81 BACTEREMIA: ICD-10-CM

## 2023-05-18 DIAGNOSIS — M62.82 RHABDOMYOLYSIS: ICD-10-CM

## 2023-05-18 LAB
ALBUMIN SERPL ELPH-MCNC: 2.1 G/DL — LOW (ref 3.3–5)
ALP SERPL-CCNC: 216 U/L — HIGH (ref 40–120)
ALT FLD-CCNC: 228 U/L — HIGH (ref 12–78)
ANION GAP SERPL CALC-SCNC: 2 MMOL/L — LOW (ref 5–17)
ANION GAP SERPL CALC-SCNC: 3 MMOL/L — LOW (ref 5–17)
APTT BLD: 32.1 SEC — SIGNIFICANT CHANGE UP (ref 27.5–35.5)
AST SERPL-CCNC: 365 U/L — HIGH (ref 15–37)
BASOPHILS # BLD AUTO: 0.02 K/UL — SIGNIFICANT CHANGE UP (ref 0–0.2)
BASOPHILS NFR BLD AUTO: 0.8 % — SIGNIFICANT CHANGE UP (ref 0–2)
BILIRUB SERPL-MCNC: 11.4 MG/DL — HIGH (ref 0.2–1.2)
BUN SERPL-MCNC: 5 MG/DL — LOW (ref 7–23)
BUN SERPL-MCNC: 7 MG/DL — SIGNIFICANT CHANGE UP (ref 7–23)
CALCIUM SERPL-MCNC: 7.1 MG/DL — LOW (ref 8.5–10.1)
CALCIUM SERPL-MCNC: 7.3 MG/DL — LOW (ref 8.5–10.1)
CHLORIDE SERPL-SCNC: 100 MMOL/L — SIGNIFICANT CHANGE UP (ref 96–108)
CHLORIDE SERPL-SCNC: 102 MMOL/L — SIGNIFICANT CHANGE UP (ref 96–108)
CO2 SERPL-SCNC: 27 MMOL/L — SIGNIFICANT CHANGE UP (ref 22–31)
CO2 SERPL-SCNC: 29 MMOL/L — SIGNIFICANT CHANGE UP (ref 22–31)
CREAT ?TM UR-MCNC: 16 MG/DL — SIGNIFICANT CHANGE UP
CREAT SERPL-MCNC: 0.65 MG/DL — SIGNIFICANT CHANGE UP (ref 0.5–1.3)
CREAT SERPL-MCNC: 0.72 MG/DL — SIGNIFICANT CHANGE UP (ref 0.5–1.3)
E COLI DNA BLD POS QL NAA+NON-PROBE: SIGNIFICANT CHANGE UP
EGFR: 120 ML/MIN/1.73M2 — SIGNIFICANT CHANGE UP
EGFR: 124 ML/MIN/1.73M2 — SIGNIFICANT CHANGE UP
EOSINOPHIL # BLD AUTO: 0.05 K/UL — SIGNIFICANT CHANGE UP (ref 0–0.5)
EOSINOPHIL NFR BLD AUTO: 2 % — SIGNIFICANT CHANGE UP (ref 0–6)
GLUCOSE SERPL-MCNC: 119 MG/DL — HIGH (ref 70–99)
GLUCOSE SERPL-MCNC: 139 MG/DL — HIGH (ref 70–99)
GRAM STN FLD: SIGNIFICANT CHANGE UP
HAPTOGLOB SERPL-MCNC: 125 MG/DL — SIGNIFICANT CHANGE UP (ref 34–200)
HAV IGM SER-ACNC: SIGNIFICANT CHANGE UP
HBV CORE IGM SER-ACNC: SIGNIFICANT CHANGE UP
HBV SURFACE AG SER-ACNC: SIGNIFICANT CHANGE UP
HCT VFR BLD CALC: 33.3 % — LOW (ref 39–50)
HCV AB S/CO SERPL IA: 0.21 S/CO — SIGNIFICANT CHANGE UP (ref 0–0.99)
HCV AB SERPL-IMP: SIGNIFICANT CHANGE UP
HGB BLD-MCNC: 11.5 G/DL — LOW (ref 13–17)
IMM GRANULOCYTES NFR BLD AUTO: 1.6 % — HIGH (ref 0–0.9)
INR BLD: 1.15 RATIO — SIGNIFICANT CHANGE UP (ref 0.88–1.16)
LACTATE SERPL-SCNC: 0.7 MMOL/L — SIGNIFICANT CHANGE UP (ref 0.7–2)
LDH SERPL L TO P-CCNC: 478 U/L — HIGH (ref 50–242)
LYMPHOCYTES # BLD AUTO: 0.42 K/UL — LOW (ref 1–3.3)
LYMPHOCYTES # BLD AUTO: 16.8 % — SIGNIFICANT CHANGE UP (ref 13–44)
MAGNESIUM SERPL-MCNC: 2.2 MG/DL — SIGNIFICANT CHANGE UP (ref 1.6–2.6)
MCHC RBC-ENTMCNC: 27.4 PG — SIGNIFICANT CHANGE UP (ref 27–34)
MCHC RBC-ENTMCNC: 34.5 GM/DL — SIGNIFICANT CHANGE UP (ref 32–36)
MCV RBC AUTO: 79.3 FL — LOW (ref 80–100)
METHOD TYPE: SIGNIFICANT CHANGE UP
MONOCYTES # BLD AUTO: 0.17 K/UL — SIGNIFICANT CHANGE UP (ref 0–0.9)
MONOCYTES NFR BLD AUTO: 6.8 % — SIGNIFICANT CHANGE UP (ref 2–14)
NEUTROPHILS # BLD AUTO: 1.8 K/UL — SIGNIFICANT CHANGE UP (ref 1.8–7.4)
NEUTROPHILS NFR BLD AUTO: 72 % — SIGNIFICANT CHANGE UP (ref 43–77)
NRBC # BLD: 0 /100 WBCS — SIGNIFICANT CHANGE UP (ref 0–0)
OSMOLALITY UR: 279 MOSM/KG — SIGNIFICANT CHANGE UP (ref 50–1200)
PHOSPHATE SERPL-MCNC: 2.4 MG/DL — LOW (ref 2.5–4.5)
PLATELET # BLD AUTO: 35 K/UL — LOW (ref 150–400)
POTASSIUM SERPL-MCNC: 3.5 MMOL/L — SIGNIFICANT CHANGE UP (ref 3.5–5.3)
POTASSIUM SERPL-MCNC: 3.7 MMOL/L — SIGNIFICANT CHANGE UP (ref 3.5–5.3)
POTASSIUM SERPL-SCNC: 3.5 MMOL/L — SIGNIFICANT CHANGE UP (ref 3.5–5.3)
POTASSIUM SERPL-SCNC: 3.7 MMOL/L — SIGNIFICANT CHANGE UP (ref 3.5–5.3)
PROT SERPL-MCNC: 5.2 G/DL — LOW (ref 6–8.3)
PROTHROM AB SERPL-ACNC: 13.5 SEC — HIGH (ref 10.5–13.4)
RBC # BLD: 4.2 M/UL — SIGNIFICANT CHANGE UP (ref 4.2–5.8)
RBC # FLD: 15.1 % — HIGH (ref 10.3–14.5)
SODIUM SERPL-SCNC: 131 MMOL/L — LOW (ref 135–145)
SODIUM SERPL-SCNC: 132 MMOL/L — LOW (ref 135–145)
SODIUM UR-SCNC: 85 MMOL/L — SIGNIFICANT CHANGE UP
SPECIMEN SOURCE: SIGNIFICANT CHANGE UP
SPECIMEN SOURCE: SIGNIFICANT CHANGE UP
WBC # BLD: 2.5 K/UL — LOW (ref 3.8–10.5)
WBC # FLD AUTO: 2.5 K/UL — LOW (ref 3.8–10.5)

## 2023-05-18 PROCEDURE — 99291 CRITICAL CARE FIRST HOUR: CPT | Mod: GC

## 2023-05-18 RX ORDER — CHLORHEXIDINE GLUCONATE 213 G/1000ML
1 SOLUTION TOPICAL
Refills: 0 | Status: DISCONTINUED | OUTPATIENT
Start: 2023-05-18 | End: 2023-05-24

## 2023-05-18 RX ORDER — PIPERACILLIN AND TAZOBACTAM 4; .5 G/20ML; G/20ML
3.38 INJECTION, POWDER, LYOPHILIZED, FOR SOLUTION INTRAVENOUS EVERY 8 HOURS
Refills: 0 | Status: DISCONTINUED | OUTPATIENT
Start: 2023-05-18 | End: 2023-05-20

## 2023-05-18 RX ORDER — POTASSIUM CHLORIDE 20 MEQ
10 PACKET (EA) ORAL
Refills: 0 | Status: COMPLETED | OUTPATIENT
Start: 2023-05-18 | End: 2023-05-18

## 2023-05-18 RX ORDER — ACETAMINOPHEN 500 MG
1000 TABLET ORAL ONCE
Refills: 0 | Status: COMPLETED | OUTPATIENT
Start: 2023-05-18 | End: 2023-05-18

## 2023-05-18 RX ORDER — THIAMINE MONONITRATE (VIT B1) 100 MG
500 TABLET ORAL EVERY 8 HOURS
Refills: 0 | Status: COMPLETED | OUTPATIENT
Start: 2023-05-18 | End: 2023-05-20

## 2023-05-18 RX ADMIN — Medication 2 MILLIGRAM(S): at 13:55

## 2023-05-18 RX ADMIN — Medication 2 MILLIGRAM(S): at 21:54

## 2023-05-18 RX ADMIN — Medication 85 MILLIMOLE(S): at 05:26

## 2023-05-18 RX ADMIN — CHLORHEXIDINE GLUCONATE 1 APPLICATION(S): 213 SOLUTION TOPICAL at 12:27

## 2023-05-18 RX ADMIN — Medication 1 MILLIGRAM(S): at 13:54

## 2023-05-18 RX ADMIN — Medication 100 MILLIEQUIVALENT(S): at 05:26

## 2023-05-18 RX ADMIN — Medication 2 MILLIGRAM(S): at 10:13

## 2023-05-18 RX ADMIN — Medication 2 MILLIGRAM(S): at 16:48

## 2023-05-18 RX ADMIN — PANTOPRAZOLE SODIUM 40 MILLIGRAM(S): 20 TABLET, DELAYED RELEASE ORAL at 11:55

## 2023-05-18 RX ADMIN — PIPERACILLIN AND TAZOBACTAM 25 GRAM(S): 4; .5 INJECTION, POWDER, LYOPHILIZED, FOR SOLUTION INTRAVENOUS at 13:55

## 2023-05-18 RX ADMIN — Medication 105 MILLIGRAM(S): at 21:12

## 2023-05-18 RX ADMIN — Medication 105 MILLIGRAM(S): at 05:26

## 2023-05-18 RX ADMIN — DEXMEDETOMIDINE HYDROCHLORIDE IN 0.9% SODIUM CHLORIDE 2.38 MICROGRAM(S)/KG/HR: 4 INJECTION INTRAVENOUS at 11:32

## 2023-05-18 RX ADMIN — Medication 400 MILLIGRAM(S): at 16:43

## 2023-05-18 RX ADMIN — PIPERACILLIN AND TAZOBACTAM 25 GRAM(S): 4; .5 INJECTION, POWDER, LYOPHILIZED, FOR SOLUTION INTRAVENOUS at 05:26

## 2023-05-18 RX ADMIN — Medication 2 MILLIGRAM(S): at 02:16

## 2023-05-18 RX ADMIN — DEXMEDETOMIDINE HYDROCHLORIDE IN 0.9% SODIUM CHLORIDE 2.38 MICROGRAM(S)/KG/HR: 4 INJECTION INTRAVENOUS at 18:21

## 2023-05-18 RX ADMIN — Medication 105 MILLIGRAM(S): at 14:05

## 2023-05-18 RX ADMIN — Medication 100 MILLIEQUIVALENT(S): at 03:25

## 2023-05-18 RX ADMIN — Medication 2 MILLIGRAM(S): at 18:21

## 2023-05-18 RX ADMIN — Medication 2 MILLIGRAM(S): at 15:06

## 2023-05-18 RX ADMIN — PIPERACILLIN AND TAZOBACTAM 25 GRAM(S): 4; .5 INJECTION, POWDER, LYOPHILIZED, FOR SOLUTION INTRAVENOUS at 01:45

## 2023-05-18 RX ADMIN — Medication 2 MILLIGRAM(S): at 05:58

## 2023-05-18 RX ADMIN — PIPERACILLIN AND TAZOBACTAM 25 GRAM(S): 4; .5 INJECTION, POWDER, LYOPHILIZED, FOR SOLUTION INTRAVENOUS at 21:12

## 2023-05-18 RX ADMIN — DEXMEDETOMIDINE HYDROCHLORIDE IN 0.9% SODIUM CHLORIDE 2.38 MICROGRAM(S)/KG/HR: 4 INJECTION INTRAVENOUS at 22:31

## 2023-05-18 RX ADMIN — Medication 100 MILLIEQUIVALENT(S): at 04:24

## 2023-05-18 NOTE — DIETITIAN INITIAL EVALUATION ADULT - PROBLEM SELECTOR PLAN 9
Plt 65 on admission likely in setting of Chronic alcoholic liver disease & Likely BM suppression 2/2 ETOH   - no signs of acute bleed at this time  - daily CBC

## 2023-05-18 NOTE — PROGRESS NOTE ADULT - PROBLEM SELECTOR PLAN 5
Rhabdo improving, CK downtrending  - likely due to fall and alcohol use  - Continue IVF: LR @ 150cc/hr

## 2023-05-18 NOTE — DIETITIAN INITIAL EVALUATION ADULT - NUTRITIONGOAL OUTCOME1
Pt to meet estimated energy needs upon diet initiation/advancement & to refrain from future alcohol use.

## 2023-05-18 NOTE — PROGRESS NOTE ADULT - ATTENDING COMMENTS
38M with alcohol abuse and fatty liver presents s/p fall found to be in alcohol withdrawal, rhabdomyolysis, alcoholic hepatitis with course complicated by E Coli bacteremia.    Problems:  Alcohol withdrawal  Alcoholic hepatitis  Sinus tachycardia    Neuro: ETOH withdrawal with high CIWA, continue ativan 2mg q4 with ativan 2mg q1hr prn agitation. Taper off precedex as tolerated. ammonia mildly elevated but likely not contributing to mental status  Pulm: no active issues  CV: tachycardia 2/2 alcohol withdrawal, EKG sinus tachycardia; continue tele monitoring  Skin/Lines: squires catheter  GI: Liver failure 2/2 alcoholic hepatitis; trend MELD daily; GI consulted, appreciate recommendations; LFTs improving but bilirubin continues to rise, d/w GI regarding MRCP  /Renal: lactic acidosis likely 2/2 liver failure, resolved; also with rhabdomyolysis, continue IVF, trend I&Os, BMPs,  Heme/DVT PPX: SCDs given thrombocytopenia; pancytopenia likely 2/2 liver dysfunction and alcoholism; trend BMPs  Endo: No active issues, trend glucose on BMP  ID: Pt with ecoli bacteremia, concern for GI source; continue zosyn, repeat cx  Ethics: Full code, plan d/w aunt at bedside 38M with alcohol abuse and fatty liver presents s/p fall found to be in alcohol withdrawal, rhabdomyolysis, alcoholic hepatitis with course complicated by E Coli bacteremia.    Problems:  Alcohol withdrawal  Alcoholic hepatitis  Sinus tachycardia  E Coli bacteremia  hyperbilirubinemia  pancytopenia    Neuro: ETOH withdrawal with high CIWA, continue ativan 2mg q4 with ativan 2mg q1hr prn agitation. Taper off precedex as tolerated. ammonia mildly elevated but likely not contributing to mental status  Pulm: no active issues  CV: tachycardia 2/2 alcohol withdrawal, EKG sinus tachycardia; continue tele monitoring  Skin/Lines: squires catheter  GI: Liver failure 2/2 alcoholic hepatitis; trend MELD daily; GI consulted, appreciate recommendations; LFTs improving but bilirubin continues to rise, d/w GI regarding MRCP  /Renal: lactic acidosis likely 2/2 liver failure, resolved; also with rhabdomyolysis, continue IVF, trend I&Os, BMPs,  Heme/DVT PPX: SCDs given thrombocytopenia; pancytopenia likely 2/2 liver dysfunction and alcoholism; trend BMPs  Endo: No active issues, trend glucose on BMP  ID: Pt with ecoli bacteremia, concern for GI source; continue zosyn, repeat cx  Ethics: Full code, plan d/w aunt at bedside

## 2023-05-18 NOTE — DIETITIAN INITIAL EVALUATION ADULT - OTHER INFO
38M PMH of alcohol abuse, alcohol withdrawal seizures, fatty liver presents with signs of alcohol abuse/withdrawal.     Pt sedated on precedex at time of visit. NPO on IVF-LR@150cc/hr. GI wdl, no BM since admission. On thiamine, folic acid. Recommend MVI daily. Mild Hyponatremia likely secondary to ETOH abuse. S/p K, Phos replacement. Will remain available for po diet initiation/advancement as medically appropriate.

## 2023-05-18 NOTE — DIETITIAN INITIAL EVALUATION ADULT - PROBLEM SELECTOR PLAN 2
Tbili 9.6, , , , Lipase 432 on admission w/ known hx of alcohol abuse   - CT Abd/Pelv and RUQ U/S showing steatohepatitis without evidence of biliary obstruction or gallstones   - RUQ U/S Visualization of the gallbladder is limited by overlying bowel gas. No gross gallstones, gallbladder wall thickening, or pericholecystic fluid. No ultrasonic Grayson's sign   - Likely 2/2 alcohol abuse in the setting of acute intoxication   - Avoid hepatotoxic agents   - Daily CMP  -Elevated serum Lipase-? Alcoholic pancreatitis   - GI Consulted - recs appreciated  - ICU (audrey) Consulted, recs appreciated  - management as per ICU

## 2023-05-18 NOTE — DIETITIAN INITIAL EVALUATION ADULT - PERTINENT MEDS FT
MEDICATIONS  (STANDING):  chlorhexidine 4% Liquid 1 Application(s) Topical <User Schedule>  dexMEDEtomidine Infusion 0.1 MICROgram(s)/kG/Hr (2.38 mL/Hr) IV Continuous <Continuous>  folic acid Injectable 1 milliGRAM(s) IV Push daily  lactated ringers. 1000 milliLiter(s) (150 mL/Hr) IV Continuous <Continuous>  LORazepam   Injectable 2 milliGRAM(s) IV Push every 4 hours  pantoprazole  Injectable 40 milliGRAM(s) IV Push daily  piperacillin/tazobactam IVPB.. 3.375 Gram(s) IV Intermittent every 8 hours  thiamine IVPB 500 milliGRAM(s) IV Intermittent every 8 hours    MEDICATIONS  (PRN):  chlorproMAZINE    Injectable 25 milliGRAM(s) IntraMuscular every 8 hours PRN Hiccups  LORazepam   Injectable 2 milliGRAM(s) IV Push every 1 hour PRN Agitation

## 2023-05-18 NOTE — DIETITIAN INITIAL EVALUATION ADULT - PROBLEM SELECTOR PLAN 3
-Likely metabolic Encephelopathy   s/p unwitnessed fall with small abrasions of face  - CT head/cervical spine/ maxillofacial non-con: No acute intracranial abnormalities. Mild right periorbital subcutaneous soft tissue swelling. No evidence of orbital or other facial fracture. No vertebral fracture, collapse or subluxation.  - f/u repeat CT head noncon to evaluate right posterior fossa for SDH with fall  Check Urine Tox & Ammonia level

## 2023-05-18 NOTE — DIETITIAN INITIAL EVALUATION ADULT - ADD RECOMMEND
Recommend MVI with minerals daily. When medically feasible clear liquid diet and advance as tolerated. SLP evaluation per MD discretion prn.

## 2023-05-18 NOTE — PROGRESS NOTE ADULT - ASSESSMENT
etoh abuse  alcoholic hepatitis    diet as tolerated  calculated DF is 20   no need for sternoids  bili noted; likely 2/2 EtOH  check daily lfts and PT/INR  ciwa protocol  management per ICU    I reviewed the overnight course of events on the unit, re-confirming the patient history. I discussed the care with the patient and their family  Differential diagnosis and plan of care discussed with patient after the evaluation  40 minutes spent on total encounter of which more than fifty percent of the encounter was spent counseling and/or coordinating care by the attending physician.  Advanced care planning was discussed with patient and family.  Advanced care planning forms were reviewed and discussed.  Risks, benefits and alternatives of gastroenterologic procedures were discussed in detail and all questions were answered.   etoh abuse  alcoholic hepatitis    diet as tolerated  calculated DF is 20   no need for sternoids  bili noted; likely 2/2 EtOH, hold off on MRI for now  check daily lfts and PT/INR  ciwa protocol  management per ICU    I reviewed the overnight course of events on the unit, re-confirming the patient history. I discussed the care with the patient and their family  Differential diagnosis and plan of care discussed with patient after the evaluation  40 minutes spent on total encounter of which more than fifty percent of the encounter was spent counseling and/or coordinating care by the attending physician.  Advanced care planning was discussed with patient and family.  Advanced care planning forms were reviewed and discussed.  Risks, benefits and alternatives of gastroenterologic procedures were discussed in detail and all questions were answered.

## 2023-05-18 NOTE — PROGRESS NOTE ADULT - PROBLEM SELECTOR PLAN 4
-Likely metabolic Encephelopathy   s/p unwitnessed fall with small abrasions of face  - CT head/cervical spine/ maxillofacial non-con: No acute intracranial abnormalities. Mild right periorbital subcutaneous soft tissue swelling. No evidence of orbital or other facial fracture. No vertebral fracture, collapse or subluxation.  - f/u repeat CT head noncon: no acute acute signs of brain hemmorhage  - Mild hepatic encephalopathy, ammonia elevated  - continue to monitor

## 2023-05-18 NOTE — PROGRESS NOTE ADULT - PROBLEM SELECTOR PLAN 3
blood cultures grew Gram neg rods  - suspected E coli bacteremia   - Noted elevated alk phos, LFTs, exam and no other obv source.   - continue zosyn  - GI involved for discussion regarding potential MRCP, potential need for mechanical intervention beyond abx

## 2023-05-18 NOTE — DIETITIAN INITIAL EVALUATION ADULT - PROBLEM SELECTOR PLAN 1
Chronic known hx of alcohol use disorder  - Admit to ICU  - CIWA Protocol  - Fall precautions  - Ativan RTC &  PRN ativan  - IVF, thiamine, folate supplementation  - NPO except meds  - F/u Addiction Medicine recs (Radha)   - ICU (audrey) Consulted, recs appreciated  - management as per ICU

## 2023-05-18 NOTE — PROGRESS NOTE ADULT - PROBLEM SELECTOR PLAN 10
As per nurse patient was having abd pain which resolved after patient voided  ~600cc  - CT A/P- No hydronephrosis. 6.3 mm nonobstructing stone lower pole left kidney.  now with hematuria from trauma (squires removal)  - replaced squires  - continue to monitor

## 2023-05-18 NOTE — DIETITIAN INITIAL EVALUATION ADULT - PROBLEM SELECTOR PLAN 8
As per nurse patient was having abd pain which resolved after patient voided  ~600cc  - CT A/P- No hydronephrosis. 6.3 mm nonobstructing stone lower pole left kidney.  - squires ordered  - continue to monitor

## 2023-05-18 NOTE — PROGRESS NOTE ADULT - ASSESSMENT
38M with alcohol abuse and fatty liver presents s/p fall found to be in alcohol withdrawal, rhabdomyolysis, and liver failure 2/2 alcoholic hepatitis    Neuro:  - ETOH withdrawal  - Precedex for sedation, wean as tolerated  - D/c Ativan taper. Start 2mg q4h standing, 2mg q1h PRN agitation  - Thiamine 500mg IVP TID, folic acid 1mg IVP QD    CV:  - No active issues  - Maintain MAP >65    Pulm:   - On 2L NC. Wean respiratory support as tolerated    GI:  - Mild hepatic encephalopathy, ammonia elevated  - US, CT A/P noting hepatic steatosis and hepatomegaly  - NPO except meds, Protonix QD  - Liver failure with improving LFTs, avoid hepatotoxic meds  - Bili rising, 9.2 -> 11.4. Indirect 2.8, Direct 8.6    Renal:  - Rhabdo improving, CK downtrending  - Continue IVF: LR @ 150cc/hr  - Hyponatremia improving, likely 2/2 ETOH abuse and hepatic disease  - Monitor renal function and lytes, replete PRN    Heme:  - SCDs for DVT ppx  - Thrombocytopenia likely 2/2 dilution, bone marrow suppression from ETOH  - f/u LDH, haptoglobin    ID:  - Lactate downtrended, now WNL  - BCx: gram negative rods. Continue Zosyn  - ID consulted, f/u recs    Endo:  - FS q6h while NPO    Skin:  - Heredia, restraints in place    Dispo:  - Full Code

## 2023-05-18 NOTE — CONSULT NOTE ADULT - SUBJECTIVE AND OBJECTIVE BOX
[facility] DIVISION of INFECTIOUS DISEASE  Christian Gant MD PhD, Raven Schilling MD, Sophy Palm MD, Sienna Morfin MD, Jermaine Kelly MD  and providing coverage with Kaur Hampton MD  Providing Infectious Disease Consultations at [facility], [facility], [facility]'s    Office# [phone] to schedule follow up appointments  Answering Service for urgent calls or New Consults [phone]  Cell# to text for urgent issues Christian Gant [phone]     HPI:  38M PMH of alcohol abuse, alcohol withdrawal seizures, fatty liver presented with signs of alcohol abuse/withdrawal. History obtained from wife and mother now at the bedside when I see Solitario. Patient has been drinking for the past 16 days PTA of unknown amount and had unwitnessed fall from the bed so pt was taken to the ED. Seen and examined pt at bedside. Unable to obtain history from patient due to somnolence. E coli bacteremia noted and ID consultation requested.    Imaging  RUQ U/S Visualization of the gallbladder is limited by overlying bowel gas. No   gross gallstones, gallbladder wall thickening, or pericholecystic fluid. No ultrasonic Grayson's sign epatomegaly and hepatic steatosis.  CT A/P- steatosis and hepatomegaly. No hydronephrosis. 6.3 mm nonobstructing stone lower pole left kidney.  CT head/cervical spine/ maxillofacial non-con: No acute intracranial abnormalities. Mild right periorbital subcutaneous soft tissue swelling. No evidence of orbital or other facial fracture. No vertebral fracture, collapse or subluxation.      PAST MEDICAL & SURGICAL HISTORY:  Alcohol abuse    H/O hernia repair      S/P appendectomy          Antimicrobials  piperacillin/tazobactam IVPB.. 3.375 Gram(s) IV Intermittent every 8 hours      Immunological      Other  chlorhexidine 2% Cloths 1 Application(s) Topical <User Schedule>  chlorproMAZINE    Injectable 25 milliGRAM(s) IntraMuscular every 8 hours PRN  dexMEDEtomidine Infusion 0.1 MICROgram(s)/kG/Hr IV Continuous <Continuous>  folic acid Injectable 1 milliGRAM(s) IV Push daily  lactated ringers. 1000 milliLiter(s) IV Continuous <Continuous>  LORazepam   Injectable 2 milliGRAM(s) IV Push every 4 hours  LORazepam   Injectable 2 milliGRAM(s) IV Push every 1 hour PRN  pantoprazole  Injectable 40 milliGRAM(s) IV Push daily  thiamine IVPB 500 milliGRAM(s) IV Intermittent every 8 hours      Allergies    No Known Allergies    Intolerances        SOCIAL HISTORY:  Social History:  Tobacco: denies  EtOH: chronic heavy drinker  Recreational drug use: denies  Lives with: patient lives with wife  Ambulates: independent  ADLs: independent (17 May 2023 09:51)      FAMILY HISTORY:  Family history of renal disease  Uncle    ROS:    limited by cognitive function    Vital Signs Last 24 Hrs  T(C): 37.7 (18 May 2023 12:00), Max: 38.3 (17 May 2023 23:30)  T(F): 99.9 (18 May 2023 12:00), Max: 100.9 (17 May 2023 23:30)  HR: 73 (18 May 2023 12:00) (70 - 139)  BP: 110/71 (18 May 2023 12:00) (81/53 - 142/84)  BP(mean): 86 (18 May 2023 12:00) (63 - 107)  RR: 21 (18 May 2023 12:00) (16 - 57)  SpO2: 96% (18 May 2023 12:00) (93% - 98%)    Parameters below as of 18 May 2023 08:00  Patient On (Oxygen Delivery Method): nasal cannula  O2 Flow (L/min): 2      PE:  In no distress  HEENT:  NC, PERRL, sclerae anicteric, conjunctivae clear, EOMI.  Sinuses nontender, no nasal exudate.  No buccal or pharyngeal lesions, erythema or exudate, bruising and swelling above right eye  Neck:  Supple, no adenopathy  Lungs:  No accessory muscle use, bilaterally clear to auscultation  Cor:  distant  Abd:  Symmetric, normoactive BS.  Soft, RUW tender, pt moaning with nay pressure, no masses, guarding or rebound.  Liver and spleen not enlarged  Extrem:  No cyanosis or edema  Skin:  some yellowing  Neuro: nonverbal, grunting        LABS:                        11.5   2.50  )-----------( 35       ( 18 May 2023 06:19 )             33.3       WBC Count: 2.50 K/uL (23 @ 06:19)  WBC Count: 5.76 K/uL (05-17-23 @ 02:25)          132<L>  |  102  |  7   ----------------------------<  139<H>  3.5   |  27  |  0.72    Ca    7.3<L>      18 May 2023 06:19  Phos  2.6       Mg     2.4         TPro  5.2<L>  /  Alb  2.1<L>  /  TBili  11.4<H>  /  DBili  8.6<H>  /  AST  365<H>  /  ALT  228<H>  /  AlkPhos  216<H>        Creatinine, Serum: 0.72 mg/dL (23 @ 06:19)  Creatinine, Serum: 0.65 mg/dL (23 @ 23:21)  Creatinine, Serum: 0.50 mg/dL (23 @ 18:30)  Creatinine, Serum: 0.55 mg/dL (23 @ 11:00)  Creatinine, Serum: 0.56 mg/dL (23 @ 10:20)  Creatinine, Serum: 0.61 mg/dL (23 @ 06:36)  Creatinine, Serum: 0.64 mg/dL (23 @ 02:25)      Urinalysis Basic - ( 17 May 2023 15:50 )    Color: Yellow / Appearance: Clear / S.010 / pH: x  Gluc: x / Ketone: Negative  / Bili: Negative / Urobili: Negative   Blood: x / Protein: Negative / Nitrite: Negative   Leuk Esterase: Trace / RBC: >50 /HPF / WBC 3-5   Sq Epi: x / Non Sq Epi: x / Bacteria: Occasional      MICROBIOLOGY:    Culture - Blood (23 @ 08:30)    -  Escherichia coli: Detec   Gram Stain:   Growth in anaerobic bottle: Gram Negative Rods  Growth in aerobic bottle: Gram Negative Rods   Specimen Source: .Blood Blood   Organism: Blood Culture PCR   Culture Results:   Growth in anaerobic bottle: Gram Negative Rods  Growth in aerobic bottle: Gram Negative Rods  ***Blood Panel PCR results on this specimen are available  approximately 3 hours after the Gram stain result.***  Gram stain, PCR, and/or culture results may not always  correspond due to difference in methodologies.  ************************************************************  This PCR assay was performed by multiplex PCR. This  Assay tests for 66 bacterial and resistance gene targets.  Please refer to the [facility] test directory  at [email or web address] for details.   Organism Identification: Blood Culture PCR   Method Type: PCR        RADIOLOGY & ADDITIONAL STUDIES:    --

## 2023-05-18 NOTE — PROGRESS NOTE ADULT - ASSESSMENT
38M PMH of alcohol abuse, alcohol withdrawal seizures, fatty liver presents with signs of alcohol abuse/withdrawal. History obtained from wife with  over phone. Patient has been drinking for the past 16 days of unknown amount and had unwitnessed fall from the bed so pt was taken to the ED.  Blood alcohol level was 432 on arrival. Patient is febrile, bacteremic, septic, exertion agitation in the setting of toxic metabolic encephalopathy, with acute transaminitis and hyperammonemia, and multiple electrolyte disturbances. Patient is critically ill and at high risk for intubation, high risk for further hepatic deterioration and worsening infectious process and further multiorgan damage.     Metabolic Encephalopathy   - Non contrasted CT head obtained and not notable for any intracranial pathology   - Mild hepatic encephalopathy component with ammonia level of 42 - Fluid hydration and recheck lab in am to assess need for further medical therapy   - Toxic component a mix of acute ETOH intoxication with sepsis   - Thiamine increased to 500q8 X 3d for wernicke dosing     Septic Shock  - Blood cultures notable for gram negative rods in both bottles; awaiting further speciation and sensitivities   - Zosyn added 3.375 q8h   - Fluid resuscitation with stable hemodynamics; IVF continuing for hydration   - lactate 4.3 at max and has normalized with fluids     Rhabdomyolysis   - CK of 2989 downtrending   - Continue IVF   - renal function trending     ETOH abuse / acute intoxication / withdrawal   - ETOH level in 400s on arrival  - Ativan taper in place   - Precedex added overnight due to persistent agitation     Hypokalemia  - 60 mEq total in progress for potassium repletion   - repeat BMP in am     Hypophosphatemia   - 30mmol sodium phos ordered    Hyponatremia  - Mild, likely secondary to ETOH abuse / hepatic disease   - Trending BMPs     Transaminitis  - avoiding hepatotoxic meds  - Trending CMP daily   - CT abdomen noting hepatic steatosis and hepatomegaly   - US abdomen confirming same as CT     I have spent a total of 100 mins of nonconsecutive critical care time managing this patient with metabolic encephalopathy, transaminitis, hepatic encephalopathy, septic shock, gram negative bacteremia, hyponatremia, hypophosphatemia, hypokalemia, rhabdomyolysis and acute alcohol intoxication. This included review of relevant history, clinical examination, review of data and images, discussion of treatment with the multidisciplinary team and any consultants involved in this patient’s care as well as family discussion.     I affirm that this patient is critically ill and at high risk for sudden, fatal deterioration due to one or more of the above stated active issues. I managed/supervised life or organ support interventions that required frequent assessment. This time does not include bedside procedures that are documented separately.

## 2023-05-18 NOTE — DIETITIAN INITIAL EVALUATION ADULT - PROBLEM SELECTOR PLAN 7
s/p unwitnessed fall with small abrasions of face  - CT head/cervical spine/ maxillofacial non-con: No acute intracranial abnormalities. Mild right periorbital subcutaneous soft tissue swelling. No evidence of orbital or other facial fracture. No vertebral fracture, collapse or subluxation.  - f/u repeat CT head noncon to evaluate right posterior fossa for SDH

## 2023-05-18 NOTE — DIETITIAN INITIAL EVALUATION ADULT - SIGNS/SYMPTOMS
as evidenced by ETOH intoxication/withdrawal on admission, TBili 11.4, , , Alkphos 216 as evidenced by low electrolytes on admission s/p replacement.

## 2023-05-18 NOTE — PROGRESS NOTE ADULT - ATTENDING COMMENTS
38M PMH of alcohol abuse, alcohol withdrawal seizures, fatty liver presents with signs of alcohol abuse/withdrawal. Admitted for AMS , alcohol withdrawal.   transaminitis ,Alcoholic hepatitis & Hyponatremia. Low K, Low Ca, Low Mag .  Pt  seen, Examined, case & care plan d/w , residents at detail.  Consults:  GI-Dr Leach follow up  Detox -Dr Garcia follow up.  Social service eval  Care as per ICU Team.  AM labs   DVT PPx  NPO, IVF.    Total Care time is 45 minutes.

## 2023-05-18 NOTE — PROGRESS NOTE ADULT - SUBJECTIVE AND OBJECTIVE BOX
HPI: 38M PMH of alcohol abuse, alcohol withdrawal seizures, fatty liver presents with signs of alcohol abuse/withdrawal. History obtained from wife with  over phone. Patient has been drinking for the past 16 days of unknown amount and had unwitnessed fall from the bed so pt was taken to the ED. Seen and examined pt at bedside. Unable to obtain history from patient due to somnolence in setting of recent ativan use.    In the ED    Vitals: , T 98, Spo2 95% /80     Labs: Plt 65, Na 128, K 3.0, Tbili 9.6, , , , Lactate 4.0, Lipase 432, UA + for bacteria and leuk esterase, Blood alc level 423     Imaging  RUQ U/S Visualization of the gallbladder is limited by overlying bowel gas. No   gross gallstones, gallbladder wall thickening, or pericholecystic fluid. No ultrasonic Grayson's sign epatomegaly and hepatic steatosis.  CT A/P- steatosis and hepatomegaly. No hydronephrosis. 6.3 mm nonobstructing stone lower pole left kidney.  CT head/cervical spine/ maxillofacial non-con: No acute intracranial abnormalities. Mild right periorbital subcutaneous soft tissue swelling. No evidence of orbital or other facial fracture. No vertebral fracture, collapse or subluxation.    ECG: Sinus tachycardia, 115 BPM    Meds Given    dTAP x1, Zofran x 1, Morphine x1, Pepcid x1,2 NS, 5mg haldol x1, 40meq k, 10mg flexiril, zosyn x1, thiamine, folic acid,   Pt seen by ICU team, admitted to ICU for ETOH intoxication & withdrawal. (17 May 2023 09:51)      Past Medical History, Family History, Social History:  PAST MEDICAL & SURGICAL HISTORY:  Alcohol abuse      H/O hernia repair      S/P appendectomy          FAMILY HISTORY:  Family history of renal disease  Uncle        Social History:  Tobacco: denies  EtOH: chronic heavy drinker  Recreational drug use: denies  Lives with: patient lives with wife  Ambulates: independent  ADLs: independent (17 May 2023 09:51)      Interval History: Agitation, self removed squires catheter, placed in restraints, blood cultures +, antibiotics started            Review of Systems:  Review of Systems is Limited due to patient's neurologic status.      Physical Exam:  Constitutional: Sleeping in ICU bed   Neuro: sedated in ICU bed, moving all extremities purposefully when lightened up  Cardiovascular:  Sinus tachycardia   Eyes: PERRL EOMI, Right eye ecchymosis   ENT: No JVD, Trachea Midline.  Respiratory: Good air excursion through upper and lower airways, symmetric chest rise   Gastrointestinal: Soft, nontender, nondistended  Genitourinary: Squires catheter in place   Musculoskeletal: No muscle wasting noted, No pretibial edema appreciated, no appreciable calf tenderness.  Skin:  CDI   Hematologic / Lymph / Immunologic: No bleeding from IV sites or wounds, No Hives or allergic type skin lesions      Vitals:  Vital Signs Last 24 Hrs  T(C): 37.9 (18 May 2023 00:07), Max: 38.3 (17 May 2023 23:30)  T(F): 100.2 (18 May 2023 00:07), Max: 100.9 (17 May 2023 23:30)  HR: 74 (18 May 2023 02:30) (74 - 139)  BP: 96/55 (18 May 2023 02:30) (81/53 - 142/84)  BP(mean): 68 (18 May 2023 02:30) (63 - 107)  RR: 18 (18 May 2023 02:30) (18 - 57)  SpO2: 97% (18 May 2023 02:30) (93% - 97%)    I&O:  I&O's Summary    17 May 2023 07:01  -  18 May 2023 03:02  --------------------------------------------------------  IN: 3860.4 mL / OUT: 5200 mL / NET: -1339.6 mL        Labs & Radiology:                        14.9   5.76  )-----------( 65       ( 17 May 2023 02:25 )             40.8       05-17    131<L>  |  100  |  5<L>  ----------------------------<  119<H>  3.7   |  29  |  0.65    Ca    7.1<L>      17 May 2023 23:21  Phos  2.4     05-17  Mg     2.2     05-17    TPro  5.4<L>  /  Alb  2.4<L>  /  TBili  9.2<H>  /  DBili  x   /  AST  425<H>  /  ALT  283<H>  /  AlkPhos  229<H>  05-17      LIVER FUNCTIONS - ( 17 May 2023 11:00 )  Alb: 2.4 g/dL / Pro: 5.4 g/dL / ALK PHOS: 229 U/L / ALT: 283 U/L / AST: 425 U/L / GGT: x             CARDIAC MARKERS ( 17 May 2023 18:30 )  x     / x     / 2225 U/L / x     / x      CARDIAC MARKERS ( 17 May 2023 11:00 )  x     / x     / 2989 U/L / x     / x        PT/INR - ( 17 May 2023 08:30 )   PT: 14.5 sec;   INR: 1.24 ratio    PTT - ( 17 May 2023 08:30 )  PTT:34.7 sec      Culture - Blood (collected 17 May 2023 08:45)  Source: .Blood Blood  Gram Stain (18 May 2023 01:18):    Growth in aerobic bottle: Gram Negative Rods    Growth in anaerobic bottle: Gram Negative Rods  Preliminary Report (18 May 2023 01:18):    Growth in aerobic bottle: Gram Negative Rods    Growth in anaerobic bottle: Gram Negative Rods    Culture - Blood (collected 17 May 2023 08:30)  Source: .Blood Blood  Gram Stain (18 May 2023 01:19):    Growth in anaerobic bottle: Gram Negative Rods  Preliminary Report (18 May 2023 01:19):    Growth in anaerobic bottle: Gram Negative Rods        < from: CT Head No Cont (05.17.23 @ 12:53) >  FINDINGS:    There is no evidence of intraparenchymal or extraaxial hemorrhage.     There is no CT evidence of large vessel acute infarct. No mass effect is   found in the brain.  No evidence of midline shift or herniation pattern.    The ventricles, sulci and basal cisterns appear unremarkable.    Small retention cyst versus polyp in the anterior right maxillary sinus.    IMPRESSION:    No acute intracranial findings.    --- End of Report ---            DELTA CLAIRE MD; Attending Radiologist  This document has been electronically signed. May 17 2023  1:22PM      < from: US Abdomen Upper Quadrant Right (05.17.23 @ 08:53) >  IMPRESSION:  Visualization of the gallbladder is limited by overlying bowel gas. No   gross gallstones, gallbladder wall thickening, or pericholecystic fluid.   No ultrasonic Grayson's sign.    Hepatomegaly and hepatic steatosis.    --- End of Report ---        < from: CT Abdomen and Pelvis w/ IV Cont (05.17.23 @ 03:17) >  IMPRESSION:  Steatosis and hepatomegaly.    No hydronephrosis. 6.3 mm nonobstructing stone lower pole left kidney.    Please refer to detailed findings otherwise described above.    --- End of Report ---          Medications:  MEDICATIONS  (STANDING):  chlorhexidine 4% Liquid 1 Application(s) Topical <User Schedule>  dexMEDEtomidine Infusion 0.1 MICROgram(s)/kG/Hr (2.38 mL/Hr) IV Continuous <Continuous>  folic acid Injectable 1 milliGRAM(s) IV Push daily  lactated ringers. 1000 milliLiter(s) (150 mL/Hr) IV Continuous <Continuous>  LORazepam   Injectable 1.5 milliGRAM(s) IV Push every 4 hours  LORazepam   Injectable   IV Push   LORazepam   Injectable 2 milliGRAM(s) IV Push every 4 hours  pantoprazole  Injectable 40 milliGRAM(s) IV Push daily  piperacillin/tazobactam IVPB.. 3.375 Gram(s) IV Intermittent every 8 hours  potassium chloride  10 mEq/100 mL IVPB 10 milliEquivalent(s) IV Intermittent every 1 hour  sodium phosphate 30 milliMole(s)/500 mL IVPB 30 milliMole(s) IV Intermittent once  thiamine Injectable 200 milliGRAM(s) IV Push daily    MEDICATIONS  (PRN):  chlorproMAZINE    Injectable 25 milliGRAM(s) IntraMuscular every 8 hours PRN Hiccups  LORazepam   Injectable 1 milliGRAM(s) IV Push every 2 hours PRN Symptom-triggered: 2 point increase in CIWA -Ar score and a total score of 7 or LESS  LORazepam   Injectable 1 milliGRAM(s) IV Push every 1 hour PRN Symptom-triggered: each CIWA -Ar score 8 or GREATER

## 2023-05-18 NOTE — DIETITIAN INITIAL EVALUATION ADULT - NS FNS DIET ORDER
Diet, NPO:   Except Medications     Special Instructions for Nursing:  Except Medications (05-17-23 @ 10:43)

## 2023-05-18 NOTE — PROGRESS NOTE ADULT - PROBLEM SELECTOR PLAN 1
Chronic known hx of alcohol use disorder  - Admitted to ICU  - [facility] Protocol  - Fall precautions  - Precedex for sedation, wean as tolerated  - wrist restraints for agitation  - D/c Ativan taper. Start 2mg q4h standing, 2mg q1h PRN agitation  - Thiamine 500mg IVP TID, folic acid 1mg IVP QD  - NPO except meds  - Addiction Medicine recs (Radha), recs appreciated   - ICU (audrey) Consulted, recs appreciated  - management as per ICU

## 2023-05-18 NOTE — PROGRESS NOTE ADULT - SUBJECTIVE AND OBJECTIVE BOX
Patient is a 38y old  Male who presents with a chief complaint of Hepatitis (18 May 2023 13:04)    HPI:  38M PMH of alcohol abuse, alcohol withdrawal seizures, fatty liver presents with signs of alcohol abuse/withdrawal. History obtained from wife with  over phone. Patient has been drinking for the past 16 days of unknown amount and had unwitnessed fall from the bed so pt was taken to the ED. Seen and examined pt at bedside. Unable to obtain history from patient due to somnolence in setting of recent ativan use.    In the ED    Vitals: , T 98, Spo2 95% /80     Labs: Plt 65, Na 128, K 3.0, Tbili 9.6, , , , Lactate 4.0, Lipase 432, UA + for bacteria and leuk esterase, Blood alc level 423     Imaging  RUQ U/S Visualization of the gallbladder is limited by overlying bowel gas. No   gross gallstones, gallbladder wall thickening, or pericholecystic fluid. No ultrasonic Grayson's sign epatomegaly and hepatic steatosis.  CT A/P- steatosis and hepatomegaly. No hydronephrosis. 6.3 mm nonobstructing stone lower pole left kidney.  CT head/cervical spine/ maxillofacial non-con: No acute intracranial abnormalities. Mild right periorbital subcutaneous soft tissue swelling. No evidence of orbital or other facial fracture. No vertebral fracture, collapse or subluxation.    ECG: Sinus tachycardia, 115 BPM    Meds Given    dTAP x1, Zofran x 1, Morphine x1, Pepcid x1,2 NS, 5mg haldol x1, 40meq k, 10mg flexiril, zosyn x1, thiamine, folic acid,   Pt seen by ICU team, admitted to ICU for ETOH intoxication & withdrawal. (17 May 2023 09:51)    INTERVAL HPI:   - Seen and examined patient. Pt was lethargic so unable to obtain ROS.    OVERNIGHT EVENTS: Agitated overnight, self removed squires, now with hematuria 2/2 trauma. Placed on restraints and precedex. Cultures showed gram negative rods, started on Zosyn. Remains on Precedex for sedation.    Home Medications:      MEDICATIONS  (STANDING):  chlorhexidine 2% Cloths 1 Application(s) Topical <User Schedule>  dexMEDEtomidine Infusion 0.1 MICROgram(s)/kG/Hr (2.38 mL/Hr) IV Continuous <Continuous>  folic acid Injectable 1 milliGRAM(s) IV Push daily  lactated ringers. 1000 milliLiter(s) (150 mL/Hr) IV Continuous <Continuous>  LORazepam   Injectable 2 milliGRAM(s) IV Push every 4 hours  pantoprazole  Injectable 40 milliGRAM(s) IV Push daily  piperacillin/tazobactam IVPB.. 3.375 Gram(s) IV Intermittent every 8 hours  thiamine IVPB 500 milliGRAM(s) IV Intermittent every 8 hours    MEDICATIONS  (PRN):  chlorproMAZINE    Injectable 25 milliGRAM(s) IntraMuscular every 8 hours PRN Hiccups  LORazepam   Injectable 2 milliGRAM(s) IV Push every 1 hour PRN Agitation      Allergies    No Known Allergies    Intolerances        Social History:  Tobacco: denies  EtOH: chronic heavy drinker  Recreational drug use: denies  Lives with: patient lives with wife  Ambulates: independent  ADLs: independent (17 May 2023 09:51)      REVIEW OF SYSTEMS: Unable to obtain ROS due to lethargy    Vital Signs Last 24 Hrs  T(C): 37.7 (18 May 2023 12:00), Max: 38.3 (17 May 2023 23:30)  T(F): 99.9 (18 May 2023 12:00), Max: 100.9 (17 May 2023 23:30)  HR: 73 (18 May 2023 12:00) (70 - 139)  BP: 110/71 (18 May 2023 12:00) (81/53 - 138/92)  BP(mean): 86 (18 May 2023 12:00) (63 - 107)  RR: 21 (18 May 2023 12:00) (16 - 57)  SpO2: 96% (18 May 2023 12:00) (93% - 98%)    Parameters below as of 18 May 2023 08:00  Patient On (Oxygen Delivery Method): nasal cannula  O2 Flow (L/min): 2    Finger Stick        05-17 @ 07:01  -   @ 07:00  --------------------------------------------------------  IN: 4598.4 mL / OUT: 5150 mL / NET: -551.6 mL     @ 07:01  -   @ 14:00  --------------------------------------------------------  IN: 209.5 mL / OUT: 0 mL / NET: 209.5 mL        PHYSICAL EXAM: on precedex  GENERAL:  [ x ] NAD , [  ] well appearing, [  ] Agitated, [  ] Drowsy,  [ x ] lethargy, [  ] confused   HEAD:  [ x ] Normal, [  ] Other  EYES:  [ x ] Ecchymosis/wound noted on R eyebrow [ x ] EOMI, [  x] PERRLA, [ x ] scleral icterus, [  ] Other,  [  ] Pallor,[  ] Discharge  ENMT:  [ x ] Normal, [ x ] Moist mucous membranes, [ x ] Good dentition, [  ] No Thrush  NECK:  [ x ] Supple, [ x ] No JVD, [ x ] Normal thyroid, [  ] Lymphadenopathy [  ] Other  CHEST/LUNG:  [ x ] Clear to auscultation bilaterally, [ x ] Breath Sounds equal B/L, [  ] poor effort  [ x ] No rales, [ x ] No rhonchi  [ x ]  No wheezing,   HEART:  [ x ] Regular rate and rhythm, [  ] tachycardia, [  ] Bradycardia,  [  ] irregular  [x  ] No murmurs, No rubs, No gallops, [  ] PPM in place (Mfr:  )  ABDOMEN:  [ x ] Soft, [x  ] Nontender, [ x ] Nondistended, [ x ] No mass, [  x] Bowel sounds present, [  ] obese  NERVOUS SYSTEM:  [ x ] Alert & Oriented X0, [ x ] Somnolent, difficult to arouse  [  ] Nonfocal  [  ] Confusion  [ x ] Encephalopathic [  ] Sedated [  ] Unable to assess, [  ] Dementia [  ] Other-  EXTREMITIES: [ x ] 2+ Peripheral Pulses, No clubbing, No cyanosis,  [  ] edema B/L lower EXT. [  ] PVD stasis skin changes B/L Lower EXT, [  ] wound  LYMPH: No lymphadenopathy noted  SKIN: [ x ] Squires in place with hematuria (2/2 trauma), [ x ] No rashes or lesions, [  ] Pressure Ulcers, [  ] ecchymosis, [  ] Skin Tears, [  ] Other    DIET: Diet, NPO:   Except Medications     Special Instructions for Nursing:  Except Medications (23 @ 10:43)      LABS:                        11.5   2.50  )-----------( 35       ( 18 May 2023 06:19 )             33.3     18 May 2023 06:19    132    |  102    |  7      ----------------------------<  139    3.5     |  27     |  0.72     Ca    7.3        18 May 2023 06:19  Phos  2.6       18 May 2023 06:19  Mg     2.4       18 May 2023 06:19    TPro  5.2    /  Alb  2.1    /  TBili  11.4   /  DBili  8.6    /  AST  365    /  ALT  228    /  AlkPhos  216    18 May 2023 06:19    PT/INR - ( 18 May 2023 06:19 )   PT: 13.5 sec;   INR: 1.15 ratio         PTT - ( 18 May 2023 06:19 )  PTT:32.1 sec  Urinalysis Basic - ( 17 May 2023 15:50 )    Color: Yellow / Appearance: Clear / S.010 / pH: x  Gluc: x / Ketone: Negative  / Bili: Negative / Urobili: Negative   Blood: x / Protein: Negative / Nitrite: Negative   Leuk Esterase: Trace / RBC: >50 /HPF / WBC 3-5   Sq Epi: x / Non Sq Epi: x / Bacteria: Occasional        Culture Results:   Growth in aerobic bottle: Gram Negative Rods  Growth in anaerobic bottle: Gram Negative Rods ( @ 08:45)  Culture Results:   Growth in anaerobic bottle: Gram Negative Rods  Growth in aerobic bottle: Gram Negative Rods  ***Blood Panel PCR results on this specimen are available  approximately 3 hours after the Gram stain result.***  Gram stain, PCR, and/or culture results may not always  correspond due to difference in methodologies.  ************************************************************  This PCR assay was performed by multiplex PCR. This  Assay tests for 66 bacterial and resistance gene targets.  Please refer to the [facility] [email or web address] test directory  at [email or web address] for details. ( @ 08:30)      culture blood  -- .Blood Blood  @ 08:45    culture urine  --   @ 08:45  culture blood  -- .Blood Blood  @ 08:30    culture urine  --   @ 08:30      CARDIAC MARKERS ( 17 May 2023 18:30 )  x     / x     / 2225 U/L / x     / x      CARDIAC MARKERS ( 17 May 2023 11:00 )  x     / x     / 2989 U/L / x     / x              Culture - Blood (collected 17 May 2023 08:45)  Source: .Blood Blood  Gram Stain (18 May 2023 01:18):    Growth in aerobic bottle: Gram Negative Rods    Growth in anaerobic bottle: Gram Negative Rods  Preliminary Report (18 May 2023 01:18):    Growth in aerobic bottle: Gram Negative Rods    Growth in anaerobic bottle: Gram Negative Rods    Culture - Blood (collected 17 May 2023 08:30)  Source: .Blood Blood  Gram Stain (18 May 2023 03:36):    Growth in anaerobic bottle: Gram Negative Rods    Growth in aerobic bottle: Gram Negative Rods  Preliminary Report (18 May 2023 03:36):    Growth in anaerobic bottle: Gram Negative Rods    Growth in aerobic bottle: Gram Negative Rods    ***Blood Panel PCR results on this specimen are available    approximately 3 hours after the Gram stain result.***    Gram stain, PCR, and/or culture results may not always    correspond due to difference in methodologies.    ************************************************************    This PCR assay was performed by multiplex PCR. This    Assay tests for 66 bacterial and resistance gene targets.    Please refer to the [facility] [email or web address] test directory    at [email or web address] for details.  Organism: Blood Culture PCR (18 May 2023 03:41)  Organism: Blood Culture PCR (18 May 2023 03:41)             Lipase, Serum: 432 U/L (23 @ 02:25)          RADIOLOGY & ADDITIONAL TESTS:  < from: CT Head No Cont (23 @ 12:53) >    ACC: [id number] EXAM:  CT BRAIN   ORDERED BY: MAGDA MITCHELL     PROCEDURE DATE:  2023          INTERPRETATION:  Exam Date: 2023 12:53 PM    CT head without IV contrast    CLINICAL INFORMATION:  evaluate right posterior fossa for SDH Admitting   Dxs: F10.10 ALCOHOL ABUSE, UNCOMPLICATED    TECHNIQUE: Contiguous axial sections were obtained through the head.     Coronal and sagittal reformats were obtained.    COMPARISON: CT head 2020 1:20 AM    FINDINGS:    There is no evidence of intraparenchymal or extraaxial hemorrhage.     There is no CT evidence of large vessel acute infarct. No mass effect is   found in the brain.  No evidence of midline shift or herniation pattern.    The ventricles, sulci and basal cisterns appear unremarkable.    Small retention cyst versus polyp in the anterior right maxillary sinus.    IMPRESSION:    No acute intracranial findings.    --- End of Report ---            DELTA CLAIRE MD; Attending Radiologist  This document has been electronically signed. May 17 2023  1:22PM    < end of copied text >        HEALTH ISSUES - PROBLEM Dx:  Transaminitis    Hypokalemia    Hypocalcemia    Hyponatremia    Fall    Urinary retention    Thrombocytopenia    Need for prophylactic measure    Alcohol dependence with withdrawal    AMS (altered mental status)            Consultant(s) Notes Reviewed:  [ x ] YES     Care Discussed with [X] Consultants  [ x ] Patient  [ x ] Family [ x ] HCP [  ]   [  ] Social Service  [ x ] RN, [  ] Physical Therapy,[  ] Palliative care team  DVT PPX: [  ] Lovenox, [  ] S C Heparin, [  ] Coumadin, [  ] Xarelto, [  ] Eliquis, [  ] Pradaxa, [  ] IV Heparin drip, [ x ] SCD [  ] Contraindication 2 to GI Bleed,[  ] Ambulation [ x ] Contraindicated 2 to  bleed [  ] Contraindicated 2 to Brain Bleed  Advanced directive: [ x ] None, [  ] DNR/DNI Patient is a 38y old  Male who presents with a chief complaint of Hepatitis (18 May 2023 13:04)    HPI:  38M PMH of alcohol abuse, alcohol withdrawal seizures, fatty liver presents with signs of alcohol abuse/withdrawal. History obtained from wife with  over phone. Patient has been drinking for the past 16 days of unknown amount and had unwitnessed fall from the bed so pt was taken to the ED. Seen and examined pt at bedside. Unable to obtain history from patient due to somnolence in setting of recent ativan use.    In the ED    Vitals: , T 98, Spo2 95% /80     Labs: Plt 65, Na 128, K 3.0, Tbili 9.6, , , , Lactate 4.0, Lipase 432, UA + for bacteria and leuk esterase, Blood alc level 423     Imaging  RUQ U/S Visualization of the gallbladder is limited by overlying bowel gas. No   gross gallstones, gallbladder wall thickening, or pericholecystic fluid. No ultrasonic Grayson's sign epatomegaly and hepatic steatosis.  CT A/P- steatosis and hepatomegaly. No hydronephrosis. 6.3 mm nonobstructing stone lower pole left kidney.  CT head/cervical spine/ maxillofacial non-con: No acute intracranial abnormalities. Mild right periorbital subcutaneous soft tissue swelling. No evidence of orbital or other facial fracture. No vertebral fracture, collapse or subluxation.    ECG: Sinus tachycardia, 115 BPM    Meds Given    dTAP x1, Zofran x 1, Morphine x1, Pepcid x1,2 NS, 5mg haldol x1, 40meq k, 10mg flexiril, zosyn x1, thiamine, folic acid,   Pt seen by ICU team, admitted to ICU for ETOH intoxication & withdrawal. (17 May 2023 09:51)    INTERVAL HPI:   - Seen and examined patient. Pt was lethargic so unable to obtain ROS. On Precedex drip, Cooling [place]     OVERNIGHT EVENTS: Agitated overnight, self removed squires, now with hematuria 2/2 trauma. Placed on restraints and precedex Cultures showed gram negative rods, started on Zosyn. Remains on Precedex for sedation.    Home Medications:      MEDICATIONS  (STANDING):  chlorhexidine 2% Cloths 1 Application(s) Topical <User Schedule>  dexMEDEtomidine Infusion 0.1 MICROgram(s)/kG/Hr (2.38 mL/Hr) IV Continuous <Continuous>  folic acid Injectable 1 milliGRAM(s) IV Push daily  lactated ringers. 1000 milliLiter(s) (150 mL/Hr) IV Continuous <Continuous>  LORazepam   Injectable 2 milliGRAM(s) IV Push every 4 hours  pantoprazole  Injectable 40 milliGRAM(s) IV Push daily  piperacillin/tazobactam IVPB.. 3.375 Gram(s) IV Intermittent every 8 hours  thiamine IVPB 500 milliGRAM(s) IV Intermittent every 8 hours    MEDICATIONS  (PRN):  chlorproMAZINE    Injectable 25 milliGRAM(s) IntraMuscular every 8 hours PRN Hiccups  LORazepam   Injectable 2 milliGRAM(s) IV Push every 1 hour PRN Agitation      Allergies    No Known Allergies    Intolerances        Social History:  Tobacco: denies  EtOH: chronic heavy drinker  Recreational drug use: denies  Lives with: patient lives with wife  Ambulates: independent  ADLs: independent (17 May 2023 09:51)      REVIEW OF SYSTEMS: Unable to obtain ROS due to lethargy    Vital Signs Last 24 Hrs  T(C): 37.7 (18 May 2023 12:00), Max: 38.3 (17 May 2023 23:30)  T(F): 99.9 (18 May 2023 12:00), Max: 100.9 (17 May 2023 23:30)  HR: 73 (18 May 2023 12:00) (70 - 139)  BP: 110/71 (18 May 2023 12:00) (81/53 - 138/92)  BP(mean): 86 (18 May 2023 12:00) (63 - 107)  RR: 21 (18 May 2023 12:00) (16 - 57)  SpO2: 96% (18 May 2023 12:00) (93% - 98%)    Parameters below as of 18 May 2023 08:00  Patient On (Oxygen Delivery Method): nasal cannula  O2 Flow (L/min): 2    Finger Stick        -17 @ 07:01  -  - @ 07:00  --------------------------------------------------------  IN: 4598.4 mL / OUT: 5150 mL / NET: -551.6 mL     @ 07:01  -   @ 14:00  --------------------------------------------------------  IN: 209.5 mL / OUT: 0 mL / NET: 209.5 mL        PHYSICAL EXAM: on Precedex  GENERAL:  [ x ] NAD , [  ] well appearing, [  ] Agitated, [  ] Drowsy,  [ x ] lethargy, [  ] confused   HEAD:  [ x ] Normal, [  ] Other  EYES:  [ x ] Ecchymosis/wound noted on R eyebrow [ x ] EOMI, [  x] PERRLA, [ x ] scleral icterus, [  ] Other,  [  ] Pallor,[  ] Discharge  ENMT:  [ x ] Normal, [ x ] Moist mucous membranes, [ x ] Good dentition, [x  ] No Thrush  NECK:  [ x ] Supple, [ x ] No JVD, [ x ] Normal thyroid, [  ] Lymphadenopathy [  ] Other  CHEST/LUNG:  [ x ] Clear to auscultation bilaterally, [ x ] Breath Sounds equal B/L, [  ] poor effort  [ x ] No rales, [ x ] No rhonchi  [ x ]  No wheezing,   HEART:  [ x ] Regular rate and rhythm, [  ] tachycardia, [  ] Bradycardia,  [  ] irregular  [x  ] No murmurs, No rubs, No gallops, [  ] PPM in place (Mfr:  )  ABDOMEN:  [ x ] Soft, [x  ] Nontender, [ x ] Nondistended, [ x ] No mass, [  x] Bowel sounds present, [  ] obese  NERVOUS SYSTEM:  [ x ] Alert & Oriented X0, [ x ] Somnolent, difficult to arouse  [  ] Nonfocal  [  ] Confusion  [ x ] Encephalopathic [  ] Sedated [  ] Unable to assess, [  ] Dementia [  ] Other-  EXTREMITIES: [ x ] 2+ Peripheral Pulses, No clubbing, No cyanosis,  [  ] edema B/L lower EXT. [  ] PVD stasis skin changes B/L Lower EXT, [  ] wound  LYMPH: No lymphadenopathy noted  SKIN: [ x ] Squires in place with hematuria (2/2 trauma), [ x ] No rashes or lesions, [  ] Pressure Ulcers, [ x ] ecchymosis,- Rt eye brow  [  ] Skin Tears, [  ] Other    DIET: Diet, NPO:   Except Medications     Special Instructions for Nursing:  Except Medications (23 @ 10:43)      LABS:                        11.5   2.50  )-----------( 35       ( 18 May 2023 06:19 )             33.3     18 May 2023 06:19    132    |  102    |  7      ----------------------------<  139    3.5     |  27     |  0.72     Ca    7.3        18 May 2023 06:19  Phos  2.6       18 May 2023 06:19  Mg     2.4       18 May 2023 06:19    TPro  5.2    /  Alb  2.1    /  TBili  11.4   /  DBili  8.6    /  AST  365    /  ALT  228    /  AlkPhos  216    18 May 2023 06:19    PT/INR - ( 18 May 2023 06:19 )   PT: 13.5 sec;   INR: 1.15 ratio         PTT - ( 18 May 2023 06:19 )  PTT:32.1 sec  Urinalysis Basic - ( 17 May 2023 15:50 )    Color: Yellow / Appearance: Clear / S.010 / pH: x  Gluc: x / Ketone: Negative  / Bili: Negative / Urobili: Negative   Blood: x / Protein: Negative / Nitrite: Negative   Leuk Esterase: Trace / RBC: >50 /HPF / WBC 3-5   Sq Epi: x / Non Sq Epi: x / Bacteria: Occasional        Culture Results:   Growth in aerobic bottle: Gram Negative Rods  Growth in anaerobic bottle: Gram Negative Rods ( @ 08:45)  Culture Results:   Growth in anaerobic bottle: Gram Negative Rods  Growth in aerobic bottle: Gram Negative Rods  ***Blood Panel PCR results on this specimen are available  approximately 3 hours after the Gram stain result.***  Gram stain, PCR, and/or culture results may not always  correspond due to difference in methodologies.  ************************************************************  This PCR assay was performed by multiplex PCR. This  Assay tests for 66 bacterial and resistance gene targets.  Please refer to the [facility] [email or web address] test directory  at [email or web address] for details. ( @ 08:30)      culture blood  -- .Blood Blood  @ 08:45    culture urine  --   @ 08:45  culture blood  -- .Blood Blood  @ 08:30    culture urine  --   @ 08:30      CARDIAC MARKERS ( 17 May 2023 18:30 )  x     / x     / 2225 U/L / x     / x      CARDIAC MARKERS ( 17 May 2023 11:00 )  x     / x     / 2989 U/L / x     / x        Culture - Blood (collected 17 May 2023 08:45)  Source: .Blood Blood  Gram Stain (18 May 2023 01:18):    Growth in aerobic bottle: Gram Negative Rods    Growth in anaerobic bottle: Gram Negative Rods  Preliminary Report (18 May 2023 01:18):    Growth in aerobic bottle: Gram Negative Rods    Growth in anaerobic bottle: Gram Negative Rods    Culture - Blood (collected 17 May 2023 08:30)  Source: .Blood Blood  Gram Stain (18 May 2023 03:36):    Growth in anaerobic bottle: Gram Negative Rods    Growth in aerobic bottle: Gram Negative Rods  Preliminary Report (18 May 2023 03:36):    Growth in anaerobic bottle: Gram Negative Rods    Growth in aerobic bottle: Gram Negative Rods    ***Blood Panel PCR results on this specimen are available    approximately 3 hours after the Gram stain result.***    Gram stain, PCR, and/or culture results may not always    correspond due to difference in methodologies.    ************************************************************    This PCR assay was performed by multiplex PCR. This    Assay tests for 66 bacterial and resistance gene targets.    Please refer to the [facility] [email or web address] test directory    at [email or web address] for details.  Organism: Blood Culture PCR (18 May 2023 03:41)  Organism: Blood Culture PCR (18 May 2023 03:41)     Lipase, Serum: 432 U/L (23 @ 02:25)      RADIOLOGY & ADDITIONAL TESTS:  < from: CT Head No Cont (23 @ 12:53) >    ACC: [id number] EXAM:  CT BRAIN   ORDERED BY: MAGDA MITCHELL     PROCEDURE DATE:  2023          INTERPRETATION:  Exam Date: 2023 12:53 PM    CT head without IV contrast    CLINICAL INFORMATION:  evaluate right posterior fossa for SDH Admitting   Dxs: F10.10 ALCOHOL ABUSE, UNCOMPLICATED    TECHNIQUE: Contiguous axial sections were obtained through the head.     Coronal and sagittal reformats were obtained.    COMPARISON: CT head 2020 1:20 AM    FINDINGS:    There is no evidence of intraparenchymal or extraaxial hemorrhage.     There is no CT evidence of large vessel acute infarct. No mass effect is   found in the brain.  No evidence of midline shift or herniation pattern.    The ventricles, sulci and basal cisterns appear unremarkable.    Small retention cyst versus polyp in the anterior right maxillary sinus.    IMPRESSION:    No acute intracranial findings.    --- End of Report ---            DELTA CLAIRE MD; Attending Radiologist  This document has been electronically signed. May 17 2023  1:22PM    < end of copied text >        HEALTH ISSUES - PROBLEM Dx:  Transaminitis    Hypokalemia    Hypocalcemia    Hyponatremia    Fall    Urinary retention    Thrombocytopenia    Need for prophylactic measure    Alcohol dependence with withdrawal    AMS (altered mental status)      Consultant(s) Notes Reviewed:  [ x ] YES     Care Discussed with [X] Consultants  [ x ] Patient  [ x ] Family [ x ] HCP [  ]   [  ] Social Service  [ x ] RN, [  ] Physical Therapy,[  ] Palliative care team  DVT PPX: [  ] Lovenox, [  ] S C Heparin, [  ] Coumadin, [  ] Xarelto, [  ] Eliquis, [  ] Pradaxa, [  ] IV Heparin drip, [ x ] SCD [  ] Contraindication 2 to GI Bleed,[  ] Ambulation [ x ] Contraindicated 2 to  bleed [  ] Contraindicated 2 to Brain Bleed  Advanced directive: [ x ] None, [  ] DNR/DNI

## 2023-05-18 NOTE — PROGRESS NOTE ADULT - SUBJECTIVE AND OBJECTIVE BOX
Patient is a 38y old  Male who presents with a chief complaint of Hepatitis (18 May 2023 11:40)    24 hour events: Agitated overnight, self removed squires, now with hematuria 2/2 trauma. Placed on restraints. Cultures showed gram negative rods, started on Zosyn. Remains on Precedex for sedation.    REVIEW OF SYSTEMS  Unable to obtain 2/2 lethargy    T(F): 99.9 (23 @ 12:00), Max: 100.9 (23 @ 23:30)  HR: 73 (23 @ 12:00) (70 - 139)  BP: 110/71 (23 @ 12:00) (81/53 - 142/84)  RR: 21 (23 @ 12:00) (16 - 57)  SpO2: 96% (23 @ 12:00) (93% - 98%)  Wt(kg): --            I&O's Summary     @ 07:01  -   @ 07:00  --------------------------------------------------------  IN: 4598.4 mL / OUT: 5150 mL / NET: -551.6 mL     @ 07:01  -   @ 12:31  --------------------------------------------------------  IN: 209.5 mL / OUT: 0 mL / NET: 209.5 mL      PHYSICAL EXAM  General: NAD, somnolent  CNS: Somnolent, difficult to arouse  HEENT: PERRLA; conjunctive and sclera clear. Ecchymosis/wound noted on R eyebrow  Resp: CTA B/L, no wheezes, rales or rhonchi  CVS: RRR, +S1/S2, no m/r/g  Abd: Soft, nontender, nondistended, +BS in all 4 quadrants, no palpable masses  Ext: No LE edema, cyanosis  Skin: Squires in place with hematuria (2/2 trauma)    MEDICATIONS  piperacillin/tazobactam IVPB.. IV Intermittent          chlorproMAZINE    Injectable IntraMuscular PRN  dexMEDEtomidine Infusion IV Continuous  LORazepam   Injectable IV Push PRN  LORazepam   Injectable IV Push        pantoprazole  Injectable IV Push      folic acid Injectable IV Push  lactated ringers. IV Continuous  thiamine IVPB IV Intermittent      chlorhexidine 2% Cloths Topical                            11.5   2.50  )-----------( 35       ( 18 May 2023 06:19 )             33.3       05-18    132<L>  |  102  |  7   ----------------------------<  139<H>  3.5   |  27  |  0.72    Ca    7.3<L>      18 May 2023 06:19  Phos  2.6     05-18  Mg     2.4     05-18    TPro  5.2<L>  /  Alb  2.1<L>  /  TBili  11.4<H>  /  DBili  8.6<H>  /  AST  365<H>  /  ALT  228<H>  /  AlkPhos  216<H>  05-18    Lactate 0.7           05-17 @ 23:21    Lactate 2.8           05-17 @ 18:30      CARDIAC MARKERS ( 17 May 2023 18:30 )  x     / x     / 2225 U/L / x     / x      CARDIAC MARKERS ( 17 May 2023 11:00 )  x     / x     / 2989 U/L / x     / x          PT/INR - ( 18 May 2023 06:19 )   PT: 13.5 sec;   INR: 1.15 ratio         PTT - ( 18 May 2023 06:19 )  PTT:32.1 sec  Urinalysis Basic - ( 17 May 2023 15:50 )    Color: Yellow / Appearance: Clear / S.010 / pH: x  Gluc: x / Ketone: Negative  / Bili: Negative / Urobili: Negative   Blood: x / Protein: Negative / Nitrite: Negative   Leuk Esterase: Trace / RBC: >50 /HPF / WBC 3-5   Sq Epi: x / Non Sq Epi: x / Bacteria: Occasional      .Blood Blood   Growth in aerobic bottle: Gram Negative Rods  Growth in anaerobic bottle: Gram Negative Rods   Growth in aerobic bottle: Gram Negative Rods  Growth in anaerobic bottle: Gram Negative Rods - @ 08:45  .Blood Blood   Growth in anaerobic bottle: Gram Negative Rods  Growth in aerobic bottle: Gram Negative Rods  ***Blood Panel PCR results on this specimen are available  approximately 3 hours after the Gram stain result.***  Gram stain, PCR, and/or culture results may not always  correspond due to difference in methodologies.  ************************************************************  This PCR assay was performed by multiplex PCR. This  Assay tests for 66 bacterial and resistance gene targets.  Please refer to the [facility] test directory  at [email or web address] for details.   Growth in anaerobic bottle: Gram Negative Rods  Growth in aerobic bottle: Gram Negative Rods - @ 08:30      CENTRAL LINE: N  SQUIRES: Y  A-LINE: N    GLOBAL ISSUE/BEST PRACTICE  Analgesia: N  Sedation: Precedex  HOB elevation: yes  Stress ulcer prophylaxis: Protonix  VTE prophylaxis: SCDs  Glycemic control: N  Nutrition: N    CODE STATUS: Full Code       Patient is a 38y old  Male who presents with a chief complaint of Hepatitis (18 May 2023 11:40)    24 hour events: Agitated overnight, self removed squires, now with hematuria 2/2 trauma. Placed on restraints. Cultures showed gram negative rods, started on Zosyn. Remains on Precedex for sedation.    REVIEW OF SYSTEMS  Unable to obtain 2/2 lethargy    T(F): 99.9 (23 @ 12:00), Max: 100.9 (23 @ 23:30)  HR: 73 (23 @ 12:00) (70 - 139)  BP: 110/71 (23 @ 12:00) (81/53 - 142/84)  RR: 21 (23 @ 12:00) (16 - 57)  SpO2: 96% (23 @ 12:00) (93% - 98%)  Wt(kg): --            I&O's Summary     @ 07:01  -   @ 07:00  --------------------------------------------------------  IN: 4598.4 mL / OUT: 5150 mL / NET: -551.6 mL     @ 07:01  -   @ 12:31  --------------------------------------------------------  IN: 209.5 mL / OUT: 0 mL / NET: 209.5 mL      PHYSICAL EXAM  General: NAD  CNS: Somnolent, difficult to arouse. On Precedex  HEENT: PERRLA; conjunctive and sclera clear. Ecchymosis/wound noted on R eyebrow  Resp: CTA B/L, no wheezes, rales or rhonchi  CVS: RRR, +S1/S2, no m/r/g  Abd: Soft, nontender, nondistended, +BS in all 4 quadrants, no palpable masses  Ext: No LE edema, cyanosis  Skin: Squires in place with hematuria (2/2 trauma)    MEDICATIONS  piperacillin/tazobactam IVPB.. IV Intermittent          chlorproMAZINE    Injectable IntraMuscular PRN  dexMEDEtomidine Infusion IV Continuous  LORazepam   Injectable IV Push PRN  LORazepam   Injectable IV Push        pantoprazole  Injectable IV Push      folic acid Injectable IV Push  lactated ringers. IV Continuous  thiamine IVPB IV Intermittent      chlorhexidine 2% Cloths Topical                            11.5   2.50  )-----------( 35       ( 18 May 2023 06:19 )             33.3       05-18    132<L>  |  102  |  7   ----------------------------<  139<H>  3.5   |  27  |  0.72    Ca    7.3<L>      18 May 2023 06:19  Phos  2.6     05-18  Mg     2.4     05-18    TPro  5.2<L>  /  Alb  2.1<L>  /  TBili  11.4<H>  /  DBili  8.6<H>  /  AST  365<H>  /  ALT  228<H>  /  AlkPhos  216<H>  05-18    Lactate 0.7           05-17 @ 23:21    Lactate 2.8           05-17 @ 18:30      CARDIAC MARKERS ( 17 May 2023 18:30 )  x     / x     / 2225 U/L / x     / x      CARDIAC MARKERS ( 17 May 2023 11:00 )  x     / x     / 2989 U/L / x     / x          PT/INR - ( 18 May 2023 06:19 )   PT: 13.5 sec;   INR: 1.15 ratio         PTT - ( 18 May 2023 06:19 )  PTT:32.1 sec  Urinalysis Basic - ( 17 May 2023 15:50 )    Color: Yellow / Appearance: Clear / S.010 / pH: x  Gluc: x / Ketone: Negative  / Bili: Negative / Urobili: Negative   Blood: x / Protein: Negative / Nitrite: Negative   Leuk Esterase: Trace / RBC: >50 /HPF / WBC 3-5   Sq Epi: x / Non Sq Epi: x / Bacteria: Occasional      .Blood Blood   Growth in aerobic bottle: Gram Negative Rods  Growth in anaerobic bottle: Gram Negative Rods   Growth in aerobic bottle: Gram Negative Rods  Growth in anaerobic bottle: Gram Negative Rods  @ 08:45  .Blood Blood   Growth in anaerobic bottle: Gram Negative Rods  Growth in aerobic bottle: Gram Negative Rods  ***Blood Panel PCR results on this specimen are available  approximately 3 hours after the Gram stain result.***  Gram stain, PCR, and/or culture results may not always  correspond due to difference in methodologies.  ************************************************************  This PCR assay was performed by multiplex PCR. This  Assay tests for 66 bacterial and resistance gene targets.  Please refer to the [facility] test directory  at [email or web address] for details.   Growth in anaerobic bottle: Gram Negative Rods  Growth in aerobic bottle: Gram Negative Rods  @ 08:30      CENTRAL LINE: N  SQUIRES: Y  A-LINE: N    GLOBAL ISSUE/BEST PRACTICE  Analgesia: N  Sedation: Precedex  HOB elevation: yes  Stress ulcer prophylaxis: Protonix  VTE prophylaxis: SCDs  Glycemic control: N  Nutrition: N    CODE STATUS: Full Code

## 2023-05-18 NOTE — PROGRESS NOTE ADULT - ASSESSMENT
38M PMH of alcohol abuse, alcohol withdrawal seizures, fatty liver presents with signs of alcohol abuse/withdrawal. Admitted for AMS , alcohol withdrawal.   transaminitis ,Alcoholic hepatitis & Hyponatremia. Low K, Low Ca, Low Mag

## 2023-05-18 NOTE — PROGRESS NOTE ADULT - SUBJECTIVE AND OBJECTIVE BOX
[facility]  Juliocesar Godfrey PA-C  [address] [place] [address]  [phone]      INTERVAL HPI/OVERNIGHT EVENTS:  Pt s/e in ICU  Pt was somnolent; discussed with family member at bedside using  ID #[id number]  Reports no new GI events    MEDICATIONS  (STANDING):  chlorhexidine 4% Liquid 1 Application(s) Topical <User Schedule>  dexMEDEtomidine Infusion 0.1 MICROgram(s)/kG/Hr (2.38 mL/Hr) IV Continuous <Continuous>  folic acid Injectable 1 milliGRAM(s) IV Push daily  lactated ringers. 1000 milliLiter(s) (150 mL/Hr) IV Continuous <Continuous>  LORazepam   Injectable 2 milliGRAM(s) IV Push every 4 hours  pantoprazole  Injectable 40 milliGRAM(s) IV Push daily  piperacillin/tazobactam IVPB.. 3.375 Gram(s) IV Intermittent every 8 hours  thiamine IVPB 500 milliGRAM(s) IV Intermittent every 8 hours    MEDICATIONS  (PRN):  chlorproMAZINE    Injectable 25 milliGRAM(s) IntraMuscular every 8 hours PRN Hiccups  LORazepam   Injectable 2 milliGRAM(s) IV Push every 1 hour PRN Agitation      Allergies    No Known Allergies    PHYSICAL EXAM:   Vital Signs:  Vital Signs Last 24 Hrs  T(C): 36.5 (18 May 2023 07:55), Max: 38.3 (17 May 2023 23:30)  T(F): 97.7 (18 May 2023 07:55), Max: 100.9 (17 May 2023 23:30)  HR: 71 (18 May 2023 09:30) (70 - 139)  BP: 108/68 (18 May 2023 09:30) (81/53 - 142/84)  BP(mean): 82 (18 May 2023 09:30) (63 - 107)  RR: 19 (18 May 2023 09:30) (16 - 57)  SpO2: 96% (18 May 2023 09:30) (93% - 98%)    Parameters below as of 18 May 2023 08:00  Patient On (Oxygen Delivery Method): nasal cannula  O2 Flow (L/min): 2    Daily Height in cm: 170.18 (17 May 2023 13:10)    Daily Weight in k (18 May 2023 06:00)    GENERAL:  Appears stated age  HEENT:  NC/AT  CHEST:  Full & symmetric excursion  HEART:  Regular rhythm  ABDOMEN:  Soft, non-tender, non-distended  EXTEREMITIES:  no cyanosis  SKIN:  No rash  NEURO:  Somnolent      LABS:                        11.5   2.50  )-----------( 35       ( 18 May 2023 06:19 )             33.3     05-18    132<L>  |  102  |  7   ----------------------------<  139<H>  3.5   |  27  |  0.72    Ca    7.3<L>      18 May 2023 06:19  Phos  2.6     05-18  Mg     2.4     0518    TPro  5.2<L>  /  Alb  2.1<L>  /  TBili  11.4<H>  /  DBili  8.6<H>  /  AST  365<H>  /  ALT  228<H>  /  AlkPhos  216<H>  05-18    PT/INR - ( 18 May 2023 06:19 )   PT: 13.5 sec;   INR: 1.15 ratio         PTT - ( 18 May 2023 06:19 )  PTT:32.1 sec  Urinalysis Basic - ( 17 May 2023 15:50 )    Color: Yellow / Appearance: Clear / S.010 / pH: x  Gluc: x / Ketone: Negative  / Bili: Negative / Urobili: Negative   Blood: x / Protein: Negative / Nitrite: Negative   Leuk Esterase: Trace / RBC: >50 /HPF / WBC 3-5   Sq Epi: x / Non Sq Epi: x / Bacteria: Occasional

## 2023-05-18 NOTE — PROGRESS NOTE ADULT - PROBLEM SELECTOR PLAN 8
Hyponatremia improving, likely 2/2 ETOH abuse and hepatic disease  - s/p 3L NS in ED  - c/w LR IVF  - Daily CMPs  - replete lytes as needed

## 2023-05-18 NOTE — PROGRESS NOTE ADULT - PROBLEM SELECTOR PLAN 2
Tbili 9.6, , , , Lipase 432 on admission w/ known hx of alcohol abuse   - Bili rising, 9.2 -> 11.4. Indirect 2.8, Direct 8.6  - CT Abd/Pelv and RUQ U/S showing steatohepatitis without evidence of biliary obstruction or gallstones   - RUQ U/S Visualization of the gallbladder is limited by overlying bowel gas. No gross gallstones, gallbladder wall thickening, or pericholecystic fluid. No ultrasonic Grayson's sign   - Likely 2/2 alcohol abuse in the setting of acute intoxication   - Avoid hepatotoxic agents   - Daily CMP  -Elevated serum Lipase-? Alcoholic pancreatitis   - GI Dr. Leach Consulted - recs appreciated  - ICU (audrey) Consulted, recs appreciated  - management as per ICU

## 2023-05-18 NOTE — DIETITIAN INITIAL EVALUATION ADULT - PERTINENT LABORATORY DATA
05-18    132<L>  |  102  |  7   ----------------------------<  139<H>  3.5   |  27  |  0.72    Ca    7.3<L>      18 May 2023 06:19  Phos  2.4     05-17  Mg     2.2     05-17    TPro  5.2<L>  /  Alb  2.1<L>  /  TBili  11.4<H>  /  DBili  x   /  AST  365<H>  /  ALT  228<H>  /  AlkPhos  216<H>  05-18

## 2023-05-19 LAB
-  AMIKACIN: SIGNIFICANT CHANGE UP
-  AMPICILLIN/SULBACTAM: SIGNIFICANT CHANGE UP
-  AMPICILLIN: SIGNIFICANT CHANGE UP
-  AZTREONAM: SIGNIFICANT CHANGE UP
-  CEFAZOLIN: SIGNIFICANT CHANGE UP
-  CEFEPIME: SIGNIFICANT CHANGE UP
-  CEFOXITIN: SIGNIFICANT CHANGE UP
-  CEFTRIAXONE: SIGNIFICANT CHANGE UP
-  CIPROFLOXACIN: SIGNIFICANT CHANGE UP
-  ERTAPENEM: SIGNIFICANT CHANGE UP
-  GENTAMICIN: SIGNIFICANT CHANGE UP
-  IMIPENEM: SIGNIFICANT CHANGE UP
-  LEVOFLOXACIN: SIGNIFICANT CHANGE UP
-  MEROPENEM: SIGNIFICANT CHANGE UP
-  PIPERACILLIN/TAZOBACTAM: SIGNIFICANT CHANGE UP
-  TOBRAMYCIN: SIGNIFICANT CHANGE UP
-  TRIMETHOPRIM/SULFAMETHOXAZOLE: SIGNIFICANT CHANGE UP
ALBUMIN SERPL ELPH-MCNC: 1.8 G/DL — LOW (ref 3.3–5)
ALP SERPL-CCNC: 198 U/L — HIGH (ref 40–120)
ALT FLD-CCNC: 261 U/L — HIGH (ref 12–78)
ANION GAP SERPL CALC-SCNC: 4 MMOL/L — LOW (ref 5–17)
AST SERPL-CCNC: 584 U/L — HIGH (ref 15–37)
BILIRUB DIRECT SERPL-MCNC: 13.9 MG/DL — HIGH (ref 0–0.3)
BILIRUB INDIRECT FLD-MCNC: 2.7 MG/DL — HIGH (ref 0.2–1)
BILIRUB SERPL-MCNC: 16.6 MG/DL — CRITICAL HIGH (ref 0.2–1.2)
BILIRUB SERPL-MCNC: 16.6 MG/DL — CRITICAL HIGH (ref 0.2–1.2)
BUN SERPL-MCNC: 5 MG/DL — LOW (ref 7–23)
CALCIUM SERPL-MCNC: 7.5 MG/DL — LOW (ref 8.5–10.1)
CHLORIDE SERPL-SCNC: 104 MMOL/L — SIGNIFICANT CHANGE UP (ref 96–108)
CK SERPL-CCNC: 547 U/L — HIGH (ref 26–308)
CO2 SERPL-SCNC: 29 MMOL/L — SIGNIFICANT CHANGE UP (ref 22–31)
CREAT SERPL-MCNC: 0.77 MG/DL — SIGNIFICANT CHANGE UP (ref 0.5–1.3)
CULTURE RESULTS: SIGNIFICANT CHANGE UP
CULTURE RESULTS: SIGNIFICANT CHANGE UP
EGFR: 118 ML/MIN/1.73M2 — SIGNIFICANT CHANGE UP
GLUCOSE SERPL-MCNC: 114 MG/DL — HIGH (ref 70–99)
HCT VFR BLD CALC: 33.2 % — LOW (ref 39–50)
HGB BLD-MCNC: 11.1 G/DL — LOW (ref 13–17)
INR BLD: 1.14 RATIO — SIGNIFICANT CHANGE UP (ref 0.88–1.16)
LIDOCAIN IGE QN: 73 U/L — SIGNIFICANT CHANGE UP (ref 73–393)
MAGNESIUM SERPL-MCNC: 2 MG/DL — SIGNIFICANT CHANGE UP (ref 1.6–2.6)
MCHC RBC-ENTMCNC: 26.9 PG — LOW (ref 27–34)
MCHC RBC-ENTMCNC: 33.4 GM/DL — SIGNIFICANT CHANGE UP (ref 32–36)
MCV RBC AUTO: 80.6 FL — SIGNIFICANT CHANGE UP (ref 80–100)
METHOD TYPE: SIGNIFICANT CHANGE UP
NRBC # BLD: 0 /100 WBCS — SIGNIFICANT CHANGE UP (ref 0–0)
ORGANISM # SPEC MICROSCOPIC CNT: SIGNIFICANT CHANGE UP
PHOSPHATE SERPL-MCNC: 3.8 MG/DL — SIGNIFICANT CHANGE UP (ref 2.5–4.5)
PLATELET # BLD AUTO: 36 K/UL — LOW (ref 150–400)
POTASSIUM SERPL-MCNC: 3.6 MMOL/L — SIGNIFICANT CHANGE UP (ref 3.5–5.3)
POTASSIUM SERPL-SCNC: 3.6 MMOL/L — SIGNIFICANT CHANGE UP (ref 3.5–5.3)
PROT SERPL-MCNC: 5.1 G/DL — LOW (ref 6–8.3)
PROTHROM AB SERPL-ACNC: 13.4 SEC — SIGNIFICANT CHANGE UP (ref 10.5–13.4)
RBC # BLD: 4.12 M/UL — LOW (ref 4.2–5.8)
RBC # FLD: 15.4 % — HIGH (ref 10.3–14.5)
SODIUM SERPL-SCNC: 137 MMOL/L — SIGNIFICANT CHANGE UP (ref 135–145)
SPECIMEN SOURCE: SIGNIFICANT CHANGE UP
SPECIMEN SOURCE: SIGNIFICANT CHANGE UP
WBC # BLD: 9.16 K/UL — SIGNIFICANT CHANGE UP (ref 3.8–10.5)
WBC # FLD AUTO: 9.16 K/UL — SIGNIFICANT CHANGE UP (ref 3.8–10.5)

## 2023-05-19 PROCEDURE — 99291 CRITICAL CARE FIRST HOUR: CPT | Mod: GC

## 2023-05-19 RX ORDER — SODIUM CHLORIDE 9 MG/ML
1000 INJECTION, SOLUTION INTRAVENOUS
Refills: 0 | Status: DISCONTINUED | OUTPATIENT
Start: 2023-05-19 | End: 2023-05-22

## 2023-05-19 RX ADMIN — Medication 105 MILLIGRAM(S): at 21:03

## 2023-05-19 RX ADMIN — Medication 105 MILLIGRAM(S): at 14:25

## 2023-05-19 RX ADMIN — CHLORHEXIDINE GLUCONATE 1 APPLICATION(S): 213 SOLUTION TOPICAL at 06:22

## 2023-05-19 RX ADMIN — Medication 2 MILLIGRAM(S): at 18:25

## 2023-05-19 RX ADMIN — PIPERACILLIN AND TAZOBACTAM 25 GRAM(S): 4; .5 INJECTION, POWDER, LYOPHILIZED, FOR SOLUTION INTRAVENOUS at 06:22

## 2023-05-19 RX ADMIN — Medication 105 MILLIGRAM(S): at 06:21

## 2023-05-19 RX ADMIN — PIPERACILLIN AND TAZOBACTAM 25 GRAM(S): 4; .5 INJECTION, POWDER, LYOPHILIZED, FOR SOLUTION INTRAVENOUS at 14:25

## 2023-05-19 RX ADMIN — DEXMEDETOMIDINE HYDROCHLORIDE IN 0.9% SODIUM CHLORIDE 2.38 MICROGRAM(S)/KG/HR: 4 INJECTION INTRAVENOUS at 20:35

## 2023-05-19 RX ADMIN — DEXMEDETOMIDINE HYDROCHLORIDE IN 0.9% SODIUM CHLORIDE 2.38 MICROGRAM(S)/KG/HR: 4 INJECTION INTRAVENOUS at 05:40

## 2023-05-19 RX ADMIN — Medication 2 MILLIGRAM(S): at 14:25

## 2023-05-19 RX ADMIN — PIPERACILLIN AND TAZOBACTAM 25 GRAM(S): 4; .5 INJECTION, POWDER, LYOPHILIZED, FOR SOLUTION INTRAVENOUS at 21:03

## 2023-05-19 RX ADMIN — Medication 2 MILLIGRAM(S): at 21:03

## 2023-05-19 RX ADMIN — SODIUM CHLORIDE 150 MILLILITER(S): 9 INJECTION, SOLUTION INTRAVENOUS at 02:07

## 2023-05-19 RX ADMIN — PANTOPRAZOLE SODIUM 40 MILLIGRAM(S): 20 TABLET, DELAYED RELEASE ORAL at 12:40

## 2023-05-19 RX ADMIN — Medication 2 MILLIGRAM(S): at 02:29

## 2023-05-19 RX ADMIN — Medication 1 MILLIGRAM(S): at 16:33

## 2023-05-19 RX ADMIN — SODIUM CHLORIDE 75 MILLILITER(S): 9 INJECTION, SOLUTION INTRAVENOUS at 09:50

## 2023-05-19 NOTE — PROGRESS NOTE ADULT - PROBLEM SELECTOR PLAN 8
Hyponatremia improving, likely 2/2 ETOH abuse and hepatic disease  - c/w LR IVF  - Daily CMPs  - replete lytes as needed s/p unwitnessed fall with small abrasions of face  - CT head/cervical spine/ maxillofacial non-con: No acute intracranial abnormalities. Mild right periorbital subcutaneous soft tissue swelling. No evidence of orbital or other facial fracture. No vertebral fracture, collapse or subluxation.  - f/u repeat CT head noncon to evaluate right posterior fossa for SDH

## 2023-05-19 NOTE — PROGRESS NOTE ADULT - SUBJECTIVE AND OBJECTIVE BOX
Patient is a 38y old  Male who presents with a chief complaint of Hepatitis (19 May 2023 11:50)    HPI:  38M PMH of alcohol abuse, alcohol withdrawal seizures, fatty liver presents with signs of alcohol abuse/withdrawal. History obtained from wife with  over phone. Patient has been drinking for the past 16 days of unknown amount and had unwitnessed fall from the bed so pt was taken to the ED. Seen and examined pt at bedside. Unable to obtain history from patient due to somnolence in setting of recent ativan use.    In the ED    Vitals: , T 98, Spo2 95% /80     Labs: Plt 65, Na 128, K 3.0, Tbili 9.6, , , , Lactate 4.0, Lipase 432, UA + for bacteria and leuk esterase, Blood alc level 423     Imaging  RUQ U/S Visualization of the gallbladder is limited by overlying bowel gas. No   gross gallstones, gallbladder wall thickening, or pericholecystic fluid. No ultrasonic Grayson's sign epatomegaly and hepatic steatosis.  CT A/P- steatosis and hepatomegaly. No hydronephrosis. 6.3 mm nonobstructing stone lower pole left kidney.  CT head/cervical spine/ maxillofacial non-con: No acute intracranial abnormalities. Mild right periorbital subcutaneous soft tissue swelling. No evidence of orbital or other facial fracture. No vertebral fracture, collapse or subluxation.    ECG: Sinus tachycardia, 115 BPM    Meds Given    dTAP x1, Zofran x 1, Morphine x1, Pepcid x1,2 NS, 5mg haldol x1, 40meq k, 10mg flexiril, zosyn x1, thiamine, folic acid,   Pt seen by ICU team, admitted to ICU for ETOH intoxication & withdrawal. (17 May 2023 09:51)    INTERVAL HPI:      OVERNIGHT EVENTS:    Home Medications:      MEDICATIONS  (STANDING):  chlorhexidine 2% Cloths 1 Application(s) Topical <User Schedule>  dexMEDEtomidine Infusion 0.1 MICROgram(s)/kG/Hr (2.38 mL/Hr) IV Continuous <Continuous>  folic acid Injectable 1 milliGRAM(s) IV Push daily  lactated ringers. 1000 milliLiter(s) (75 mL/Hr) IV Continuous <Continuous>  LORazepam   Injectable 2 milliGRAM(s) IV Push every 4 hours  pantoprazole  Injectable 40 milliGRAM(s) IV Push daily  piperacillin/tazobactam IVPB.. 3.375 Gram(s) IV Intermittent every 8 hours  thiamine IVPB 500 milliGRAM(s) IV Intermittent every 8 hours    MEDICATIONS  (PRN):  chlorproMAZINE    Injectable 25 milliGRAM(s) IntraMuscular every 8 hours PRN Hiccups  LORazepam   Injectable 2 milliGRAM(s) IV Push every 1 hour PRN Agitation      Allergies    No Known Allergies    Intolerances        Social History:  Tobacco: denies  EtOH: chronic heavy drinker  Recreational drug use: denies  Lives with: patient lives with wife  Ambulates: independent  ADLs: independent (17 May 2023 09:51)      REVIEW OF SYSTEMS:  CONSTITUTIONAL: No fever, No chills, No fatigue, No myalgia, No Body ache, No Weakness  EYES: No eye pain,  No visual disturbances, No discharge, NO Redness  ENMT:  No ear pain, No nose bleed, No vertigo; No sinus pain, NO throat pain, No Congestion  NECK: No pain, No stiffness  RESPIRATORY: No cough, NO wheezing, No  hemoptysis, NO  shortness of breath  CARDIOVASCULAR: No chest pain, palpitations  GASTROINTESTINAL: No abdominal pain, NO epigastric pain. No nausea, No vomiting; No diarrhea, No constipation. [  ] BM  GENITOURINARY: No dysuria, No frequency, No urgency, No hematuria, NO incontinence  NEUROLOGICAL: No headaches, No dizziness, No numbness, No tingling, No tremors, No weakness  EXT: No Swelling, No Pain, No Edema  SKIN:  [  ] No itching, burning, rashes, or lesions   MUSCULOSKELETAL: No joint pain ,No Jt swelling; No muscle pain, No back pain, No extremity pain  PSYCHIATRIC: No depression,  No anxiety,  No mood swings ,No difficulty sleeping at night  PAIN SCALE: [  ] None  [  ] Other-  ROS Unable to obtain due to - [  ] Dementia  [  ] Lethargy [  ] Drowsy [  ] Sedated [  ] non verbal  REST OF REVIEW Of SYSTEM - [  ] Normal     Vital Signs Last 24 Hrs  T(C): 36.1 (19 May 2023 11:43), Max: 40 (18 May 2023 16:22)  T(F): 97 (19 May 2023 11:43), Max: 104 (18 May 2023 16:22)  HR: 74 (19 May 2023 12:00) (69 - 144)  BP: 98/63 (19 May 2023 12:00) (91/51 - 136/82)  BP(mean): 74 (19 May 2023 12:00) (65 - 104)  RR: 15 (19 May 2023 12:00) (15 - 37)  SpO2: 98% (19 May 2023 12:00) (91% - 100%)    Parameters below as of 19 May 2023 07:30  Patient On (Oxygen Delivery Method): nasal cannula  O2 Flow (L/min): 2    Finger Stick         @ 07:  -   @ 07:00  --------------------------------------------------------  IN: 4529.9 mL / OUT: 3375 mL / NET: 1154.9 mL     @ 07:01  -   @ 12:44  --------------------------------------------------------  IN: 580.8 mL / OUT: 360 mL / NET: 220.8 mL        PHYSICAL EXAM:  GENERAL:  [  ] NAD , [  ] well appearing, [  ] Agitated, [  ] Drowsy,  [  ] Lethargy, [  ] confused   HEAD:  [  ] Normal, [  ] Other  EYES:  [  ] EOMI, [  ] PERRLA, [  ] conjunctiva and sclera clear normal, [  ] Other,  [  ] Pallor,[  ] Discharge  ENMT:  [  ] Normal, [  ] Moist mucous membranes, [  ] Good dentition, [  ] No Thrush  NECK:  [  ] Supple, [  ] No JVD, [  ] Normal thyroid, [  ] Lymphadenopathy [  ] Other  CHEST/LUNG:  [  ] Clear to auscultation bilaterally, [  ] Breath Sounds equal B/L / Decrease, [  ] poor effort  [  ] No rales, [  ] No rhonchi  [  ]  No wheezing,   HEART:  [  ] Regular rate and rhythm, [  ] tachycardia, [  ] Bradycardia,  [  ] irregular  [  ] No murmurs, No rubs, No gallops, [  ] PPM in place (Mfr:  )  ABDOMEN:  [  ] Soft, [  ] Nontender, [  ] Nondistended, [  ] No mass, [  ] Bowel sounds present, [  ] obese  NERVOUS SYSTEM:  [  ] Alert & Oriented X3, [  ] Nonfocal  [  ] Confusion  [  ] Encephalopathic [  ] Sedated [  ] Unable to assess, [  ] Dementia [  ] Other-  EXTREMITIES: [  ] 2+ Peripheral Pulses, No clubbing, No cyanosis,  [  ] edema B/L lower EXT. [  ] PVD stasis skin changes B/L Lower EXT, [  ] wound  LYMPH: No lymphadenopathy noted  SKIN:  [  ] No rashes or lesions, [  ] Pressure Ulcers, [  ] ecchymosis, [  ] Skin Tears, [  ] Other    DIET: Diet, NPO:   Except Medications     Special Instructions for Nursing:  Except Medications (23 @ 10:43)      LABS:                        11.1   9.16  )-----------( 36       ( 19 May 2023 07:00 )             33.2     19 May 2023 07:00    137    |  104    |  5      ----------------------------<  114    3.6     |  29     |  0.77     Ca    7.5        19 May 2023 07:00  Phos  3.8       19 May 2023 07:00  Mg     2.0       19 May 2023 07:00    TPro  5.1    /  Alb  1.8    /  TBili  16.6   /  DBili  13.9   /  AST  584    /  ALT  261    /  AlkPhos  198    19 May 2023 07:00    PT/INR - ( 19 May 2023 10:00 )   PT: 13.4 sec;   INR: 1.14 ratio         PTT - ( 18 May 2023 06:19 )  PTT:32.1 sec  Urinalysis Basic - ( 17 May 2023 15:50 )    Color: Yellow / Appearance: Clear / S.010 / pH: x  Gluc: x / Ketone: Negative  / Bili: Negative / Urobili: Negative   Blood: x / Protein: Negative / Nitrite: Negative   Leuk Esterase: Trace / RBC: >50 /HPF / WBC 3-5   Sq Epi: x / Non Sq Epi: x / Bacteria: Occasional        Culture Results:   Growth in aerobic and anaerobic bottles: Escherichia coli  See previous culture [id number] ( @ 08:45)  Culture Results:   Growth in aerobic and anaerobic bottles: Escherichia coli  ***Blood Panel PCR results on this specimen are available  approximately 3 hours after the Gram stain result.***  Gram stain, PCR, and/or culture results may not always  correspond due to difference in methodologies.  ************************************************************  This PCR assay was performed by multiplex PCR. This  Assay tests for 66 bacterial and resistance gene targets.  Please refer to the [facility] test directory  at [email or web address] for details. ( @ 08:30)          CARDIAC MARKERS ( 19 May 2023 07:00 )  x     / x     / 547 U/L / x     / x      CARDIAC MARKERS ( 17 May 2023 18:30 )  x     / x     / 2225 U/L / x     / x      CARDIAC MARKERS ( 17 May 2023 11:00 )  x     / x     / 2989 U/L / x     / x              Culture - Blood (collected 17 May 2023 08:45)  Source: .Blood Blood  Gram Stain (18 May 2023 01:18):    Growth in aerobic bottle: Gram Negative Rods    Growth in anaerobic bottle: Gram Negative Rods  Final Report (19 May 2023 11:31):    Growth in aerobic and anaerobic bottles: Escherichia coli    See previous culture [id number]    Culture - Blood (collected 17 May 2023 08:30)  Source: .Blood Blood  Gram Stain (18 May 2023 03:36):    Growth in anaerobic bottle: Gram Negative Rods    Growth in aerobic bottle: Gram Negative Rods  Final Report (19 May 2023 11:30):    Growth in aerobic and anaerobic bottles: Escherichia coli    ***Blood Panel PCR results on this specimen are available    approximately 3 hours after the Gram stain result.***    Gram stain, PCR, and/or culture results may not always    correspond due to difference in methodologies.    ************************************************************    This PCR assay was performed by multiplex PCR. This    Assay tests for 66 bacterial and resistance gene targets.    Please refer to the [facility] test directory    at [email or web address] for details.  Organism: Blood Culture PCR  Escherichia coli (19 May 2023 11:30)  Organism: Escherichia coli (19 May 2023 11:30)  Organism: Blood Culture PCR (19 May 2023 11:30)         Anemia Panel:      Thyroid Panel:        Lipase, Serum: 73 U/L (23 @ 07:00)  Lipase, Serum: 432 U/L (23 @ 02:25)          RADIOLOGY & ADDITIONAL TESTS:      HEALTH ISSUES - PROBLEM Dx:  Transaminitis    Need for prophylactic measure    Hyponatremia    Urinary retention    Fall    Hypokalemia    Hypocalcemia    Thrombocytopenia    Alcohol dependence with withdrawal    AMS (altered mental status)    Rhabdomyolysis    Bacteremia, escherichia coli            Consultant(s) Notes Reviewed:  [  ] YES     Care Discussed with [X] Consultants  [  ] Patient  [  ] Family [  ] HCP [  ]   [  ] Social Service  [  ] RN, [  ] Physical Therapy,[  ] Palliative care team  DVT PPX: [  ] Lovenox, [  ] S C Heparin, [  ] Coumadin, [  ] Xarelto, [  ] Eliquis, [  ] Pradaxa, [  ] IV Heparin drip, [  ] SCD [  ] Contraindication 2 to GI Bleed,[  ] Ambulation [  ] Contraindicated 2 to  bleed [  ] Contraindicated 2 to Brain Bleed  Advanced directive: [  ] None, [  ] DNR/DNI Patient is a 38y old  Male who presents with a chief complaint of Hepatitis (19 May 2023 11:50)    HPI:  38M PMH of alcohol abuse, alcohol withdrawal seizures, fatty liver presents with signs of alcohol abuse/withdrawal. History obtained from wife with  over phone. Patient has been drinking for the past 16 days of unknown amount and had unwitnessed fall from the bed so pt was taken to the ED. Seen and examined pt at bedside. Unable to obtain history from patient due to somnolence in setting of recent ativan use.    In the ED    Vitals: , T 98, Spo2 95% /80     Labs: Plt 65, Na 128, K 3.0, Tbili 9.6, , , , Lactate 4.0, Lipase 432, UA + for bacteria and leuk esterase, Blood alc level 423     Imaging  RUQ U/S Visualization of the gallbladder is limited by overlying bowel gas. No   gross gallstones, gallbladder wall thickening, or pericholecystic fluid. No ultrasonic Grayson's sign epatomegaly and hepatic steatosis.  CT A/P- steatosis and hepatomegaly. No hydronephrosis. 6.3 mm nonobstructing stone lower pole left kidney.  CT head/cervical spine/ maxillofacial non-con: No acute intracranial abnormalities. Mild right periorbital subcutaneous soft tissue swelling. No evidence of orbital or other facial fracture. No vertebral fracture, collapse or subluxation.    ECG: Sinus tachycardia, 115 BPM    Meds Given    dTAP x1, Zofran x 1, Morphine x1, Pepcid x1,2 NS, 5mg haldol x1, 40meq k, 10mg flexiril, zosyn x1, thiamine, folic acid,   Pt seen by ICU team, admitted to ICU for ETOH intoxication & withdrawal. (17 May 2023 09:51)    INTERVAL HPI:   - Seen and examined patient. Pt was lethargic so unable to obtain ROS. On Precedex drip, Cooling [place]    - Seen and examined. Patient is lethargic on precedex. Found to be bacteremic now on abx. Bilirubin uptrending. No acute distress.          Home Medications:      MEDICATIONS  (STANDING):  chlorhexidine 2% Cloths 1 Application(s) Topical <User Schedule>  dexMEDEtomidine Infusion 0.1 MICROgram(s)/kG/Hr (2.38 mL/Hr) IV Continuous <Continuous>  folic acid Injectable 1 milliGRAM(s) IV Push daily  lactated ringers. 1000 milliLiter(s) (75 mL/Hr) IV Continuous <Continuous>  LORazepam   Injectable 2 milliGRAM(s) IV Push every 4 hours  pantoprazole  Injectable 40 milliGRAM(s) IV Push daily  piperacillin/tazobactam IVPB.. 3.375 Gram(s) IV Intermittent every 8 hours  thiamine IVPB 500 milliGRAM(s) IV Intermittent every 8 hours    MEDICATIONS  (PRN):  chlorproMAZINE    Injectable 25 milliGRAM(s) IntraMuscular every 8 hours PRN Hiccups  LORazepam   Injectable 2 milliGRAM(s) IV Push every 1 hour PRN Agitation      Allergies    No Known Allergies    Intolerances        Social History:  Tobacco: denies  EtOH: chronic heavy drinker  Recreational drug use: denies  Lives with: patient lives with wife  Ambulates: independent  ADLs: independent (17 May 2023 09:51)      REVIEW OF SYSTEMS: unable to obtain 2/2 current medical condition    Vital Signs Last 24 Hrs  T(C): 36.1 (19 May 2023 11:43), Max: 40 (18 May 2023 16:22)  T(F): 97 (19 May 2023 11:43), Max: 104 (18 May 2023 16:22)  HR: 74 (19 May 2023 12:00) (69 - 144)  BP: 98/63 (19 May 2023 12:00) (91/51 - 136/82)  BP(mean): 74 (19 May 2023 12:00) (65 - 104)  RR: 15 (19 May 2023 12:00) (15 - 37)  SpO2: 98% (19 May 2023 12:00) (91% - 100%)    Parameters below as of 19 May 2023 07:30  Patient On (Oxygen Delivery Method): nasal cannula  O2 Flow (L/min): 2    Finger Stick        - @ 07:01  -   @ 07:00  --------------------------------------------------------  IN: 4529.9 mL / OUT: 3375 mL / NET: 1154.9 mL     @ 07: @ 12:44  --------------------------------------------------------  IN: 580.8 mL / OUT: 360 mL / NET: 220.8 mL        PHYSICAL EXAM: on Precedex  GENERAL:  [ x ] NAD , [  ] well appearing, [  ] Agitated, [  ] Drowsy,  [ x ] lethargy, [  ] confused   HEAD:  [ x ] Normal, [  ] Other  EYES:  [ x ] Ecchymosis/wound noted on R eyebrow [ x ] EOMI, [  x] PERRLA, [ x ] scleral icterus, [  ] Other,  [  ] Pallor,[  ] Discharge  ENMT:  [ x ] Normal, [ x ] Moist mucous membranes, [ x ] Good dentition, [x  ] No Thrush  NECK:  [ x ] Supple, [ x ] No JVD, [ x ] Normal thyroid, [  ] Lymphadenopathy [  ] Other  CHEST/LUNG:  [ x ] Clear to auscultation bilaterally, [ x ] Breath Sounds equal B/L, [  ] poor effort  [ x ] No rales, [ x ] No rhonchi  [ x ]  No wheezing,   HEART:  [ x ] Regular rate and rhythm, [  ] tachycardia, [  ] Bradycardia,  [  ] irregular  [x  ] No murmurs, No rubs, No gallops, [  ] PPM in place (Mfr:  )  ABDOMEN:  [ x ] Soft, [x  ] Nontender, [ x ] Nondistended, [ x ] No mass, [  x] Bowel sounds present, [  ] obese  NERVOUS SYSTEM:  [ x ] Alert & Oriented X0, [ x ] Somnolent, difficult to arouse  [  ] Nonfocal  [  ] Confusion  [ x ] Encephalopathic [  ] Sedated [  ] Unable to assess, [  ] Dementia [  ] Other-  EXTREMITIES: [ x ] 2+ Peripheral Pulses, No clubbing, No cyanosis,  [  ] edema B/L lower EXT. [  ] PVD stasis skin changes B/L Lower EXT, [  ] wound  LYMPH: No lymphadenopathy noted  SKIN: [ x ] Heredia in place [ x ] No rashes or lesions, [  ] Pressure Ulcers, [ x ] ecchymosis,- Rt eye brow  [  ] Skin Tears, [  ] Other    DIET: Diet, NPO:   Except Medications     Special Instructions for Nursing:  Except Medications (23 @ 10:43)      LABS:                        11.1   9.16  )-----------( 36       ( 19 May 2023 07:00 )             33.2     19 May 2023 07:00    137    |  104    |  5      ----------------------------<  114    3.6     |  29     |  0.77     Ca    7.5        19 May 2023 07:00  Phos  3.8       19 May 2023 07:00  Mg     2.0       19 May 2023 07:00    TPro  5.1    /  Alb  1.8    /  TBili  16.6   /  DBili  13.9   /  AST  584    /  ALT  261    /  AlkPhos  198    19 May 2023 07:00    PT/INR - ( 19 May 2023 10:00 )   PT: 13.4 sec;   INR: 1.14 ratio         PTT - ( 18 May 2023 06:19 )  PTT:32.1 sec  Urinalysis Basic - ( 17 May 2023 15:50 )    Color: Yellow / Appearance: Clear / S.010 / pH: x  Gluc: x / Ketone: Negative  / Bili: Negative / Urobili: Negative   Blood: x / Protein: Negative / Nitrite: Negative   Leuk Esterase: Trace / RBC: >50 /HPF / WBC 3-5   Sq Epi: x / Non Sq Epi: x / Bacteria: Occasional        Culture Results:   Growth in aerobic and anaerobic bottles: Escherichia coli  See previous culture [id number] ( @ 08:45)  Culture Results:   Growth in aerobic and anaerobic bottles: Escherichia coli  ***Blood Panel PCR results on this specimen are available  approximately 3 hours after the Gram stain result.***  Gram stain, PCR, and/or culture results may not always  correspond due to difference in methodologies.  ************************************************************  This PCR assay was performed by multiplex PCR. This  Assay tests for 66 bacterial and resistance gene targets.  Please refer to the [facility] [employer] test directory  at [email or web address] for details. ( @ 08:30)          CARDIAC MARKERS ( 19 May 2023 07:00 )  x     / x     / 547 U/L / x     / x      CARDIAC MARKERS ( 17 May 2023 18:30 )  x     / x     / 2225 U/L / x     / x      CARDIAC MARKERS ( 17 May 2023 11:00 )  x     / x     / 2989 U/L / x     / x              Culture - Blood (collected 17 May 2023 08:45)  Source: .Blood Blood  Gram Stain (18 May 2023 01:18):    Growth in aerobic bottle: Gram Negative Rods    Growth in anaerobic bottle: Gram Negative Rods  Final Report (19 May 2023 11:31):    Growth in aerobic and anaerobic bottles: Escherichia coli    See previous culture [id number]    Culture - Blood (collected 17 May 2023 08:30)  Source: .Blood Blood  Gram Stain (18 May 2023 03:36):    Growth in anaerobic bottle: Gram Negative Rods    Growth in aerobic bottle: Gram Negative Rods  Final Report (19 May 2023 11:30):    Growth in aerobic and anaerobic bottles: Escherichia coli    ***Blood Panel PCR results on this specimen are available    approximately 3 hours after the Gram stain result.***    Gram stain, PCR, and/or culture results may not always    correspond due to difference in methodologies.    ************************************************************    This PCR assay was performed by multiplex PCR. This    Assay tests for 66 bacterial and resistance gene targets.    Please refer to the [facility] [employer] test directory    at [email or web address] for details.  Organism: Blood Culture PCR  Escherichia coli (19 May 2023 11:30)  Organism: Escherichia coli (19 May 2023 11:30)  Organism: Blood Culture PCR (19 May 2023 11:30)         Anemia Panel:      Thyroid Panel:        Lipase, Serum: 73 U/L (23 @ 07:00)  Lipase, Serum: 432 U/L (23 @ 02:25)          RADIOLOGY & ADDITIONAL TESTS:      HEALTH ISSUES - PROBLEM Dx:  Transaminitis    Need for prophylactic measure    Hyponatremia    Urinary retention    Fall    Hypokalemia    Hypocalcemia    Thrombocytopenia    Alcohol dependence with withdrawal    AMS (altered mental status)    Rhabdomyolysis    Bacteremia, escherichia coli            Consultant(s) Notes Reviewed:  [  ] YES     Care Discussed with [X] Consultants  [  ] Patient  [  ] Family [  ] HCP [  ]   [  ] Social Service  [  ] RN, [  ] Physical Therapy,[  ] Palliative care team  DVT PPX: [  ] Lovenox, [  ] S C Heparin, [  ] Coumadin, [  ] Xarelto, [  ] Eliquis, [  ] Pradaxa, [  ] IV Heparin drip, [  ] SCD [  ] Contraindication 2 to GI Bleed,[  ] Ambulation [  ] Contraindicated 2 to  bleed [  ] Contraindicated 2 to Brain Bleed  Advanced directive: [  ] None, [  ] DNR/DNI Patient is a 38y old  Male who presents with a chief complaint of Hepatitis (19 May 2023 11:50)    HPI:  38M PMH of alcohol abuse, alcohol withdrawal seizures, fatty liver presents with signs of alcohol abuse/withdrawal. History obtained from wife with  over phone. Patient has been drinking for the past 16 days of unknown amount and had unwitnessed fall from the bed so pt was taken to the ED. Seen and examined pt at bedside. Unable to obtain history from patient due to somnolence in setting of recent ativan use.    In the ED    Vitals: , T 98, Spo2 95% /80     Labs: Plt 65, Na 128, K 3.0, Tbili 9.6, , , , Lactate 4.0, Lipase 432, UA + for bacteria and leuk esterase, Blood alc level 423     Imaging  RUQ U/S Visualization of the gallbladder is limited by overlying bowel gas. No   gross gallstones, gallbladder wall thickening, or pericholecystic fluid. No ultrasonic Grayson's sign epatomegaly and hepatic steatosis.  CT A/P- steatosis and hepatomegaly. No hydronephrosis. 6.3 mm nonobstructing stone lower pole left kidney.  CT head/cervical spine/ maxillofacial non-con: No acute intracranial abnormalities. Mild right periorbital subcutaneous soft tissue swelling. No evidence of orbital or other facial fracture. No vertebral fracture, collapse or subluxation.    ECG: Sinus tachycardia, 115 BPM    Meds Given    dTAP x1, Zofran x 1, Morphine x1, Pepcid x1,2 NS, 5mg haldol x1, 40meq k, 10mg flexiril, zosyn x1, thiamine, folic acid,   Pt seen by ICU team, admitted to ICU for ETOH intoxication & withdrawal. (17 May 2023 09:51)    INTERVAL HPI:   - Seen and examined patient. Pt was lethargic so unable to obtain ROS. On Precedex drip, Cooling [place] Bacteremia, IV Abx Fever   - Seen and examined. Patient is lethargic on precedex. Found to be bacteremic now on abx. Bilirubin uptrending. No acute distress. E Coli sepsis, IV Abx           Home Medications:      MEDICATIONS  (STANDING):  chlorhexidine 2% Cloths 1 Application(s) Topical <User Schedule>  dexMEDEtomidine Infusion 0.1 MICROgram(s)/kG/Hr (2.38 mL/Hr) IV Continuous <Continuous>  folic acid Injectable 1 milliGRAM(s) IV Push daily  lactated ringers. 1000 milliLiter(s) (75 mL/Hr) IV Continuous <Continuous>  LORazepam   Injectable 2 milliGRAM(s) IV Push every 4 hours  pantoprazole  Injectable 40 milliGRAM(s) IV Push daily  piperacillin/tazobactam IVPB.. 3.375 Gram(s) IV Intermittent every 8 hours  thiamine IVPB 500 milliGRAM(s) IV Intermittent every 8 hours    MEDICATIONS  (PRN):  chlorproMAZINE    Injectable 25 milliGRAM(s) IntraMuscular every 8 hours PRN Hiccups  LORazepam   Injectable 2 milliGRAM(s) IV Push every 1 hour PRN Agitation      Allergies    No Known Allergies    Intolerances        Social History:  Tobacco: denies  EtOH: chronic heavy drinker  Recreational drug use: denies  Lives with: patient lives with wife  Ambulates: independent  ADLs: independent (17 May 2023 09:51)      REVIEW OF SYSTEMS: unable to obtain 2/2 current medical condition    Vital Signs Last 24 Hrs  T(C): 36.1 (19 May 2023 11:43), Max: 40 (18 May 2023 16:22)  T(F): 97 (19 May 2023 11:43), Max: 104 (18 May 2023 16:22)  HR: 74 (19 May 2023 12:00) (69 - 144)  BP: 98/63 (19 May 2023 12:00) (91/51 - 136/82)  BP(mean): 74 (19 May 2023 12:00) (65 - 104)  RR: 15 (19 May 2023 12:00) (15 - 37)  SpO2: 98% (19 May 2023 12:00) (91% - 100%)    Parameters below as of 19 May 2023 07:30  Patient On (Oxygen Delivery Method): nasal cannula  O2 Flow (L/min): 2    Finger Stick        -18 @ 07:01  -  - @ 07:00  --------------------------------------------------------  IN: 4529.9 mL / OUT: 3375 mL / NET: 1154.9 mL     @ 07:01  -   @ 12:44  --------------------------------------------------------  IN: 580.8 mL / OUT: 360 mL / NET: 220.8 mL        PHYSICAL EXAM: on Precedex  GENERAL:  [ x ] NAD , [  ] well appearing, [  ] Agitated, [  ] Drowsy,  [ x ] lethargy, [  ] confused   HEAD:  [ x ] Normal, [  ] Other  EYES:  [ x ] Ecchymosis/wound noted on R eyebrow [ x ] EOMI, [  x] PERRLA, [ x ] scleral icterus, [  ] Other,  [  ] Pallor,[  ] Discharge  ENMT:  [ x ] Normal, [ x ] Moist mucous membranes, [ x ] Good dentition, [x  ] No Thrush  NECK:  [ x ] Supple, [ x ] No JVD, [ x ] Normal thyroid, [  ] Lymphadenopathy [  ] Other  CHEST/LUNG:  [ x ] Clear to auscultation bilaterally, [ x ] Breath Sounds equal B/L, [  ] poor effort  [ x ] No rales, [ x ] No rhonchi  [ x ]  No wheezing,   HEART:  [ x ] Regular rate and rhythm, [  ] tachycardia, [  ] Bradycardia,  [  ] irregular  [x  ] No murmurs, No rubs, No gallops, [  ] PPM in place (Mfr:  )  ABDOMEN:  [ x ] Soft, [x  ] Nontender, [ x ] Nondistended, [ x ] No mass, [  x] Bowel sounds present, [  ] obese  NERVOUS SYSTEM:  [ x ] Alert & Oriented X0, [ x ] Somnolent, difficult to arouse  [  ] Nonfocal  [  ] Confusion  [ x ] Encephalopathic [  ] Sedated [  ] Unable to assess, [  ] Dementia [  ] Other-  EXTREMITIES: [ x ] 2+ Peripheral Pulses, No clubbing, No cyanosis,  [  ] edema B/L lower EXT. [  ] PVD stasis skin changes B/L Lower EXT, [  ] wound  LYMPH: No lymphadenopathy noted  SKIN: [ x ] Heredia in place [ x ] No rashes or lesions, [  ] Pressure Ulcers, [ x ] ecchymosis,- Rt eye brow  [  ] Skin Tears, [  ] Other    DIET: Diet, NPO:   Except Medications     Special Instructions for Nursing:  Except Medications (23 @ 10:43)      LABS:                        11.1   9.16  )-----------( 36       ( 19 May 2023 07:00 )             33.2     19 May 2023 07:00    137    |  104    |  5      ----------------------------<  114    3.6     |  29     |  0.77     Ca    7.5        19 May 2023 07:00  Phos  3.8       19 May 2023 07:00  Mg     2.0       19 May 2023 07:00    TPro  5.1    /  Alb  1.8    /  TBili  16.6   /  DBili  13.9   /  AST  584    /  ALT  261    /  AlkPhos  198    19 May 2023 07:00    PT/INR - ( 19 May 2023 10:00 )   PT: 13.4 sec;   INR: 1.14 ratio         PTT - ( 18 May 2023 06:19 )  PTT:32.1 sec  Urinalysis Basic - ( 17 May 2023 15:50 )    Color: Yellow / Appearance: Clear / S.010 / pH: x  Gluc: x / Ketone: Negative  / Bili: Negative / Urobili: Negative   Blood: x / Protein: Negative / Nitrite: Negative   Leuk Esterase: Trace / RBC: >50 /HPF / WBC 3-5   Sq Epi: x / Non Sq Epi: x / Bacteria: Occasional        Culture Results:   Growth in aerobic and anaerobic bottles: Escherichia coli  See previous culture [id number] ( @ 08:45)  Culture Results:   Growth in aerobic and anaerobic bottles: Escherichia coli  ***Blood Panel PCR results on this specimen are available  approximately 3 hours after the Gram stain result.***  Gram stain, PCR, and/or culture results may not always  correspond due to difference in methodologies.  ************************************************************  This PCR assay was performed by multiplex PCR. This  Assay tests for 66 bacterial and resistance gene targets.  Please refer to the [facility] test directory  at [email or web address] for details. ( @ 08:30)          CARDIAC MARKERS ( 19 May 2023 07:00 )  x     / x     / 547 U/L / x     / x      CARDIAC MARKERS ( 17 May 2023 18:30 )  x     / x     / 2225 U/L / x     / x      CARDIAC MARKERS ( 17 May 2023 11:00 )  x     / x     / 2989 U/L / x     / x              Culture - Blood (collected 17 May 2023 08:45)  Source: .Blood Blood  Gram Stain (18 May 2023 01:18):    Growth in aerobic bottle: Gram Negative Rods    Growth in anaerobic bottle: Gram Negative Rods  Final Report (19 May 2023 11:31):    Growth in aerobic and anaerobic bottles: Escherichia coli    See previous culture [id number]    Culture - Blood (collected 17 May 2023 08:30)  Source: .Blood Blood  Gram Stain (18 May 2023 03:36):    Growth in anaerobic bottle: Gram Negative Rods    Growth in aerobic bottle: Gram Negative Rods  Final Report (19 May 2023 11:30):    Growth in aerobic and anaerobic bottles: Escherichia coli    ***Blood Panel PCR results on this specimen are available    approximately 3 hours after the Gram stain result.***    Gram stain, PCR, and/or culture results may not always    correspond due to difference in methodologies.    ************************************************************    This PCR assay was performed by multiplex PCR. This    Assay tests for 66 bacterial and resistance gene targets.    Please refer to the [facility] [employer] test directory    at [email or web address] for details.  Organism: Blood Culture PCR  Escherichia coli (19 May 2023 11:30)  Organism: Escherichia coli (19 May 2023 11:30)  Organism: Blood Culture PCR (19 May 2023 11:30)         Anemia Panel:      Thyroid Panel:        Lipase, Serum: 73 U/L (23 @ 07:00)  Lipase, Serum: 432 U/L (23 @ 02:25)          RADIOLOGY & ADDITIONAL TESTS:      HEALTH ISSUES - PROBLEM Dx:  Transaminitis    Need for prophylactic measure    Hyponatremia    Urinary retention    Fall    Hypokalemia    Hypocalcemia    Thrombocytopenia    Alcohol dependence with withdrawal    AMS (altered mental status)    Rhabdomyolysis    Bacteremia, escherichia coli            Consultant(s) Notes Reviewed:  [  ] YES     Care Discussed with [X] Consultants  [  ] Patient  [  ] Family [  ] HCP [  ]   [  ] Social Service  [ x ] RN, [  ] Physical Therapy,[  ] Palliative care team  DVT PPX: [x  ] Lovenox, [  ] S C Heparin, [  ] Coumadin, [  ] Xarelto, [  ] Eliquis, [  ] Pradaxa, [  ] IV Heparin drip, [  ] SCD [  ] Contraindication 2 to GI Bleed,[  ] Ambulation [  ] Contraindicated 2 to  bleed [  ] Contraindicated 2 to Brain Bleed  Advanced directive: [x  ] None, [  ] DNR/DNI

## 2023-05-19 NOTE — PROGRESS NOTE ADULT - PROBLEM SELECTOR PLAN 4
-Likely metabolic Encephelopathy   s/p unwitnessed fall with small abrasions of face  - CT head/cervical spine/ maxillofacial non-con: No acute intracranial abnormalities. Mild right periorbital subcutaneous soft tissue swelling. No evidence of orbital or other facial fracture. No vertebral fracture, collapse or subluxation.  - f/u repeat CT head noncon: no acute acute signs of brain hemmorhage  - Mild hepatic encephalopathy, ammonia elevated  - continue to monitor -Likely metabolic Encephelopathy / sepsis now   s/p unwitnessed fall with small abrasions of face  - CT head/cervical spine/ maxillofacial non-con: No acute intracranial abnormalities. Mild right periorbital subcutaneous soft tissue swelling. No evidence of orbital or other facial fracture. No vertebral fracture, collapse or subluxation.  - f/u repeat CT head noncon: no acute acute signs of brain hemorrhage  - Mild hepatic encephalopathy, ammonia elevated  - continue to monitor, Rx sepsis

## 2023-05-19 NOTE — PROGRESS NOTE ADULT - PROBLEM SELECTOR PLAN 10
As per nurse patient was having abd pain which resolved after patient voided  ~600cc  - CT A/P- No hydronephrosis. 6.3 mm nonobstructing stone lower pole left kidney.  - maintain squires  - continue to monitor SCDs b/l dvt ppx given age and thrombocytopenia, now with hematuria from trauma (squires removal)

## 2023-05-19 NOTE — PROGRESS NOTE ADULT - PROBLEM SELECTOR PLAN 6
- replete as needed  - Daily CMP Rhabdo improving, CK downtrending  - likely due to fall and alcohol use  - Continue IVF

## 2023-05-19 NOTE — PROGRESS NOTE ADULT - SUBJECTIVE AND OBJECTIVE BOX
Date/Time Patient Seen:  		  Referring MD:   Data Reviewed	       Patient is a 38y old  Male who presents with a chief complaint of Hepatitis (18 May 2023 14:00)      Subjective/HPI     PAST MEDICAL & SURGICAL HISTORY:  No pertinent past medical history    Alcohol abuse    H/O hernia repair    S/P appendectomy          Medication list         MEDICATIONS  (STANDING):  chlorhexidine 2% Cloths 1 Application(s) Topical <User Schedule>  dexMEDEtomidine Infusion 0.1 MICROgram(s)/kG/Hr (2.38 mL/Hr) IV Continuous <Continuous>  folic acid Injectable 1 milliGRAM(s) IV Push daily  lactated ringers. 1000 milliLiter(s) (150 mL/Hr) IV Continuous <Continuous>  LORazepam   Injectable 2 milliGRAM(s) IV Push every 4 hours  pantoprazole  Injectable 40 milliGRAM(s) IV Push daily  piperacillin/tazobactam IVPB.. 3.375 Gram(s) IV Intermittent every 8 hours  thiamine IVPB 500 milliGRAM(s) IV Intermittent every 8 hours    MEDICATIONS  (PRN):  chlorproMAZINE    Injectable 25 milliGRAM(s) IntraMuscular every 8 hours PRN Hiccups  LORazepam   Injectable 2 milliGRAM(s) IV Push every 1 hour PRN Agitation         Vitals log        ICU Vital Signs Last 24 Hrs  T(C): 36.5 (19 May 2023 05:00), Max: 40 (18 May 2023 16:22)  T(F): 97.7 (19 May 2023 05:00), Max: 104 (18 May 2023 16:22)  HR: 70 (19 May 2023 05:00) (69 - 144)  BP: 113/79 (19 May 2023 05:00) (91/51 - 136/82)  BP(mean): 91 (19 May 2023 05:00) (65 - 104)  ABP: --  ABP(mean): --  RR: 15 (19 May 2023 05:00) (15 - 37)  SpO2: 97% (19 May 2023 05:00) (91% - 100%)    O2 Parameters below as of 18 May 2023 19:30  Patient On (Oxygen Delivery Method): nasal cannula  O2 Flow (L/min): 2               Input and Output:  I&O's Detail    17 May 2023 07:01  -  18 May 2023 07:00  --------------------------------------------------------  IN:    Dexmedetomidine: 123.4 mL    IV PiggyBack: 100 mL    IV PiggyBack: 600 mL    IV PiggyBack: 50 mL    Lactated Ringers: 625 mL    Lactated Ringers: 2100 mL    Lactated Ringers Bolus: 1000 mL  Total IN: 4598.4 mL    OUT:    Indwelling Catheter - Urethral (mL): 5150 mL  Total OUT: 5150 mL    Total NET: -551.6 mL      18 May 2023 07:01  -  19 May 2023 05:37  --------------------------------------------------------  IN:    Dexmedetomidine: 351.4 mL    IV PiggyBack: 100 mL    IV PiggyBack: 200 mL    IV PiggyBack: 250 mL    Lactated Ringers: 3300 mL  Total IN: 4201.4 mL    OUT:    Indwelling Catheter - Urethral (mL): 2375 mL  Total OUT: 2375 mL    Total NET: 1826.4 mL          Lab Data                        11.5   2.50  )-----------( 35       ( 18 May 2023 06:19 )             33.3     05-18    132<L>  |  102  |  7   ----------------------------<  139<H>  3.5   |  27  |  0.72    Ca    7.3<L>      18 May 2023 06:19  Phos  2.6     05-18  Mg     2.4     05-18    TPro  5.2<L>  /  Alb  2.1<L>  /  TBili  11.4<H>  /  DBili  8.6<H>  /  AST  365<H>  /  ALT  228<H>  /  AlkPhos  216<H>  05-18      CARDIAC MARKERS ( 17 May 2023 18:30 )  x     / x     / 2225 U/L / x     / x      CARDIAC MARKERS ( 17 May 2023 11:00 )  x     / x     / 2989 U/L / x     / x            Review of Systems	      Objective     Physical Examination    heart s1s2  lung dec BS  head nc      Pertinent Lab findings & Imaging      Giovanna:  NO   Adequate UO     I&O's Detail    17 May 2023 07:01  -  18 May 2023 07:00  --------------------------------------------------------  IN:    Dexmedetomidine: 123.4 mL    IV PiggyBack: 100 mL    IV PiggyBack: 600 mL    IV PiggyBack: 50 mL    Lactated Ringers: 625 mL    Lactated Ringers: 2100 mL    Lactated Ringers Bolus: 1000 mL  Total IN: 4598.4 mL    OUT:    Indwelling Catheter - Urethral (mL): 5150 mL  Total OUT: 5150 mL    Total NET: -551.6 mL      18 May 2023 07:01  -  19 May 2023 05:37  --------------------------------------------------------  IN:    Dexmedetomidine: 351.4 mL    IV PiggyBack: 100 mL    IV PiggyBack: 200 mL    IV PiggyBack: 250 mL    Lactated Ringers: 3300 mL  Total IN: 4201.4 mL    OUT:    Indwelling Catheter - Urethral (mL): 2375 mL  Total OUT: 2375 mL    Total NET: 1826.4 mL               Discussed with:     Cultures:	        Radiology

## 2023-05-19 NOTE — PROGRESS NOTE ADULT - SUBJECTIVE AND OBJECTIVE BOX
Patient is a 38y old  Male who presents with a chief complaint of Hepatitis (19 May 2023 10:46)    24 hour events: No acute events overnight. Patient had a fever yesterday which subsequently resolved, currently afebrile. Blood cultures pending. Remains on Precedex.    REVIEW OF SYSTEMS  Unable to obtain 2/2 lethargy    T(F): 97.7 (23 @ 05:00), Max: 104 (23 @ 16:22)  HR: 79 (23 @ 11:00) (69 - 144)  BP: 105/64 (23 @ 11:00) (91/51 - 136/82)  RR: 20 (23 @ 11:00) (15 - 37)  SpO2: 97% (23 @ 11:00) (91% - 100%)  Wt(kg): --            I&O's Summary     @ 07:01  -   @ 07:00  --------------------------------------------------------  IN: 4529.9 mL / OUT: 3375 mL / NET: 1154.9 mL      PHYSICAL EXAM  General: NAD  CNS: Somnolent, somewhat arousable. On Precedex  HEENT: PERRLA; conjunctive and sclera clear. Ecchymosis/laceration noted on R eyebrow  Resp: CTA B/L, no wheezes, rales or rhonchi  CVS: RRR, +S1/S2, no m/r/g  Abd: Soft, nontender, nondistended, +BS in all 4 quadrants, no palpable masses  Ext: No LE edema, cyanosis  Skin: Oseguera in place with hematuria (2/2 trauma)    MEDICATIONS  piperacillin/tazobactam IVPB.. IV Intermittent          chlorproMAZINE    Injectable IntraMuscular PRN  dexMEDEtomidine Infusion IV Continuous  LORazepam   Injectable IV Push PRN  LORazepam   Injectable IV Push        pantoprazole  Injectable IV Push      folic acid Injectable IV Push  lactated ringers. IV Continuous  thiamine IVPB IV Intermittent      chlorhexidine 2% Cloths Topical                            11.1   9.16  )-----------( 36       ( 19 May 2023 07:00 )             33.2       05-    137  |  104  |  5<L>  ----------------------------<  114<H>  3.6   |  29  |  0.77    Ca    7.5<L>      19 May 2023 07:00  Phos  3.8       Mg     2.0         TPro  5.1<L>  /  Alb  1.8<L>  /  TBili  16.6<HH>  /  DBili  13.9<H>  /  AST  584<H>  /  ALT  261<H>  /  AlkPhos  198<H>  0519      CARDIAC MARKERS ( 19 May 2023 07:00 )  x     / x     / 547 U/L / x     / x      CARDIAC MARKERS ( 17 May 2023 18:30 )  x     / x     / 2225 U/L / x     / x          PT/INR - ( 19 May 2023 10:00 )   PT: 13.4 sec;   INR: 1.14 ratio         PTT - ( 18 May 2023 06:19 )  PTT:32.1 sec  Urinalysis Basic - ( 17 May 2023 15:50 )    Color: Yellow / Appearance: Clear / S.010 / pH: x  Gluc: x / Ketone: Negative  / Bili: Negative / Urobili: Negative   Blood: x / Protein: Negative / Nitrite: Negative   Leuk Esterase: Trace / RBC: >50 /HPF / WBC 3-5   Sq Epi: x / Non Sq Epi: x / Bacteria: Occasional      .Blood Blood   Growth in aerobic and anaerobic bottles: Escherichia coli  See previous culture [id number]   Growth in aerobic bottle: Gram Negative Rods  Growth in anaerobic bottle: Gram Negative Rods  @ 08:45  .Blood Blood   Growth in aerobic and anaerobic bottles: Escherichia coli  ***Blood Panel PCR results on this specimen are available  approximately 3 hours after the Gram stain result.***  Gram stain, PCR, and/or culture results may not always  correspond due to difference in methodologies.  ************************************************************  This PCR assay was performed by multiplex PCR. This  Assay tests for 66 bacterial and resistance gene targets.  Please refer to the [facility] test directory  at [email or web address] for details.   Growth in anaerobic bottle: Gram Negative Rods  Growth in aerobic bottle: Gram Negative Rods  @ 08:30      CENTRAL LINE: N  OSEGUERA: Y  A-LINE: N    GLOBAL ISSUE/BEST PRACTICE  Analgesia: N  Sedation: Precedex  HOB elevation: yes  Stress ulcer prophylaxis: Protonix  VTE prophylaxis: SCDs  Glycemic control: N  Nutrition: N    CODE STATUS: Full Code

## 2023-05-19 NOTE — PROGRESS NOTE ADULT - ASSESSMENT
38M PMH of alcohol abuse, alcohol withdrawal seizures, fatty liver presented with signs of alcohol abuse/withdrawal. Patient has been drinking for the past 16 days PTA of unknown amount and had unwitnessed fall. E coli bacteremia noted and ID consultation requested.    Acute Cholangitis  E.coli Bacteremia  Transaminitis  EtOH Intoxication/Withdrawal  Fevers  - BCx +E.coli   -- susceptibilities reviewed   -- repeat pending  - Currently on zosyn  Recommendations  - can consider de-escalation to ceftriaxone/flagyl  - appreciate GI recs  - trend temps/WBC/LFTs  - additional management per ICU team    Dr. Morfin covering weekend service  Infectious Diseases will continue to follow. Please call with any questions.   Sophy Palm M.D.  [facility] Infectious [facility] [phone]

## 2023-05-19 NOTE — PROGRESS NOTE ADULT - PROBLEM SELECTOR PLAN 9
s/p unwitnessed fall with small abrasions of face  - CT head/cervical spine/ maxillofacial non-con: No acute intracranial abnormalities. Mild right periorbital subcutaneous soft tissue swelling. No evidence of orbital or other facial fracture. No vertebral fracture, collapse or subluxation.  - f/u repeat CT head noncon to evaluate right posterior fossa for SDH As per nurse patient was having abd pain which resolved after patient voided  ~600cc  - CT A/P- No hydronephrosis. 6.3 mm non obstructing stone lower pole left kidney.  - maintain squires  - continue to monitor

## 2023-05-19 NOTE — PROGRESS NOTE ADULT - ATTENDING COMMENTS
38M PMH of alcohol abuse, alcohol withdrawal seizures, fatty liver presents with signs of alcohol abuse/withdrawal. Admitted for AMS , alcohol withdrawal.   transaminitis ,Alcoholic hepatitis & Hyponatremia. Low K, Low Ca, Low Mag .  Pt  seen, Examined, case & care plan d/w , residents at detail.  Consults:  ID Dr Gant -IV Abx   GI-Dr Leach follow up  Detox -Dr Garcia follow up.  Social service eval  Care as per ICU Team.  AM labs   DVT PPx  NPO, IVF.    Total Care time is 45 minutes.

## 2023-05-19 NOTE — PROGRESS NOTE ADULT - ASSESSMENT
38 year old male with a history of alcohol abuse, alcohol withdrawal seizures, fatty liver presents with signs of alcohol abuse/withdrawal    etoh use   jennifer  harry  hepatitis  steatosis    ativan - atc  ativan prn    trend LFT  serial LABS  I and O  monitor VS and Sat  ciwa monitoring  pulse ox - tele monitoring  folic acid  thiamine

## 2023-05-19 NOTE — PROGRESS NOTE ADULT - ASSESSMENT
etoh abuse  alcoholic hepatitis    diet as tolerated  calculated DF is 20   no need for sternoids  bili noted; likely 2/2 EtOH follow up PT and INR today  check daily lfts and PT/INR  ciwa protocol  management per ICU    I reviewed the overnight course of events on the unit, re-confirming the patient history. I discussed the care with the patient and their family  Differential diagnosis and plan of care discussed with patient after the evaluation  40 minutes spent on total encounter of which more than fifty percent of the encounter was spent counseling and/or coordinating care by the attending physician.  Advanced care planning was discussed with patient and family.  Advanced care planning forms were reviewed and discussed.  Risks, benefits and alternatives of gastroenterologic procedures were discussed in detail and all questions were answered.   etoh abuse  alcoholic hepatitis    diet as tolerated  calculated DF is 20   no need for sternoids  bili noted; likely 2/2 EtOH follow up PT and INR today  check daily lfts and PT/INR  ciwa protocol  can consider ursodiol  management per ICU    I reviewed the overnight course of events on the unit, re-confirming the patient history. I discussed the care with the patient and their family  Differential diagnosis and plan of care discussed with patient after the evaluation  40 minutes spent on total encounter of which more than fifty percent of the encounter was spent counseling and/or coordinating care by the attending physician.  Advanced care planning was discussed with patient and family.  Advanced care planning forms were reviewed and discussed.  Risks, benefits and alternatives of gastroenterologic procedures were discussed in detail and all questions were answered.

## 2023-05-19 NOTE — PROGRESS NOTE ADULT - PROBLEM SELECTOR PLAN 12
SCDs b/l dvt ppx given age and thrombocytopenia, now with hematuria from trauma (squires removal)
SCDs b/l dvt ppx given age and thrombocytopenia, now with hematuria from trauma (squires removal)

## 2023-05-19 NOTE — PROGRESS NOTE ADULT - ATTENDING COMMENTS
38M with alcohol abuse and fatty liver presents s/p fall found to be in alcohol withdrawal, rhabdomyolysis, alcoholic hepatitis with course complicated by E Coli bacteremia.    Problems:  Alcohol withdrawal  Alcoholic hepatitis  Sinus tachycardia  E Coli bacteremia  hyperbilirubinemia  pancytopenia    Neuro: ETOH withdrawal with high CIWA, continue ativan 2mg q4 with ativan 2mg q1hr prn agitation. Taper off precedex as tolerated. ammonia mildly elevated but likely not contributing to mental status  Pulm: no active issues  CV: tachycardia improving, continue tele monitoring  Skin/Lines: squires catheter  GI: Liver failure 2/2 alcoholic hepatitis; trend MELD daily; GI consulted, appreciate recommendations; LFTs improving but bilirubin continues to rise, MRCP ordered, however, family is unable to answer questions regarding MRI safety screen, will attempt to find additional answers  /Renal: lactic acidosis likely 2/2 liver failure, resolved; also with rhabdomyolysis, decrease IVF, trend I&Os, BMPs,  Heme/DVT PPX: SCDs given thrombocytopenia; pancytopenia likely 2/2 liver dysfunction and alcoholism; trend BMPs  Endo: No active issues, trend glucose on BMP  ID: Pt with ecoli bacteremia, concern for GI source; continue zosyn, repeat cx  Ethics: Full code, plan d/w aunt at bedside

## 2023-05-19 NOTE — PROGRESS NOTE ADULT - PROBLEM SELECTOR PLAN 1
Chronic known hx of alcohol use disorder  - Admitted to ICU  - [facility] Protocol  - Fall precautions  - Precedex for sedation, wean as tolerated  - wrist restraints for agitation  - Ativan 2mg q4h standing, 2mg q1h PRN agitation  - Thiamine 500mg IVP TID, folic acid 1mg IVP QD  - NPO except meds  - Addiction Medicine recs (Radha), recs appreciated   - ICU (audrey) Consulted, recs appreciated  - management as per ICU fever/ sepsis  E Coli -Etiology-R/O Cholangitis -elevated Bili   -blood cultures grew Gram neg rods  - suRepeat Bloos c/s x 2   - Noted elevated alk phos, LFTs, bilirubin.  - continue  IV Abx zosyn q 8 hrs   - GI involved for discussion regarding potential MRCP, potential need for mechanical intervention beyond abx  Unable to do MRI as family NOT able to Provide Safty screening Q /A

## 2023-05-19 NOTE — PROGRESS NOTE ADULT - PROBLEM SELECTOR PLAN 3
blood cultures grew Gram neg rods  - suspected E coli bacteremia   - Noted elevated alk phos, LFTs, bilirubin.  - continue zosyn  - GI involved for discussion regarding potential MRCP, potential need for mechanical intervention beyond abx Chronic known hx of alcohol use disorder  - [facility] Protocol  - Precedex for sedation, wean as tolerated  - wrist restraints for agitation  - Ativan 2mg q4h standing, 2mg q1h PRN agitation  - Thiamine 500mg IVP TID, folic acid 1mg IVP QD  - NPO except meds  - Addiction Medicine recs (Radha), rfollow up  - management as per ICU

## 2023-05-19 NOTE — PROGRESS NOTE ADULT - ASSESSMENT
38M with alcohol abuse and fatty liver presents s/p fall found to be in alcohol withdrawal, rhabdomyolysis, and liver failure 2/2 alcoholic hepatitis    Neuro:  - ETOH withdrawal  - Precedex for sedation, wean as tolerated and use Ativan for agitation  - Continue Ativan 2mg q4h standing, 2mg q1h PRN agitation  - Thiamine 500mg IVP TID, folic acid 1mg IVP QD    CV:  - No active issues  - Maintain MAP >65    Pulm:   - On 2L NC. Wean respiratory support as tolerated    GI:  - Mild hepatic encephalopathy, ammonia elevated  - US, CT A/P noting hepatic steatosis and hepatomegaly  - NPO except meds, Protonix QD  - Liver failure with worsening LFTs, avoid hepatotoxic meds  - Bili rising, 11.4 -> 16.6. Indirect 2.7, Direct 13.9  - MRCP ordered    Renal:  - Rhabdo improving, CK downtrending  - Continue IVF: LR @ 75cc/hr  - Hyponatremia improved, likely 2/2 ETOH abuse and hepatic disease  - Monitor renal function and lytes, replete PRN    Heme:  - SCDs for DVT ppx  - Thrombocytopenia likely 2/2 dilution, bone marrow suppression from ETOH  - LDH elevated, haptoglobin WNL  - f/u INR in AM    ID:  - Notable increase in WBC this AM  - Lactate downtrended, now WNL  - BCx: E.coli. Continue Zosyn  - ID consulted, f/u recs    Endo:  - FS q6h while NPO    Skin:  - Heredia, restraints in place    Dispo:  - Full Code

## 2023-05-19 NOTE — PROGRESS NOTE ADULT - SUBJECTIVE AND OBJECTIVE BOX
[facility]  Juliocesar Godfrey PA-C  [address] [place] [address]  [phone]      INTERVAL HPI/OVERNIGHT EVENTS:  Pt s/e in ICU, lethargic  Bilirubin noted    MEDICATIONS  (STANDING):  chlorhexidine 2% Cloths 1 Application(s) Topical <User Schedule>  dexMEDEtomidine Infusion 0.1 MICROgram(s)/kG/Hr (2.38 mL/Hr) IV Continuous <Continuous>  folic acid Injectable 1 milliGRAM(s) IV Push daily  lactated ringers. 1000 milliLiter(s) (75 mL/Hr) IV Continuous <Continuous>  LORazepam   Injectable 2 milliGRAM(s) IV Push every 4 hours  pantoprazole  Injectable 40 milliGRAM(s) IV Push daily  piperacillin/tazobactam IVPB.. 3.375 Gram(s) IV Intermittent every 8 hours  thiamine IVPB 500 milliGRAM(s) IV Intermittent every 8 hours    MEDICATIONS  (PRN):  chlorproMAZINE    Injectable 25 milliGRAM(s) IntraMuscular every 8 hours PRN Hiccups  LORazepam   Injectable 2 milliGRAM(s) IV Push every 1 hour PRN Agitation      Allergies    No Known Allergies      PHYSICAL EXAM:   Vital Signs:  Vital Signs Last 24 Hrs  T(C): 36.5 (19 May 2023 05:00), Max: 40 (18 May 2023 16:22)  T(F): 97.7 (19 May 2023 05:00), Max: 104 (18 May 2023 16:22)  HR: 74 (19 May 2023 10:00) (69 - 144)  BP: 103/66 (19 May 2023 10:00) (91/51 - 136/82)  BP(mean): 78 (19 May 2023 10:00) (65 - 104)  RR: 16 (19 May 2023 10:00) (15 - 37)  SpO2: 98% (19 May 2023 10:00) (91% - 100%)    Parameters below as of 19 May 2023 07:30  Patient On (Oxygen Delivery Method): nasal cannula  O2 Flow (L/min): 2    Daily     Daily Weight in k.4 (19 May 2023 07:30)    GENERAL:  Appears stated age  HEENT:  NC/AT  CHEST:  Full & symmetric excursion  HEART:  Regular rhythm  ABDOMEN:  Soft, non-tender, non-distended  EXTEREMITIES:  no cyanosis  SKIN:  No rash  NEURO:  Lethargic      LABS:                        11.1   9.16  )-----------( 36       ( 19 May 2023 07:00 )             33.2     05-    137  |  104  |  5<L>  ----------------------------<  114<H>  3.6   |  29  |  0.77    Ca    7.5<L>      19 May 2023 07:00  Phos  3.8       Mg     2.0         TPro  5.1<L>  /  Alb  1.8<L>  /  TBili  16.6<HH>  /  DBili  13.9<H>  /  AST  584<H>  /  ALT  261<H>  /  AlkPhos  198<H>  19    PT/INR - ( 18 May 2023 06:19 )   PT: 13.5 sec;   INR: 1.15 ratio         PTT - ( 18 May 2023 06:19 )  PTT:32.1 sec  Urinalysis Basic - ( 17 May 2023 15:50 )    Color: Yellow / Appearance: Clear / S.010 / pH: x  Gluc: x / Ketone: Negative  / Bili: Negative / Urobili: Negative   Blood: x / Protein: Negative / Nitrite: Negative   Leuk Esterase: Trace / RBC: >50 /HPF / WBC 3-5   Sq Epi: x / Non Sq Epi: x / Bacteria: Occasional

## 2023-05-19 NOTE — PROGRESS NOTE ADULT - ASSESSMENT
38M PMH of alcohol abuse, alcohol withdrawal seizures, fatty liver presents with signs of alcohol abuse/withdrawal. Admitted for AMS , alcohol withdrawal found to have alcoholic hepatitis, electrolyte abnormalities, ecoli bacteremia.

## 2023-05-19 NOTE — PROGRESS NOTE ADULT - PROBLEM SELECTOR PLAN 2
Tbili 9.6, , , , Lipase 432 on admission w/ known hx of alcohol abuse   - Bili uptrending, 16 today. suspect cholangitis; for MRCP today.  - CT Abd/Pelv and RUQ U/S showing steatohepatitis without evidence of biliary obstruction or gallstones   - RUQ U/S Visualization of the gallbladder is limited by overlying bowel gas. No gross gallstones, gallbladder wall thickening, or pericholecystic fluid. No ultrasonic Grayson's sign   - Avoid hepatotoxic agents   - Daily CMP  -Elevated serum Lipase-? Alcoholic pancreatitis   - GI Dr. Leach Consulted - recs appreciated  - ICU (audrey) Consulted, recs appreciated  - management as per ICU Tbili 9.6, , , , Lipase 432 on admission w/ known hx of alcohol abuse   - Bili uptrending, 16 today. suspect cholangitis; for MRCP today.  - CT Abd/Pelv and RUQ U/S showing steatohepatitis without evidence of biliary obstruction or gallstones   - RUQ U/S Visualization of the gallbladder is limited by overlying bowel gas. No gross gallstones, gallbladder wall thickening, or pericholecystic fluid. No ultrasonic Grayson's sign   - Avoid hepatotoxic agents   - Daily CMP  -Elevated serum Lipase-? Alcoholic pancreatitis   - GI Dr. Leach follow up   - management as per ICU

## 2023-05-19 NOTE — PROGRESS NOTE ADULT - PROBLEM SELECTOR PLAN 5
Rhabdo improving, CK downtrending  - likely due to fall and alcohol use  - Continue IVF Plt 65 on admission likely in setting of Chronic alcoholic liver disease & Likely BM suppression 2/2 ETOH   - Thrombocytopenia likely 2/2 dilution, bone marrow suppression from ETOH  - daily CBC  - f/u LDH, haptoglobin

## 2023-05-19 NOTE — PROGRESS NOTE ADULT - SUBJECTIVE AND OBJECTIVE BOX
[facility], Division of Infectious Diseases  LAUREN Gallegos Y. Patel, S. Shah, G. [facility]  [phone]    Name: DIANE GARAY  Age: 38y  Gender: Male  MRN: [id number]    Interval History:  Patient seen and examined at bedside this morning in ICU  Continues to remain febrile overnight Tm 104F  Sleeping comfortably  Notes reviewed    Antibiotics:  piperacillin/tazobactam IVPB.. 3.375 Gram(s) IV Intermittent every 8 hours      Medications:  chlorhexidine 2% Cloths 1 Application(s) Topical <User Schedule>  chlorproMAZINE    Injectable 25 milliGRAM(s) IntraMuscular every 8 hours PRN  dexMEDEtomidine Infusion 0.1 MICROgram(s)/kG/Hr IV Continuous <Continuous>  folic acid Injectable 1 milliGRAM(s) IV Push daily  lactated ringers. 1000 milliLiter(s) IV Continuous <Continuous>  LORazepam   Injectable 2 milliGRAM(s) IV Push every 1 hour PRN  LORazepam   Injectable 2 milliGRAM(s) IV Push every 4 hours  pantoprazole  Injectable 40 milliGRAM(s) IV Push daily  piperacillin/tazobactam IVPB.. 3.375 Gram(s) IV Intermittent every 8 hours  thiamine IVPB 500 milliGRAM(s) IV Intermittent every 8 hours      Review of Systems:  unable to obtain    Allergies: No Known Allergies    For details regarding the patient's past medical history, social history, family history, and other miscellaneous elements, please refer the initial infectious diseases consultation and/or the admitting history and physical examination for this admission.    Objective:  Vitals:   T(C): 36.1 (23 @ 11:43), Max: 40 (23 @ 16:22)  HR: 79 (23 @ 11:00) (69 - 144)  BP: 105/64 (23 @ 11:00) (91/51 - 136/82)  RR: 20 (23 @ 11:00) (15 - 37)  SpO2: 97% (23 @ 11:00) (91% - 100%)    Physical Examination:  General: no acute distress  HEENT: NC/AT, EOMI,  Cardio: S1, S2 heard, RRR, no murmurs  Resp: decreased breath sounds on NC  Abd: soft, NT, ND  Ext: no edema or cyanosis  Skin: warm, dry, no visible rash      Laboratory Studies:  CBC:                       11.1   9.16  )-----------( 36       ( 19 May 2023 07:00 )             33.2     CMP:     137  |  104  |  5<L>  ----------------------------<  114<H>  3.6   |  29  |  0.77    Ca    7.5<L>      19 May 2023 07:00  Phos  3.8       Mg     2.0         TPro  5.1<L>  /  Alb  1.8<L>  /  TBili  16.6<HH>  /  DBili  13.9<H>  /  AST  584<H>  /  ALT  261<H>  /  AlkPhos  198<H>      LIVER FUNCTIONS - ( 19 May 2023 07:00 )  Alb: 1.8 g/dL / Pro: 5.1 g/dL / ALK PHOS: 198 U/L / ALT: 261 U/L / AST: 584 U/L / GGT: x           Urinalysis Basic - ( 17 May 2023 15:50 )    Color: Yellow / Appearance: Clear / S.010 / pH: x  Gluc: x / Ketone: Negative  / Bili: Negative / Urobili: Negative   Blood: x / Protein: Negative / Nitrite: Negative   Leuk Esterase: Trace / RBC: >50 /HPF / WBC 3-5   Sq Epi: x / Non Sq Epi: x / Bacteria: Occasional        Microbiology: reviewed    Culture - Blood (collected 23 @ 08:45)  Source: .Blood Blood  Gram Stain (23 @ 01:18):    Growth in aerobic bottle: Gram Negative Rods    Growth in anaerobic bottle: Gram Negative Rods  Final Report (23 @ 11:31):    Growth in aerobic and anaerobic bottles: Escherichia coli    See previous culture [id number]    Culture - Blood (collected 23 @ 08:30)  Source: .Blood Blood  Gram Stain (23 @ 03:36):    Growth in anaerobic bottle: Gram Negative Rods    Growth in aerobic bottle: Gram Negative Rods  Final Report (23 @ 11:30):    Growth in aerobic and anaerobic bottles: Escherichia coli    ***Blood Panel PCR results on this specimen are available    approximately 3 hours after the Gram stain result.***    Gram stain, PCR, and/or culture results may not always    correspond due to difference in methodologies.    ************************************************************    This PCR assay was performed by multiplex PCR. This    Assay tests for 66 bacterial and resistance gene targets.    Please refer to the [facility] test directory    at [email or web address] for details.  Organism: Blood Culture PCR  Escherichia coli (23 @ 11:30)  Organism: Escherichia coli (23 @ 11:30)      Method Type: CANDIDA      -  Amikacin: S <=16      -  Ampicillin: R >16 These ampicillin results predict results for amoxicillin      -  Ampicillin/Sulbactam: I 16/8 Enterobacter, Klebsiella aerogenes, Citrobacter, and Serratia may develop resistance during prolonged therapy (3-4 days)      -  Aztreonam: S <=4      -  Cefazolin: S <=2 Enterobacter, Klebsiella aerogenes, Citrobacter, and Serratia may develop resistance during prolonged therapy (3-4 days)      -  Cefepime: S <=2      -  Cefoxitin: S <=8      -  Ceftriaxone: S <=1 Enterobacter, Klebsiella aerogenes, Citrobacter, and Serratia may develop resistance during prolonged therapy      -  Ciprofloxacin: S <=0.25      -  Ertapenem: S <=0.5      -  Gentamicin: S <=2      -  Imipenem: S <=1      -  Levofloxacin: S <=0.5      -  Meropenem: S <=1      -  Piperacillin/Tazobactam: S <=8      -  Tobramycin: S <=2      -  Trimethoprim/Sulfamethoxazole: S <=0.5/9.5  Organism: Blood Culture PCR (23 @ 11:30)      Method Type: PCR      -  Escherichia coli: Detec          Radiology: reviewed    < from: US Abdomen Upper Quadrant Right (23 @ 08:53) >    ACC: [id number] EXAM:  US ABDOMEN RT UPR QUADRANT   ORDERED BY:  ROMEL BARTLETT     PROCEDURE DATE:  2023          INTERPRETATION:  CLINICAL INFORMATION: Elevated liver enzymes and total   bilirubin. History of alcohol use. The patient is currently intoxicated.    COMPARISON: Abdominal ultrasound dated 2020. Abdominal CT dated   2023.    TECHNIQUE: Sonography of the right upper quadrant.    FINDINGS:  Liver: Hepatic steatosis again noted. Hepatomegaly measuring 20.4 cm.  Bile ducts: Common bile duct not visualized.  Gallbladder: Visualization of the gallbladder is limited by overlying   bowel gas. No gross gallstones, gallbladder wall thickening, or   pericholecystic fluid. No ultrasonic Grayson's sign.  Pancreas: Not visualized due to overlying bowel gas.  Right kidney: 13.0 cm. No hydronephrosis. Within normal limits.  Ascites: None.  IVC: Not visualized due to overlying bowel gas.    IMPRESSION:  Visualization of the gallbladder is limited by overlying bowel gas. No   gross gallstones, gallbladder wall thickening, or pericholecystic fluid.   No ultrasonic Grayson's sign.    Hepatomegaly and hepatic steatosis.    --- End of Report ---            ALEX ELLIS MD; Attending Radiologist  This document has been electronically signed. May 17 2023  9:04AM    < end of copied text >

## 2023-05-20 LAB
ALBUMIN SERPL ELPH-MCNC: 1.9 G/DL — LOW (ref 3.3–5)
ALP SERPL-CCNC: 178 U/L — HIGH (ref 40–120)
ALT FLD-CCNC: 273 U/L — HIGH (ref 12–78)
ANION GAP SERPL CALC-SCNC: 7 MMOL/L — SIGNIFICANT CHANGE UP (ref 5–17)
APTT BLD: 27.9 SEC — SIGNIFICANT CHANGE UP (ref 27.5–35.5)
AST SERPL-CCNC: 485 U/L — HIGH (ref 15–37)
BILIRUB SERPL-MCNC: 10.9 MG/DL — HIGH (ref 0.2–1.2)
BUN SERPL-MCNC: 5 MG/DL — LOW (ref 7–23)
CALCIUM SERPL-MCNC: 8.3 MG/DL — LOW (ref 8.5–10.1)
CHLORIDE SERPL-SCNC: 106 MMOL/L — SIGNIFICANT CHANGE UP (ref 96–108)
CO2 SERPL-SCNC: 27 MMOL/L — SIGNIFICANT CHANGE UP (ref 22–31)
CREAT SERPL-MCNC: 0.58 MG/DL — SIGNIFICANT CHANGE UP (ref 0.5–1.3)
EGFR: 128 ML/MIN/1.73M2 — SIGNIFICANT CHANGE UP
GLUCOSE SERPL-MCNC: 108 MG/DL — HIGH (ref 70–99)
HCT VFR BLD CALC: 35.2 % — LOW (ref 39–50)
HGB BLD-MCNC: 11.8 G/DL — LOW (ref 13–17)
INR BLD: 1.06 RATIO — SIGNIFICANT CHANGE UP (ref 0.88–1.16)
MAGNESIUM SERPL-MCNC: 2.1 MG/DL — SIGNIFICANT CHANGE UP (ref 1.6–2.6)
MCHC RBC-ENTMCNC: 27.6 PG — SIGNIFICANT CHANGE UP (ref 27–34)
MCHC RBC-ENTMCNC: 33.5 GM/DL — SIGNIFICANT CHANGE UP (ref 32–36)
MCV RBC AUTO: 82.2 FL — SIGNIFICANT CHANGE UP (ref 80–100)
NRBC # BLD: 0 /100 WBCS — SIGNIFICANT CHANGE UP (ref 0–0)
PHOSPHATE SERPL-MCNC: 2 MG/DL — LOW (ref 2.5–4.5)
PLATELET # BLD AUTO: 59 K/UL — LOW (ref 150–400)
POTASSIUM SERPL-MCNC: 3.2 MMOL/L — LOW (ref 3.5–5.3)
POTASSIUM SERPL-SCNC: 3.2 MMOL/L — LOW (ref 3.5–5.3)
PROT SERPL-MCNC: 5.6 G/DL — LOW (ref 6–8.3)
PROTHROM AB SERPL-ACNC: 12.4 SEC — SIGNIFICANT CHANGE UP (ref 10.5–13.4)
RBC # BLD: 4.28 M/UL — SIGNIFICANT CHANGE UP (ref 4.2–5.8)
RBC # FLD: 15.9 % — HIGH (ref 10.3–14.5)
SODIUM SERPL-SCNC: 140 MMOL/L — SIGNIFICANT CHANGE UP (ref 135–145)
WBC # BLD: 5.71 K/UL — SIGNIFICANT CHANGE UP (ref 3.8–10.5)
WBC # FLD AUTO: 5.71 K/UL — SIGNIFICANT CHANGE UP (ref 3.8–10.5)

## 2023-05-20 PROCEDURE — 99291 CRITICAL CARE FIRST HOUR: CPT

## 2023-05-20 RX ORDER — POTASSIUM CHLORIDE 20 MEQ
10 PACKET (EA) ORAL
Refills: 0 | Status: COMPLETED | OUTPATIENT
Start: 2023-05-20 | End: 2023-05-20

## 2023-05-20 RX ORDER — POTASSIUM PHOSPHATE, MONOBASIC POTASSIUM PHOSPHATE, DIBASIC 236; 224 MG/ML; MG/ML
30 INJECTION, SOLUTION INTRAVENOUS ONCE
Refills: 0 | Status: COMPLETED | OUTPATIENT
Start: 2023-05-20 | End: 2023-05-20

## 2023-05-20 RX ORDER — CEFTRIAXONE 500 MG/1
2000 INJECTION, POWDER, FOR SOLUTION INTRAMUSCULAR; INTRAVENOUS EVERY 24 HOURS
Refills: 0 | Status: DISCONTINUED | OUTPATIENT
Start: 2023-05-20 | End: 2023-05-26

## 2023-05-20 RX ADMIN — Medication 2 MILLIGRAM(S): at 05:35

## 2023-05-20 RX ADMIN — POTASSIUM PHOSPHATE, MONOBASIC POTASSIUM PHOSPHATE, DIBASIC 83.33 MILLIMOLE(S): 236; 224 INJECTION, SOLUTION INTRAVENOUS at 09:23

## 2023-05-20 RX ADMIN — PANTOPRAZOLE SODIUM 40 MILLIGRAM(S): 20 TABLET, DELAYED RELEASE ORAL at 11:24

## 2023-05-20 RX ADMIN — Medication 1 MILLIGRAM(S): at 12:55

## 2023-05-20 RX ADMIN — Medication 105 MILLIGRAM(S): at 05:36

## 2023-05-20 RX ADMIN — Medication 2 MILLIGRAM(S): at 18:10

## 2023-05-20 RX ADMIN — Medication 2 MILLIGRAM(S): at 14:20

## 2023-05-20 RX ADMIN — DEXMEDETOMIDINE HYDROCHLORIDE IN 0.9% SODIUM CHLORIDE 2.38 MICROGRAM(S)/KG/HR: 4 INJECTION INTRAVENOUS at 09:13

## 2023-05-20 RX ADMIN — Medication 2 MILLIGRAM(S): at 10:00

## 2023-05-20 RX ADMIN — SODIUM CHLORIDE 75 MILLILITER(S): 9 INJECTION, SOLUTION INTRAVENOUS at 14:39

## 2023-05-20 RX ADMIN — Medication 2 MILLIGRAM(S): at 10:13

## 2023-05-20 RX ADMIN — CEFTRIAXONE 100 MILLIGRAM(S): 500 INJECTION, POWDER, FOR SOLUTION INTRAMUSCULAR; INTRAVENOUS at 14:21

## 2023-05-20 RX ADMIN — CHLORHEXIDINE GLUCONATE 1 APPLICATION(S): 213 SOLUTION TOPICAL at 11:24

## 2023-05-20 RX ADMIN — DEXMEDETOMIDINE HYDROCHLORIDE IN 0.9% SODIUM CHLORIDE 2.38 MICROGRAM(S)/KG/HR: 4 INJECTION INTRAVENOUS at 18:26

## 2023-05-20 RX ADMIN — Medication 100 MILLIEQUIVALENT(S): at 12:53

## 2023-05-20 RX ADMIN — Medication 2 MILLIGRAM(S): at 21:52

## 2023-05-20 RX ADMIN — Medication 100 MILLIEQUIVALENT(S): at 08:45

## 2023-05-20 RX ADMIN — Medication 105 MILLIGRAM(S): at 14:21

## 2023-05-20 RX ADMIN — Medication 105 MILLIGRAM(S): at 21:52

## 2023-05-20 RX ADMIN — Medication 2 MILLIGRAM(S): at 01:09

## 2023-05-20 RX ADMIN — DEXMEDETOMIDINE HYDROCHLORIDE IN 0.9% SODIUM CHLORIDE 2.38 MICROGRAM(S)/KG/HR: 4 INJECTION INTRAVENOUS at 14:40

## 2023-05-20 RX ADMIN — DEXMEDETOMIDINE HYDROCHLORIDE IN 0.9% SODIUM CHLORIDE 2.38 MICROGRAM(S)/KG/HR: 4 INJECTION INTRAVENOUS at 04:53

## 2023-05-20 RX ADMIN — DEXMEDETOMIDINE HYDROCHLORIDE IN 0.9% SODIUM CHLORIDE 2.38 MICROGRAM(S)/KG/HR: 4 INJECTION INTRAVENOUS at 22:42

## 2023-05-20 RX ADMIN — Medication 100 MILLIEQUIVALENT(S): at 10:14

## 2023-05-20 RX ADMIN — Medication 2 MILLIGRAM(S): at 14:00

## 2023-05-20 RX ADMIN — PIPERACILLIN AND TAZOBACTAM 25 GRAM(S): 4; .5 INJECTION, POWDER, LYOPHILIZED, FOR SOLUTION INTRAVENOUS at 05:35

## 2023-05-20 NOTE — DISCHARGE NOTE PROVIDER - CARE PROVIDER_API CALL
Jermaine Anand  Cardiology  [address] [place] [address]  Phone: [phone]  Fax: [phone]  Follow Up Time:     Paul Yan  Gastroenterology  [address] [place] [address]  Phone: [phone]  Fax: [phone]  Follow Up Time:    Jermaine Anand  Cardiology  [address] [place] [address]  Phone: [phone]  Fax: [phone]  Follow Up Time:     Paul Yan  Gastroenterology  [address] [place] [address]  Phone: [phone]  Fax: [phone]  Follow Up Time:     Jaime Guidry  Medical Oncology  [address] [facility], [address]  [place] [address]  Phone: [phone]  Fax: [phone]  Follow Up Time: 1 week   Jermaine Anand  Cardiology  [address] [place] [address]  Phone: [phone]  Fax: [phone]  Follow Up Time: 1 week    Paul Yan  Gastroenterology  [address] [place] [address]  Phone: [phone]  Fax: [phone]  Follow Up Time: 1 week    Jaime Guidry  Medical Oncology  [address] [facility] [address]  [place] [address]  Phone: [phone]  Fax: [phone]  Follow Up Time: 1 week

## 2023-05-20 NOTE — DISCHARGE NOTE PROVIDER - NSDCMRMEDTOKEN_GEN_ALL_CORE_FT
folic acid 1 mg oral tablet: 1 tab(s) orally once a day  lactulose 10 g/15 mL oral syrup: 22.5 milliliter(s) orally once a day  metoprolol tartrate 25 mg oral tablet: 1 tab(s) orally every 12 hours  Multiple Vitamins with Minerals oral tablet: 1 tab(s) orally once a day  tamsulosin 0.4 mg oral capsule: 1 cap(s) orally once a day (at bedtime)  thiamine 100 mg oral tablet: 1 tab(s) orally once a day   aspirin 81 mg oral tablet, chewable: 1 tab(s) orally once a day  lactulose 10 g/15 mL oral syrup: 22.5 milliliter(s) orally once a day  metoprolol tartrate 25 mg oral tablet: 1 tab(s) orally every 12 hours  Multiple Vitamins with Minerals oral tablet: 1 tab(s) orally once a day  tamsulosin 0.4 mg oral capsule: 1 cap(s) orally once a day (at bedtime)  thiamine 100 mg oral tablet: 1 tab(s) orally once a day   aspirin 81 mg oral tablet, chewable: 1 tab(s) orally once a day  folic acid 1 mg oral tablet: 1 tab(s) orally once a day  lactulose 10 g/15 mL oral syrup: 22.5 milliliter(s) orally once a day  metoprolol tartrate 25 mg oral tablet: 1 tab(s) orally every 12 hours  Multiple Vitamins with Minerals oral tablet: 1 tab(s) orally once a day  tamsulosin 0.4 mg oral capsule: 1 cap(s) orally once a day (at bedtime)  thiamine 100 mg oral tablet: 1 tab(s) orally once a day   aspirin 81 mg oral tablet, chewable: 1 tab(s) orally once a day  folic acid 1 mg oral tablet: 1 tab(s) orally once a day  lactulose 10 g/15 mL oral syrup: 22.5 milliliter(s) orally once a day  metoprolol tartrate 25 mg oral tablet: 1 tab(s) orally every 12 hours  Multiple Vitamins with Minerals oral tablet: 1 tab(s) orally once a day  tamsulosin 0.4 mg oral capsule: 1 cap(s) orally once a day (at bedtime)  tamsulosin 0.4 mg oral capsule: 1 cap(s) orally once a day  thiamine 100 mg oral tablet: 1 tab(s) orally once a day

## 2023-05-20 NOTE — PROGRESS NOTE ADULT - PROBLEM SELECTOR PLAN 1
fever/ sepsis  E Coli -Etiology-R/O Cholangitis -elevated Bili   - blood cultures grew ecoli  - fu Repeat Blood c/s x 2   - Noted elevated alk phos, LFTs, bilirubin (downtrending)  - switched zosyn to rocephin  - GI involved for discussion regarding potential MRCP, potential need for mechanical intervention beyond abx  Unable to do MRI as family NOT able to Provide Safety screening Q /A

## 2023-05-20 NOTE — PROGRESS NOTE ADULT - SUBJECTIVE AND OBJECTIVE BOX
[facility] DIVISION OF INFECTIOUS DISEASES  LAUREN Gallegos Y. Patel, S. Shah, G. Casimir  [phone]  ([phone] - weekdays after 5pm and weekends)    Name: DIANE GARAY  Age/Gender: 38y Male  MRN: [id number]    Interval History:  Patient seen and examined this afternoon.  Resting comfortably, briefly awakens to name.   Notes reviewed. Fever curve improved.   Allergies: No Known Allergies      Objective:  Vitals:   T(F): 99.1 (05-20-23 @ 11:47), Max: 99.3 (05-19-23 @ 21:04)  HR: 70 (05-20-23 @ 12:00) (69 - 84)  BP: 122/84 (05-20-23 @ 12:00) (102/66 - 137/76)  RR: 20 (05-20-23 @ 12:00) (15 - 29)  SpO2: 96% (05-20-23 @ 12:00) (95% - 98%)  Physical Examination:  General: no acute distress  HEENT: NC, dried blood on R eyebrow, neck supple  Cardio: S1, S2 present, normal rate  Resp: decreased breath sounds b/l  Abd: soft, NT, ND  Neuro: awakens briefly, on sedation  Ext: no edema, no cyanosis  Skin: warm, dry, no visible rash    Laboratory Studies:  CBC:                       11.8   5.71  )-----------( 59       ( 20 May 2023 06:08 )             35.2     WBC Trend:  5.71 05-20-23 @ 06:08  9.16 05-19-23 @ 07:00  2.50 05-18-23 @ 06:19  5.76 05-17-23 @ 02:25    CMP: 05-20    140  |  106  |  5<L>  ----------------------------<  108<H>  3.2<L>   |  27  |  0.58    Ca    8.3<L>      20 May 2023 06:08  Phos  2.0     05-20  Mg     2.1     05-20    TPro  5.6<L>  /  Alb  1.9<L>  /  TBili  10.9<H>  /  DBili  x   /  AST  485<H>  /  ALT  273<H>  /  AlkPhos  178<H>  05-20    Creatinine, Serum: 0.58 mg/dL (05-20-23 @ 06:08)  Creatinine, Serum: 0.77 mg/dL (05-19-23 @ 07:00)  Creatinine, Serum: 0.72 mg/dL (05-18-23 @ 06:19)  Creatinine, Serum: 0.65 mg/dL (05-17-23 @ 23:21)  Creatinine, Serum: 0.50 mg/dL (05-17-23 @ 18:30)  Creatinine, Serum: 0.55 mg/dL (05-17-23 @ 11:00)  Creatinine, Serum: 0.56 mg/dL (05-17-23 @ 10:20)  Creatinine, Serum: 0.61 mg/dL (05-17-23 @ 06:36)  Creatinine, Serum: 0.64 mg/dL (05-17-23 @ 02:25)      LIVER FUNCTIONS - ( 20 May 2023 06:08 )  Alb: 1.9 g/dL / Pro: 5.6 g/dL / ALK PHOS: 178 U/L / ALT: 273 U/L / AST: 485 U/L / GGT: x           Microbiology: reviewed     Culture - Blood (collected 05-19-23 @ 07:00)  Source: .Blood Blood-Peripheral  Preliminary Report (05-20-23 @ 13:01):    No growth to date.    Culture - Blood (collected 05-17-23 @ 08:45)  Source: .Blood Blood  Gram Stain (05-18-23 @ 01:18):    Growth in aerobic bottle: Gram Negative Rods    Growth in anaerobic bottle: Gram Negative Rods  Final Report (05-19-23 @ 11:31):    Growth in aerobic and anaerobic bottles: Escherichia coli    See previous culture [id number]    Culture - Blood (collected 05-17-23 @ 08:30)  Source: .Blood Blood  Gram Stain (05-18-23 @ 03:36):    Growth in anaerobic bottle: Gram Negative Rods    Growth in aerobic bottle: Gram Negative Rods  Final Report (05-19-23 @ 11:30):    Growth in aerobic and anaerobic bottles: Escherichia coli    ***Blood Panel PCR results on this specimen are available    approximately 3 hours after the Gram stain result.***    Gram stain, PCR, and/or culture results may not always    correspond due to difference in methodologies.    ************************************************************    This PCR assay was performed by multiplex PCR. This    Assay tests for 66 bacterial and resistance gene targets.    Please refer to the [facility] test directory    at [email or web address] for details.  Organism: Blood Culture PCR  Escherichia coli (05-19-23 @ 11:30)  Organism: Escherichia coli (05-19-23 @ 11:30)      Method Type: CANDIDA      -  Amikacin: S <=16      -  Ampicillin: R >16 These ampicillin results predict results for amoxicillin      -  Ampicillin/Sulbactam: I 16/8 Enterobacter, Klebsiella aerogenes, Citrobacter, and Serratia may develop resistance during prolonged therapy (3-4 days)      -  Aztreonam: S <=4      -  Cefazolin: S <=2 Enterobacter, Klebsiella aerogenes, Citrobacter, and Serratia may develop resistance during prolonged therapy (3-4 days)      -  Cefepime: S <=2      -  Cefoxitin: S <=8      -  Ceftriaxone: S <=1 Enterobacter, Klebsiella aerogenes, Citrobacter, and Serratia may develop resistance during prolonged therapy      -  Ciprofloxacin: S <=0.25      -  Ertapenem: S <=0.5      -  Gentamicin: S <=2      -  Imipenem: S <=1      -  Levofloxacin: S <=0.5      -  Meropenem: S <=1      -  Piperacillin/Tazobactam: S <=8      -  Tobramycin: S <=2      -  Trimethoprim/Sulfamethoxazole: S <=0.5/9.5  Organism: Blood Culture PCR (05-19-23 @ 11:30)      Method Type: PCR      -  Escherichia coli: Detec          Radiology: reviewed     Medications:  cefTRIAXone   IVPB 2000 milliGRAM(s) IV Intermittent every 24 hours  chlorhexidine 2% Cloths 1 Application(s) Topical <User Schedule>  chlorproMAZINE    Injectable 25 milliGRAM(s) IntraMuscular every 8 hours PRN  dexMEDEtomidine Infusion 0.1 MICROgram(s)/kG/Hr IV Continuous <Continuous>  folic acid Injectable 1 milliGRAM(s) IV Push daily  lactated ringers. 1000 milliLiter(s) IV Continuous <Continuous>  LORazepam   Injectable 2 milliGRAM(s) IV Push every 1 hour PRN  LORazepam   Injectable 2 milliGRAM(s) IV Push every 4 hours  pantoprazole  Injectable 40 milliGRAM(s) IV Push daily  thiamine IVPB 500 milliGRAM(s) IV Intermittent every 8 hours    Current Antimicrobials:  cefTRIAXone   IVPB 2000 milliGRAM(s) IV Intermittent every 24 hours    Prior/Completed Antimicrobials:  piperacillin/tazobactam IVPB...

## 2023-05-20 NOTE — PROGRESS NOTE ADULT - PROBLEM SELECTOR PLAN 3
Chronic known hx of alcohol use disorder  - [facility] Protocol  - Precedex for sedation, wean as tolerated  - wrist restraints for agitation  - Ativan 2mg q4h standing, 2mg q1h PRN agitation  - Thiamine 500mg IVP TID, folic acid 1mg IVP QD  - NPO except meds  - Addiction Medicine (Radha) consulted, recs appreciated  - management as per ICU

## 2023-05-20 NOTE — DISCHARGE NOTE PROVIDER - NSDCFUADDINST_GEN_ALL_CORE_FT
STOP Alcohol intake  Follow up with GI-Dr Yan in 1-2 weeks -Repeat LFT's,BMP  Follow up with Cardiology Dr Anand after 2 weeks for medication adjustment

## 2023-05-20 NOTE — PROGRESS NOTE ADULT - PROBLEM SELECTOR PLAN 9
As per nurse patient was having abd pain which resolved after patient voided  ~600cc  - CT A/P- No hydronephrosis. 6.3 mm non obstructing stone lower pole left kidney.  - maintain squires  - continue to monitor

## 2023-05-20 NOTE — DISCHARGE NOTE PROVIDER - PROVIDER TOKENS
PROVIDER:[TOKEN:[[id number]]],PROVIDER:[TOKEN:[[id number]]] PROVIDER:[TOKEN:[[id number]]],PROVIDER:[TOKEN:[[id number]]],PROVIDER:[TOKEN:[[id number]],FOLLOWUP:[1 week]] PROVIDER:[TOKEN:[[id number]],FOLLOWUP:[1 week]],PROVIDER:[TOKEN:[[id number]],FOLLOWUP:[1 week]],PROVIDER:[TOKEN:[[id number]],FOLLOWUP:[1 week]]

## 2023-05-20 NOTE — PROGRESS NOTE ADULT - SUBJECTIVE AND OBJECTIVE BOX
Patient is a 38y old  Male who presents with a chief complaint of Hepatitis (20 May 2023 06:02)    24 hour events: No acute overnight events reported. Noted to be hypokalemic and hypophosphatemic, repleted this AM. Also remains on Precedex, will continue to wean as tolerated and utilize Ativan for agitation.    REVIEW OF SYSTEMS  Unable to obtain 2/2 lethargy    T(F): 99.1 (05-20-23 @ 11:47), Max: 99.3 (05-19-23 @ 21:04)  HR: 72 (05-20-23 @ 10:00) (69 - 84)  BP: 137/76 (05-20-23 @ 10:00) (97/60 - 137/76)  RR: 19 (05-20-23 @ 10:00) (15 - 29)  SpO2: 97% (05-20-23 @ 10:00) (95% - 98%)  Wt(kg): --            I&O's Summary    05-19 @ 07:01  -  05-20 @ 07:00  --------------------------------------------------------  IN: 2749.6 mL / OUT: 4210 mL / NET: -1460.4 mL    05-20 @ 07:01  -  05-20 @ 11:51  --------------------------------------------------------  IN: 289.2 mL / OUT: 0 mL / NET: 289.2 mL      PHYSICAL EXAM  General: NAD  CNS: Somnolent, somewhat arousable. On Precedex  HEENT: PERRLA; conjunctive and sclera clear. Ecchymosis/laceration noted on R eye and eyebrow  Resp: CTA B/L, no wheezes, rales or rhonchi  CVS: RRR, +S1/S2, no m/r/g  Abd: Soft, nontender, nondistended, +BS in all 4 quadrants, no palpable masses  Ext: No LE edema, cyanosis  Skin: Oseguera in place    MEDICATIONS  cefTRIAXone   IVPB IV Intermittent          chlorproMAZINE    Injectable IntraMuscular PRN  dexMEDEtomidine Infusion IV Continuous  LORazepam   Injectable IV Push PRN  LORazepam   Injectable IV Push        pantoprazole  Injectable IV Push      folic acid Injectable IV Push  lactated ringers. IV Continuous  potassium chloride  10 mEq/100 mL IVPB IV Intermittent  thiamine IVPB IV Intermittent      chlorhexidine 2% Cloths Topical                            11.8   5.71  )-----------( 59       ( 20 May 2023 06:08 )             35.2       05-20    140  |  106  |  5<L>  ----------------------------<  108<H>  3.2<L>   |  27  |  0.58    Ca    8.3<L>      20 May 2023 06:08  Phos  2.0     05-20  Mg     2.1     05-20    TPro  5.6<L>  /  Alb  1.9<L>  /  TBili  10.9<H>  /  DBili  x   /  AST  485<H>  /  ALT  273<H>  /  AlkPhos  178<H>  05-20      CARDIAC MARKERS ( 19 May 2023 07:00 )  x     / x     / 547 U/L / x     / x          PT/INR - ( 20 May 2023 06:08 )   PT: 12.4 sec;   INR: 1.06 ratio         PTT - ( 20 May 2023 06:08 )  PTT:27.9 sec    .Blood Blood   Growth in aerobic and anaerobic bottles: Escherichia coli  See previous culture [id number]   Growth in aerobic bottle: Gram Negative Rods  Growth in anaerobic bottle: Gram Negative Rods 05-17 @ 08:45  .Blood Blood   Growth in aerobic and anaerobic bottles: Escherichia coli  ***Blood Panel PCR results on this specimen are available  approximately 3 hours after the Gram stain result.***  Gram stain, PCR, and/or culture results may not always  correspond due to difference in methodologies.  ************************************************************  This PCR assay was performed by multiplex PCR. This  Assay tests for 66 bacterial and resistance gene targets.  Please refer to the [facility] test directory  at [email or web address] for details.   Growth in anaerobic bottle: Gram Negative Rods  Growth in aerobic bottle: Gram Negative Rods 05-17 @ 08:30      CENTRAL LINE: N  OSEGUERA: Y  A-LINE: N    GLOBAL ISSUE/BEST PRACTICE  Analgesia: N  Sedation: Precedex  HOB elevation: yes  Stress ulcer prophylaxis: Protonix  VTE prophylaxis: SCDs  Glycemic control: N  Nutrition: N    CODE STATUS: Full Code

## 2023-05-20 NOTE — PROGRESS NOTE ADULT - ASSESSMENT
etoh abuse  alcoholic hepatitis    diet as tolerated  calculated DF is 20   no need for sternoids  bili noted; likely 2/2 EtOH follow up PT and INR today  check daily lfts and PT/INR  ciwa protocol  can consider ursodiol  management per ICU    I reviewed the overnight course of events on the unit, re-confirming the patient history. I discussed the care with the patient and their family  Differential diagnosis and plan of care discussed with patient after the evaluation  40 minutes spent on total encounter of which more than fifty percent of the encounter was spent counseling and/or coordinating care by the attending physician.  Advanced care planning was discussed with patient and family.  Advanced care planning forms were reviewed and discussed.  Risks, benefits and alternatives of gastroenterologic procedures were discussed in detail and all questions were answered.

## 2023-05-20 NOTE — PROGRESS NOTE ADULT - SUBJECTIVE AND OBJECTIVE BOX
[facility]  Juliocesar Godfrey PA-C  [address] [place] [address]  [phone]      INTERVAL HPI/OVERNIGHT EVENTS:  Pt s/e in ICU, lethargic, aunt at bedside   Bilirubin noted    MEDICATIONS  (STANDING):  cefTRIAXone   IVPB 2000 milliGRAM(s) IV Intermittent every 24 hours  chlorhexidine 2% Cloths 1 Application(s) Topical <User Schedule>  dexMEDEtomidine Infusion 0.1 MICROgram(s)/kG/Hr (2.38 mL/Hr) IV Continuous <Continuous>  folic acid Injectable 1 milliGRAM(s) IV Push daily  lactated ringers. 1000 milliLiter(s) (75 mL/Hr) IV Continuous <Continuous>  LORazepam   Injectable 2 milliGRAM(s) IV Push every 4 hours  pantoprazole  Injectable 40 milliGRAM(s) IV Push daily  thiamine IVPB 500 milliGRAM(s) IV Intermittent every 8 hours    MEDICATIONS  (PRN):  chlorproMAZINE    Injectable 25 milliGRAM(s) IntraMuscular every 8 hours PRN Hiccups  LORazepam   Injectable 2 milliGRAM(s) IV Push every 1 hour PRN Agitation        Allergies    No Known Allergies      PHYSICAL EXAM:   Vital Signs Last 24 Hrs  T(C): 37.3 (20 May 2023 11:47), Max: 37.4 (19 May 2023 21:04)  T(F): 99.1 (20 May 2023 11:47), Max: 99.3 (19 May 2023 21:04)  HR: 71 (20 May 2023 15:00) (69 - 84)  BP: 105/69 (20 May 2023 15:00) (102/66 - 137/76)  BP(mean): 81 (20 May 2023 15:00) (78 - 104)  RR: 16 (20 May 2023 15:00) (16 - 29)  SpO2: 97% (20 May 2023 15:00) (95% - 98%)    Parameters below as of 20 May 2023 08:00  Patient On (Oxygen Delivery Method): nasal cannula  O2 Flow (L/min): 2        Daily     Daily Weight in k.4 (19 May 2023 07:30)    GENERAL:  Appears stated age  HEENT:  NC/AT  CHEST:  Full & symmetric excursion  HEART:  Regular rhythm  ABDOMEN:  Soft, non-tender, non-distended  EXTEREMITIES:  no cyanosis  SKIN:  No rash  NEURO:  Lethargic      LABS:                                 11.8   5.71  )-----------( 59       ( 20 May 2023 06:08 )             35.2   05-20    140  |  106  |  5<L>  ----------------------------<  108<H>  3.2<L>   |  27  |  0.58    Ca    8.3<L>      20 May 2023 06:08  Phos  2.0     -  Mg     2.1     -20    TPro  5.6<L>  /  Alb  1.9<L>  /  TBili  10.9<H>  /  DBili  x   /  AST  485<H>  /  ALT  273<H>  /  AlkPhos  178<H>  05-20          PT/INR - ( 18 May 2023 06:19 )   PT: 13.5 sec;   INR: 1.15 ratio         PTT - ( 18 May 2023 06:19 )  PTT:32.1 sec  Urinalysis Basic - ( 17 May 2023 15:50 )    Color: Yellow / Appearance: Clear / S.010 / pH: x  Gluc: x / Ketone: Negative  / Bili: Negative / Urobili: Negative   Blood: x / Protein: Negative / Nitrite: Negative   Leuk Esterase: Trace / RBC: >50 /HPF / WBC 3-5   Sq Epi: x / Non Sq Epi: x / Bacteria: Occasional

## 2023-05-20 NOTE — PROGRESS NOTE ADULT - ASSESSMENT
38M with alcohol abuse and fatty liver presents s/p fall found to be in alcohol withdrawal, rhabdomyolysis, and liver failure 2/2 alcoholic hepatitis    Neuro:  - ETOH withdrawal  - Precedex for sedation, continue to wean as tolerated and use Ativan for agitation  - Ativan 2mg q4h standing, 2mg q1h PRN agitation  - Thiamine 500mg IVP TID, folic acid 1mg IVP QD    CV:  - No active issues  - Maintain MAP >65    Pulm:   - On 2L NC. Wean respiratory support as tolerated    GI:  - Mild hepatic encephalopathy, ammonia elevated  - US, CT A/P noting hepatic steatosis and hepatomegaly  - NPO except meds, Protonix QD  - Liver failure with improving LFTs, avoid hepatotoxic meds  - Bili downtrending, 16.6 -> 10.9  - MRCP ordered, however family unable to confirm if patient has metal in his body. Will conduct safety screening on patient once more awake and alert    Renal:  - Rhabdo improving  - Continue IVF: LR @ 75cc/hr  - Hyponatremia improved, likely 2/2 ETOH abuse and hepatic disease  - Monitor renal function and lytes, replete PRN    Heme:  - SCDs for DVT ppx  - Thrombocytopenia likely 2/2 dilution, bone marrow suppression from ETOH  - LDH elevated, haptoglobin WNL  - Continue to trend INR    ID:  - Notable increase in WBC, now improved this AM  - Lactate downtrended, now WNL  - BCx: E.coli. Switch Zosyn to Rocephin    Endo:  - FS q6h while NPO    Skin:  - Heredia, restraints in place    Dispo:  - Full Code

## 2023-05-20 NOTE — PROGRESS NOTE ADULT - PROBLEM SELECTOR PLAN 2
Tbili 9.6, , , , Lipase 432 on admission w/ known hx of alcohol abuse   - Noted elevated alk phos, LFTs, bilirubin (downtrending)  - suspect cholangitis; potential need for mechanical intervention beyond abx, Unable to do MRCP as family NOT able to Provide Safety screening Q /A  - CT Abd/Pelv and RUQ U/S showing steatohepatitis without evidence of biliary obstruction or gallstones   - RUQ U/S Visualization of the gallbladder is limited by overlying bowel gas. No gross gallstones, gallbladder wall thickening, or pericholecystic fluid. No ultrasonic Grayson's sign   - Avoid hepatotoxic agents   - Daily CMP  -Elevated serum Lipase-? Alcoholic pancreatitis   - GI Dr. Leach follow up   - management as per ICU

## 2023-05-20 NOTE — DISCHARGE NOTE PROVIDER - HOSPITAL COURSE
HPI:  38M PMH of alcohol abuse, alcohol withdrawal seizures, fatty liver presents with signs of alcohol abuse/withdrawal. History obtained from wife with  over phone. Patient has been drinking for the past 16 days of unknown amount and had unwitnessed fall from the bed so pt was taken to the ED. Seen and examined pt at bedside. Unable to obtain history from patient due to somnolence in setting of recent ativan use.    In the ED    Vitals: , T 98, Spo2 95% /80     Labs: Plt 65, Na 128, K 3.0, Tbili 9.6, , , , Lactate 4.0, Lipase 432, UA + for bacteria and leuk esterase, Blood alc level 423     Imaging  RUQ U/S Visualization of the gallbladder is limited by overlying bowel gas. No   gross gallstones, gallbladder wall thickening, or pericholecystic fluid. No ultrasonic Grayson's sign epatomegaly and hepatic steatosis.  CT A/P- steatosis and hepatomegaly. No hydronephrosis. 6.3 mm nonobstructing stone lower pole left kidney.  CT head/cervical spine/ maxillofacial non-con: No acute intracranial abnormalities. Mild right periorbital subcutaneous soft tissue swelling. No evidence of orbital or other facial fracture. No vertebral fracture, collapse or subluxation.    ECG: Sinus tachycardia, 115 BPM    Meds Given    dTAP x1, Zofran x 1, Morphine x1, Pepcid x1,2 NS, 5mg haldol x1, 40meq k, 10mg flexiril, zosyn x1, thiamine, folic acid,   Pt seen by ICU team, admitted to ICU for ETOH intoxication & withdrawal. (17 May 2023 09:51)      ---  HOSPITAL COURSE: Patient admitted to ICU for management of acute alcohol withdrawal     Pt seen and examined on day of discharge. Patient is medically optimized for discharge to home with close outpatient followup.        ---  CONSULTANTS:     ---  TIME SPENT:  I, the attending physician, was physically present for the key portions of the evaluation and management (E/M) service provided. The total amount of time spent reviewing the hospital notes, laboratory values, imaging findings, assessing/counseling the patient, discussing with consultant physicians, social work, nursing staff was -- minutes    ---  Primary care provider was made aware of plan for discharge:      [  ] NO     [  ] YES   HPI:  38M PMH of alcohol abuse, alcohol withdrawal seizures, fatty liver presents with signs of alcohol abuse/withdrawal. History obtained from wife with  over phone. Patient has been drinking for the past 16 days of unknown amount and had unwitnessed fall from the bed so pt was taken to the ED. Seen and examined pt at bedside. Unable to obtain history from patient due to somnolence in setting of recent ativan use.    In the ED    Vitals: , T 98, Spo2 95% /80     Labs: Plt 65, Na 128, K 3.0, Tbili 9.6, , , , Lactate 4.0, Lipase 432, UA + for bacteria and leuk esterase, Blood alc level 423     Imaging  RUQ U/S Visualization of the gallbladder is limited by overlying bowel gas. No   gross gallstones, gallbladder wall thickening, or pericholecystic fluid. No ultrasonic Grayson's sign epatomegaly and hepatic steatosis.  CT A/P- steatosis and hepatomegaly. No hydronephrosis. 6.3 mm nonobstructing stone lower pole left kidney.  CT head/cervical spine/ maxillofacial non-con: No acute intracranial abnormalities. Mild right periorbital subcutaneous soft tissue swelling. No evidence of orbital or other facial fracture. No vertebral fracture, collapse or subluxation.    ECG: Sinus tachycardia, 115 BPM    Meds Given    dTAP x1, Zofran x 1, Morphine x1, Pepcid x1,2 NS, 5mg haldol x1, 40meq k, 10mg flexiril, zosyn x1, thiamine, folic acid,   Pt seen by ICU team, admitted to ICU for ETOH intoxication & withdrawal. (17 May 2023 09:51)      ---  HOSPITAL ICU COURSE: Patient admitted to ICU for management of acute alcohol withdrawal, transaminitis, and rhabdomyolysis. Pt was treated with IV ativan, thiamine, folic acid, and IVF. Indwelling catheter was placed but due to confusion/encephalopathy patient pulled out squires with resulting trauma (bloody urine). Patient was started on precedex and wrist restraints. Electrolytes were repleted as needed. Hospital course was complicated by E coli bacteremia on blood cx so patient was started on IV abx including zosyn. Patient was eventually transitioned to rocephin. There was a possible suspicion of acute cholangitis but MRCP was unable to be performed because family and patient were unable to provide information for MRI safety screening.    Pt seen and examined on day of discharge. Patient is medically optimized for discharge to home with close outpatient followup.        ---  CONSULTANTS:   Addiction medicine: Dr. Garcia  Critical Care: Dr. Lobo  GI: Dr. Leach  ID: Dr. Gant    ---  TIME SPENT:  I, the attending physician, was physically present for the key portions of the evaluation and management (E/M) service provided. The total amount of time spent reviewing the hospital notes, laboratory values, imaging findings, assessing/counseling the patient, discussing with consultant physicians, social work, nursing staff was -- minutes    ---  Primary care provider was made aware of plan for discharge:      [  ] NO     [  ] YES   HPI:  38M PMH of alcohol abuse, alcohol withdrawal seizures, fatty liver presents with signs of alcohol abuse/withdrawal. History obtained from wife with  over phone. Patient has been drinking for the past 16 days of unknown amount and had unwitnessed fall from the bed so pt was taken to the ED. Seen and examined pt at bedside. Unable to obtain history from patient due to somnolence in setting of recent ativan use.    In the ED    Vitals: , T 98, Spo2 95% /80     Labs: Plt 65, Na 128, K 3.0, Tbili 9.6, , , , Lactate 4.0, Lipase 432, UA + for bacteria and leuk esterase, Blood alc level 423     Imaging  RUQ U/S Visualization of the gallbladder is limited by overlying bowel gas. No   gross gallstones, gallbladder wall thickening, or pericholecystic fluid. No ultrasonic Grayson's sign epatomegaly and hepatic steatosis.  CT A/P- steatosis and hepatomegaly. No hydronephrosis. 6.3 mm nonobstructing stone lower pole left kidney.  CT head/cervical spine/ maxillofacial non-con: No acute intracranial abnormalities. Mild right periorbital subcutaneous soft tissue swelling. No evidence of orbital or other facial fracture. No vertebral fracture, collapse or subluxation.    ECG: Sinus tachycardia, 115 BPM    Meds Given    dTAP x1, Zofran x 1, Morphine x1, Pepcid x1,2 NS, 5mg haldol x1, 40meq k, 10mg flexiril, zosyn x1, thiamine, folic acid,   Pt seen by ICU team, admitted to ICU for ETOH intoxication & withdrawal. (17 May 2023 09:51)      ---  HOSPITAL ICU COURSE: Patient admitted to ICU for management of acute alcohol withdrawal, transaminitis, and rhabdomyolysis. Pt was treated with IV ativan, thiamine, folic acid, and IVF. Indwelling catheter was placed but due to confusion/encephalopathy patient pulled out squires with resulting trauma (bloody urine). Patient was started on precedex and wrist restraints. Electrolytes were repleted as needed. Hospital course was complicated by E coli bacteremia on blood cx so patient was started on IV abx including zosyn. Patient was eventually transitioned to rocephin. There was a possible suspicion of acute cholangitis but MRCP was unable to be performed because family and patient were unable to provide information for MRI safety screening. Pt completed course of rocephin and had his squires discontinued. Pt continued ativan taper course.    Pt seen and examined on day of discharge. Patient is medically optimized for discharge to home with close outpatient followup.        ---  CONSULTANTS:   Addiction medicine: Dr. Garcia  Critical Care: Dr. Lobo  GI: Dr. Leach  ID: Dr. Gant    ---  TIME SPENT:  I, the attending physician, was physically present for the key portions of the evaluation and management (E/M) service provided. The total amount of time spent reviewing the hospital notes, laboratory values, imaging findings, assessing/counseling the patient, discussing with consultant physicians, social work, nursing staff was -- minutes    ---  Primary care provider was made aware of plan for discharge:      [  ] NO     [  ] YES   HPI:  38M PMH of alcohol abuse, alcohol withdrawal seizures, fatty liver presents with signs of alcohol abuse/withdrawal. History obtained from wife with  over phone. Patient has been drinking for the past 16 days of unknown amount and had unwitnessed fall from the bed so pt was taken to the ED. Seen and examined pt at bedside. Unable to obtain history from patient due to somnolence in setting of recent ativan use.    In the ED    Vitals: , T 98, Spo2 95% /80     Labs: Plt 65, Na 128, K 3.0, Tbili 9.6, , , , Lactate 4.0, Lipase 432, UA + for bacteria and leuk esterase, Blood alc level 423     Imaging  RUQ U/S Visualization of the gallbladder is limited by overlying bowel gas. No   gross gallstones, gallbladder wall thickening, or pericholecystic fluid. No ultrasonic Grayson's sign epatomegaly and hepatic steatosis.  CT A/P- steatosis and hepatomegaly. No hydronephrosis. 6.3 mm nonobstructing stone lower pole left kidney.  CT head/cervical spine/ maxillofacial non-con: No acute intracranial abnormalities. Mild right periorbital subcutaneous soft tissue swelling. No evidence of orbital or other facial fracture. No vertebral fracture, collapse or subluxation.    ECG: Sinus tachycardia, 115 BPM    Meds Given    dTAP x1, Zofran x 1, Morphine x1, Pepcid x1,2 NS, 5mg haldol x1, 40meq k, 10mg flexiril, zosyn x1, thiamine, folic acid,   Pt seen by ICU team, admitted to ICU for ETOH intoxication & withdrawal. (17 May 2023 09:51)      ---  HOSPITAL ICU COURSE: Patient admitted to ICU for management of acute alcohol withdrawal, transaminitis, and rhabdomyolysis. Pt was treated with IV ativan, thiamine, folic acid, and IVF. Indwelling catheter was placed but due to confusion/encephalopathy patient pulled out squires with resulting trauma (bloody urine). Patient was started on precedex and wrist restraints. Electrolytes were repleted as needed. Hospital course was complicated by E coli bacteremia on blood cx so patient was started on IV abx including zosyn. Patient was eventually transitioned to rocephin. There was a possible suspicion of acute cholangitis but MRCP was unable to be performed because family and patient were unable to provide information for MRI safety screening. Pt completed course of rocephin and had his squires discontinued. Pt continued ativan taper course.    Pt seen and examined on day of discharge. Patient is medically optimized for discharge to home with close outpatient followup.        ---  CONSULTANTS:   Addiction medicine: Dr. Garcia  Critical Care: Dr. Lobo  GI: Dr. Leach  ID: Dr. Gant    ---  TIME SPENT:  I, the attending physician, was physically present for the key portions of the evaluation and management (E/M) service provided. The total amount of time spent reviewing the hospital notes, laboratory values, imaging findings, assessing/counseling the patient, discussing with consultant physicians, social work, nursing staff was 60  minutes   HPI:  38M PMH of alcohol abuse, alcohol withdrawal seizures, fatty liver presents with signs of alcohol abuse/withdrawal. History obtained from wife with  over phone. Patient has been drinking for the past 16 days of unknown amount and had unwitnessed fall from the bed so pt was taken to the ED. Seen and examined pt at bedside. Unable to obtain history from patient due to somnolence in setting of recent ativan use.    In the ED  Vitals: , T 98, Spo2 95% /80   Labs: Plt 65, Na 128, K 3.0, Tbili 9.6, , , , Lactate 4.0, Lipase 432, UA + for bacteria and leuk esterase, Blood alc level 423   Imaging  RUQ U/S Visualization of the gallbladder is limited by overlying bowel gas. No   gross gallstones, gallbladder wall thickening, or pericholecystic fluid. No ultrasonic Grayson's sign epatomegaly and hepatic steatosis.  CT A/P- steatosis and hepatomegaly. No hydronephrosis. 6.3 mm nonobstructing stone lower pole left kidney.  CT head/cervical spine/ maxillofacial non-con: No acute intracranial abnormalities. Mild right periorbital subcutaneous soft tissue swelling. No evidence of orbital or other facial fracture. No vertebral fracture, collapse or subluxation.  ECG: Sinus tachycardia, 115 BPM    Meds Given    dTAP x1, Zofran x 1, Morphine x1, Pepcid x1,2 NS, 5mg haldol x1, 40meq k, 10mg flexiril, zosyn x1, thiamine, folic acid,   Pt seen by ICU team, admitted to ICU for ETOH intoxication & withdrawal. (17 May 2023 09:51)      HOSPITAL ICU COURSE: Patient admitted to ICU for management of acute alcohol withdrawal, transaminitis, and rhabdomyolysis. Pt was treated with IV ativan, thiamine, folic acid, and IVF. Indwelling catheter was placed but due to confusion/encephalopathy patient pulled out squires with resulting trauma (bloody urine). Patient was started on Precedex and wrist restraints. Electrolytes were repleted as needed. Hospital course was complicated by E coli bacteremia on blood cx and patient was started on IV Zosyn and eventually transitioned to Rocephin. There was a suspicion of acute cholangitis but MRCP was unable to be perform because family unable to provide information for MRI safety screening. Pt completed course of Rocephin and had his Squires discontinued. MRCP not longer needed when pt was able to provide information for safety screen. Pt continued ativan taper course with prn doses per CIWA protocol. Pt presented persistent sinus tachycardia despite bacteremia resolved, alcohol withdrawal treatment, good hydration. Cardiology consulted and pt started metoprolol 25mg q12h with better control. Pt received information from  to continue outpatient management with AA to stop alcohol intake.      Pt seen and examined on day of discharge. Patient is medically optimized for discharge to home with close outpatient followup.      CONSULTANTS:   Addiction medicine: Dr. Garcia  Critical Care: Dr. Lobo  GI: Dr. Leach  ID: Dr. Gant    ---  TIME SPENT:  I, the attending physician, was physically present for the key portions of the evaluation and management (E/M) service provided. The total amount of time spent reviewing the hospital notes, laboratory values, imaging findings, assessing/counseling the patient, discussing with consultant physicians, social work, nursing staff was 60  minutes   HPI:  38M PMH of alcohol abuse, alcohol withdrawal seizures, fatty liver presents with signs of alcohol abuse/withdrawal. History obtained from wife with  over phone. Patient has been drinking for the past 16 days of unknown amount and had unwitnessed fall from the bed so pt was taken to the ED. Seen and examined pt at bedside. Unable to obtain history from patient due to somnolence in setting of recent ativan use.    In the ED  Vitals: , T 98, Spo2 95% /80   Labs: Plt 65, Na 128, K 3.0, Tbili 9.6, , , , Lactate 4.0, Lipase 432, UA + for bacteria and leuk esterase, Blood alc level 423   Imaging  RUQ U/S Visualization of the gallbladder is limited by overlying bowel gas. No   gross gallstones, gallbladder wall thickening, or pericholecystic fluid. No ultrasonic Grayson's sign epatomegaly and hepatic steatosis.  CT A/P- steatosis and hepatomegaly. No hydronephrosis. 6.3 mm nonobstructing stone lower pole left kidney.  CT head/cervical spine/ maxillofacial non-con: No acute intracranial abnormalities. Mild right periorbital subcutaneous soft tissue swelling. No evidence of orbital or other facial fracture. No vertebral fracture, collapse or subluxation.  ECG: Sinus tachycardia, 115 BPM    Meds Given    dTAP x1, Zofran x 1, Morphine x1, Pepcid x1,2 NS, 5mg haldol x1, 40meq k, 10mg flexiril, zosyn x1, thiamine, folic acid,   Pt seen by ICU team, admitted to ICU for ETOH intoxication & withdrawal. (17 May 2023 09:51)      HOSPITAL ICU COURSE: Patient admitted to ICU for management of acute alcohol withdrawal, transaminitis, and rhabdomyolysis. Pt was treated with IV ativan, thiamine, folic acid, and IVF. Indwelling catheter was placed but due to confusion/encephalopathy patient pulled out squires with resulting trauma (bloody urine). Patient was started on Precedex and wrist restraints. Electrolytes were repleted as needed. Hospital course was complicated by E coli bacteremia on blood cx and patient was started on IV Zosyn and eventually transitioned to Rocephin. There was a suspicion of acute cholangitis but MRCP was unable to be perform because family unable to provide information for MRI safety screening. Pt completed course of Rocephin and had his Squires discontinued. MRCP not longer needed when pt was able to provide information for safety screen. Pt continued ativan taper course with prn doses per CIWA protocol. Pt presented persistent sinus tachycardia despite bacteremia resolved, alcohol withdrawal treatment, good hydration. Cardiology consulted and pt started metoprolol 25mg q12h with better control. Pt received information from  to continue outpatient management with AA to stop alcohol intake.     Pt seen and examined on day of discharge. Patient is medically optimized for discharge to home with close outpatient followup.      CONSULTANTS:   Addiction medicine: Dr. Garcia  Critical Care: Dr. Lobo  GI: Dr. Leach  ID: Dr. Gant    ---  TIME SPENT:  I, the attending physician, was physically present for the key portions of the evaluation and management (E/M) service provided. The total amount of time spent reviewing the hospital notes, laboratory values, imaging findings, assessing/counseling the patient, discussing with consultant physicians, social work, nursing staff was 60  minutes   The patient is a 65y Female complaining of shoulder pain/injury. HPI:  38M PMH of alcohol abuse, alcohol withdrawal seizures, fatty liver presents with signs of alcohol abuse/withdrawal. History obtained from wife with  over phone. Patient has been drinking for the past 16 days of unknown amount and had unwitnessed fall from the bed so pt was taken to the ED. Seen and examined pt at bedside. Unable to obtain history from patient due to somnolence in setting of recent ativan use.    In the ED  Vitals: , T 98, Spo2 95% /80   Labs: Plt 65, Na 128, K 3.0, Tbili 9.6, , , , Lactate 4.0, Lipase 432, UA + for bacteria and leuk esterase, Blood alc level 423   Imaging  RUQ U/S Visualization of the gallbladder is limited by overlying bowel gas. No   gross gallstones, gallbladder wall thickening, or pericholecystic fluid. No ultrasonic Grayson's sign epatomegaly and hepatic steatosis.  CT A/P- steatosis and hepatomegaly. No hydronephrosis. 6.3 mm nonobstructing stone lower pole left kidney.  CT head/cervical spine/ maxillofacial non-con: No acute intracranial abnormalities. Mild right periorbital subcutaneous soft tissue swelling. No evidence of orbital or other facial fracture. No vertebral fracture, collapse or subluxation.  ECG: Sinus tachycardia, 115 BPM    Meds Given    dTAP x1, Zofran x 1, Morphine x1, Pepcid x1,2 NS, 5mg haldol x1, 40meq k, 10mg flexiril, zosyn x1, thiamine, folic acid,   Pt seen by ICU team, admitted to ICU for ETOH intoxication & withdrawal. (17 May 2023 09:51)      HOSPITAL ICU COURSE: Patient admitted to ICU for management of acute alcohol withdrawal, transaminitis, and rhabdomyolysis. Pt was treated with IV ativan, thiamine, folic acid, and IVF. Indwelling catheter was placed but due to confusion/encephalopathy patient pulled out squires with resulting trauma (bloody urine). Patient was started on Precedex and wrist restraints. Electrolytes were repleted as needed. Hospital course was complicated by E coli bacteremia on blood cx and patient was started on IV Zosyn and eventually transitioned to Rocephin. There was a suspicion of acute cholangitis but MRCP was unable to be perform because family unable to provide information for MRI safety screening. Pt completed course of Rocephin and had his Squires discontinued. MRCP not longer needed when pt was able to provide information for safety screen. Pt continued ativan taper course with prn doses per CIWA protocol. Pt presented persistent sinus tachycardia despite bacteremia resolved, alcohol withdrawal treatment, good hydration. Cardiology consulted and pt started metoprolol 25mg q12h with better control. Pt received information from  to continue outpatient management with AA to stop alcohol intake.     Pt seen and examined on day of discharge. Patient is medically optimized for discharge to home with close outpatient followup.    Vital Signs Last 24 Hrs  T(C): 37.1 (28 May 2023 13:02), Max: 37.1 (28 May 2023 13:02)  T(F): 98.8 (28 May 2023 13:02), Max: 98.8 (28 May 2023 13:02)  HR: 92 (28 May 2023 13:02) (92 - 110)  BP: 103/68 (28 May 2023 13:02) (103/68 - 129/73)  BP(mean): --  RR: 18 (28 May 2023 13:02) (17 - 18)  SpO2: 96% (28 May 2023 13:02) (92% - 96%)    Parameters below as of 28 May 2023 13:02  Patient On (Oxygen Delivery Method): room air    PHYSICAL EXAM:   GENERAL:  [ x ] NAD , [ x ] well appearing, [  ] Agitated, [  ] Drowsy,  [  ] Lethargy, [  ] confused   HEAD:  [ x ] Normal, [  ] Other  EYES:  [ x ] EOMI, [x  ] PERRLA, [  x] conjunctiva and sclera clear normal, [  ] Other,  [ x ] Pallor,[  ] Discharge + Ecchymosis rt eyebrow   ENMT:  [ x ] Normal, [ x ] Moist mucous membranes, [  ] Good dentition, [x  ] No Thrush  NECK:  [ x ] Supple, [ x ] No JVD, [x  ] Normal thyroid, [  ] Lymphadenopathy [  ] Other  CHEST/LUNG:  [x  ] Clear to auscultation bilaterally, [x  ] Breath Sounds equal B/L / Decrease, [  ] poor effort  [ x ] No rales, [ x ] No rhonchi  [ x ]  No wheezing,   HEART:  [  ] Regular rate and rhythm, [ x ] tachycardia, [  ] Bradycardia,  [  ] irregular  [ x ] No murmurs, No rubs, No gallops, [  ] PPM in place (Mfr:  )  ABDOMEN:  [ x ] Soft, [ x ] Nontender, [x  ] Nondistended, [ x ] No mass, [ x ] Bowel sounds present, [ x ] obese  NERVOUS SYSTEM:  [x  ] Alert & Oriented X3, [ x ] Nonfocal  [  ] Confusion  [  ] Encephalopathic [  ] Sedated [  ] Unable to assess, [  ] Dementia [  ] Other-  EXTREMITIES: [x  ] 2+ Peripheral Pulses, No clubbing, No cyanosis,  [  ] edema B/L lower EXT. [  ] PVD stasis skin changes B/L Lower EXT, [  ] wound  LYMPH: No lymphadenopathy noted  SKIN:  [x  ] No rashes or lesions, [  ] Pressure Ulcers, [  ] ecchymosis, [  ] Skin Tears, [ x ] Other- Multiple skin tattoos on Body          CONSULTANTS:   Addiction medicine: Dr. Garcia  Critical Care: Dr. Lobo  GI: Dr. Leach  ID: Dr. Gant    ---  TIME SPENT:  I, the attending physician, was physically present for the key portions of the evaluation and management (E/M) service provided. The total amount of time spent reviewing the hospital notes, laboratory values, imaging findings, assessing/counseling the patient, discussing with consultant physicians, social work, nursing staff was 60  minutes

## 2023-05-20 NOTE — PROGRESS NOTE ADULT - ATTENDING COMMENTS
38M PMH of alcohol abuse, alcohol withdrawal seizures, fatty liver presents with signs of alcohol abuse/withdrawal. Admitted for AMS , alcohol withdrawal.   transaminitis ,Alcoholic hepatitis & Hyponatremia. Low K, Low Ca, Low Mag .  Pt  seen, Examined, case & care plan d/w , residents at detail.  Consults:  ID Dr Gant -IV Abx -Rocephin daily-Cholangitis likely   GI-Dr Leach follow up-Likely Cholangitis   Detox -Dr Garcia follow up.  Social service eval  Care as per ICU Team.  AM labs   DVT PPx  NPO, IVF.    Total Care time is 45 minutes.

## 2023-05-20 NOTE — PROGRESS NOTE ADULT - PROBLEM SELECTOR PLAN 5
Plt 65 on admission likely in setting of Chronic alcoholic liver disease & Likely BM suppression 2/2 ETOH   - Thrombocytopenia likely 2/2 dilution, bone marrow suppression from ETOH  - LDH elevated, Haptoglobin WNL  - daily CBC

## 2023-05-20 NOTE — PROGRESS NOTE ADULT - SUBJECTIVE AND OBJECTIVE BOX
Date/Time Patient Seen:  		  Referring MD:   Data Reviewed	       Patient is a 38y old  Male who presents with a chief complaint of Hepatitis (19 May 2023 12:41)      Subjective/HPI     PAST MEDICAL & SURGICAL HISTORY:  No pertinent past medical history    Alcohol abuse    H/O hernia repair    S/P appendectomy          Medication list         MEDICATIONS  (STANDING):  chlorhexidine 2% Cloths 1 Application(s) Topical <User Schedule>  dexMEDEtomidine Infusion 0.1 MICROgram(s)/kG/Hr (2.38 mL/Hr) IV Continuous <Continuous>  folic acid Injectable 1 milliGRAM(s) IV Push daily  lactated ringers. 1000 milliLiter(s) (75 mL/Hr) IV Continuous <Continuous>  LORazepam   Injectable 2 milliGRAM(s) IV Push every 4 hours  pantoprazole  Injectable 40 milliGRAM(s) IV Push daily  piperacillin/tazobactam IVPB.. 3.375 Gram(s) IV Intermittent every 8 hours  thiamine IVPB 500 milliGRAM(s) IV Intermittent every 8 hours    MEDICATIONS  (PRN):  chlorproMAZINE    Injectable 25 milliGRAM(s) IntraMuscular every 8 hours PRN Hiccups  LORazepam   Injectable 2 milliGRAM(s) IV Push every 1 hour PRN Agitation         Vitals log        ICU Vital Signs Last 24 Hrs  T(C): 36.6 (20 May 2023 04:37), Max: 37.4 (19 May 2023 21:04)  T(F): 97.9 (20 May 2023 04:37), Max: 99.3 (19 May 2023 21:04)  HR: 71 (20 May 2023 06:00) (71 - 84)  BP: 123/84 (20 May 2023 05:00) (97/60 - 124/85)  BP(mean): 98 (20 May 2023 05:00) (73 - 101)  ABP: --  ABP(mean): --  RR: 19 (20 May 2023 06:00) (15 - 29)  SpO2: 96% (20 May 2023 06:00) (95% - 98%)    O2 Parameters below as of 19 May 2023 07:30  Patient On (Oxygen Delivery Method): nasal cannula  O2 Flow (L/min): 2               Input and Output:  I&O's Detail    18 May 2023 07:01  -  19 May 2023 07:00  --------------------------------------------------------  IN:    Dexmedetomidine: 379.9 mL    IV PiggyBack: 100 mL    IV PiggyBack: 200 mL    IV PiggyBack: 250 mL    Lactated Ringers: 3600 mL  Total IN: 4529.9 mL    OUT:    Indwelling Catheter - Urethral (mL): 3375 mL  Total OUT: 3375 mL    Total NET: 1154.9 mL      19 May 2023 07:01  -  20 May 2023 06:02  --------------------------------------------------------  IN:    Dexmedetomidine: 119.7 mL    IV PiggyBack: 100 mL    IV PiggyBack: 200 mL    Lactated Ringers: 300 mL    Lactated Ringers: 900 mL  Total IN: 1619.7 mL    OUT:    Indwelling Catheter - Urethral (mL): 1710 mL  Total OUT: 1710 mL    Total NET: -90.3 mL          Lab Data                        11.1   9.16  )-----------( 36       ( 19 May 2023 07:00 )             33.2     05-19    137  |  104  |  5<L>  ----------------------------<  114<H>  3.6   |  29  |  0.77    Ca    7.5<L>      19 May 2023 07:00  Phos  3.8     05-19  Mg     2.0     05-19    TPro  5.1<L>  /  Alb  1.8<L>  /  TBili  16.6<HH>  /  DBili  13.9<H>  /  AST  584<H>  /  ALT  261<H>  /  AlkPhos  198<H>  05-19      CARDIAC MARKERS ( 19 May 2023 07:00 )  x     / x     / 547 U/L / x     / x            Review of Systems	      Objective     Physical Examination    heart s1s2  lung dec BS  head nc      Pertinent Lab findings & Imaging      Giovanna:  NO   Adequate UO     I&O's Detail    18 May 2023 07:01  -  19 May 2023 07:00  --------------------------------------------------------  IN:    Dexmedetomidine: 379.9 mL    IV PiggyBack: 100 mL    IV PiggyBack: 200 mL    IV PiggyBack: 250 mL    Lactated Ringers: 3600 mL  Total IN: 4529.9 mL    OUT:    Indwelling Catheter - Urethral (mL): 3375 mL  Total OUT: 3375 mL    Total NET: 1154.9 mL      19 May 2023 07:01  -  20 May 2023 06:02  --------------------------------------------------------  IN:    Dexmedetomidine: 119.7 mL    IV PiggyBack: 100 mL    IV PiggyBack: 200 mL    Lactated Ringers: 300 mL    Lactated Ringers: 900 mL  Total IN: 1619.7 mL    OUT:    Indwelling Catheter - Urethral (mL): 1710 mL  Total OUT: 1710 mL    Total NET: -90.3 mL               Discussed with:     Cultures:	        Radiology

## 2023-05-20 NOTE — DISCHARGE NOTE PROVIDER - NPI NUMBER (FOR [employer] USE ONLY) :
"History & Physical    SUBJECTIVE:     History of Present Illness:  Patient is a 61 y.o. female presents in referral from Dr. Collazo with CAD and severe AS.  She has a past medical history of hodgkins lymphoma in 1991 and 1995 for which she received radiation to the neck, chest and abdomen in 1991.  When she had recurrence in 1995 she had chemotherapy.  She states she has had shortness of breath for several years with exertion but has always been told she had "lung damage" from the radiation.  However recently she began having a heavy feeling in her chest which prompted an ED visit to [facility].  She was admitted and underwent LHC and was found to have LM disease and RCA stenosis.  She has aortic stenosis with a JORI of 0.76 and a MG of 35.  She has persistent shortness of breath and intermittent feelings of "chest heaviness."  She also believes she may have some component of anxiety as these occur for which she has been taking Xanax.  She has not used NTG.      Chief Complaint   Patient presents with    Pre-op Exam       Review of patient's allergies indicates:   Allergen Reactions    Sulfa (sulfonamide antibiotics) Shortness Of Breath, Itching and Other (See Comments)     elevated blood pressure    Iodinated contrast media - oral and iv dye     Iodine      Other reaction(s): Unknown    Latex      Other reaction(s): Itching    Norvasc  [amlodipine]      Other reaction(s): Swelling       Current Outpatient Prescriptions   Medication Sig Dispense Refill    aspirin (ECOTRIN) 81 MG EC tablet Take 1 tablet (81 mg total) by mouth once daily.  0    brimonidine-timolol (COMBIGAN) 0.2-0.5 % Drop Place 1 drop into both eyes once daily.       calcium carbonate (OS-CALVIN) 600 mg (1,500 mg) Tab Take 600 mg by mouth 2 (two) times daily with meals.      clobetasol (TEMOVATE) 0.05 % Gel Apply 1 application topically 2 (two) times daily. Apply to affected area  3    folic acid (FOLVITE) 1 MG tablet Take 1 mg by mouth once daily.   " 1    levothyroxine (SYNTHROID) 75 MCG tablet TAKE 1 TABLET EVERY DAY 90 tablet 3    lisinopril (PRINIVIL,ZESTRIL) 20 MG tablet Take 1 tablet (20 mg total) by mouth once daily. 90 tablet 3    methotrexate 2.5 MG Tab Take 2.5 mg by mouth every 7 days. 10 tablets every 7 days on Fridiay  1    metoprolol succinate (TOPROL-XL) 50 MG 24 hr tablet Take 1 tablet (50 mg total) by mouth every evening. 30 tablet 11    RESTASIS 0.05 % ophthalmic emulsion Place 1 drop into both eyes 2 (two) times daily.  4    rosuvastatin (CRESTOR) 20 MG tablet Take 1 tablet (20 mg total) by mouth nightly. (Patient taking differently: Take 20 mg by mouth every other day. ) 90 tablet 3    albuterol 90 mcg/actuation inhaler Inhale 2 puffs into the lungs every 6 (six) hours as needed for Wheezing. 1 each 11    cloNIDine (CATAPRES) 0.1 MG tablet Take 1 tablet (0.1 mg total) by mouth every 6 (six) hours as needed (for systolic bp >160). 60 tablet 5    valacyclovir (VALTREX) 1000 MG tablet TAKE 1 TABLET EVERY 24 HOURS  3     No current facility-administered medications for this visit.        Past Medical History:   Diagnosis Date    Allergy     Breast cancer 2008    Hodgkin lymphoma     Hyperlipidemia     Hypertension     Osteoporosis     Thyroid disease      Past Surgical History:   Procedure Laterality Date    BREAST BIOPSY      BREAST RECONSTRUCTION      bilateral mastectomy    COSMETIC SURGERY      bereast reconstruction    EYE SURGERY      eye lids    LYMPHADENECTOMY      MASTECTOMY      SKIN BIOPSY      SPLENECTOMY, TOTAL      TUMOR REMOVAL       Family History   Problem Relation Age of Onset    Hypertension Mother     Hypertension Father     Cancer Neg Hx      Social History   Substance Use Topics    Smoking status: Former Smoker     Packs/day: 1.00     Years: 20.00     Types: Cigarettes     Quit date: 11/6/1981    Smokeless tobacco: Never Used    Alcohol use No      Comment: 2 drinks/month        Review of  "Systems     Review of Systems   Constitutional: Negative for fever and malaise/fatigue.   HENT: Negative for congestion and sore throat.    Eyes: Negative for blurred vision, double vision and discharge.   Respiratory:  She c/o chronic cough for the past 2 months.  See HPI  Cardiovascular: Negative for palpitations, claudication, leg swelling and PND.  She has c/o chest tightness (see HPI)  Gastrointestinal: Negative for abdominal pain, constipation, diarrhea, nausea and vomiting.   Genitourinary: Negative for dysuria, frequency and urgency.   Musculoskeletal: Negative for back pain and myalgias.   Skin: Negative for itching and rash.   Neurological: Negative for dizziness, speech change, seizures, weakness and headaches.   Endo/Heme/Allergies: Does not bruise/bleed easily.   Psychiatric/Behavioral: Negative for depression. C/o anxiety since her diagnosis of CAD    OBJECTIVE:     Vital Signs (Most Recent)  Temp: 98 °F (36.7 °C) (08/15/17 1141)  Pulse: 79 (08/15/17 1141)  BP: (!) 151/71 (08/15/17 1141)  SpO2: 100 % (08/15/17 1141)  5' 2" (1.575 m)  77.1 kg (169 lb 14.4 oz)       Physical Exam     Physical Exam   Constitutional: oriented to person, place, and time, appears well-developed and well-nourished.   HENT:   Head: Normocephalic and atraumatic.   Eyes: Pupils are equal, round, and reactive to light.   Neck: Normal range of motion. Neck supple.   Cardiovascular: Normal rate, regular rhythm and normal heart sounds.  + RIDGE  Pulmonary/Chest: Effort normal and breath sounds normal.   Abdominal: Soft. Bowel sounds are normal.   Musculoskeletal: Normal range of motion.   Neurological: alert and oriented to person, place, and time.   Skin: Skin is warm and dry.   Psychiatric:  normal mood and affect, her behavior is normal.       Laboratory  reviewed    Diagnostic Results:  2D echo:  CORONARY CIRCULATION:  --  Left main: The vessel was normal sized with a 60% lesion seen in its mid  portion. --  LAD: The vessel was " normal sized seen wrapping around the apex. It  gives rise to two diagonals. LAD and diagonals all have mild disease, less than  20%. --  Circumflex: Circumflex gives rise to two trivial obtuse marginals.  Circumflex and its branches have mild disease less than 30%.  --  Ramus intermedius: The vessel was small sized, normal in appearance.  --  RCA: Right coronary artery is a normal dominant vessel with a short  discrete  60-70% lesion seen in its proximal/mid portion. The remainder has mild disease.    CONCLUSIONS:    NOTATIONS:  Significant left main lesion and mid RCA lesion.  Significant aortic valve stenosis.      2D echo:  1 - Severe aortic stenosis, JORI = 0.76 cm2, peak velocity = 3.85 m/s, mean gradient = 35.0 mmHg.     2 - Mild to moderate aortic regurgitation.     3 - Concentric remodeling.     4 - Normal left ventricular systolic function (EF 55-60%).     5 - Left ventricular diastolic dysfunction.     6 - The estimated PA systolic pressure is greater than 25 mmHg.     7 - Normal right ventricular systolic function .     8 - Trivial tricuspid regurgitation.     9 - Small pericardial effusion.     ASSESSMENT/PLAN:     61 year old female, referred by Dr. Collazo, with CAD and severe AS.  She has a history significant for Hodgkins X 2 with neck, chest, and abdominal radiation in 1991 and chemo in 1995 and history of breast cancer with breast reconstruction presents to discuss CABG/AVR.  Her STS score is 3.5%.  NYHA class II symptoms.    PLAN:    - Proceed with AVR/CABG on 8/21/2017.     CTS Attending Note:    I have personally taken the history and examined this patient and agree with the resident's note as stated above.Plan CABG 2 with LIMA-LAD and SVG-RCA, and AVR with [place] valve. Questions answered, and she wishes to proceed.    [[id number]],[[id number]] [[id number]],[[id number]],[[id number]]

## 2023-05-20 NOTE — PROGRESS NOTE ADULT - ASSESSMENT
38 year old male with a history of alcohol abuse, alcohol withdrawal seizures, fatty liver presents with signs of alcohol abuse/withdrawal    etoh use   jennifer  harry  hepatitis  steatosis    ativan - atc  ativan prn  on IVF    trend LFT  serial LABS  I and O  monitor VS and Sat  ciwa monitoring  pulse ox - tele monitoring  folic acid  thiamine

## 2023-05-20 NOTE — DISCHARGE NOTE PROVIDER - DETAILS OF MALNUTRITION DIAGNOSIS/DIAGNOSES
This patient has been assessed with a concern for Malnutrition and was treated during this hospitalization for the following Nutrition diagnosis/diagnoses:     -  05/21/2023: Severe protein-calorie malnutrition

## 2023-05-20 NOTE — PROGRESS NOTE ADULT - ASSESSMENT
38M PMH of alcohol abuse, alcohol withdrawal seizures, fatty liver presented with signs of alcohol abuse/withdrawal. Patient has been drinking for the past 16 days PTA of unknown amount and had unwitnessed fall. E coli bacteremia noted and ID consultation requested.    Acute Cholangitis  E.coli Bacteremia  Transaminitis  EtOH Intoxication/Withdrawal  Fevers  - BCx +E.coli   -- susceptibilities reviewed   -- repeat Bcx NGTD   - s/p zosyn  - fever curve improved, WBC wnl    Recommendations  - de-escalated to ceftriaxone 2g IV Q24h  - appreciate GI recs  - trend temps/WBC/LFTs  - additional management per ICU team    Sienna Morfin M.D.  [facility], Division of Infectious Diseases  [phone]  After 5pm on weekdays and all day on weekends - please call [phone]

## 2023-05-20 NOTE — PROGRESS NOTE ADULT - SUBJECTIVE AND OBJECTIVE BOX
Patient is a 38y old  Male who presents with a chief complaint of Hepatitis (20 May 2023 11:51)    HPI:  38M PMH of alcohol abuse, alcohol withdrawal seizures, fatty liver presents with signs of alcohol abuse/withdrawal. History obtained from wife with  over phone. Patient has been drinking for the past 16 days of unknown amount and had unwitnessed fall from the bed so pt was taken to the ED. Seen and examined pt at bedside. Unable to obtain history from patient due to somnolence in setting of recent ativan use.    In the ED    Vitals: , T 98, Spo2 95% /80     Labs: Plt 65, Na 128, K 3.0, Tbili 9.6, , , , Lactate 4.0, Lipase 432, UA + for bacteria and leuk esterase, Blood alc level 423     Imaging  RUQ U/S Visualization of the gallbladder is limited by overlying bowel gas. No   gross gallstones, gallbladder wall thickening, or pericholecystic fluid. No ultrasonic Grayson's sign epatomegaly and hepatic steatosis.  CT A/P- steatosis and hepatomegaly. No hydronephrosis. 6.3 mm nonobstructing stone lower pole left kidney.  CT head/cervical spine/ maxillofacial non-con: No acute intracranial abnormalities. Mild right periorbital subcutaneous soft tissue swelling. No evidence of orbital or other facial fracture. No vertebral fracture, collapse or subluxation.    ECG: Sinus tachycardia, 115 BPM    Meds Given    dTAP x1, Zofran x 1, Morphine x1, Pepcid x1,2 NS, 5mg haldol x1, 40meq k, 10mg flexiril, zosyn x1, thiamine, folic acid,   Pt seen by ICU team, admitted to ICU for ETOH intoxication & withdrawal. (17 May 2023 09:51)    INTERVAL HPI:  5/18 - Seen and examined patient. Pt was lethargic so unable to obtain ROS. On Precedex drip, Cooling [place] Bacteremia, IV Abx Fever  5/19 - Seen and examined. Patient is lethargic on precedex. Found to be bacteremic now on abx. Bilirubin uptrending. No acute distress. E Coli sepsis, IV Abx   5/20 - Seen and examined. Patient is somnolent on precedex but responds to name and able to answer some questions. Denies CP, SOB, abd pain, nausea. Found to be bacteremic now on abx. Bilirubin uptrending. No acute distress. E Coli sepsis, IV Abx     OVERNIGHT EVENTS: No acute overnight events. Noted to be hypokalemic and hypophosphatemic, repleted this AM. Also remains on Precedex, will continue to wean as tolerated and utilize Ativan for agitation.    Home Medications:      MEDICATIONS  (STANDING):  cefTRIAXone   IVPB 2000 milliGRAM(s) IV Intermittent every 24 hours  chlorhexidine 2% Cloths 1 Application(s) Topical <User Schedule>  dexMEDEtomidine Infusion 0.1 MICROgram(s)/kG/Hr (2.38 mL/Hr) IV Continuous <Continuous>  folic acid Injectable 1 milliGRAM(s) IV Push daily  lactated ringers. 1000 milliLiter(s) (75 mL/Hr) IV Continuous <Continuous>  LORazepam   Injectable 2 milliGRAM(s) IV Push every 4 hours  pantoprazole  Injectable 40 milliGRAM(s) IV Push daily  thiamine IVPB 500 milliGRAM(s) IV Intermittent every 8 hours    MEDICATIONS  (PRN):  chlorproMAZINE    Injectable 25 milliGRAM(s) IntraMuscular every 8 hours PRN Hiccups  LORazepam   Injectable 2 milliGRAM(s) IV Push every 1 hour PRN Agitation      Allergies    No Known Allergies    Intolerances        Social History:  Tobacco: denies  EtOH: chronic heavy drinker  Recreational drug use: denies  Lives with: patient lives with wife  Ambulates: independent  ADLs: independent (17 May 2023 09:51)      REVIEW OF SYSTEMS: unable to obtain full reliable ROS due to lethargy      Vital Signs Last 24 Hrs  T(C): 37.3 (20 May 2023 11:47), Max: 37.4 (19 May 2023 21:04)  T(F): 99.1 (20 May 2023 11:47), Max: 99.3 (19 May 2023 21:04)  HR: 70 (20 May 2023 12:00) (69 - 84)  BP: 122/84 (20 May 2023 12:00) (102/66 - 137/76)  BP(mean): 99 (20 May 2023 12:00) (78 - 104)  RR: 20 (20 May 2023 12:00) (15 - 29)  SpO2: 96% (20 May 2023 12:00) (95% - 98%)    Parameters below as of 20 May 2023 08:00  Patient On (Oxygen Delivery Method): nasal cannula  O2 Flow (L/min): 2    Finger Stick        05-19 @ 07:01  -  05-20 @ 07:00  --------------------------------------------------------  IN: 2749.6 mL / OUT: 4210 mL / NET: -1460.4 mL    05-20 @ 07:01 - 05-20 @ 14:40  --------------------------------------------------------  IN: 289.2 mL / OUT: 0 mL / NET: 289.2 mL      PHYSICAL EXAM: on Precedex  GENERAL:  [ x ] NAD , [  ] well appearing, [  ] Agitated, [  ] Drowsy,  [ x ] lethargy, [  ] confused   HEAD:  [ x ] Normal, [  ] Other  EYES:  [ x ] Ecchymosis/wound noted on R eyebrow [ x ] EOMI, [  x] PERRLA, [ x ] scleral icterus, [  ] Other,  [  ] Pallor,[  ] Discharge  ENMT:  [ x ] Normal, [ x ] Moist mucous membranes, [ x ] Good dentition, [x  ] No Thrush  NECK:  [ x ] Supple, [ x ] No JVD, [ x ] Normal thyroid, [  ] Lymphadenopathy [  ] Other  CHEST/LUNG:  [ x ] Clear to auscultation bilaterally, [ x ] Breath Sounds equal B/L, [  ] poor effort  [ x ] No rales, [ x ] No rhonchi  [ x ]  No wheezing,   HEART:  [ x ] Regular rate and rhythm, [  ] tachycardia, [  ] Bradycardia,  [  ] irregular  [x  ] No murmurs, No rubs, No gallops, [  ] PPM in place (Mfr:  )  ABDOMEN:  [ x ] Soft, [x  ] Nontender, [ x ] Nondistended, [ x ] No mass, [  x] Bowel sounds present, [  ] obese  NERVOUS SYSTEM:  [ x ] Alert & Oriented X1, [ x ] Somnolent, difficult to arouse  [  ] Nonfocal  [  ] Confusion  [ x ] Encephalopathic [  ] Sedated [  ] Unable to assess, [  ] Dementia [  ] Other-  EXTREMITIES: [ x ] 2+ Peripheral Pulses, No clubbing, No cyanosis,  [  ] edema B/L lower EXT. [  ] PVD stasis skin changes B/L Lower EXT, [  ] wound  LYMPH: No lymphadenopathy noted  SKIN: [ x ] Heredia in place [ x ] No rashes or lesions, [  ] Pressure Ulcers, [ x ] ecchymosis,- Rt eye brow  [  ] Skin Tears, [  ] Other      DIET: Diet, NPO:   Except Medications     Special Instructions for Nursing:  Except Medications (05-17-23 @ 10:43)      LABS:                        11.8   5.71  )-----------( 59       ( 20 May 2023 06:08 )             35.2     20 May 2023 06:08    140    |  106    |  5      ----------------------------<  108    3.2     |  27     |  0.58     Ca    8.3        20 May 2023 06:08  Phos  2.0       20 May 2023 06:08  Mg     2.1       20 May 2023 06:08    TPro  5.6    /  Alb  1.9    /  TBili  10.9   /  DBili  x      /  AST  485    /  ALT  273    /  AlkPhos  178    20 May 2023 06:08    PT/INR - ( 20 May 2023 06:08 )   PT: 12.4 sec;   INR: 1.06 ratio         PTT - ( 20 May 2023 06:08 )  PTT:27.9 sec      Culture Results:   No growth to date. (05-19 @ 07:00)  Culture Results:   Growth in aerobic and anaerobic bottles: Escherichia coli  See previous culture [id number] (05-17 @ 08:45)  Culture Results:   Growth in aerobic and anaerobic bottles: Escherichia coli  ***Blood Panel PCR results on this specimen are available  approximately 3 hours after the Gram stain result.***  Gram stain, PCR, and/or culture results may not always  correspond due to difference in methodologies.  ************************************************************  This PCR assay was performed by multiplex PCR. This  Assay tests for 66 bacterial and resistance gene targets.  Please refer to the [facility] Labs test directory  at [email or web address] for details. (05-17 @ 08:30)      culture blood  -- .Blood Blood-Peripheral 05-19 @ 07:00    culture urine  --  05-19 @ 07:00      CARDIAC MARKERS ( 19 May 2023 07:00 )  x     / x     / 547 U/L / x     / x      CARDIAC MARKERS ( 17 May 2023 18:30 )  x     / x     / 2225 U/L / x     / x      CARDIAC MARKERS ( 17 May 2023 11:00 )  x     / x     / 2989 U/L / x     / x              Culture - Blood (collected 19 May 2023 07:00)  Source: .Blood Blood-Peripheral  Preliminary Report (20 May 2023 13:01):    No growth to date.    Culture - Blood (collected 17 May 2023 08:45)  Source: .Blood Blood  Gram Stain (18 May 2023 01:18):    Growth in aerobic bottle: Gram Negative Rods    Growth in anaerobic bottle: Gram Negative Rods  Final Report (19 May 2023 11:31):    Growth in aerobic and anaerobic bottles: Escherichia coli    See previous culture [id number]    Culture - Blood (collected 17 May 2023 08:30)  Source: .Blood Blood  Gram Stain (18 May 2023 03:36):    Growth in anaerobic bottle: Gram Negative Rods    Growth in aerobic bottle: Gram Negative Rods  Final Report (19 May 2023 11:30):    Growth in aerobic and anaerobic bottles: Escherichia coli    ***Blood Panel PCR results on this specimen are available    approximately 3 hours after the Gram stain result.***    Gram stain, PCR, and/or culture results may not always    correspond due to difference in methodologies.    ************************************************************    This PCR assay was performed by multiplex PCR. This    Assay tests for 66 bacterial and resistance gene targets.    Please refer to the [facility] Labs test directory    at [email or web address] for details.  Organism: Blood Culture PCR  Escherichia coli (19 May 2023 11:30)  Organism: Escherichia coli (19 May 2023 11:30)  Organism: Blood Culture PCR (19 May 2023 11:30)           Lipase, Serum: 73 U/L (05-19-23 @ 07:00)  Lipase, Serum: 432 U/L (05-17-23 @ 02:25)            HEALTH ISSUES - PROBLEM Dx:  Alcohol dependence with withdrawal    Transaminitis    Bacteremia, escherichia coli    AMS (altered mental status)    Rhabdomyolysis    Hypocalcemia    Fall    Urinary retention    Thrombocytopenia    Need for prophylactic measure        Consultant(s) Notes Reviewed:  [ x ] YES     Care Discussed with [X] Consultants  [  ] Patient  [  x] Family [  ] HCP [  ]   [  ] Social Service  [ x ] RN, [  ] Physical Therapy,[  ] Palliative care team  DVT PPX: [  ] Lovenox, [  ] S C Heparin, [  ] Coumadin, [  ] Xarelto, [  ] Eliquis, [  ] Pradaxa, [  ] IV Heparin drip, [x  ] SCD [  ] Contraindication 2 to GI Bleed,[  ] Ambulation [ x ] Contraindicated 2 to  bleed [  ] Contraindicated 2 to Brain Bleed  Advanced directive: [x  ] None, [  ] DNR/DNI Patient is a 38y old  Male who presents with a chief complaint of Hepatitis (20 May 2023 11:51)    HPI:  38M PMH of alcohol abuse, alcohol withdrawal seizures, fatty liver presents with signs of alcohol abuse/withdrawal. History obtained from wife with  over phone. Patient has been drinking for the past 16 days of unknown amount and had unwitnessed fall from the bed so pt was taken to the ED. Seen and examined pt at bedside. Unable to obtain history from patient due to somnolence in setting of recent ativan use.    In the ED    Vitals: , T 98, Spo2 95% /80     Labs: Plt 65, Na 128, K 3.0, Tbili 9.6, , , , Lactate 4.0, Lipase 432, UA + for bacteria and leuk esterase, Blood alc level 423     Imaging  RUQ U/S Visualization of the gallbladder is limited by overlying bowel gas. No   gross gallstones, gallbladder wall thickening, or pericholecystic fluid. No ultrasonic Grayson's sign epatomegaly and hepatic steatosis.  CT A/P- steatosis and hepatomegaly. No hydronephrosis. 6.3 mm nonobstructing stone lower pole left kidney.  CT head/cervical spine/ maxillofacial non-con: No acute intracranial abnormalities. Mild right periorbital subcutaneous soft tissue swelling. No evidence of orbital or other facial fracture. No vertebral fracture, collapse or subluxation.    ECG: Sinus tachycardia, 115 BPM    Meds Given    dTAP x1, Zofran x 1, Morphine x1, Pepcid x1,2 NS, 5mg haldol x1, 40meq k, 10mg flexiril, zosyn x1, thiamine, folic acid,   Pt seen by ICU team, admitted to ICU for ETOH intoxication & withdrawal. (17 May 2023 09:51)    INTERVAL HPI:  5/18 - Seen and examined patient. Pt was lethargic so unable to obtain ROS. On Precedex drip, Cooling [place] Bacteremia, IV Abx Fever  5/19 - Seen and examined. Patient is lethargic on precedex. Found to be bacteremic now on abx. Bilirubin uptrending. No acute distress. E Coli sepsis, IV Abx   5/20 - Seen and examined. Patient is somnolent on precedex but responds to name and able to answer some questions. Denies CP, SOB, abd pain, nausea. Found to be bacteremic now on abx. Bilirubin uptrending. No acute distress. E Coli sepsis, IV Abx     OVERNIGHT EVENTS: No acute overnight events. Noted to be hypokalemic and hypophosphatemic, repleted this AM. Also remains on Precedex, will continue to wean as tolerated and utilize Ativan for agitation.IV Rocephin daily     Home Medications:      MEDICATIONS  (STANDING):  cefTRIAXone   IVPB 2000 milliGRAM(s) IV Intermittent every 24 hours  chlorhexidine 2% Cloths 1 Application(s) Topical <User Schedule>  dexMEDEtomidine Infusion 0.1 MICROgram(s)/kG/Hr (2.38 mL/Hr) IV Continuous <Continuous>  folic acid Injectable 1 milliGRAM(s) IV Push daily  lactated ringers. 1000 milliLiter(s) (75 mL/Hr) IV Continuous <Continuous>  LORazepam   Injectable 2 milliGRAM(s) IV Push every 4 hours  pantoprazole  Injectable 40 milliGRAM(s) IV Push daily  thiamine IVPB 500 milliGRAM(s) IV Intermittent every 8 hours    MEDICATIONS  (PRN):  chlorproMAZINE    Injectable 25 milliGRAM(s) IntraMuscular every 8 hours PRN Hiccups  LORazepam   Injectable 2 milliGRAM(s) IV Push every 1 hour PRN Agitation      Allergies    No Known Allergies    Intolerances        Social History:  Tobacco: denies  EtOH: chronic heavy drinker  Recreational drug use: denies  Lives with: patient lives with wife  Ambulates: independent  ADLs: independent (17 May 2023 09:51)      REVIEW OF SYSTEMS: unable to obtain full reliable ROS due to lethargy      Vital Signs Last 24 Hrs  T(C): 37.3 (20 May 2023 11:47), Max: 37.4 (19 May 2023 21:04)  T(F): 99.1 (20 May 2023 11:47), Max: 99.3 (19 May 2023 21:04)  HR: 70 (20 May 2023 12:00) (69 - 84)  BP: 122/84 (20 May 2023 12:00) (102/66 - 137/76)  BP(mean): 99 (20 May 2023 12:00) (78 - 104)  RR: 20 (20 May 2023 12:00) (15 - 29)  SpO2: 96% (20 May 2023 12:00) (95% - 98%)    Parameters below as of 20 May 2023 08:00  Patient On (Oxygen Delivery Method): nasal cannula  O2 Flow (L/min): 2    Finger Stick        05-19 @ 07:01  -  05-20 @ 07:00  --------------------------------------------------------  IN: 2749.6 mL / OUT: 4210 mL / NET: -1460.4 mL    05-20 @ 07:01  -  05-20 @ 14:40  --------------------------------------------------------  IN: 289.2 mL / OUT: 0 mL / NET: 289.2 mL      PHYSICAL EXAM: on Precedex  GENERAL:  [ x ] NAD , [  ] well appearing, [  ] Agitated, [  ] Drowsy,  [ x ] lethargy, [  ] confused   HEAD:  [ x ] Normal, [  ] Other  EYES:  [ x ] Ecchymosis/wound noted on R eyebrow [ x ] EOMI, [  x] PERRLA, [ x ] scleral icterus, [  ] Other,  [  ] Pallor,[  ] Discharge  ENMT:  [ x ] Normal, [ x ] Moist mucous membranes, [ x ] Good dentition, [x  ] No Thrush  NECK:  [ x ] Supple, [ x ] No JVD, [ x ] Normal thyroid, [  ] Lymphadenopathy [  ] Other  CHEST/LUNG:  [ x ] Clear to auscultation bilaterally, [ x ] Breath Sounds equal B/L, [  ] poor effort  [ x ] No rales, [ x ] No rhonchi  [ x ]  No wheezing,   HEART:  [ x ] Regular rate and rhythm, [  ] tachycardia, [  ] Bradycardia,  [  ] irregular  [x  ] No murmurs, No rubs, No gallops, [  ] PPM in place (Mfr:  )  ABDOMEN:  [ x ] Soft, [x  ] Nontender, [ x ] Nondistended, [ x ] No mass, [  x] Bowel sounds present, [  ] obese  NERVOUS SYSTEM:  [ x ] Alert & Oriented X1, [ x ] Somnolent, difficult to arouse  [  ] Nonfocal  [  ] Confusion  [ x ] Encephalopathic [  ] Sedated [  ] Unable to assess, [  ] Dementia [  ] Other-  EXTREMITIES: [ x ] 2+ Peripheral Pulses, No clubbing, No cyanosis,  [  ] edema B/L lower EXT. [  ] PVD stasis skin changes B/L Lower EXT, [  ] wound  LYMPH: No lymphadenopathy noted  SKIN: [ x ] Heredia in place [ x ] No rashes or lesions, [  ] Pressure Ulcers, [ x ] ecchymosis,- Rt eye brow  [  ] Skin Tears, [  ] Other      DIET: Diet, NPO:   Except Medications     Special Instructions for Nursing:  Except Medications (05-17-23 @ 10:43)      LABS:                        11.8   5.71  )-----------( 59       ( 20 May 2023 06:08 )             35.2     20 May 2023 06:08    140    |  106    |  5      ----------------------------<  108    3.2     |  27     |  0.58     Ca    8.3        20 May 2023 06:08  Phos  2.0       20 May 2023 06:08  Mg     2.1       20 May 2023 06:08    TPro  5.6    /  Alb  1.9    /  TBili  10.9   /  DBili  x      /  AST  485    /  ALT  273    /  AlkPhos  178    20 May 2023 06:08    PT/INR - ( 20 May 2023 06:08 )   PT: 12.4 sec;   INR: 1.06 ratio         PTT - ( 20 May 2023 06:08 )  PTT:27.9 sec      Culture Results:   No growth to date. (05-19 @ 07:00)  Culture Results:   Growth in aerobic and anaerobic bottles: Escherichia coli  See previous culture [id number] (05-17 @ 08:45)  Culture Results:   Growth in aerobic and anaerobic bottles: Escherichia coli  ***Blood Panel PCR results on this specimen are available  approximately 3 hours after the Gram stain result.***  Gram stain, PCR, and/or culture results may not always  correspond due to difference in methodologies.  ************************************************************  This PCR assay was performed by multiplex PCR. This  Assay tests for 66 bacterial and resistance gene targets.  Please refer to the [facility] Labs test directory  at [email or web address] for details. (05-17 @ 08:30)      culture blood  -- .Blood Blood-Peripheral 05-19 @ 07:00    culture urine  --  05-19 @ 07:00      CARDIAC MARKERS ( 19 May 2023 07:00 )  x     / x     / 547 U/L / x     / x      CARDIAC MARKERS ( 17 May 2023 18:30 )  x     / x     / 2225 U/L / x     / x      CARDIAC MARKERS ( 17 May 2023 11:00 )  x     / x     / 2989 U/L / x     / x              Culture - Blood (collected 19 May 2023 07:00)  Source: .Blood Blood-Peripheral  Preliminary Report (20 May 2023 13:01):    No growth to date.    Culture - Blood (collected 17 May 2023 08:45)  Source: .Blood Blood  Gram Stain (18 May 2023 01:18):    Growth in aerobic bottle: Gram Negative Rods    Growth in anaerobic bottle: Gram Negative Rods  Final Report (19 May 2023 11:31):    Growth in aerobic and anaerobic bottles: Escherichia coli    See previous culture [id number]    Culture - Blood (collected 17 May 2023 08:30)  Source: .Blood Blood  Gram Stain (18 May 2023 03:36):    Growth in anaerobic bottle: Gram Negative Rods    Growth in aerobic bottle: Gram Negative Rods  Final Report (19 May 2023 11:30):    Growth in aerobic and anaerobic bottles: Escherichia coli    ***Blood Panel PCR results on this specimen are available    approximately 3 hours after the Gram stain result.***    Gram stain, PCR, and/or culture results may not always    correspond due to difference in methodologies.    ************************************************************    This PCR assay was performed by multiplex PCR. This    Assay tests for 66 bacterial and resistance gene targets.    Please refer to the [facility] Labs test directory    at [email or web address] for details.  Organism: Blood Culture PCR  Escherichia coli (19 May 2023 11:30)  Organism: Escherichia coli (19 May 2023 11:30)  Organism: Blood Culture PCR (19 May 2023 11:30)           Lipase, Serum: 73 U/L (05-19-23 @ 07:00)  Lipase, Serum: 432 U/L (05-17-23 @ 02:25)      HEALTH ISSUES - PROBLEM Dx:  Alcohol dependence with withdrawal    Transaminitis    Bacteremia, escherichia coli    AMS (altered mental status)    Rhabdomyolysis    Hypocalcemia    Fall    Urinary retention    Thrombocytopenia    Need for prophylactic measure        Consultant(s) Notes Reviewed:  [ x ] YES     Care Discussed with [X] Consultants  [  ] Patient  [  x] Family [  ] HCP [  ]   [  ] Social Service  [ x ] RN, [  ] Physical Therapy,[  ] Palliative care team  DVT PPX: [  ] Lovenox, [  ] S C Heparin, [  ] Coumadin, [  ] Xarelto, [  ] Eliquis, [  ] Pradaxa, [  ] IV Heparin drip, [x  ] SCD [  ] Contraindication 2 to GI Bleed,[  ] Ambulation [ x ] Contraindicated 2 to  bleed [  ] Contraindicated 2 to Brain Bleed  Advanced directive: [x  ] None, [  ] DNR/DNI

## 2023-05-20 NOTE — DISCHARGE NOTE PROVIDER - DISCHARGE SERVICE FOR PATIENT
[FreeTextEntry1] : 58 yo male presenting with severe left knee oa with medial joint space narrowing. Patient's wife present for today's exam\par -Discussed with patient that when pain worsens that they will be indicated for TKA, patient understands\par -Discussed risks, benefits, alternatives of left knee intraarticular CSI, patient tolerated well\par -Activities as tolerated\par -Rest, ice, compression, elevation, NSAIDs PRN for pain. \par -All questions answered\par -F/u 3 months\par  on the discharge service for the patient. I have reviewed and made amendments to the documentation where necessary.

## 2023-05-20 NOTE — DISCHARGE NOTE PROVIDER - NSDCCPCAREPLAN_GEN_ALL_CORE_FT
PRINCIPAL DISCHARGE DIAGNOSIS  Diagnosis: Alcohol dependence with withdrawal  Assessment and Plan of Treatment: STOP Alcol intake   Continue daily MVI , Thiamin & Folic Acid ,Each 1 tab daily  You were admitted & treated in ICU for your Alcohol withdrawal with IV Ativan & Precedex         SECONDARY DISCHARGE DIAGNOSES  Diagnosis: Bacteremia, escherichia coli  Assessment and Plan of Treatment: You had Positive Blood cultures  causing  infection /Sepsis likely cause -Cholengitis-Infection of your CBD -Common Bile duct   Treated with 10 days of IV Antibiotics  by ID    Diagnosis: Transaminitis  Assessment and Plan of Treatment: 2/2 Acute on chronic Alcohol abuse /Intoxication   Your Liver enzymes are elevated   STOP Alcohol intake in  Any forms    Diagnosis: Urinary retention  Assessment and Plan of Treatment: You had urinary Retension   Continue Flomax 0.4 mg daily at bed time.    Diagnosis: Alcoholic hepatitis  Assessment and Plan of Treatment: 2/2 Alcohol intoxication   Follow up with GI Dr Yan in 2weeks   STOP ALcohol intake as it can cause Liver Cirrhosis & Death    Diagnosis: Rhabdomyolysis  Assessment and Plan of Treatment: 2/2 Fall  S/P IV Fluids   Resolved    Diagnosis: AMS (altered mental status)  Assessment and Plan of Treatment: 2/2 Metabolic encephlopathy 2/2  Alcohol intoxication & Sepsis  -Improved  STOP Alcohol intake in any forms     PRINCIPAL DISCHARGE DIAGNOSIS  Diagnosis: Alcohol dependence with withdrawal  Assessment and Plan of Treatment: STOP Alcol intake   Continue daily MVI , Thiamin & Folic Acid ,Each 1 tab daily  You were admitted & treated in ICU for your Alcohol withdrawal with IV Ativan & Precedex         SECONDARY DISCHARGE DIAGNOSES  Diagnosis: Bacteremia, escherichia coli  Assessment and Plan of Treatment: You had Positive Blood cultures  causing  infection /Sepsis likely cause -Cholengitis-Infection of your CBD -Common Bile duct   Treated with 10 days of IV Antibiotics, completed in [facility]; , you were seen  by ID    Diagnosis: Transaminitis  Assessment and Plan of Treatment: 2/2 Acute on chronic Alcohol abuse /Intoxication   Your Liver enzymes are elevated   STOP Alcohol intake in  Any forms    Diagnosis: Sinus tachycardia  Assessment and Plan of Treatment: You have Elevated Heart Rate  2/2 Reactive from alcohol withdrawal, Recent Sepsis  & deconditioning   Seen by cardiologist -started on Medication   Continue Lopressor 25 mg 1 tab 2x day  Follow up with Cardiologist in 1-2 weeks as out pt    Diagnosis: Urinary retention  Assessment and Plan of Treatment: You had urinary Retension treated with Heredia cath in ICU   Continue Flomax 0.4 mg daily at bed time.    Diagnosis: Alcoholic hepatitis  Assessment and Plan of Treatment: 2/2 Alcohol intoxication   Follow up with GI Dr Yan in 2weeks   STOP ALcohol intake as it can cause Liver Cirrhosis & Death    Diagnosis: AMS (altered mental status)  Assessment and Plan of Treatment: 2/2 Metabolic encephlopathy 2/2  Alcohol intoxication & Sepsis  -Improved  STOP Alcohol intake in any forms    Diagnosis: Rhabdomyolysis  Assessment and Plan of Treatment: 2/2 Fall  S/P IV Fluids   Resolved     PRINCIPAL DISCHARGE DIAGNOSIS  Diagnosis: Alcohol dependence with withdrawal  Assessment and Plan of Treatment: You were admitted & treated in ICU for your Alcohol withdrawal with IV Ativan and Precedex.   STOP Alcohol intake   Start outpatient management to quit drinking.   Continue daily multivitamin, Thiamin & Folic Acid supplements daily        SECONDARY DISCHARGE DIAGNOSES  Diagnosis: Bacteremia, escherichia coli  Assessment and Plan of Treatment: You had Positive Blood cultures which means bacterial infection in ypur blood causing Sepsis likely secondary to Cholangitis (Infection of Common Bile duct from the liver).   You were seen by infectious disease doctor.  You received 10 days of IV Antibiotics, treatment was completed in the Hospital and repeated blood cultures were negative.    Diagnosis: Alcoholic hepatitis  Assessment and Plan of Treatment: Hepatitis secondary to Alcohol use.   Follow up with GI Dr Yan in 2 weeks   STOP ALcohol intake as it can cause Liver Cirrhosis & Death    Diagnosis: Transaminitis  Assessment and Plan of Treatment: Elevation in liver enzymes secondary to Acute on chronic Alcohol abuse /Intoxication   STOP Alcohol intake in any forms  Follow up with RIVER Yan in 2 weeks    Diagnosis: Rhabdomyolysis  Assessment and Plan of Treatment: Muscle lesion secondary to fall.  You received IV Fluids and slowly improved.       Diagnosis: Urinary retention  Assessment and Plan of Treatment: You had urinary Retension treated with Heredia cath in ICU   Continue Flomax 0.4 mg daily at bed time.    Diagnosis: AMS (altered mental status)  Assessment and Plan of Treatment: 2/2 Metabolic encephlopathy 2/2  Alcohol intoxication & Sepsis  -Improved  STOP Alcohol intake in any forms    Diagnosis: Sinus tachycardia  Assessment and Plan of Treatment: You have elevated Heart Rate secondary to alcohol withdrawal, Recent Sepsis  & deconditioning   Seen by cardiologist and ypu were started on Medication   Continue to take Lopressor 25 mg 1 tab  every 12 hours (2 times per day)   Follow up with Cardiologist in 1-2 weeks as out pt     PRINCIPAL DISCHARGE DIAGNOSIS  Diagnosis: Alcohol dependence with withdrawal  Assessment and Plan of Treatment: You were admitted & treated in ICU for your Alcohol withdrawal with IV Ativan and Precedex.   STOP Alcohol intake   Start outpatient management to quit drinking.   Continue daily multivitamin, Thiamin & Folic Acid supplements daily        SECONDARY DISCHARGE DIAGNOSES  Diagnosis: Alcoholic hepatitis  Assessment and Plan of Treatment: Hepatitis secondary to Alcohol use.   Follow up with GI Dr Yan in 2 weeks   STOP ALcohol intake as it can cause Liver Cirrhosis & Death    Diagnosis: Bacteremia, escherichia coli  Assessment and Plan of Treatment: You had Positive Blood cultures which means bacterial infection in ypur blood causing Sepsis likely secondary to Cholangitis (Infection of Common Bile duct from the liver).   You were seen by infectious disease doctor.  You received 10 days of IV Antibiotics, treatment was completed in the Hospital and repeated blood cultures were negative.    Diagnosis: Transaminitis  Assessment and Plan of Treatment: Elevation in liver enzymes secondary to Acute on chronic Alcohol abuse /Intoxication   STOP Alcohol intake in any forms  Follow up with GI Dr Yan in 2 weeks    Diagnosis: Rhabdomyolysis  Assessment and Plan of Treatment: Muscle lesion secondary to fall.  You received IV Fluids and slowly improved.       Diagnosis: Urinary retention  Assessment and Plan of Treatment: You had urinary Retension treated with Heredia cath in ICU   Continue Flomax 0.4 mg daily at bed time.    Diagnosis: AMS (altered mental status)  Assessment and Plan of Treatment: 2/2 Metabolic encephlopathy 2/2  Alcohol intoxication & Sepsis  -Improved  STOP Alcohol intake in any forms    Diagnosis: Sinus tachycardia  Assessment and Plan of Treatment: You have elevated Heart Rate secondary to alcohol withdrawal, Recent Sepsis  & deconditioning   Seen by cardiologist and ypu were started on Medication   Continue to take Lopressor 25 mg 1 tab  every 12 hours (2 times per day)   Follow up with Cardiologist in 1-2 weeks as out pt    Diagnosis: Thrombocytosis  Assessment and Plan of Treatment: Your initial low platelet count resolved and began to increase. You were started on a baby aspirin. Continue to take your baby aspirin daily.   Follow up with hematologist Dr. Guidry 1 week after discharge.     PRINCIPAL DISCHARGE DIAGNOSIS  Diagnosis: Alcohol dependence with withdrawal  Assessment and Plan of Treatment: You were admitted & treated in ICU for your Alcohol withdrawal with IV Ativan and Precedex.   STOP Alcohol intake   Start outpatient management to quit drinking.   Continue daily multivitamin, Thiamin & Folic Acid supplements daily        SECONDARY DISCHARGE DIAGNOSES  Diagnosis: Alcoholic hepatitis  Assessment and Plan of Treatment: Hepatitis secondary to Alcohol use.   Follow up with GI Dr Yan in 2 weeks   STOP ALcohol intake as it can cause Liver Cirrhosis & Death    Diagnosis: Bacteremia, escherichia coli  Assessment and Plan of Treatment: You had Positive Blood cultures which means bacterial infection in ypur blood causing Sepsis likely secondary to Cholangitis (Infection of Common Bile duct from the liver).   You were seen by infectious disease doctor.  You received 10 days of IV Antibiotics, treatment was completed in the Hospital and repeated blood cultures were negative.    Diagnosis: Transaminitis  Assessment and Plan of Treatment: Elevation in liver enzymes secondary to Acute on chronic Alcohol abuse /Intoxication   STOP Alcohol intake in any forms  Follow up with GI Dr Yan in 2 weeks    Diagnosis: Rhabdomyolysis  Assessment and Plan of Treatment: Muscle lesion secondary to fall.  You received IV Fluids and slowly improved.       Diagnosis: Urinary retention  Assessment and Plan of Treatment: You had urinary Retension treated with Heredia cath in ICU   Continue Flomax 0.4 mg daily at bed time.    Diagnosis: AMS (altered mental status)  Assessment and Plan of Treatment: 2/2 Metabolic encephlopathy 2/2  Alcohol intoxication & Sepsis  -Improved  STOP Alcohol intake in any forms    Diagnosis: Sinus tachycardia  Assessment and Plan of Treatment: You have elevated Heart Rate secondary to alcohol withdrawal, Recent Sepsis  & deconditioning   Seen by cardiologist and ypu were started on Medication   Continue to take Lopressor 25 mg 1 tab  every 12 hours (2 times per day)   Follow up with Cardiologist in 1-2 weeks as out pt.  Your cardiologist Dr. Anand will follow up for the results of the echo that you had.    Diagnosis: Thrombocytosis  Assessment and Plan of Treatment: Your initial low platelet count resolved and began to increase. You were started on a baby aspirin. Continue to take your baby aspirin daily.   Follow up with hematologist Dr. Guidry 1 week after discharge.

## 2023-05-21 LAB
ALBUMIN SERPL ELPH-MCNC: 2.1 G/DL — LOW (ref 3.3–5)
ALP SERPL-CCNC: 162 U/L — HIGH (ref 40–120)
ALT FLD-CCNC: 263 U/L — HIGH (ref 12–78)
ANION GAP SERPL CALC-SCNC: 6 MMOL/L — SIGNIFICANT CHANGE UP (ref 5–17)
AST SERPL-CCNC: 363 U/L — HIGH (ref 15–37)
BILIRUB SERPL-MCNC: 9 MG/DL — HIGH (ref 0.2–1.2)
BUN SERPL-MCNC: 10 MG/DL — SIGNIFICANT CHANGE UP (ref 7–23)
CALCIUM SERPL-MCNC: 8.7 MG/DL — SIGNIFICANT CHANGE UP (ref 8.5–10.1)
CHLORIDE SERPL-SCNC: 104 MMOL/L — SIGNIFICANT CHANGE UP (ref 96–108)
CO2 SERPL-SCNC: 26 MMOL/L — SIGNIFICANT CHANGE UP (ref 22–31)
CREAT SERPL-MCNC: 0.63 MG/DL — SIGNIFICANT CHANGE UP (ref 0.5–1.3)
EGFR: 125 ML/MIN/1.73M2 — SIGNIFICANT CHANGE UP
GLUCOSE SERPL-MCNC: 89 MG/DL — SIGNIFICANT CHANGE UP (ref 70–99)
HCT VFR BLD CALC: 36.6 % — LOW (ref 39–50)
HGB BLD-MCNC: 12.2 G/DL — LOW (ref 13–17)
INR BLD: 1.05 RATIO — SIGNIFICANT CHANGE UP (ref 0.88–1.16)
MAGNESIUM SERPL-MCNC: 2.1 MG/DL — SIGNIFICANT CHANGE UP (ref 1.6–2.6)
MCHC RBC-ENTMCNC: 27.9 PG — SIGNIFICANT CHANGE UP (ref 27–34)
MCHC RBC-ENTMCNC: 33.3 GM/DL — SIGNIFICANT CHANGE UP (ref 32–36)
MCV RBC AUTO: 83.8 FL — SIGNIFICANT CHANGE UP (ref 80–100)
NRBC # BLD: 0 /100 WBCS — SIGNIFICANT CHANGE UP (ref 0–0)
PHOSPHATE SERPL-MCNC: 2.8 MG/DL — SIGNIFICANT CHANGE UP (ref 2.5–4.5)
PLATELET # BLD AUTO: 88 K/UL — LOW (ref 150–400)
POTASSIUM SERPL-MCNC: 3.6 MMOL/L — SIGNIFICANT CHANGE UP (ref 3.5–5.3)
POTASSIUM SERPL-SCNC: 3.6 MMOL/L — SIGNIFICANT CHANGE UP (ref 3.5–5.3)
PROT SERPL-MCNC: 6.2 G/DL — SIGNIFICANT CHANGE UP (ref 6–8.3)
PROTHROM AB SERPL-ACNC: 12.3 SEC — SIGNIFICANT CHANGE UP (ref 10.5–13.4)
RBC # BLD: 4.37 M/UL — SIGNIFICANT CHANGE UP (ref 4.2–5.8)
RBC # FLD: 16 % — HIGH (ref 10.3–14.5)
SODIUM SERPL-SCNC: 136 MMOL/L — SIGNIFICANT CHANGE UP (ref 135–145)
WBC # BLD: 4.67 K/UL — SIGNIFICANT CHANGE UP (ref 3.8–10.5)
WBC # FLD AUTO: 4.67 K/UL — SIGNIFICANT CHANGE UP (ref 3.8–10.5)

## 2023-05-21 PROCEDURE — 99291 CRITICAL CARE FIRST HOUR: CPT | Mod: GC

## 2023-05-21 RX ORDER — SODIUM CHLORIDE 9 MG/ML
1000 INJECTION, SOLUTION INTRAVENOUS ONCE
Refills: 0 | Status: COMPLETED | OUTPATIENT
Start: 2023-05-21 | End: 2023-05-21

## 2023-05-21 RX ORDER — DEXMEDETOMIDINE HYDROCHLORIDE IN 0.9% SODIUM CHLORIDE 4 UG/ML
0.5 INJECTION INTRAVENOUS
Qty: 400 | Refills: 0 | Status: DISCONTINUED | OUTPATIENT
Start: 2023-05-21 | End: 2023-05-23

## 2023-05-21 RX ORDER — TAMSULOSIN HYDROCHLORIDE 0.4 MG/1
0.4 CAPSULE ORAL AT BEDTIME
Refills: 0 | Status: DISCONTINUED | OUTPATIENT
Start: 2023-05-21 | End: 2023-05-26

## 2023-05-21 RX ORDER — THIAMINE MONONITRATE (VIT B1) 100 MG
100 TABLET ORAL DAILY
Refills: 0 | Status: DISCONTINUED | OUTPATIENT
Start: 2023-05-21 | End: 2023-05-22

## 2023-05-21 RX ORDER — ACETAMINOPHEN 500 MG
650 TABLET ORAL ONCE
Refills: 0 | Status: COMPLETED | OUTPATIENT
Start: 2023-05-21 | End: 2023-05-21

## 2023-05-21 RX ADMIN — DEXMEDETOMIDINE HYDROCHLORIDE IN 0.9% SODIUM CHLORIDE 11.9 MICROGRAM(S)/KG/HR: 4 INJECTION INTRAVENOUS at 21:35

## 2023-05-21 RX ADMIN — Medication 1 MILLIGRAM(S): at 12:02

## 2023-05-21 RX ADMIN — Medication 650 MILLIGRAM(S): at 18:21

## 2023-05-21 RX ADMIN — Medication 2 MILLIGRAM(S): at 21:32

## 2023-05-21 RX ADMIN — TAMSULOSIN HYDROCHLORIDE 0.4 MILLIGRAM(S): 0.4 CAPSULE ORAL at 21:32

## 2023-05-21 RX ADMIN — Medication 2 MILLIGRAM(S): at 18:04

## 2023-05-21 RX ADMIN — Medication 650 MILLIGRAM(S): at 18:29

## 2023-05-21 RX ADMIN — Medication 2 MILLIGRAM(S): at 14:07

## 2023-05-21 RX ADMIN — Medication 2 MILLIGRAM(S): at 05:11

## 2023-05-21 RX ADMIN — SODIUM CHLORIDE 1000 MILLILITER(S): 9 INJECTION, SOLUTION INTRAVENOUS at 18:21

## 2023-05-21 RX ADMIN — Medication 2 MILLIGRAM(S): at 01:40

## 2023-05-21 RX ADMIN — DEXMEDETOMIDINE HYDROCHLORIDE IN 0.9% SODIUM CHLORIDE 2.38 MICROGRAM(S)/KG/HR: 4 INJECTION INTRAVENOUS at 01:59

## 2023-05-21 RX ADMIN — Medication 100 MILLIGRAM(S): at 12:03

## 2023-05-21 RX ADMIN — PANTOPRAZOLE SODIUM 40 MILLIGRAM(S): 20 TABLET, DELAYED RELEASE ORAL at 12:03

## 2023-05-21 RX ADMIN — DEXMEDETOMIDINE HYDROCHLORIDE IN 0.9% SODIUM CHLORIDE 2.38 MICROGRAM(S)/KG/HR: 4 INJECTION INTRAVENOUS at 06:55

## 2023-05-21 RX ADMIN — DEXMEDETOMIDINE HYDROCHLORIDE IN 0.9% SODIUM CHLORIDE 11.9 MICROGRAM(S)/KG/HR: 4 INJECTION INTRAVENOUS at 22:31

## 2023-05-21 RX ADMIN — Medication 2 MILLIGRAM(S): at 10:26

## 2023-05-21 RX ADMIN — CEFTRIAXONE 100 MILLIGRAM(S): 500 INJECTION, POWDER, FOR SOLUTION INTRAMUSCULAR; INTRAVENOUS at 14:07

## 2023-05-21 RX ADMIN — SODIUM CHLORIDE 75 MILLILITER(S): 9 INJECTION, SOLUTION INTRAVENOUS at 02:00

## 2023-05-21 RX ADMIN — CHLORHEXIDINE GLUCONATE 1 APPLICATION(S): 213 SOLUTION TOPICAL at 14:07

## 2023-05-21 RX ADMIN — Medication 2 MILLIGRAM(S): at 20:51

## 2023-05-21 RX ADMIN — Medication 2 MILLIGRAM(S): at 20:18

## 2023-05-21 NOTE — PROGRESS NOTE ADULT - SUBJECTIVE AND OBJECTIVE BOX
[facility]  Juliocesar Godfrey PA-C  [address] [place] [address]  [phone]      INTERVAL HPI/OVERNIGHT EVENTS:  Pt s/e in ICU, lethargic, aunt at bedside   Bilirubin noted    MEDICATIONS  (STANDING):  cefTRIAXone   IVPB 2000 milliGRAM(s) IV Intermittent every 24 hours  chlorhexidine 2% Cloths 1 Application(s) Topical <User Schedule>  dexMEDEtomidine Infusion 0.1 MICROgram(s)/kG/Hr (2.38 mL/Hr) IV Continuous <Continuous>  folic acid Injectable 1 milliGRAM(s) IV Push daily  lactated ringers. 1000 milliLiter(s) (75 mL/Hr) IV Continuous <Continuous>  LORazepam   Injectable 2 milliGRAM(s) IV Push every 4 hours  pantoprazole  Injectable 40 milliGRAM(s) IV Push daily  tamsulosin 0.4 milliGRAM(s) Oral at bedtime  thiamine Injectable 100 milliGRAM(s) IV Push daily    MEDICATIONS  (PRN):  chlorproMAZINE    Injectable 25 milliGRAM(s) IntraMuscular every 8 hours PRN Hiccups  LORazepam   Injectable 2 milliGRAM(s) IV Push every 1 hour PRN Agitation        Allergies    No Known Allergies      PHYSICAL EXAM:   Vital Signs Last 24 Hrs  T(C): 37.8 (21 May 2023 08:00), Max: 37.8 (21 May 2023 08:00)  T(F): 100 (21 May 2023 08:00), Max: 100 (21 May 2023 08:00)  HR: 72 (21 May 2023 09:00) (69 - 72)  BP: 94/63 (21 May 2023 09:00) (89/57 - 128/88)  BP(mean): 73 (21 May 2023 09:00) (68 - 104)  RR: 19 (21 May 2023 09:00) (13 - 20)  SpO2: 97% (21 May 2023 09:00) (95% - 98%)    Parameters below as of 21 May 2023 08:00  Patient On (Oxygen Delivery Method): nasal cannula  O2 Flow (L/min): 2      Daily     Daily Weight in k.4 (19 May 2023 07:30)    GENERAL:  Appears stated age  HEENT:  NC/AT  CHEST:  Full & symmetric excursion  HEART:  Regular rhythm  ABDOMEN:  Soft, non-tender, non-distended  EXTEREMITIES:  no cyanosis  SKIN:  No rash  NEURO:  Lethargic      LABS:                                          12.2   4.67  )-----------( 88       ( 21 May 2023 06:08 )             36.6   05-    136  |  104  |  10  ----------------------------<  89  3.6   |  26  |  0.63    Ca    8.7      21 May 2023 06:08  Phos  2.8       Mg     2.1         TPro  6.2  /  Alb  2.1<L>  /  TBili  9.0<H>  /  DBili  x   /  AST  363<H>  /  ALT  263<H>  /  AlkPhos  162<H>  05-21    PT/INR - ( 21 May 2023 06:08 )   PT: 12.3 sec;   INR: 1.05 ratio         PTT - ( 20 May 2023 06:08 )  PTT:27.9 sec    Urinalysis Basic - ( 17 May 2023 15:50 )    Color: Yellow / Appearance: Clear / S.010 / pH: x  Gluc: x / Ketone: Negative  / Bili: Negative / Urobili: Negative   Blood: x / Protein: Negative / Nitrite: Negative   Leuk Esterase: Trace / RBC: >50 /HPF / WBC 3-5   Sq Epi: x / Non Sq Epi: x / Bacteria: Occasional

## 2023-05-21 NOTE — PROGRESS NOTE ADULT - ASSESSMENT
38M with alcohol abuse and fatty liver presents s/p fall found to be in alcohol withdrawal, rhabdomyolysis, and liver failure 2/2 alcoholic hepatitis    Neuro:  - ETOH withdrawal  - Attempt to wean off precedex for sedation today as tolerated, use Ativan/Haldol for agitation  - Ativan 2mg q4h standing, 2mg q1h PRN agitation - once pt off precedex will taper down Ativan  - Thiamine 500mg IVP TID, folic acid 1mg IVP QD    CV:  - No active issues  - Maintain MAP >65    Pulm:   - On 2L NC. Wean respiratory support as tolerated    GI:  - Mild hepatic encephalopathy, ammonia elevated on admission  - US, CT A/P with hepatic steatosis and hepatomegaly  - Clear liquid diet pending bedside swallow eval, Protonix QD  - Liver failure with improving LFTs, avoid hepatotoxic meds  - Bili downtrending, 16.6 -> 10.9 -> 9  - MRCP ordered, however family unable to confirm if patient has metal in his body. Will conduct safety screening on patient once more awake and alert    Renal:  - Rhabdo improving  - Continue IVF: LR @ 75cc/hr  - Hyponatremia resolved, likely 2/2 ETOH abuse and hepatic disease  - Blood clots noted in squires overnight, resolved with irrigation. Start Flomax  - Monitor renal function and lytes, replete PRN    Heme:  - SCDs for DVT ppx - in setting of improving thrombocytopenia may start Lovenox tomorrow  - Thrombocytopenia improving, likely 2/2 bone marrow suppression from ETOH  - LDH elevated, haptoglobin WNL  - Continue to trend INR    ID:  - E.coli bacteremia on BCx 5/17. Repeat BCx from 5/19 NGTD  - Transitioned from Zosyn to Rocephin on 5/20. Contine Rocephin  - Lactate downtrended, now WNL    Endo:  - No active issues  - FS q6h while NPO    Skin:  - Squires, restraints in place    Dispo:  - Full Code

## 2023-05-21 NOTE — PROGRESS NOTE ADULT - ASSESSMENT
38 year old male with a history of alcohol abuse, alcohol withdrawal seizures, fatty liver presents with signs of alcohol abuse/withdrawal    etoh use   jennifer  harry  hepatitis  steatosis    ativan - atc  ativan prn  on ABX    trend LFT  serial LABS  I and O  monitor VS and Sat  ciwa monitoring  pulse ox - tele monitoring  folic acid  thiamine

## 2023-05-21 NOTE — PROGRESS NOTE ADULT - SUBJECTIVE AND OBJECTIVE BOX
Date/Time Patient Seen:  		  Referring MD:   Data Reviewed	       Patient is a 38y old  Male who presents with a chief complaint of Hepatitis (20 May 2023 15:35)      Subjective/HPI     PAST MEDICAL & SURGICAL HISTORY:  No pertinent past medical history    Alcohol abuse    H/O hernia repair    S/P appendectomy          Medication list         MEDICATIONS  (STANDING):  cefTRIAXone   IVPB 2000 milliGRAM(s) IV Intermittent every 24 hours  chlorhexidine 2% Cloths 1 Application(s) Topical <User Schedule>  dexMEDEtomidine Infusion 0.1 MICROgram(s)/kG/Hr (2.38 mL/Hr) IV Continuous <Continuous>  folic acid Injectable 1 milliGRAM(s) IV Push daily  lactated ringers. 1000 milliLiter(s) (75 mL/Hr) IV Continuous <Continuous>  LORazepam   Injectable 2 milliGRAM(s) IV Push every 4 hours  pantoprazole  Injectable 40 milliGRAM(s) IV Push daily    MEDICATIONS  (PRN):  chlorproMAZINE    Injectable 25 milliGRAM(s) IntraMuscular every 8 hours PRN Hiccups  LORazepam   Injectable 2 milliGRAM(s) IV Push every 1 hour PRN Agitation         Vitals log        ICU Vital Signs Last 24 Hrs  T(C): 37.5 (21 May 2023 04:00), Max: 37.5 (21 May 2023 00:00)  T(F): 99.5 (21 May 2023 04:00), Max: 99.5 (21 May 2023 00:00)  HR: 70 (21 May 2023 05:00) (69 - 72)  BP: 102/67 (21 May 2023 05:00) (89/57 - 137/76)  BP(mean): 79 (21 May 2023 05:00) (68 - 104)  ABP: --  ABP(mean): --  RR: 13 (21 May 2023 05:00) (13 - 20)  SpO2: 97% (21 May 2023 05:00) (96% - 98%)    O2 Parameters below as of 20 May 2023 08:00  Patient On (Oxygen Delivery Method): nasal cannula  O2 Flow (L/min): 2               Input and Output:  I&O's Detail    19 May 2023 07:01  -  20 May 2023 07:00  --------------------------------------------------------  IN:    Dexmedetomidine: 299.6 mL    IV PiggyBack: 200 mL    IV PiggyBack: 300 mL    Lactated Ringers: 300 mL    Lactated Ringers: 1650 mL  Total IN: 2749.6 mL    OUT:    Indwelling Catheter - Urethral (mL): 4210 mL  Total OUT: 4210 mL    Total NET: -1460.4 mL      20 May 2023 07:01  -  21 May 2023 05:56  --------------------------------------------------------  IN:    Dexmedetomidine: 521.2 mL    IV PiggyBack: 50 mL    IV PiggyBack: 499.8 mL    IV PiggyBack: 300 mL    IV PiggyBack: 200 mL    Lactated Ringers: 1650 mL  Total IN: 3221 mL    OUT:    Indwelling Catheter - Urethral (mL): 3290 mL  Total OUT: 3290 mL    Total NET: -69 mL          Lab Data                        11.8   5.71  )-----------( 59       ( 20 May 2023 06:08 )             35.2     05-20    140  |  106  |  5<L>  ----------------------------<  108<H>  3.2<L>   |  27  |  0.58    Ca    8.3<L>      20 May 2023 06:08  Phos  2.0     05-20  Mg     2.1     05-20    TPro  5.6<L>  /  Alb  1.9<L>  /  TBili  10.9<H>  /  DBili  x   /  AST  485<H>  /  ALT  273<H>  /  AlkPhos  178<H>  05-20      CARDIAC MARKERS ( 19 May 2023 07:00 )  x     / x     / 547 U/L / x     / x            Review of Systems	      Objective     Physical Examination    heart s1s2  lung dec BS    Pertinent Lab findings & Imaging      Giovanna:  NO   Adequate UO     I&O's Detail    19 May 2023 07:01  -  20 May 2023 07:00  --------------------------------------------------------  IN:    Dexmedetomidine: 299.6 mL    IV PiggyBack: 200 mL    IV PiggyBack: 300 mL    Lactated Ringers: 300 mL    Lactated Ringers: 1650 mL  Total IN: 2749.6 mL    OUT:    Indwelling Catheter - Urethral (mL): 4210 mL  Total OUT: 4210 mL    Total NET: -1460.4 mL      20 May 2023 07:01  -  21 May 2023 05:56  --------------------------------------------------------  IN:    Dexmedetomidine: 521.2 mL    IV PiggyBack: 50 mL    IV PiggyBack: 499.8 mL    IV PiggyBack: 300 mL    IV PiggyBack: 200 mL    Lactated Ringers: 1650 mL  Total IN: 3221 mL    OUT:    Indwelling Catheter - Urethral (mL): 3290 mL  Total OUT: 3290 mL    Total NET: -69 mL               Discussed with:     Cultures:	        Radiology

## 2023-05-21 NOTE — PROGRESS NOTE ADULT - ATTENDING COMMENTS
38M with alcohol abuse and fatty liver presents s/p fall found to be in alcohol withdrawal, rhabdomyolysis, alcoholic hepatitis with course complicated by E Coli bacteremia.    Problems:  Alcohol withdrawal  Alcoholic hepatitis  Sinus tachycardia  E Coli bacteremia  hyperbilirubinemia  pancytopenia    Neuro: ETOH withdrawal with high CIWA, continue ativan 2mg q4 with ativan 2mg q1hr prn agitation. Add haldol prn agitation. Can taper ativan once off precedex. ammonia mildly elevated on admission but likely not contributing to mental status  Pulm: no active issues  CV: tachycardia improving, continue tele monitoring  Skin/Lines: squires catheter, await clearance of hematuria prior to DC  GI: Liver failure 2/2 alcoholic hepatitis; trend MELD daily; GI consulted, appreciate recommendations; LFTs and bili improving; MRCP ordered, however, family is unable to answer questions regarding MRI safety screen, bili improving and bacteremia improved so doubt cholangitis at this point. Advance diet as tolerated  /Renal: lactic acidosis likely 2/2 liver failure, resolved; also with rhabdomyolysis, now resolved. Continue IVF, trend I&Os, BMPs  Heme/DVT PPX: SCDs given thrombocytopenia; pancytopenia likely 2/2 liver dysfunction and alcoholism; trend BMPs  Endo: No active issues, trend glucose on BMP  ID: Pt with ecoli bacteremia, concern for GI source; narrow abx to CTX, repeat cx NGTD  Ethics: Full code, plan d/w aunt at bedside using

## 2023-05-21 NOTE — PROGRESS NOTE ADULT - ATTENDING COMMENTS
38 M PMH of alcohol abuse, alcohol withdrawal seizures, fatty liver presents with signs of alcohol abuse/withdrawal. Admitted for AMS , alcohol withdrawal.   transaminitis ,Alcoholic hepatitis & Hyponatremia. Low K, Low Ca, Low Mag .  Pt  seen, Examined, case & care plan d/w , residents at detail.  Consults:  ID Dr Gant/Dr Morfin  -IV Abx -Rocephin daily-Cholangitis likely   GI-Dr Leach follow up-Likely Cholangitis   [facility]   Social service eval  Care as per ICU Team. d/w -Taper off Precedex   AM labs   DVT PPx  NPO, IVF.    Total Care time is 45 minutes.

## 2023-05-21 NOTE — PROGRESS NOTE ADULT - ASSESSMENT
etoh abuse  alcoholic hepatitis    diet as tolerated  calculated DF is 20   no need for sternoids  bili noted; likely 2/2 EtOH follow up   check daily lfts and PT/INR  [facility] protocol  can consider ursodiol  management per ICU    I reviewed the overnight course of events on the unit, re-confirming the patient history. I discussed the care with the patient and their family  Differential diagnosis and plan of care discussed with patient after the evaluation  40 minutes spent on total encounter of which more than fifty percent of the encounter was spent counseling and/or coordinating care by the attending physician.  Advanced care planning was discussed with patient and family.  Advanced care planning forms were reviewed and discussed.  Risks, benefits and alternatives of gastroenterologic procedures were discussed in detail and all questions were answered.

## 2023-05-21 NOTE — CHART NOTE - NSCHARTNOTEFT_GEN_A_CORE
Assessment: Pt seen for nutrition follow-up. Chart reviewed, hospital course noted.    Brief hx: Pt is a "38M PMH of alcohol abuse, alcohol withdrawal seizures, fatty liver presents with signs of alcohol abuse/withdrawal. Admitted for AMS , alcohol withdrawal found to have alcoholic hepatitis, electrolyte abnormalities, ecoli bacteremia."    Visited pt at bedside this am. Pt sleeping soundly during visit. On precedex for sedation. Remains NPO x 4 days. Receiving IVFs; LR@75ml/hr. Diet now advanced to clear liquids. No po intakes thus far. No N/V/D/C per chart review. +BM 5/18. Recommend advancing diet as tolerated to better meet pt's estimated energy/protein needs.     Factors impacting intake: [ ] none [ ] nausea  [ ] vomiting [ ] diarrhea [ ] constipation  [ ]chewing problems [ ] swallowing issues  [X] other: metabolic Encephelopathy, alcohol withdrawal    Diet Prescription: Diet, Clear Liquid (05-21-23 @ 11:39)    Intake: NPO this am, diet now advanced to clears    Current Weight: 5/17 209.4#, 5/21 201.7# (no edema noted)  Possible 7#/3.4% weight loss x < 1 week    Pertinent Medications: MEDICATIONS  (STANDING):  cefTRIAXone   IVPB 2000 milliGRAM(s) IV Intermittent every 24 hours  chlorhexidine 2% Cloths 1 Application(s) Topical <User Schedule>  dexMEDEtomidine Infusion 0.1 MICROgram(s)/kG/Hr (2.38 mL/Hr) IV Continuous <Continuous>  folic acid Injectable 1 milliGRAM(s) IV Push daily  lactated ringers. 1000 milliLiter(s) (75 mL/Hr) IV Continuous <Continuous>  LORazepam   Injectable 2 milliGRAM(s) IV Push every 4 hours  pantoprazole  Injectable 40 milliGRAM(s) IV Push daily  tamsulosin 0.4 milliGRAM(s) Oral at bedtime  thiamine Injectable 100 milliGRAM(s) IV Push daily    MEDICATIONS  (PRN):  chlorproMAZINE    Injectable 25 milliGRAM(s) IntraMuscular every 8 hours PRN Hiccups  LORazepam   Injectable 2 milliGRAM(s) IV Push every 1 hour PRN Agitation    Pertinent Labs: 05-21 Na136 mmol/L Glu 89 mg/dL K+ 3.6 mmol/L Cr  0.63 mg/dL BUN 10 mg/dL 05-21 Phos 2.8 mg/dL 05-21 Alb 2.1 g/dL<L>    CAPILLARY BLOOD GLUCOSE  POCT Blood Glucose.: 101 mg/dL (21 May 2023 12:10)  POCT Blood Glucose.: 91 mg/dL (21 May 2023 05:04)  POCT Blood Glucose.: 111 mg/dL (20 May 2023 23:20)  POCT Blood Glucose.: 108 mg/dL (20 May 2023 18:12)    Skin: no pressure ulcers    Estimated Needs:   [X] no change since previous assessment: based on .3#/64.9kg  25-30kcal/kg (1622-1947kcal)  1.2-1.4g pro/kg (78-91gm protein)  [ ] recalculated:     Previous Nutrition Diagnosis:   [ ] Inadequate Energy Intake [ ]Inadequate Oral Intake [ ] Excessive Energy Intake   [ ] Underweight [ ] Increased Nutrient Needs [ ] Overweight/Obesity [X] Excessive ETOH Intake  [X] Altered Nutrition Related Lab Values [ ] Unintended Weight Loss [ ] Food & Nutrition Related Knowledge Deficit [ ] Malnutrition     Nutrition Diagnosis is [X] ongoing  [ ] resolved [ ] not applicable     New Nutrition Diagnosis: [ ] not applicable [X] Malnutrition: severe malnutrition in the context of acute illness related to inability to consume sufficient energy, NPO status as evidenced by <50%of EER for > 5 days, >2% weight loss x 1 week.     Interventions:   Recommend  [ ] Change Diet To:  [ ] Nutrition Supplement  [ ] Nutrition Support  [X] Other: Pt to remain NPO with IVFs at this time; as medically feasible recommend initiating clear liquid diet with ensure clear TID and advance as tolerated  Recommend MVI with minerals daily, continue thiamine and folic acid supplementation    Monitoring and Evaluation:   [ ] PO intake [ x ] Tolerance to diet prescription [ x ] weights [ x ] labs[ x ] follow up per protocol  [X] other: s/s GI distress, bowel function, skin integrity/ edema

## 2023-05-21 NOTE — PROGRESS NOTE ADULT - SUBJECTIVE AND OBJECTIVE BOX
Patient is a 38y old  Male who presents with a chief complaint of Hepatitis (21 May 2023 10:11)    Interval events: Noted to have blood clots in [place] overnight, irrigated with resolution of clots. Still on Precedex this AM, with incomprehensible mumbling, when asked how he's doing states "ok", responds to commands. Noted to be attempting to pull at [place]    Review of Systems:  Unable to obtain 2/2 lethargy    T(F): 99.1 (05-21-23 @ 10:00), Max: 100 (05-21-23 @ 08:00)  HR: 69 (05-21-23 @ 10:00) (69 - 72)  BP: 90/56 (05-21-23 @ 10:00) (89/57 - 122/84)  RR: 15 (05-21-23 @ 10:00) (13 - 20)  SpO2: 96% (05-21-23 @ 10:00) (95% - 98%)  Wt(kg): --        CAPILLARY BLOOD GLUCOSE      POCT Blood Glucose.: 91 mg/dL (21 May 2023 05:04)    I&O's Summary    20 May 2023 07:01  -  21 May 2023 07:00  --------------------------------------------------------  IN: 3418.6 mL / OUT: 3360 mL / NET: 58.6 mL    21 May 2023 07:01  -  21 May 2023 11:23  --------------------------------------------------------  IN: 278.4 mL / OUT: 0 mL / NET: 278.4 mL        Physical Exam:   General: NAD  CNS: Somnolent, somewhat arousable. On Precedex  HEENT: PERRLA; conjunctive and sclera clear. Ecchymosis/laceration noted on R eye and eyebrow  Resp: CTA B/L, no wheezes, rales or rhonchi  CVS: RRR, +S1/S2, no m/r/g  Abd: Soft, nontender, nondistended, +BS, no palpable masses  Ext: No LE edema, cyanosis  Skin: [place] in place    Meds:  MEDICATIONS  (STANDING):  cefTRIAXone   IVPB 2000 milliGRAM(s) IV Intermittent every 24 hours  chlorhexidine 2% Cloths 1 Application(s) Topical <User Schedule>  dexMEDEtomidine Infusion 0.1 MICROgram(s)/kG/Hr (2.38 mL/Hr) IV Continuous <Continuous>  folic acid Injectable 1 milliGRAM(s) IV Push daily  lactated ringers. 1000 milliLiter(s) (75 mL/Hr) IV Continuous <Continuous>  LORazepam   Injectable 2 milliGRAM(s) IV Push every 4 hours  pantoprazole  Injectable 40 milliGRAM(s) IV Push daily  tamsulosin 0.4 milliGRAM(s) Oral at bedtime  thiamine Injectable 100 milliGRAM(s) IV Push daily    MEDICATIONS  (PRN):  chlorproMAZINE    Injectable 25 milliGRAM(s) IntraMuscular every 8 hours PRN Hiccups  LORazepam   Injectable 2 milliGRAM(s) IV Push every 1 hour PRN Agitation                            12.2   4.67  )-----------( 88       ( 21 May 2023 06:08 )             36.6       05-21    136  |  104  |  10  ----------------------------<  89  3.6   |  26  |  0.63    Ca    8.7      21 May 2023 06:08  Phos  2.8     05-21  Mg     2.1     05-21    TPro  6.2  /  Alb  2.1<L>  /  TBili  9.0<H>  /  DBili  x   /  AST  363<H>  /  ALT  263<H>  /  AlkPhos  162<H>  05-21          PT/INR - ( 21 May 2023 06:08 )   PT: 12.3 sec;   INR: 1.05 ratio         PTT - ( 20 May 2023 06:08 )  PTT:27.9 sec    .Blood Blood-Peripheral   No growth to date. -- 05-19 @ 07:00  .Blood Blood   Growth in aerobic and anaerobic bottles: Escherichia coli  See previous culture [id number]   Growth in aerobic bottle: Gram Negative Rods  Growth in anaerobic bottle: Gram Negative Rods 05-17 @ 08:45  .Blood Blood   Growth in aerobic and anaerobic bottles: Escherichia coli  ***Blood Panel PCR results on this specimen are available  approximately 3 hours after the Gram stain result.***  Gram stain, PCR, and/or culture results may not always  correspond due to difference in methodologies.  ************************************************************  This PCR assay was performed by multiplex PCR. This  Assay tests for 66 bacterial and resistance gene targets.  Please refer to the [facility] Labs test directory  at [email or web address] for details.   Growth in anaerobic bottle: Gram Negative Rods  Growth in aerobic bottle: Gram Negative Rods 05-17 @ 08:30          CENTRAL LINE: N  [place]: Y  A-LINE: N    GLOBAL ISSUE/BEST PRACTICE:  Analgesia: N  Sedation: Precedex  HOB elevation: yes  Stress ulcer prophylaxis: Protonix  VTE prophylaxis: SCDs  Glycemic control: N  Nutrition: N    CODE STATUS: Full Code

## 2023-05-21 NOTE — PROGRESS NOTE ADULT - SUBJECTIVE AND OBJECTIVE BOX
[facility] DIV[facility] OF INFECTIOUS DISEASES  LAUREN Gallegos Y. Patel, S. Shah, G. Casimir  [phone]  ([phone] - weekdays after 5pm and weekends)    Name: DIANE GARAY  Age/Gender: 38y Male  MRN: [id number]    Interval History:  Patient seen and examined.  Resting comfortably  Unable to obtain full ROS  Notes reviewed. Tm 100F  Mother at bedside  Allergies: No Known Allergies      Objective:  Vitals:   T(F): 99.1 (05-21-23 @ 15:28), Max: 100 (05-21-23 @ 08:00)  HR: 94 (05-21-23 @ 15:00) (69 - 98)  BP: 91/62 (05-21-23 @ 15:00) (77/51 - 115/79)  RR: 27 (05-21-23 @ 15:00) (13 - 29)  SpO2: 92% (05-21-23 @ 15:00) (92% - 98%)  Physical Examination:  General: no acute distress  HEENT: NC, dried blood on R eyebrow, neck supple  Cardio: S1, S2 present, normal rate  Resp: decreased breath sounds b/l  Abd: soft, NT, ND  Neuro: awakens to name, mumbles  Ext: no edema, no cyanosis  Skin: warm, dry, no visible rash    Laboratory Studies:  CBC:                       12.2   4.67  )-----------( 88       ( 21 May 2023 06:08 )             36.6     WBC Trend:  4.67 05-21-23 @ 06:08  5.71 05-20-23 @ 06:08  9.16 05-19-23 @ 07:00  2.50 05-18-23 @ 06:19  5.76 05-17-23 @ 02:25    CMP: 05-21    136  |  104  |  10  ----------------------------<  89  3.6   |  26  |  0.63    Ca    8.7      21 May 2023 06:08  Phos  2.8     05-21  Mg     2.1     05-21    TPro  6.2  /  Alb  2.1<L>  /  TBili  9.0<H>  /  DBili  x   /  AST  363<H>  /  ALT  263<H>  /  AlkPhos  162<H>  05-21    Creatinine, Serum: 0.63 mg/dL (05-21-23 @ 06:08)  Creatinine, Serum: 0.58 mg/dL (05-20-23 @ 06:08)  Creatinine, Serum: 0.77 mg/dL (05-19-23 @ 07:00)  Creatinine, Serum: 0.72 mg/dL (05-18-23 @ 06:19)  Creatinine, Serum: 0.65 mg/dL (05-17-23 @ 23:21)  Creatinine, Serum: 0.50 mg/dL (05-17-23 @ 18:30)  Creatinine, Serum: 0.55 mg/dL (05-17-23 @ 11:00)  Creatinine, Serum: 0.56 mg/dL (05-17-23 @ 10:20)  Creatinine, Serum: 0.61 mg/dL (05-17-23 @ 06:36)  Creatinine, Serum: 0.64 mg/dL (05-17-23 @ 02:25)      LIVER FUNCTIONS - ( 21 May 2023 06:08 )  Alb: 2.1 g/dL / Pro: 6.2 g/dL / ALK PHOS: 162 U/L / ALT: 263 U/L / AST: 363 U/L / GGT: x               Microbiology: reviewed     Culture - Blood (collected 05-19-23 @ 07:00)  Source: .Blood Blood-Peripheral  Preliminary Report (05-20-23 @ 13:01):    No growth to date.    Culture - Blood (collected 05-17-23 @ 08:45)  Source: .Blood Blood  Gram Stain (05-18-23 @ 01:18):    Growth in aerobic bottle: Gram Negative Rods    Growth in anaerobic bottle: Gram Negative Rods  Final Report (05-19-23 @ 11:31):    Growth in aerobic and anaerobic bottles: Escherichia coli    See previous culture [id number]    Culture - Blood (collected 05-17-23 @ 08:30)  Source: .Blood Blood  Gram Stain (05-18-23 @ 03:36):    Growth in anaerobic bottle: Gram Negative Rods    Growth in aerobic bottle: Gram Negative Rods  Final Report (05-19-23 @ 11:30):    Growth in aerobic and anaerobic bottles: Escherichia coli    ***Blood Panel PCR results on this specimen are available    approximately 3 hours after the Gram stain result.***    Gram stain, PCR, and/or culture results may not always    correspond due to difference in methodologies.    ************************************************************    This PCR assay was performed by multiplex PCR. This    Assay tests for 66 bacterial and resistance gene targets.    Please refer to the [facility] Labs test directory    at [email or web address] for details.  Organism: Blood Culture PCR  Escherichia coli (05-19-23 @ 11:30)  Organism: Escherichia coli (05-19-23 @ 11:30)      Method Type: CANDIDA      -  Amikacin: S <=16      -  Ampicillin: R >16 These ampicillin results predict results for amoxicillin      -  Ampicillin/Sulbactam: I 16/8 Enterobacter, Klebsiella aerogenes, Citrobacter, and Serratia may develop resistance during prolonged therapy (3-4 days)      -  Aztreonam: S <=4      -  Cefazolin: S <=2 Enterobacter, Klebsiella aerogenes, Citrobacter, and Serratia may develop resistance during prolonged therapy (3-4 days)      -  Cefepime: S <=2      -  Cefoxitin: S <=8      -  Ceftriaxone: S <=1 Enterobacter, Klebsiella aerogenes, Citrobacter, and Serratia may develop resistance during prolonged therapy      -  Ciprofloxacin: S <=0.25      -  Ertapenem: S <=0.5      -  Gentamicin: S <=2      -  Imipenem: S <=1      -  Levofloxacin: S <=0.5      -  Meropenem: S <=1      -  Piperacillin/Tazobactam: S <=8      -  Tobramycin: S <=2      -  Trimethoprim/Sulfamethoxazole: S <=0.5/9.5  Organism: Blood Culture PCR (05-19-23 @ 11:30)      Method Type: PCR      -  Escherichia coli: Detec    Radiology: reviewed     Medications:  cefTRIAXone   IVPB 2000 milliGRAM(s) IV Intermittent every 24 hours  chlorhexidine 2% Cloths 1 Application(s) Topical <User Schedule>  chlorproMAZINE    Injectable 25 milliGRAM(s) IntraMuscular every 8 hours PRN  folic acid Injectable 1 milliGRAM(s) IV Push daily  lactated ringers. 1000 milliLiter(s) IV Continuous <Continuous>  LORazepam   Injectable 2 milliGRAM(s) IV Push every 1 hour PRN  LORazepam   Injectable 2 milliGRAM(s) IV Push every 4 hours  pantoprazole  Injectable 40 milliGRAM(s) IV Push daily  tamsulosin 0.4 milliGRAM(s) Oral at bedtime  thiamine Injectable 100 milliGRAM(s) IV Push daily    Current Antimicrobials:  cefTRIAXone   IVPB 2000 milliGRAM(s) IV Intermittent every 24 hours    Prior/Completed Antimicrobials:  piperacillin/tazobactam IVPB...

## 2023-05-21 NOTE — DIETITIAN NUTRITION RISK NOTIFICATION - UPON NUTRITIONAL ASSESSMENT BY THE REGISTERED DIETITIAN YOUR PATIENT WAS DETERMINED TO MEET CRITERIA/HAS EVIDENCE OF THE FOLLOWING DIAGNOSIS:
Mother's Name:Nadeen Phone #: [phone]  Infant Name: Jorge Luis  :2021  Gestation:36w1d  Day of life:2  Birth weight:  6-11.6 (3050g) Discharge weight: 6-4.1 (2839g)  Weight Loss: -6.92%  24 hour Summary of Feeds: 6BF + 3.5ml EBM Voids:  Stools:  Assistive devices (shields, shells, etc):  Significant Maternal history: , Anxiety/depression,  first child for 2 months - milk dried up  Maternal Concerns:  denies  Maternal Goal: exclusive breastfeeding for as long as possible  Mother's Medications: Buspar, Fioricet. Zofran, PNV, Phenergan  Breastpump for home:Motif and  Hand pump  Ped follow up appt:    Patient states she is expressing easily and feeding EBM via syringe in addition to direct breastfeeding. She states no formula has been given. Formula on I&O charted in error. Patient states infant has been exclusively  and she feels feedings are going very well. Reviewed feeding plan, signs of a good feeding, signs baby is getting enough, pumping, storing, expected infant weight loss/gain, and preventing engorgement/mastitis. Recommended follow up with IBCLC as desired.     Instructed mom our lactation team is here for continued support throughout their breastfeeding journey. Our team has encouraged mom to call with any questions or concerns that may arise after discharge.   Severe protein-calorie malnutrition

## 2023-05-21 NOTE — PROGRESS NOTE ADULT - PROBLEM SELECTOR PLAN 1
fever/ sepsis  E Coli -Etiology-R/O Cholangitis -elevated Bili   - blood cultures grew ecoli  - fu Repeat Blood c/s x 2 NGTD  - Noted elevated alk phos, LFTs, bilirubin (downtrending)  - switched zosyn to rocephin  - GI involved for discussion regarding potential MRCP, potential need for mechanical intervention beyond abx  Unable to do MRI as family NOT able to Provide Safety screening Q /A

## 2023-05-21 NOTE — PROGRESS NOTE ADULT - SUBJECTIVE AND OBJECTIVE BOX
Patient is a 38y old  Male who presents with a chief complaint of Hepatitis (21 May 2023 12:59)    HPI:  38M PMH of alcohol abuse, alcohol withdrawal seizures, fatty liver presents with signs of alcohol abuse/withdrawal. History obtained from wife with  over phone. Patient has been drinking for the past 16 days of unknown amount and had unwitnessed fall from the bed so pt was taken to the ED. Seen and examined pt at bedside. Unable to obtain history from patient due to somnolence in setting of recent ativan use.    In the ED    Vitals: , T 98, Spo2 95% /80     Labs: Plt 65, Na 128, K 3.0, Tbili 9.6, , , , Lactate 4.0, Lipase 432, UA + for bacteria and leuk esterase, Blood alc level 423     Imaging  RUQ U/S Visualization of the gallbladder is limited by overlying bowel gas. No   gross gallstones, gallbladder wall thickening, or pericholecystic fluid. No ultrasonic Grayson's sign epatomegaly and hepatic steatosis.  CT A/P- steatosis and hepatomegaly. No hydronephrosis. 6.3 mm nonobstructing stone lower pole left kidney.  CT head/cervical spine/ maxillofacial non-con: No acute intracranial abnormalities. Mild right periorbital subcutaneous soft tissue swelling. No evidence of orbital or other facial fracture. No vertebral fracture, collapse or subluxation.    ECG: Sinus tachycardia, 115 BPM    Meds Given    dTAP x1, Zofran x 1, Morphine x1, Pepcid x1,2 NS, 5mg haldol x1, 40meq k, 10mg flexiril, zosyn x1, thiamine, folic acid,   Pt seen by ICU team, admitted to ICU for ETOH intoxication & withdrawal. (17 May 2023 09:51)    5/18 - Seen and examined patient. Pt was lethargic so unable to obtain ROS. On Precedex drip, Cooling [place] Bacteremia, IV Abx Fever  5/19 - Seen and examined. Patient is lethargic on precedex. Found to be bacteremic now on abx. Bilirubin uptrending. No acute distress. E Coli sepsis, IV Abx   5/20 - Seen and examined. Patient is somnolent on precedex but responds to name and able to answer some questions. Denies CP, SOB, abd pain, nausea. Found to be bacteremic now on abx. Bilirubin uptrending. No acute distress. E Coli sepsis, IV Abx   5/21 -  Seen and examined. Patient is still somnolent on precedex but responds to name and able to answer some questions. Denies CP, SOB, abd pain, nausea. No acute distress. E Coli sepsis, IV Abx     OVERNIGHT EVENTS: No acute overnight events. Noted to have blood clots in [place] overnight, irrigated with resolution of clots. Noted to be attempting to pull at [place]    Home Medications:      MEDICATIONS  (STANDING):  cefTRIAXone   IVPB 2000 milliGRAM(s) IV Intermittent every 24 hours  chlorhexidine 2% Cloths 1 Application(s) Topical <User Schedule>  folic acid Injectable 1 milliGRAM(s) IV Push daily  lactated ringers. 1000 milliLiter(s) (75 mL/Hr) IV Continuous <Continuous>  LORazepam   Injectable 2 milliGRAM(s) IV Push every 4 hours  pantoprazole  Injectable 40 milliGRAM(s) IV Push daily  tamsulosin 0.4 milliGRAM(s) Oral at bedtime  thiamine Injectable 100 milliGRAM(s) IV Push daily    MEDICATIONS  (PRN):  chlorproMAZINE    Injectable 25 milliGRAM(s) IntraMuscular every 8 hours PRN Hiccups  LORazepam   Injectable 2 milliGRAM(s) IV Push every 1 hour PRN Agitation      Allergies    No Known Allergies    Intolerances        Social History:  Tobacco: denies  EtOH: chronic heavy drinker  Recreational drug use: denies  Lives with: patient lives with wife  Ambulates: independent  ADLs: independent (17 May 2023 09:51)      REVIEW OF SYSTEMS: unable to obtain full reliable ROS due to lethargy      Vital Signs Last 24 Hrs  T(C): 37.3 (21 May 2023 15:28), Max: 37.8 (21 May 2023 08:00)  T(F): 99.1 (21 May 2023 15:28), Max: 100 (21 May 2023 08:00)  HR: 112 (21 May 2023 16:00) (69 - 112)  BP: 105/64 (21 May 2023 16:00) (77/51 - 114/81)  BP(mean): 79 (21 May 2023 16:00) (59 - 94)  RR: 25 (21 May 2023 16:00) (13 - 29)  SpO2: 94% (21 May 2023 16:00) (92% - 98%)    Parameters below as of 21 May 2023 08:00  Patient On (Oxygen Delivery Method): nasal cannula  O2 Flow (L/min): 2    Finger Stick        05-20 @ 07:01  -  05-21 @ 07:00  --------------------------------------------------------  IN: 3418.6 mL / OUT: 3360 mL / NET: 58.6 mL    05-21 @ 07:01  -  05-21 @ 17:45  --------------------------------------------------------  IN: 1379.5 mL / OUT: 0 mL / NET: 1379.5 mL        PHYSICAL EXAM: on Precedex  GENERAL:  [ x ] NAD , [  ] well appearing, [  ] Agitated, [  ] Drowsy,  [ x ] lethargy, [  ] confused   HEAD:  [ x ] Normal, [  ] Other  EYES:  [ x ] Ecchymosis/wound noted on R eyebrow [ x ] EOMI, [  x] PERRLA, [ x ] scleral icterus, [  ] Other,  [  ] Pallor,[  ] Discharge  ENMT:  [ x ] Normal, [ x ] Moist mucous membranes, [ x ] Good dentition, [x  ] No Thrush  NECK:  [ x ] Supple, [ x ] No JVD, [ x ] Normal thyroid, [  ] Lymphadenopathy [  ] Other  CHEST/LUNG:  [ x ] Clear to auscultation bilaterally, [ x ] Breath Sounds equal B/L, [  ] poor effort  [ x ] No rales, [ x ] No rhonchi  [ x ]  No wheezing,   HEART:  [ x ] Regular rate and rhythm, [  ] tachycardia, [  ] Bradycardia,  [  ] irregular  [x  ] No murmurs, No rubs, No gallops, [  ] PPM in place (Mfr:  )  ABDOMEN:  [ x ] Soft, [x  ] Nontender, [ x ] Nondistended, [ x ] No mass, [  x] Bowel sounds present, [  ] obese  NERVOUS SYSTEM:  [ x ] Alert & Oriented X1, [ x ] Somnolent, difficult to arouse  [  ] Nonfocal  [  ] Confusion  [ x ] Encephalopathic [  ] Sedated [  ] Unable to assess, [  ] Dementia [  ] Other-  EXTREMITIES: [ x ] 2+ Peripheral Pulses, No clubbing, No cyanosis,  [  ] edema B/L lower EXT. [  ] PVD stasis skin changes B/L Lower EXT, [  ] wound  LYMPH: No lymphadenopathy noted  SKIN: [ x ] [place] in place [ x ] No rashes or lesions, [  ] Pressure Ulcers, [ x ] ecchymosis,- Rt eye brow  [  ] Skin Tears, [  ] Other    DIET: Diet, Clear Liquid (05-21-23 @ 11:39)      LABS:                        12.2   4.67  )-----------( 88       ( 21 May 2023 06:08 )             36.6     21 May 2023 06:08    136    |  104    |  10     ----------------------------<  89     3.6     |  26     |  0.63     Ca    8.7        21 May 2023 06:08  Phos  2.8       21 May 2023 06:08  Mg     2.1       21 May 2023 06:08    TPro  6.2    /  Alb  2.1    /  TBili  9.0    /  DBili  x      /  AST  363    /  ALT  263    /  AlkPhos  162    21 May 2023 06:08    PT/INR - ( 21 May 2023 06:08 )   PT: 12.3 sec;   INR: 1.05 ratio         PTT - ( 20 May 2023 06:08 )  PTT:27.9 sec      Culture Results:   No growth to date. (05-19 @ 07:00)  Culture Results:   Growth in aerobic and anaerobic bottles: Escherichia coli  See previous culture [id number] (05-17 @ 08:45)  Culture Results:   Growth in aerobic and anaerobic bottles: Escherichia coli  ***Blood Panel PCR results on this specimen are available  approximately 3 hours after the Gram stain result.***  Gram stain, PCR, and/or culture results may not always  correspond due to difference in methodologies.  ************************************************************  This PCR assay was performed by multiplex PCR. This  Assay tests for 66 bacterial and resistance gene targets.  Please refer to the [facility] test directory  at [email or web address] for details. (05-17 @ 08:30)          CARDIAC MARKERS ( 19 May 2023 07:00 )  x     / x     / 547 U/L / x     / x      CARDIAC MARKERS ( 17 May 2023 18:30 )  x     / x     / 2225 U/L / x     / x      CARDIAC MARKERS ( 17 May 2023 11:00 )  x     / x     / 2989 U/L / x     / x              Culture - Blood (collected 19 May 2023 07:00)  Source: .Blood Blood-Peripheral  Preliminary Report (20 May 2023 13:01):    No growth to date.    Culture - Blood (collected 17 May 2023 08:45)  Source: .Blood Blood  Gram Stain (18 May 2023 01:18):    Growth in aerobic bottle: Gram Negative Rods    Growth in anaerobic bottle: Gram Negative Rods  Final Report (19 May 2023 11:31):    Growth in aerobic and anaerobic bottles: Escherichia coli    See previous culture [id number]    Culture - Blood (collected 17 May 2023 08:30)  Source: .Blood Blood  Gram Stain (18 May 2023 03:36):    Growth in anaerobic bottle: Gram Negative Rods    Growth in aerobic bottle: Gram Negative Rods  Final Report (19 May 2023 11:30):    Growth in aerobic and anaerobic bottles: Escherichia coli    ***Blood Panel PCR results on this specimen are available    approximately 3 hours after the Gram stain result.***    Gram stain, PCR, and/or culture results may not always    correspond due to difference in methodologies.    ************************************************************    This PCR assay was performed by multiplex PCR. This    Assay tests for 66 bacterial and resistance gene targets.    Please refer to the [facility] test directory    at [email or web address] for details.  Organism: Blood Culture PCR  Escherichia coli (19 May 2023 11:30)  Organism: Escherichia coli (19 May 2023 11:30)  Organism: Blood Culture PCR (19 May 2023 11:30)           Lipase, Serum: 73 U/L (05-19-23 @ 07:00)  Lipase, Serum: 432 U/L (05-17-23 @ 02:25)              HEALTH ISSUES - PROBLEM Dx:  Alcohol dependence with withdrawal    Transaminitis    Bacteremia, escherichia coli    AMS (altered mental status)    Rhabdomyolysis    Hypocalcemia    Fall    Urinary retention    Thrombocytopenia    Need for prophylactic measure          Consultant(s) Notes Reviewed:  [ x ] YES     Care Discussed with [X] Consultants  [  ] Patient  [  x] Family [  ] HCP [  ]   [  ] Social Service  [ x ] RN, [  ] Physical Therapy,[  ] Palliative care team  DVT PPX: [  ] Lovenox, [  ] S C Heparin, [  ] Coumadin, [  ] Xarelto, [  ] Eliquis, [  ] Pradaxa, [  ] IV Heparin drip, [x  ] SCD [  ] Contraindication 2 to GI Bleed,[  ] Ambulation [ x ] Contraindicated 2 to  bleed [  ] Contraindicated 2 to Brain Bleed  Advanced directive: [x  ] None, [  ] DNR/DNI Patient is a 38y old  Male who presents with a chief complaint of Hepatitis (21 May 2023 12:59)    HPI:  38M PMH of alcohol abuse, alcohol withdrawal seizures, fatty liver presents with signs of alcohol abuse/withdrawal. History obtained from wife with  over phone. Patient has been drinking for the past 16 days of unknown amount and had unwitnessed fall from the bed so pt was taken to the ED. Seen and examined pt at bedside. Unable to obtain history from patient due to somnolence in setting of recent ativan use.    In the ED    Vitals: , T 98, Spo2 95% /80     Labs: Plt 65, Na 128, K 3.0, Tbili 9.6, , , , Lactate 4.0, Lipase 432, UA + for bacteria and leuk esterase, Blood alc level 423     Imaging  RUQ U/S Visualization of the gallbladder is limited by overlying bowel gas. No   gross gallstones, gallbladder wall thickening, or pericholecystic fluid. No ultrasonic Grayson's sign epatomegaly and hepatic steatosis.  CT A/P- steatosis and hepatomegaly. No hydronephrosis. 6.3 mm nonobstructing stone lower pole left kidney.  CT head/cervical spine/ maxillofacial non-con: No acute intracranial abnormalities. Mild right periorbital subcutaneous soft tissue swelling. No evidence of orbital or other facial fracture. No vertebral fracture, collapse or subluxation.    ECG: Sinus tachycardia, 115 BPM    Meds Given    dTAP x1, Zofran x 1, Morphine x1, Pepcid x1,2 NS, 5mg haldol x1, 40meq k, 10mg flexiril, zosyn x1, thiamine, folic acid,   Pt seen by ICU team, admitted to ICU for ETOH intoxication & withdrawal. (17 May 2023 09:51)    5/18 - Seen and examined patient. Pt was lethargic so unable to obtain ROS. On Precedex drip, Cooling [place] Bacteremia, IV Abx Fever  5/19 - Seen and examined. Patient is lethargic on precedex. Found to be bacteremic now on abx. Bilirubin uptrending. No acute distress. E Coli sepsis, IV Abx   5/20 - Seen and examined. Patient is somnolent on precedex but responds to name and able to answer some questions. Denies CP, SOB, abd pain, nausea. Found to be bacteremic now on abx. Bilirubin uptrending. No acute distress. E Coli sepsis, IV Abx   5/21 -  Seen and examined. Patient is still somnolent on precedex but responds to name and able to answer some questions. Denies CP, SOB, abd pain, nausea. No acute distress. E Coli sepsis, IV Abx Squires cath will blood     OVERNIGHT EVENTS: No acute overnight events. Noted to have blood clots in [place] overnight, irrigated with resolution of clots. Noted to be attempting to pull at [place]    Home Medications:      MEDICATIONS  (STANDING):  cefTRIAXone   IVPB 2000 milliGRAM(s) IV Intermittent every 24 hours  chlorhexidine 2% Cloths 1 Application(s) Topical <User Schedule>  folic acid Injectable 1 milliGRAM(s) IV Push daily  lactated ringers. 1000 milliLiter(s) (75 mL/Hr) IV Continuous <Continuous>  LORazepam   Injectable 2 milliGRAM(s) IV Push every 4 hours  pantoprazole  Injectable 40 milliGRAM(s) IV Push daily  tamsulosin 0.4 milliGRAM(s) Oral at bedtime  thiamine Injectable 100 milliGRAM(s) IV Push daily    MEDICATIONS  (PRN):  chlorproMAZINE    Injectable 25 milliGRAM(s) IntraMuscular every 8 hours PRN Hiccups  LORazepam   Injectable 2 milliGRAM(s) IV Push every 1 hour PRN Agitation      Allergies    No Known Allergies    Intolerances        Social History:  Tobacco: denies  EtOH: chronic heavy drinker  Recreational drug use: denies  Lives with: patient lives with wife  Ambulates: independent  ADLs: independent (17 May 2023 09:51)      REVIEW OF SYSTEMS: unable to obtain full reliable ROS due to lethargy      Vital Signs Last 24 Hrs  T(C): 37.3 (21 May 2023 15:28), Max: 37.8 (21 May 2023 08:00)  T(F): 99.1 (21 May 2023 15:28), Max: 100 (21 May 2023 08:00)  HR: 112 (21 May 2023 16:00) (69 - 112)  BP: 105/64 (21 May 2023 16:00) (77/51 - 114/81)  BP(mean): 79 (21 May 2023 16:00) (59 - 94)  RR: 25 (21 May 2023 16:00) (13 - 29)  SpO2: 94% (21 May 2023 16:00) (92% - 98%)    Parameters below as of 21 May 2023 08:00  Patient On (Oxygen Delivery Method): nasal cannula  O2 Flow (L/min): 2    Finger Stick        05-20 @ 07:01  -  05-21 @ 07:00  --------------------------------------------------------  IN: 3418.6 mL / OUT: 3360 mL / NET: 58.6 mL    05-21 @ 07:01  -  05-21 @ 17:45  --------------------------------------------------------  IN: 1379.5 mL / OUT: 0 mL / NET: 1379.5 mL        PHYSICAL EXAM: on Precedex, squires cath  GENERAL:  [ x ] NAD , [  ] well appearing, [  ] Agitated, [  ] Drowsy,  [ x ] lethargy, [  ] confused   HEAD:  [ x ] Normal, [  ] Other  EYES:  [ x ] Ecchymosis/wound noted on R eyebrow [ x ] EOMI, [  x] PERRLA, [ x ] scleral icterus, [  ] Other,  [  ] Pallor,[  ] Discharge  ENMT:  [ x ] Normal, [ x ] Moist mucous membranes, [ x ] Good dentition, [x  ] No Thrush  NECK:  [ x ] Supple, [ x ] No JVD, [ x ] Normal thyroid, [  ] Lymphadenopathy [  ] Other  CHEST/LUNG:  [ x ] Clear to auscultation bilaterally, [ x ] Breath Sounds equal B/L, [  ] poor effort  [ x ] No rales, [ x ] No rhonchi  [ x ]  No wheezing,   HEART:  [ x ] Regular rate and rhythm, [  ] tachycardia, [  ] Bradycardia,  [  ] irregular  [x  ] No murmurs, No rubs, No gallops, [  ] PPM in place (Mfr:  )  ABDOMEN:  [ x ] Soft, [x  ] Nontender, [ x ] Nondistended, [ x ] No mass, [  x] Bowel sounds present, [  ] obese  NERVOUS SYSTEM:  [ x ] Alert & Oriented X1, [ x ] Somnolent, difficult to arouse  [  ] Nonfocal  [  ] Confusion  [ x ] Encephalopathic [  ] Sedated [  ] Unable to assess, [  ] Dementia [  ] Other-  EXTREMITIES: [ x ] 2+ Peripheral Pulses, No clubbing, No cyanosis,  [  ] edema B/L lower EXT. [  ] PVD stasis skin changes B/L Lower EXT, [  ] wound  LYMPH: No lymphadenopathy noted  SKIN: [ x ] [place] in place [ x ] No rashes or lesions, [  ] Pressure Ulcers, [ x ] ecchymosis,- Rt eye brow  [  ] Skin Tears, [  ] Other    DIET: Diet, Clear Liquid (05-21-23 @ 11:39)      LABS:                        12.2   4.67  )-----------( 88       ( 21 May 2023 06:08 )             36.6     21 May 2023 06:08    136    |  104    |  10     ----------------------------<  89     3.6     |  26     |  0.63     Ca    8.7        21 May 2023 06:08  Phos  2.8       21 May 2023 06:08  Mg     2.1       21 May 2023 06:08    TPro  6.2    /  Alb  2.1    /  TBili  9.0    /  DBili  x      /  AST  363    /  ALT  263    /  AlkPhos  162    21 May 2023 06:08    PT/INR - ( 21 May 2023 06:08 )   PT: 12.3 sec;   INR: 1.05 ratio         PTT - ( 20 May 2023 06:08 )  PTT:27.9 sec      Culture Results:   No growth to date. (05-19 @ 07:00)  Culture Results:   Growth in aerobic and anaerobic bottles: Escherichia coli  See previous culture [id number] (05-17 @ 08:45)  Culture Results:   Growth in aerobic and anaerobic bottles: Escherichia coli  ***Blood Panel PCR results on this specimen are available  approximately 3 hours after the Gram stain result.***  Gram stain, PCR, and/or culture results may not always  correspond due to difference in methodologies.  ************************************************************  This PCR assay was performed by multiplex PCR. This  Assay tests for 66 bacterial and resistance gene targets.  Please refer to the [facility] test directory  at [email or web address] for details. (05-17 @ 08:30)          CARDIAC MARKERS ( 19 May 2023 07:00 )  x     / x     / 547 U/L / x     / x      CARDIAC MARKERS ( 17 May 2023 18:30 )  x     / x     / 2225 U/L / x     / x      CARDIAC MARKERS ( 17 May 2023 11:00 )  x     / x     / 2989 U/L / x     / x              Culture - Blood (collected 19 May 2023 07:00)  Source: .Blood Blood-Peripheral  Preliminary Report (20 May 2023 13:01):    No growth to date.    Culture - Blood (collected 17 May 2023 08:45)  Source: .Blood Blood  Gram Stain (18 May 2023 01:18):    Growth in aerobic bottle: Gram Negative Rods    Growth in anaerobic bottle: Gram Negative Rods  Final Report (19 May 2023 11:31):    Growth in aerobic and anaerobic bottles: Escherichia coli    See previous culture [id number]    Culture - Blood (collected 17 May 2023 08:30)  Source: .Blood Blood  Gram Stain (18 May 2023 03:36):    Growth in anaerobic bottle: Gram Negative Rods    Growth in aerobic bottle: Gram Negative Rods  Final Report (19 May 2023 11:30):    Growth in aerobic and anaerobic bottles: Escherichia coli    ***Blood Panel PCR results on this specimen are available    approximately 3 hours after the Gram stain result.***    Gram stain, PCR, and/or culture results may not always    correspond due to difference in methodologies.    ************************************************************    This PCR assay was performed by multiplex PCR. This    Assay tests for 66 bacterial and resistance gene targets.    Please refer to the [facility] test directory    at [email or web address] for details.  Organism: Blood Culture PCR  Escherichia coli (19 May 2023 11:30)  Organism: Escherichia coli (19 May 2023 11:30)  Organism: Blood Culture PCR (19 May 2023 11:30)           Lipase, Serum: 73 U/L (05-19-23 @ 07:00)  Lipase, Serum: 432 U/L (05-17-23 @ 02:25)              HEALTH ISSUES - PROBLEM Dx:  Alcohol dependence with withdrawal    Transaminitis    Bacteremia, escherichia coli    AMS (altered mental status)    Rhabdomyolysis    Hypocalcemia    Fall    Urinary retention    Thrombocytopenia    Need for prophylactic measure          Consultant(s) Notes Reviewed:  [ x ] YES     Care Discussed with [X] Consultants  [ x ] Patient  [  x] Family [  ] HCP [  ]   [  ] Social Service  [ x ] RN, [  ] Physical Therapy,[  ] Palliative care team  DVT PPX: [  ] Lovenox, [  ] S C Heparin, [  ] Coumadin, [  ] Xarelto, [  ] Eliquis, [  ] Pradaxa, [  ] IV Heparin drip, [x  ] SCD [  ] Contraindication 2 to GI Bleed,[  ] Ambulation [ x ] Contraindicated 2 to  bleed [  ] Contraindicated 2 to Brain Bleed  Advanced directive: [x  ] None, [  ] DNR/DNI

## 2023-05-21 NOTE — PROGRESS NOTE ADULT - PROBLEM SELECTOR PLAN 4
-Likely metabolic Encephelopathy / sepsis now   s/p unwitnessed fall with small abrasions of face  - CT head/cervical spine/ maxillofacial non-con: No acute intracranial abnormalities. Mild right periorbital subcutaneous soft tissue swelling. No evidence of orbital or other facial fracture. No vertebral fracture, collapse or subluxation.  - f/u repeat CT head noncon: no acute acute signs of brain hemorrhage  - Mild hepatic encephalopathy, ammonia elevated  - continue to monitor, Rx sepsis

## 2023-05-21 NOTE — PROGRESS NOTE ADULT - PROBLEM SELECTOR PLAN 9
As per nurse patient was having abd pain which resolved after patient voided  ~600cc  - CT A/P- No hydronephrosis. 6.3 mm non obstructing stone lower pole left kidney.  - maintain squires  - started on flomax  - continue to monitor

## 2023-05-21 NOTE — PROGRESS NOTE ADULT - PROBLEM SELECTOR PLAN 3
Chronic known hx of alcohol use disorder  - [facility] Protocol  - wrist restraints for agitation  - Attempt to wean off precedex for sedation today as tolerated, use Ativan/Haldol for agitation  - Ativan 2mg q4h standing, 2mg q1h PRN agitation, will wean ativan when able to wean off precedex  - Thiamine 500mg IVP TID, folic acid 1mg IVP QD  - advanced diet to clear liquid  - Addiction Medicine (Radha) consulted, recs appreciated  - management as per ICU

## 2023-05-21 NOTE — PROGRESS NOTE ADULT - ASSESSMENT
38M PMH of alcohol abuse, alcohol withdrawal seizures, fatty liver presented with signs of alcohol abuse/withdrawal. Patient has been drinking for the past 16 days PTA of unknown amount and had unwitnessed fall. E coli bacteremia noted and ID consultation requested.    Acute Cholangitis  E.coli Bacteremia  Transaminitis  EtOH Intoxication/Withdrawal  Alcoholic hepatitis  - BCx +E.coli - susceptibilities reviewed  - 5/19 repeat Bcx NGTD   - s/p zosyn  - fever curve improved, WBC wnl    Recommendations  - continue ceftriaxone 2g IV Q24h  - appreciate GI recs  - trend temps/WBC/LFTs  - additional management per ICU team      Sienna Morfin M.D.  [facility], Division of Infectious Diseases  [phone]  After 5pm on weekdays and all day on weekends - please call [phone]

## 2023-05-21 NOTE — PROGRESS NOTE ADULT - PROBLEM SELECTOR PLAN 8
s/p unwitnessed fall with small abrasions of face  - CT head/cervical spine/ maxillofacial non-con: No acute intracranial abnormalities. Mild right periorbital subcutaneous soft tissue swelling. No evidence of orbital or other facial fracture. No vertebral fracture, collapse or subluxation.  - f/u repeat CT head noncon to evaluate right posterior fossa for SDH showed no hemmorhage

## 2023-05-22 LAB
ALBUMIN SERPL ELPH-MCNC: 2 G/DL — LOW (ref 3.3–5)
ALBUMIN SERPL ELPH-MCNC: 2.6 G/DL — LOW (ref 3.3–5)
ALP SERPL-CCNC: 153 U/L — HIGH (ref 40–120)
ALP SERPL-CCNC: 210 U/L — HIGH (ref 40–120)
ALT FLD-CCNC: 243 U/L — HIGH (ref 12–78)
ALT FLD-CCNC: 318 U/L — HIGH (ref 12–78)
ANION GAP SERPL CALC-SCNC: 5 MMOL/L — SIGNIFICANT CHANGE UP (ref 5–17)
ANION GAP SERPL CALC-SCNC: 6 MMOL/L — SIGNIFICANT CHANGE UP (ref 5–17)
AST SERPL-CCNC: 264 U/L — HIGH (ref 15–37)
AST SERPL-CCNC: 328 U/L — HIGH (ref 15–37)
BASOPHILS # BLD AUTO: 0.03 K/UL — SIGNIFICANT CHANGE UP (ref 0–0.2)
BASOPHILS NFR BLD AUTO: 0.9 % — SIGNIFICANT CHANGE UP (ref 0–2)
BILIRUB SERPL-MCNC: 7.4 MG/DL — HIGH (ref 0.2–1.2)
BILIRUB SERPL-MCNC: 8.8 MG/DL — HIGH (ref 0.2–1.2)
BUN SERPL-MCNC: 5 MG/DL — LOW (ref 7–23)
BUN SERPL-MCNC: 7 MG/DL — SIGNIFICANT CHANGE UP (ref 7–23)
CALCIUM SERPL-MCNC: 8.7 MG/DL — SIGNIFICANT CHANGE UP (ref 8.5–10.1)
CALCIUM SERPL-MCNC: 9.2 MG/DL — SIGNIFICANT CHANGE UP (ref 8.5–10.1)
CHLORIDE SERPL-SCNC: 107 MMOL/L — SIGNIFICANT CHANGE UP (ref 96–108)
CHLORIDE SERPL-SCNC: 109 MMOL/L — HIGH (ref 96–108)
CO2 SERPL-SCNC: 26 MMOL/L — SIGNIFICANT CHANGE UP (ref 22–31)
CO2 SERPL-SCNC: 26 MMOL/L — SIGNIFICANT CHANGE UP (ref 22–31)
CREAT SERPL-MCNC: 0.7 MG/DL — SIGNIFICANT CHANGE UP (ref 0.5–1.3)
CREAT SERPL-MCNC: 0.71 MG/DL — SIGNIFICANT CHANGE UP (ref 0.5–1.3)
EGFR: 120 ML/MIN/1.73M2 — SIGNIFICANT CHANGE UP
EGFR: 121 ML/MIN/1.73M2 — SIGNIFICANT CHANGE UP
EOSINOPHIL # BLD AUTO: 0.07 K/UL — SIGNIFICANT CHANGE UP (ref 0–0.5)
EOSINOPHIL NFR BLD AUTO: 2 % — SIGNIFICANT CHANGE UP (ref 0–6)
GLUCOSE SERPL-MCNC: 117 MG/DL — HIGH (ref 70–99)
GLUCOSE SERPL-MCNC: 124 MG/DL — HIGH (ref 70–99)
HCT VFR BLD CALC: 33.7 % — LOW (ref 39–50)
HCT VFR BLD CALC: 39.9 % — SIGNIFICANT CHANGE UP (ref 39–50)
HGB BLD-MCNC: 11.1 G/DL — LOW (ref 13–17)
HGB BLD-MCNC: 12.9 G/DL — LOW (ref 13–17)
IMM GRANULOCYTES NFR BLD AUTO: 1.1 % — HIGH (ref 0–0.9)
LACTATE SERPL-SCNC: 2.3 MMOL/L — HIGH (ref 0.7–2)
LYMPHOCYTES # BLD AUTO: 1.3 K/UL — SIGNIFICANT CHANGE UP (ref 1–3.3)
LYMPHOCYTES # BLD AUTO: 36.9 % — SIGNIFICANT CHANGE UP (ref 13–44)
MAGNESIUM SERPL-MCNC: 2.1 MG/DL — SIGNIFICANT CHANGE UP (ref 1.6–2.6)
MAGNESIUM SERPL-MCNC: 2.2 MG/DL — SIGNIFICANT CHANGE UP (ref 1.6–2.6)
MCHC RBC-ENTMCNC: 27.3 PG — SIGNIFICANT CHANGE UP (ref 27–34)
MCHC RBC-ENTMCNC: 27.8 PG — SIGNIFICANT CHANGE UP (ref 27–34)
MCHC RBC-ENTMCNC: 32.3 GM/DL — SIGNIFICANT CHANGE UP (ref 32–36)
MCHC RBC-ENTMCNC: 32.9 GM/DL — SIGNIFICANT CHANGE UP (ref 32–36)
MCV RBC AUTO: 84.3 FL — SIGNIFICANT CHANGE UP (ref 80–100)
MCV RBC AUTO: 84.5 FL — SIGNIFICANT CHANGE UP (ref 80–100)
MONOCYTES # BLD AUTO: 0.32 K/UL — SIGNIFICANT CHANGE UP (ref 0–0.9)
MONOCYTES NFR BLD AUTO: 9.1 % — SIGNIFICANT CHANGE UP (ref 2–14)
NEUTROPHILS # BLD AUTO: 1.76 K/UL — LOW (ref 1.8–7.4)
NEUTROPHILS NFR BLD AUTO: 50 % — SIGNIFICANT CHANGE UP (ref 43–77)
NRBC # BLD: 0 /100 WBCS — SIGNIFICANT CHANGE UP (ref 0–0)
NRBC # BLD: 0 /100 WBCS — SIGNIFICANT CHANGE UP (ref 0–0)
NT-PROBNP SERPL-SCNC: 34 PG/ML — SIGNIFICANT CHANGE UP (ref 0–125)
PHOSPHATE SERPL-MCNC: 2.6 MG/DL — SIGNIFICANT CHANGE UP (ref 2.5–4.5)
PHOSPHATE SERPL-MCNC: 2.8 MG/DL — SIGNIFICANT CHANGE UP (ref 2.5–4.5)
PLATELET # BLD AUTO: 133 K/UL — LOW (ref 150–400)
PLATELET # BLD AUTO: 199 K/UL — SIGNIFICANT CHANGE UP (ref 150–400)
POTASSIUM SERPL-MCNC: 3.9 MMOL/L — SIGNIFICANT CHANGE UP (ref 3.5–5.3)
POTASSIUM SERPL-MCNC: 4.1 MMOL/L — SIGNIFICANT CHANGE UP (ref 3.5–5.3)
POTASSIUM SERPL-SCNC: 3.9 MMOL/L — SIGNIFICANT CHANGE UP (ref 3.5–5.3)
POTASSIUM SERPL-SCNC: 4.1 MMOL/L — SIGNIFICANT CHANGE UP (ref 3.5–5.3)
PROT SERPL-MCNC: 5.8 G/DL — LOW (ref 6–8.3)
PROT SERPL-MCNC: 7 G/DL — SIGNIFICANT CHANGE UP (ref 6–8.3)
RBC # BLD: 4 M/UL — LOW (ref 4.2–5.8)
RBC # BLD: 4.72 M/UL — SIGNIFICANT CHANGE UP (ref 4.2–5.8)
RBC # FLD: 16.3 % — HIGH (ref 10.3–14.5)
RBC # FLD: 17.3 % — HIGH (ref 10.3–14.5)
SODIUM SERPL-SCNC: 139 MMOL/L — SIGNIFICANT CHANGE UP (ref 135–145)
SODIUM SERPL-SCNC: 140 MMOL/L — SIGNIFICANT CHANGE UP (ref 135–145)
TROPONIN I, HIGH SENSITIVITY RESULT: 10 NG/L — SIGNIFICANT CHANGE UP
WBC # BLD: 3.52 K/UL — LOW (ref 3.8–10.5)
WBC # BLD: 3.75 K/UL — LOW (ref 3.8–10.5)
WBC # FLD AUTO: 3.52 K/UL — LOW (ref 3.8–10.5)
WBC # FLD AUTO: 3.75 K/UL — LOW (ref 3.8–10.5)

## 2023-05-22 PROCEDURE — 71275 CT ANGIOGRAPHY CHEST: CPT | Mod: 26

## 2023-05-22 PROCEDURE — 93010 ELECTROCARDIOGRAM REPORT: CPT

## 2023-05-22 PROCEDURE — 99233 SBSQ HOSP IP/OBS HIGH 50: CPT | Mod: GC

## 2023-05-22 RX ORDER — ENOXAPARIN SODIUM 100 MG/ML
40 INJECTION SUBCUTANEOUS EVERY 24 HOURS
Refills: 0 | Status: DISCONTINUED | OUTPATIENT
Start: 2023-05-22 | End: 2023-05-22

## 2023-05-22 RX ORDER — ZOLPIDEM TARTRATE 10 MG/1
5 TABLET ORAL ONCE
Refills: 0 | Status: DISCONTINUED | OUTPATIENT
Start: 2023-05-22 | End: 2023-05-22

## 2023-05-22 RX ORDER — LANOLIN ALCOHOL/MO/W.PET/CERES
5 CREAM (GRAM) TOPICAL ONCE
Refills: 0 | Status: COMPLETED | OUTPATIENT
Start: 2023-05-22 | End: 2023-05-22

## 2023-05-22 RX ORDER — FOLIC ACID 0.8 MG
1 TABLET ORAL DAILY
Refills: 0 | Status: DISCONTINUED | OUTPATIENT
Start: 2023-05-22 | End: 2023-05-28

## 2023-05-22 RX ORDER — SODIUM CHLORIDE 9 MG/ML
1000 INJECTION, SOLUTION INTRAVENOUS ONCE
Refills: 0 | Status: COMPLETED | OUTPATIENT
Start: 2023-05-22 | End: 2023-05-22

## 2023-05-22 RX ORDER — THIAMINE MONONITRATE (VIT B1) 100 MG
100 TABLET ORAL DAILY
Refills: 0 | Status: DISCONTINUED | OUTPATIENT
Start: 2023-05-22 | End: 2023-05-28

## 2023-05-22 RX ADMIN — Medication 2 MILLIGRAM(S): at 16:20

## 2023-05-22 RX ADMIN — Medication 1 MILLIGRAM(S): at 11:40

## 2023-05-22 RX ADMIN — Medication 100 MILLIGRAM(S): at 11:40

## 2023-05-22 RX ADMIN — DEXMEDETOMIDINE HYDROCHLORIDE IN 0.9% SODIUM CHLORIDE 11.9 MICROGRAM(S)/KG/HR: 4 INJECTION INTRAVENOUS at 02:34

## 2023-05-22 RX ADMIN — ENOXAPARIN SODIUM 40 MILLIGRAM(S): 100 INJECTION SUBCUTANEOUS at 10:57

## 2023-05-22 RX ADMIN — Medication 5 MILLIGRAM(S): at 23:21

## 2023-05-22 RX ADMIN — Medication 2 MILLIGRAM(S): at 01:13

## 2023-05-22 RX ADMIN — Medication 2 MILLIGRAM(S): at 10:54

## 2023-05-22 RX ADMIN — Medication 1.5 MILLIGRAM(S): at 17:33

## 2023-05-22 RX ADMIN — Medication 63.75 MILLIMOLE(S): at 19:41

## 2023-05-22 RX ADMIN — SODIUM CHLORIDE 1000 MILLILITER(S): 9 INJECTION, SOLUTION INTRAVENOUS at 16:29

## 2023-05-22 RX ADMIN — CHLORHEXIDINE GLUCONATE 1 APPLICATION(S): 213 SOLUTION TOPICAL at 05:40

## 2023-05-22 RX ADMIN — Medication 2 MILLIGRAM(S): at 06:22

## 2023-05-22 RX ADMIN — Medication 1.5 MILLIGRAM(S): at 21:48

## 2023-05-22 RX ADMIN — ZOLPIDEM TARTRATE 5 MILLIGRAM(S): 10 TABLET ORAL at 23:20

## 2023-05-22 RX ADMIN — Medication 2 MILLIGRAM(S): at 13:41

## 2023-05-22 RX ADMIN — Medication 2 MILLIGRAM(S): at 09:04

## 2023-05-22 RX ADMIN — SODIUM CHLORIDE 75 MILLILITER(S): 9 INJECTION, SOLUTION INTRAVENOUS at 06:22

## 2023-05-22 RX ADMIN — DEXMEDETOMIDINE HYDROCHLORIDE IN 0.9% SODIUM CHLORIDE 11.9 MICROGRAM(S)/KG/HR: 4 INJECTION INTRAVENOUS at 18:01

## 2023-05-22 RX ADMIN — CEFTRIAXONE 100 MILLIGRAM(S): 500 INJECTION, POWDER, FOR SOLUTION INTRAMUSCULAR; INTRAVENOUS at 13:55

## 2023-05-22 RX ADMIN — TAMSULOSIN HYDROCHLORIDE 0.4 MILLIGRAM(S): 0.4 CAPSULE ORAL at 22:48

## 2023-05-22 NOTE — PROGRESS NOTE ADULT - SUBJECTIVE AND OBJECTIVE BOX
INTERVAL HPI/OVERNIGHT EVENTS: Precedex held.    SUBJECTIVE: Patient seen and examined at bedside. Pt states he feels ok. Has no complaints at this time. AOx3. Denies having any metal implants in body. Oseguera in place.    ROS:  CV: Denies chest pain  Resp: Denies SOB  GI: Denies abdominal pain, constipation, diarrhea, nausea, vomiting  : Denies dysuria  ID: Denies fevers, chills  MSK: Denies joint pain     OBJECTIVE:    VITAL SIGNS:  ICU Vital Signs Last 24 Hrs  T(C): 36.4 (22 May 2023 04:29), Max: 37.8 (21 May 2023 08:00)  T(F): 97.6 (22 May 2023 04:29), Max: 100 (21 May 2023 08:00)  HR: 92 (22 May 2023 06:00) (67 - 151)  BP: 111/76 (22 May 2023 06:00) (77/51 - 135/81)  BP(mean): 89 (22 May 2023 06:00) (59 - 101)  ABP: --  ABP(mean): --  RR: 21 (22 May 2023 06:00) (13 - 35)  SpO2: 95% (22 May 2023 06:00) (92% - 99%)    O2 Parameters below as of 21 May 2023 08:00  Patient On (Oxygen Delivery Method): room air            05-21 @ 07:01  -  05-22 @ 07:00  --------------------------------------------------------  IN: 4364.5 mL / OUT: 5365 mL / NET: -1000.5 mL      CAPILLARY BLOOD GLUCOSE      POCT Blood Glucose.: 123 mg/dL (22 May 2023 05:33)      PHYSICAL EXAM:  General: NAD, comfortable  HEENT: NCAT, PERRL, scleral and sublingual icterus, ecchymoses/laceration noted above R orbit  Neck: supple, no JVD  Respiratory: CTA b/l, no wheezing, rhonchi, rales, on room air  Cardiovascular: RRR, normal S1S2, no M/R/G  Abdomen: soft, NT/ND, bowel sounds in all four quadrants, no palpable masses  Extremities: WWP, no clubbing, cyanosis, or edema  Neurology: A&Ox3, nonfocal, sensation intact     MEDICATIONS:  MEDICATIONS  (STANDING):  cefTRIAXone   IVPB 2000 milliGRAM(s) IV Intermittent every 24 hours  chlorhexidine 2% Cloths 1 Application(s) Topical <User Schedule>  dexMEDEtomidine Infusion 0.5 MICROgram(s)/kG/Hr (11.9 mL/Hr) IV Continuous <Continuous>  folic acid Injectable 1 milliGRAM(s) IV Push daily  lactated ringers. 1000 milliLiter(s) (75 mL/Hr) IV Continuous <Continuous>  LORazepam   Injectable 2 milliGRAM(s) IV Push every 4 hours  LORazepam   Injectable 1.5 milliGRAM(s) IV Push every 4 hours  LORazepam   Injectable   IV Push   pantoprazole  Injectable 40 milliGRAM(s) IV Push daily  tamsulosin 0.4 milliGRAM(s) Oral at bedtime  thiamine Injectable 100 milliGRAM(s) IV Push daily    MEDICATIONS  (PRN):  LORazepam   Injectable 2 milliGRAM(s) IV Push every 1 hour PRN Symptom-triggered: each CIWA -Ar score 8 or GREATER  LORazepam   Injectable 2 milliGRAM(s) IV Push every 2 hours PRN Symptom-triggered: 2 point increase in CIWA -Ar score and a total score of 7 or LESS  LORazepam   Injectable 2 milliGRAM(s) IV Push every 2 hours PRN Agitation      ALLERGIES:  Allergies    No Known Allergies    Intolerances        LABS:                        11.1   3.52  )-----------( 133      ( 22 May 2023 05:30 )             33.7     05-22    139  |  107  |  7   ----------------------------<  124<H>  3.9   |  26  |  0.71    Ca    8.7      22 May 2023 05:30  Phos  2.8     05-22  Mg     2.2     05-22    TPro  5.8<L>  /  Alb  2.0<L>  /  TBili  7.4<H>  /  DBili  x   /  AST  264<H>  /  ALT  243<H>  /  AlkPhos  153<H>  05-22    PT/INR - ( 21 May 2023 06:08 )   PT: 12.3 sec;   INR: 1.05 ratio               RADIOLOGY & ADDITIONAL TESTS: Reviewed.      CENTRAL LINE: N  OSEGUERA: Y  A-LINE: N    GLOBAL ISSUE/BEST PRACTICE:  Analgesia: N  Sedation: N  HOB elevation: yes  Stress ulcer prophylaxis: Protonix  VTE prophylaxis: SCDs  Glycemic control: N  Nutrition: N    CODE STATUS: Full Code   INTERVAL HPI/OVERNIGHT EVENTS: Precedex held.    SUBJECTIVE: Patient seen and examined at bedside. Pt states he feels ok. Has no complaints at this time. AOx3. Denies having any metal implants in body. Oseguera in place.    ROS:  CV: Denies chest pain  Resp: Denies SOB  GI: Denies abdominal pain, constipation, diarrhea, nausea, vomiting  : Denies dysuria  ID: Denies fevers, chills  MSK: Denies joint pain     OBJECTIVE:    VITAL SIGNS:  ICU Vital Signs Last 24 Hrs  T(C): 36.4 (22 May 2023 04:29), Max: 37.8 (21 May 2023 08:00)  T(F): 97.6 (22 May 2023 04:29), Max: 100 (21 May 2023 08:00)  HR: 92 (22 May 2023 06:00) (67 - 151)  BP: 111/76 (22 May 2023 06:00) (77/51 - 135/81)  BP(mean): 89 (22 May 2023 06:00) (59 - 101)  ABP: --  ABP(mean): --  RR: 21 (22 May 2023 06:00) (13 - 35)  SpO2: 95% (22 May 2023 06:00) (92% - 99%)    O2 Parameters below as of 21 May 2023 08:00  Patient On (Oxygen Delivery Method): room air            05-21 @ 07:01  -  05-22 @ 07:00  --------------------------------------------------------  IN: 4364.5 mL / OUT: 5365 mL / NET: -1000.5 mL      CAPILLARY BLOOD GLUCOSE      POCT Blood Glucose.: 123 mg/dL (22 May 2023 05:33)      PHYSICAL EXAM:  General: NAD, comfortable  HEENT: NCAT, PERRL, scleral and sublingual icterus, ecchymoses/laceration noted above R orbit  Neck: no JVD, no cervical lymphadenopathy  Respiratory: CTA b/l, no wheezing, rhonchi, rales, on room air  Cardiovascular: tachycardic, normal S1S2, no M/R/G  Abdomen: soft, NT/ND, bowel sounds in all four quadrants, no palpable masses  Extremities: WWP, no clubbing, cyanosis, or edema  Neurology: A&Ox3, nonfocal, sensation intact     MEDICATIONS:  MEDICATIONS  (STANDING):  cefTRIAXone   IVPB 2000 milliGRAM(s) IV Intermittent every 24 hours  chlorhexidine 2% Cloths 1 Application(s) Topical <User Schedule>  dexMEDEtomidine Infusion 0.5 MICROgram(s)/kG/Hr (11.9 mL/Hr) IV Continuous <Continuous>  folic acid Injectable 1 milliGRAM(s) IV Push daily  lactated ringers. 1000 milliLiter(s) (75 mL/Hr) IV Continuous <Continuous>  LORazepam   Injectable 2 milliGRAM(s) IV Push every 4 hours  LORazepam   Injectable 1.5 milliGRAM(s) IV Push every 4 hours  LORazepam   Injectable   IV Push   pantoprazole  Injectable 40 milliGRAM(s) IV Push daily  tamsulosin 0.4 milliGRAM(s) Oral at bedtime  thiamine Injectable 100 milliGRAM(s) IV Push daily    MEDICATIONS  (PRN):  LORazepam   Injectable 2 milliGRAM(s) IV Push every 1 hour PRN Symptom-triggered: each CIWA -Ar score 8 or GREATER  LORazepam   Injectable 2 milliGRAM(s) IV Push every 2 hours PRN Symptom-triggered: 2 point increase in CIWA -Ar score and a total score of 7 or LESS  LORazepam   Injectable 2 milliGRAM(s) IV Push every 2 hours PRN Agitation      ALLERGIES:  Allergies    No Known Allergies    Intolerances        LABS:                        11.1   3.52  )-----------( 133      ( 22 May 2023 05:30 )             33.7     05-22    139  |  107  |  7   ----------------------------<  124<H>  3.9   |  26  |  0.71    Ca    8.7      22 May 2023 05:30  Phos  2.8     05-22  Mg     2.2     05-22    TPro  5.8<L>  /  Alb  2.0<L>  /  TBili  7.4<H>  /  DBili  x   /  AST  264<H>  /  ALT  243<H>  /  AlkPhos  153<H>  05-22    PT/INR - ( 21 May 2023 06:08 )   PT: 12.3 sec;   INR: 1.05 ratio               RADIOLOGY & ADDITIONAL TESTS: Reviewed.      CENTRAL LINE: N  OSEGUERA: Y  A-LINE: N    GLOBAL ISSUE/BEST PRACTICE:  Analgesia: N  Sedation: N  HOB elevation: yes  Stress ulcer prophylaxis: Protonix  VTE prophylaxis: SCDs  Glycemic control: N  Nutrition: N    CODE STATUS: Full Code   INTERVAL HPI/OVERNIGHT EVENTS: Precedex held.    SUBJECTIVE: Patient seen and examined at bedside. Pt states he feels ok. Has no complaints at this time. AOx2-3. Asking to get out of bed today. Tolerating diet. Denies having any metal implants in body. Oseguera in place.    ROS:  CV: Denies chest pain  Resp: Denies SOB  GI: Denies abdominal pain, constipation, diarrhea, nausea, vomiting  : Denies dysuria  ID: Denies fevers, chills  MSK: Denies joint pain     OBJECTIVE:    VITAL SIGNS:  ICU Vital Signs Last 24 Hrs  T(C): 36.4 (22 May 2023 04:29), Max: 37.8 (21 May 2023 08:00)  T(F): 97.6 (22 May 2023 04:29), Max: 100 (21 May 2023 08:00)  HR: 92 (22 May 2023 06:00) (67 - 151)  BP: 111/76 (22 May 2023 06:00) (77/51 - 135/81)  BP(mean): 89 (22 May 2023 06:00) (59 - 101)  ABP: --  ABP(mean): --  RR: 21 (22 May 2023 06:00) (13 - 35)  SpO2: 95% (22 May 2023 06:00) (92% - 99%)    O2 Parameters below as of 21 May 2023 08:00  Patient On (Oxygen Delivery Method): room air        05-21 @ 07:01  -  05-22 @ 07:00  --------------------------------------------------------  IN: 4364.5 mL / OUT: 5365 mL / NET: -1000.5 mL      CAPILLARY BLOOD GLUCOSE      POCT Blood Glucose.: 123 mg/dL (22 May 2023 05:33)      PHYSICAL EXAM:  General: NAD, comfortable  HEENT: NCAT, PERRL, scleral and sublingual icterus, ecchymoses/laceration noted above R orbit  Neck: no JVD, no cervical lymphadenopathy  Respiratory: CTA b/l, no wheezing, rhonchi, rales, on room air  Cardiovascular: tachycardic, normal S1S2, no M/R/G  Abdomen: soft, NT/ND, bowel sounds in all four quadrants, no palpable masses  Extremities: WWP, no clubbing, cyanosis, or edema  Neurology: A&Ox3, nonfocal, sensation intact     MEDICATIONS:  MEDICATIONS  (STANDING):  cefTRIAXone   IVPB 2000 milliGRAM(s) IV Intermittent every 24 hours  chlorhexidine 2% Cloths 1 Application(s) Topical <User Schedule>  dexMEDEtomidine Infusion 0.5 MICROgram(s)/kG/Hr (11.9 mL/Hr) IV Continuous <Continuous>  folic acid Injectable 1 milliGRAM(s) IV Push daily  lactated ringers. 1000 milliLiter(s) (75 mL/Hr) IV Continuous <Continuous>  LORazepam   Injectable 2 milliGRAM(s) IV Push every 4 hours  LORazepam   Injectable 1.5 milliGRAM(s) IV Push every 4 hours  LORazepam   Injectable   IV Push   pantoprazole  Injectable 40 milliGRAM(s) IV Push daily  tamsulosin 0.4 milliGRAM(s) Oral at bedtime  thiamine Injectable 100 milliGRAM(s) IV Push daily    MEDICATIONS  (PRN):  LORazepam   Injectable 2 milliGRAM(s) IV Push every 1 hour PRN Symptom-triggered: each CIWA -Ar score 8 or GREATER  LORazepam   Injectable 2 milliGRAM(s) IV Push every 2 hours PRN Symptom-triggered: 2 point increase in CIWA -Ar score and a total score of 7 or LESS  LORazepam   Injectable 2 milliGRAM(s) IV Push every 2 hours PRN Agitation      ALLERGIES:  Allergies    No Known Allergies    Intolerances        LABS:                        11.1   3.52  )-----------( 133      ( 22 May 2023 05:30 )             33.7     05-22    139  |  107  |  7   ----------------------------<  124<H>  3.9   |  26  |  0.71    Ca    8.7      22 May 2023 05:30  Phos  2.8     05-22  Mg     2.2     05-22    TPro  5.8<L>  /  Alb  2.0<L>  /  TBili  7.4<H>  /  DBili  x   /  AST  264<H>  /  ALT  243<H>  /  AlkPhos  153<H>  05-22    PT/INR - ( 21 May 2023 06:08 )   PT: 12.3 sec;   INR: 1.05 ratio               RADIOLOGY & ADDITIONAL TESTS: Reviewed.      CENTRAL LINE: N  OSEGUERA: Y  A-LINE: N    GLOBAL ISSUE/BEST PRACTICE:  Analgesia: N  Sedation: N  HOB elevation: yes  Stress ulcer prophylaxis: Protonix  VTE prophylaxis: SCDs  Glycemic control: N  Nutrition: N    CODE STATUS: Full Code   INTERVAL HPI/OVERNIGHT EVENTS: Precedex held.    SUBJECTIVE: Patient seen and examined at bedside. Pt states he feels ok. Has no complaints at this time. AOx2-3. Asking to get out of bed today. Tolerating diet. Denies having any metal implants in body. Squires in place.    ROS:  CV: Denies chest pain  Resp: Denies SOB  GI: Denies abdominal pain, constipation, diarrhea, nausea, vomiting  : Denies dysuria  ID: Denies fevers, chills  MSK: Denies joint pain     OBJECTIVE:    VITAL SIGNS:  ICU Vital Signs Last 24 Hrs  T(C): 36.4 (22 May 2023 04:29), Max: 37.8 (21 May 2023 08:00)  T(F): 97.6 (22 May 2023 04:29), Max: 100 (21 May 2023 08:00)  HR: 92 (22 May 2023 06:00) (67 - 151)  BP: 111/76 (22 May 2023 06:00) (77/51 - 135/81)  BP(mean): 89 (22 May 2023 06:00) (59 - 101)  ABP: --  ABP(mean): --  RR: 21 (22 May 2023 06:00) (13 - 35)  SpO2: 95% (22 May 2023 06:00) (92% - 99%)    O2 Parameters below as of 21 May 2023 08:00  Patient On (Oxygen Delivery Method): room air        05-21 @ 07:01  -  05-22 @ 07:00  --------------------------------------------------------  IN: 4364.5 mL / OUT: 5365 mL / NET: -1000.5 mL      CAPILLARY BLOOD GLUCOSE      POCT Blood Glucose.: 123 mg/dL (22 May 2023 05:33)      PHYSICAL EXAM:  General: NAD, comfortable  HEENT: NCAT, PERRL, scleral and sublingual icterus, ecchymoses/laceration noted above R orbit  Neck: no JVD, no cervical lymphadenopathy  Respiratory: CTA b/l, no wheezing, rhonchi, rales, on room air  Cardiovascular: tachycardic, normal S1S2, no M/R/G  Abdomen: soft, NT/ND, bowel sounds in all four quadrants, no palpable masses, squires in place  Extremities: WWP, no clubbing, cyanosis, or edema  Neurology: A&Ox3, nonfocal, sensation intact, slight tremor, no asterixis    MEDICATIONS:  MEDICATIONS  (STANDING):  cefTRIAXone   IVPB 2000 milliGRAM(s) IV Intermittent every 24 hours  chlorhexidine 2% Cloths 1 Application(s) Topical <User Schedule>  dexMEDEtomidine Infusion 0.5 MICROgram(s)/kG/Hr (11.9 mL/Hr) IV Continuous <Continuous>  folic acid Injectable 1 milliGRAM(s) IV Push daily  lactated ringers. 1000 milliLiter(s) (75 mL/Hr) IV Continuous <Continuous>  LORazepam   Injectable 2 milliGRAM(s) IV Push every 4 hours  LORazepam   Injectable 1.5 milliGRAM(s) IV Push every 4 hours  LORazepam   Injectable   IV Push   pantoprazole  Injectable 40 milliGRAM(s) IV Push daily  tamsulosin 0.4 milliGRAM(s) Oral at bedtime  thiamine Injectable 100 milliGRAM(s) IV Push daily    MEDICATIONS  (PRN):  LORazepam   Injectable 2 milliGRAM(s) IV Push every 1 hour PRN Symptom-triggered: each CIWA -Ar score 8 or GREATER  LORazepam   Injectable 2 milliGRAM(s) IV Push every 2 hours PRN Symptom-triggered: 2 point increase in CIWA -Ar score and a total score of 7 or LESS  LORazepam   Injectable 2 milliGRAM(s) IV Push every 2 hours PRN Agitation      ALLERGIES:  Allergies    No Known Allergies    Intolerances        LABS:                        11.1   3.52  )-----------( 133      ( 22 May 2023 05:30 )             33.7     05-22    139  |  107  |  7   ----------------------------<  124<H>  3.9   |  26  |  0.71    Ca    8.7      22 May 2023 05:30  Phos  2.8     05-22  Mg     2.2     05-22    TPro  5.8<L>  /  Alb  2.0<L>  /  TBili  7.4<H>  /  DBili  x   /  AST  264<H>  /  ALT  243<H>  /  AlkPhos  153<H>  05-22    PT/INR - ( 21 May 2023 06:08 )   PT: 12.3 sec;   INR: 1.05 ratio               RADIOLOGY & ADDITIONAL TESTS: Reviewed.      CENTRAL LINE: N  SQUIRES: Y  A-LINE: N    GLOBAL ISSUE/BEST PRACTICE:  Analgesia: N  Sedation: N  HOB elevation: yes  Stress ulcer prophylaxis: Protonix  VTE prophylaxis: SCDs  Glycemic control: N  Nutrition: N    CODE STATUS: Full Code   INTERVAL HPI/OVERNIGHT EVENTS: Precedex held.    SUBJECTIVE: Patient seen and examined at bedside. Pt states he feels ok. Has no complaints at this time. AOx2-3. Asking to get out of bed today. Tolerating diet. Denies having any metal implants in body. Squires in place.    ROS:  CV: Denies chest pain  Resp: Denies SOB  GI: Denies abdominal pain, constipation, diarrhea, nausea, vomiting  : Denies dysuria  ID: Denies fevers, chills  MSK: Denies joint pain     OBJECTIVE:    VITAL SIGNS:  ICU Vital Signs Last 24 Hrs  T(C): 36.4 (22 May 2023 04:29), Max: 37.8 (21 May 2023 08:00)  T(F): 97.6 (22 May 2023 04:29), Max: 100 (21 May 2023 08:00)  HR: 92 (22 May 2023 06:00) (67 - 151)  BP: 111/76 (22 May 2023 06:00) (77/51 - 135/81)  BP(mean): 89 (22 May 2023 06:00) (59 - 101)  ABP: --  ABP(mean): --  RR: 21 (22 May 2023 06:00) (13 - 35)  SpO2: 95% (22 May 2023 06:00) (92% - 99%)    O2 Parameters below as of 21 May 2023 08:00  Patient On (Oxygen Delivery Method): room air        05-21 @ 07:01  -  05-22 @ 07:00  --------------------------------------------------------  IN: 4364.5 mL / OUT: 5365 mL / NET: -1000.5 mL      CAPILLARY BLOOD GLUCOSE      POCT Blood Glucose.: 123 mg/dL (22 May 2023 05:33)      PHYSICAL EXAM:  General: NAD, comfortable  HEENT: NCAT, PERRL, scleral and sublingual icterus, ecchymoses/laceration noted above R orbit  Neck: no JVD, no cervical lymphadenopathy  Respiratory: CTA b/l, no wheezing, rhonchi, rales, on room air  Cardiovascular: tachycardic, normal S1S2, no M/R/G  Abdomen: soft, NT/ND, bowel sounds in all four quadrants, no palpable masses, squires in place  Extremities: WWP, no clubbing, cyanosis, or edema  Neurology: A&Ox2-3, nonfocal, sensation intact, slight tremor, no asterixis    MEDICATIONS:  MEDICATIONS  (STANDING):  cefTRIAXone   IVPB 2000 milliGRAM(s) IV Intermittent every 24 hours  chlorhexidine 2% Cloths 1 Application(s) Topical <User Schedule>  dexMEDEtomidine Infusion 0.5 MICROgram(s)/kG/Hr (11.9 mL/Hr) IV Continuous <Continuous>  folic acid Injectable 1 milliGRAM(s) IV Push daily  lactated ringers. 1000 milliLiter(s) (75 mL/Hr) IV Continuous <Continuous>  LORazepam   Injectable 2 milliGRAM(s) IV Push every 4 hours  LORazepam   Injectable 1.5 milliGRAM(s) IV Push every 4 hours  LORazepam   Injectable   IV Push   pantoprazole  Injectable 40 milliGRAM(s) IV Push daily  tamsulosin 0.4 milliGRAM(s) Oral at bedtime  thiamine Injectable 100 milliGRAM(s) IV Push daily    MEDICATIONS  (PRN):  LORazepam   Injectable 2 milliGRAM(s) IV Push every 1 hour PRN Symptom-triggered: each CIWA -Ar score 8 or GREATER  LORazepam   Injectable 2 milliGRAM(s) IV Push every 2 hours PRN Symptom-triggered: 2 point increase in CIWA -Ar score and a total score of 7 or LESS  LORazepam   Injectable 2 milliGRAM(s) IV Push every 2 hours PRN Agitation      ALLERGIES:  Allergies    No Known Allergies    Intolerances        LABS:                        11.1   3.52  )-----------( 133      ( 22 May 2023 05:30 )             33.7     05-22    139  |  107  |  7   ----------------------------<  124<H>  3.9   |  26  |  0.71    Ca    8.7      22 May 2023 05:30  Phos  2.8     05-22  Mg     2.2     05-22    TPro  5.8<L>  /  Alb  2.0<L>  /  TBili  7.4<H>  /  DBili  x   /  AST  264<H>  /  ALT  243<H>  /  AlkPhos  153<H>  05-22    PT/INR - ( 21 May 2023 06:08 )   PT: 12.3 sec;   INR: 1.05 ratio               RADIOLOGY & ADDITIONAL TESTS: Reviewed.      CENTRAL LINE: N  SQUIRES: Y  A-LINE: N    GLOBAL ISSUE/BEST PRACTICE:  Analgesia: N  Sedation: N  HOB elevation: yes  Stress ulcer prophylaxis: Protonix  VTE prophylaxis: SCDs  Glycemic control: N  Nutrition: N    CODE STATUS: Full Code   INTERVAL HPI/OVERNIGHT EVENTS: Precedex held. Mentation improving.    SUBJECTIVE: Patient seen and examined at bedside. Pt states he feels ok. Has no complaints at this time. AOx3. Asking to get out of bed today. Tolerating diet. Denies having any metal implants in body. Squires in place.    ROS:  CV: Denies chest pain  Resp: Denies SOB  GI: Denies abdominal pain, constipation, diarrhea, nausea, vomiting  : Denies dysuria  ID: Denies fevers, chills  MSK: Denies joint pain     OBJECTIVE:    VITAL SIGNS:  ICU Vital Signs Last 24 Hrs  T(C): 36.4 (22 May 2023 04:29), Max: 37.8 (21 May 2023 08:00)  T(F): 97.6 (22 May 2023 04:29), Max: 100 (21 May 2023 08:00)  HR: 92 (22 May 2023 06:00) (67 - 151)  BP: 111/76 (22 May 2023 06:00) (77/51 - 135/81)  BP(mean): 89 (22 May 2023 06:00) (59 - 101)  ABP: --  ABP(mean): --  RR: 21 (22 May 2023 06:00) (13 - 35)  SpO2: 95% (22 May 2023 06:00) (92% - 99%)    O2 Parameters below as of 21 May 2023 08:00  Patient On (Oxygen Delivery Method): room air        05-21 @ 07:01  -  05-22 @ 07:00  --------------------------------------------------------  IN: 4364.5 mL / OUT: 5365 mL / NET: -1000.5 mL      CAPILLARY BLOOD GLUCOSE      POCT Blood Glucose.: 123 mg/dL (22 May 2023 05:33)      PHYSICAL EXAM:  General: NAD, comfortable  HEENT: NCAT, PERRL, scleral and sublingual icterus, ecchymoses/laceration noted above R orbit  Neck: no JVD, no cervical lymphadenopathy  Respiratory: CTA b/l, no wheezing, rhonchi, rales, on room air  Cardiovascular: tachycardic, normal S1S2, no M/R/G  Abdomen: soft, NT/ND, bowel sounds in all four quadrants, no palpable masses, squires in place  Extremities: WWP, no clubbing, cyanosis, or edema  Neurology: A&Ox3, nonfocal, sensation intact, slight tremor, no asterixis    MEDICATIONS:  MEDICATIONS  (STANDING):  cefTRIAXone   IVPB 2000 milliGRAM(s) IV Intermittent every 24 hours  chlorhexidine 2% Cloths 1 Application(s) Topical <User Schedule>  dexMEDEtomidine Infusion 0.5 MICROgram(s)/kG/Hr (11.9 mL/Hr) IV Continuous <Continuous>  folic acid Injectable 1 milliGRAM(s) IV Push daily  lactated ringers. 1000 milliLiter(s) (75 mL/Hr) IV Continuous <Continuous>  LORazepam   Injectable 2 milliGRAM(s) IV Push every 4 hours  LORazepam   Injectable 1.5 milliGRAM(s) IV Push every 4 hours  LORazepam   Injectable   IV Push   pantoprazole  Injectable 40 milliGRAM(s) IV Push daily  tamsulosin 0.4 milliGRAM(s) Oral at bedtime  thiamine Injectable 100 milliGRAM(s) IV Push daily    MEDICATIONS  (PRN):  LORazepam   Injectable 2 milliGRAM(s) IV Push every 1 hour PRN Symptom-triggered: each CIWA -Ar score 8 or GREATER  LORazepam   Injectable 2 milliGRAM(s) IV Push every 2 hours PRN Symptom-triggered: 2 point increase in CIWA -Ar score and a total score of 7 or LESS  LORazepam   Injectable 2 milliGRAM(s) IV Push every 2 hours PRN Agitation      ALLERGIES:  Allergies    No Known Allergies    Intolerances        LABS:                        11.1   3.52  )-----------( 133      ( 22 May 2023 05:30 )             33.7     05-22    139  |  107  |  7   ----------------------------<  124<H>  3.9   |  26  |  0.71    Ca    8.7      22 May 2023 05:30  Phos  2.8     05-22  Mg     2.2     05-22    TPro  5.8<L>  /  Alb  2.0<L>  /  TBili  7.4<H>  /  DBili  x   /  AST  264<H>  /  ALT  243<H>  /  AlkPhos  153<H>  05-22    PT/INR - ( 21 May 2023 06:08 )   PT: 12.3 sec;   INR: 1.05 ratio               RADIOLOGY & ADDITIONAL TESTS: Reviewed.      CENTRAL LINE: N  SQUIRES: Y  A-LINE: N    GLOBAL ISSUE/BEST PRACTICE:  Analgesia: N  Sedation: N  HOB elevation: yes  Stress ulcer prophylaxis: Protonix  VTE prophylaxis: SCDs  Glycemic control: N  Nutrition: N    CODE STATUS: Full Code

## 2023-05-22 NOTE — PROGRESS NOTE ADULT - PROBLEM SELECTOR PLAN 3
Chronic known hx of alcohol use disorder  - CIWA Protocol  - wrist restraints for agitation  - Precedex turned off, Ativan/Haldol for agitation  - Ativan 2mg q4h standing, 2mg q1h PRN agitation  - Thiamine 500mg IVP TID, folic acid 1mg IVP QD  - advanced diet to clear liquid  - Addiction Medicine (Radha) consulted, recs appreciated  - management as per ICU

## 2023-05-22 NOTE — PROGRESS NOTE ADULT - PROBLEM SELECTOR PLAN 9
As per nurse patient was having abd pain which resolved after patient voided  ~600cc  - CT A/P- No hydronephrosis. 6.3 mm non obstructing stone lower pole left kidney.  - d/c squires today--> TOV in the PM  - Continue flomax  - continue to monitor

## 2023-05-22 NOTE — PROGRESS NOTE ADULT - ASSESSMENT
etoh abuse  alcoholic hepatitis    diet as tolerated  calculated DF is 20   no need for sternoids  bili noted; likely 2/2 EtOH follow up   check daily lfts and PT/INR  [facility] protocol  can consider ursodiol  management per ICU    I reviewed the overnight course of events on the unit, re-confirming the patient history. I discussed the care with the patient and their family  Differential diagnosis and plan of care discussed with patient after the evaluation  40 minutes spent on total encounter of which more than fifty percent of the encounter was spent counseling and/or coordinating care by the attending physician.  Advanced care planning was discussed with patient and family.  Advanced care planning forms were reviewed and discussed.  Risks, benefits and alternatives of gastroenterologic procedures were discussed in detail and all questions were answered.

## 2023-05-22 NOTE — PROGRESS NOTE ADULT - SUBJECTIVE AND OBJECTIVE BOX
Patient is a 38y old  Male who presents with a chief complaint of Hepatitis (22 May 2023 11:44)    HPI:  38M PMH of alcohol abuse, alcohol withdrawal seizures, fatty liver presents with signs of alcohol abuse/withdrawal. History obtained from wife with  over phone. Patient has been drinking for the past 16 days of unknown amount and had unwitnessed fall from the bed so pt was taken to the ED. Seen and examined pt at bedside. Unable to obtain history from patient due to somnolence in setting of recent ativan use.    In the ED    Vitals: , T 98, Spo2 95% /80     Labs: Plt 65, Na 128, K 3.0, Tbili 9.6, , , , Lactate 4.0, Lipase 432, UA + for bacteria and leuk esterase, Blood alc level 423     Imaging  RUQ U/S Visualization of the gallbladder is limited by overlying bowel gas. No   gross gallstones, gallbladder wall thickening, or pericholecystic fluid. No ultrasonic Grayson's sign epatomegaly and hepatic steatosis.  CT A/P- steatosis and hepatomegaly. No hydronephrosis. 6.3 mm nonobstructing stone lower pole left kidney.  CT head/cervical spine/ maxillofacial non-con: No acute intracranial abnormalities. Mild right periorbital subcutaneous soft tissue swelling. No evidence of orbital or other facial fracture. No vertebral fracture, collapse or subluxation.    ECG: Sinus tachycardia, 115 BPM    Meds Given    dTAP x1, Zofran x 1, Morphine x1, Pepcid x1,2 NS, 5mg haldol x1, 40meq k, 10mg flexiril, zosyn x1, thiamine, folic acid,   Pt seen by ICU team, admitted to ICU for ETOH intoxication & withdrawal. (17 May 2023 09:51)    INTERVAL HPI:  5/18 - Seen and examined patient. Pt was lethargic so unable to obtain ROS. On Precedex drip, Cooling [place] Bacteremia, IV Abx Fever  5/19 - Seen and examined. Patient is lethargic on precedex. Found to be bacteremic now on abx. Bilirubin uptrending. No acute distress. E Coli sepsis, IV Abx   5/20 - Seen and examined. Patient is somnolent on precedex but responds to name and able to answer some questions. Denies CP, SOB, abd pain, nausea. Found to be bacteremic now on abx. Bilirubin uptrending. No acute distress. E Coli sepsis, IV Abx   5/21 -  Seen and examined. Patient is still somnolent on precedex but responds to name and able to answer some questions. Denies CP, SOB, abd pain, nausea. No acute distress. E Coli sepsis, IV Abx Heredia cath will blood   5/22 - Seen and examined. Patient is A&Ox3, but tired and anxious appearing. Precedex off. Answers questions and follows commands. Denies CP, SOB, abd pain, nausea. No acute distress. States he wants to get out of bed and walk. On IV abx for E.coli bacteremia.       OVERNIGHT EVENTS:    Home Medications:      MEDICATIONS  (STANDING):  cefTRIAXone   IVPB 2000 milliGRAM(s) IV Intermittent every 24 hours  chlorhexidine 2% Cloths 1 Application(s) Topical <User Schedule>  dexMEDEtomidine Infusion 0.5 MICROgram(s)/kG/Hr (11.9 mL/Hr) IV Continuous <Continuous>  enoxaparin Injectable 40 milliGRAM(s) SubCutaneous every 24 hours  folic acid 1 milliGRAM(s) Oral daily  LORazepam   Injectable 1.5 milliGRAM(s) IV Push every 4 hours  LORazepam   Injectable 2 milliGRAM(s) IV Push every 4 hours  LORazepam   Injectable   IV Push   tamsulosin 0.4 milliGRAM(s) Oral at bedtime  thiamine 100 milliGRAM(s) Oral daily    MEDICATIONS  (PRN):  LORazepam   Injectable 2 milliGRAM(s) IV Push every 2 hours PRN Agitation  LORazepam   Injectable 2 milliGRAM(s) IV Push every 1 hour PRN Symptom-triggered: each CIWA -Ar score 8 or GREATER  LORazepam   Injectable 2 milliGRAM(s) IV Push every 2 hours PRN Symptom-triggered: 2 point increase in CIWA -Ar score and a total score of 7 or LESS      Allergies    No Known Allergies    Intolerances        Social History:  Tobacco: denies  EtOH: chronic heavy drinker  Recreational drug use: denies  Lives with: patient lives with wife  Ambulates: independent  ADLs: independent (17 May 2023 09:51)      REVIEW OF SYSTEMS:  CONSTITUTIONAL: No fever, No chills, [X] fatigue, No myalgia, No Body ache, No Weakness  HEAD: small abrasions on face  EYES: No eye pain,  No visual disturbances, No discharge, NO Redness  ENMT:  No ear pain, No nose bleed, No vertigo; No sinus pain, NO throat pain, No Congestion  NECK: No pain, No stiffness  RESPIRATORY: No cough, NO wheezing, No  hemoptysis, NO  shortness of breath  CARDIOVASCULAR: No chest pain, palpitations  GASTROINTESTINAL: No abdominal pain, NO epigastric pain. No nausea, No vomiting; No diarrhea, No constipation. [  ] BM  GENITOURINARY: No dysuria, No frequency, No urgency, No hematuria, NO incontinence  NEUROLOGICAL: No headaches, No dizziness, No numbness, No tingling, No tremors, No weakness  EXT: No Swelling, No Pain, No Edema  SKIN:  [  ] No itching, burning, rashes, or lesions   MUSCULOSKELETAL: No joint pain ,No Jt swelling; No muscle pain, No back pain, No extremity pain  PSYCHIATRIC: No depression,  No anxiety,  No mood swings ,No difficulty sleeping at night  PAIN SCALE: [ x ] None  [  ] Other-  ROS Unable to obtain due to - [  ] Dementia  [  ] Lethargy [  ] Drowsy [  ] Sedated [  ] non verbal  REST OF REVIEW Of SYSTEM - [  ] Normal     Vital Signs Last 24 Hrs  T(C): 36.4 (22 May 2023 04:29), Max: 37.4 (21 May 2023 20:05)  T(F): 97.6 (22 May 2023 04:29), Max: 99.3 (21 May 2023 20:05)  HR: 129 (22 May 2023 10:30) (67 - 151)  BP: 121/89 (22 May 2023 10:30) (77/51 - 135/81)  BP(mean): 102 (22 May 2023 10:30) (59 - 102)  RR: 20 (22 May 2023 10:30) (15 - 35)  SpO2: 97% (22 May 2023 10:30) (92% - 99%)      Finger Stick        05-21 @ 07:01  -  05-22 @ 07:00  --------------------------------------------------------  IN: 4364.5 mL / OUT: 5365 mL / NET: -1000.5 mL        PHYSICAL EXAM:  GENERAL:  [ x ] NAD , [  ] well appearing, [  ] Agitated, [  ] Drowsy,  [  ] Lethargy, [  ] confused   HEAD:  [ x ] Normal, [  ] Other  EYES:  [ x ] EOMI, [x  ] PERRLA, [  x] conjunctiva and sclera clear normal, [  ] Other,  [  ] Pallor,[  ] Discharge  ENMT:  [ x ] Normal, [ x ] Moist mucous membranes, [  ] Good dentition, [  ] No Thrush  NECK:  [ x ] Supple, [  ] No JVD, [  ] Normal thyroid, [  ] Lymphadenopathy [  ] Other  CHEST/LUNG:  [x  ] Clear to auscultation bilaterally, [x  ] Breath Sounds equal B/L / Decrease, [  ] poor effort  [ x ] No rales, [ x ] No rhonchi  [ x ]  No wheezing,   HEART:  [  ] Regular rate and rhythm, [ x ] tachycardia, [  ] Bradycardia,  [  ] irregular  [  ] No murmurs, No rubs, No gallops, [  ] PPM in place (Mfr:  )  ABDOMEN:  [ x ] Soft, [ x ] Nontender, [x  ] Nondistended, [ x ] No mass, [ x ] Bowel sounds present, [  ] obese  NERVOUS SYSTEM:  [x  ] Alert & Oriented X3, [ x ] Nonfocal  [  ] Confusion  [  ] Encephalopathic [  ] Sedated [  ] Unable to assess, [  ] Dementia [  ] Other-  EXTREMITIES: [x  ] 2+ Peripheral Pulses, No clubbing, No cyanosis,  [  ] edema B/L lower EXT. [  ] PVD stasis skin changes B/L Lower EXT, [  ] wound  LYMPH: No lymphadenopathy noted  SKIN:  [x  ] No rashes or lesions, [  ] Pressure Ulcers, [  ] ecchymosis, [  ] Skin Tears, [  ] Other    DIET: Diet, Full Liquid (05-22-23 @ 10:00)  Diet, Regular (05-22-23 @ 09:13)      LABS:                        11.1   3.52  )-----------( 133      ( 22 May 2023 05:30 )             33.7     22 May 2023 05:30    139    |  107    |  7      ----------------------------<  124    3.9     |  26     |  0.71     Ca    8.7        22 May 2023 05:30  Phos  2.8       22 May 2023 05:30  Mg     2.2       22 May 2023 05:30    TPro  5.8    /  Alb  2.0    /  TBili  7.4    /  DBili  x      /  AST  264    /  ALT  243    /  AlkPhos  153    22 May 2023 05:30    PT/INR - ( 21 May 2023 06:08 )   PT: 12.3 sec;   INR: 1.05 ratio               Culture Results:   No growth to date. (05-19 @ 07:00)  Culture Results:   Growth in aerobic and anaerobic bottles: Escherichia coli  See previous culture [id number] (05-17 @ 08:45)  Culture Results:   Growth in aerobic and anaerobic bottles: Escherichia coli  ***Blood Panel PCR results on this specimen are available  approximately 3 hours after the Gram stain result.***  Gram stain, PCR, and/or culture results may not always  correspond due to difference in methodologies.  ************************************************************  This PCR assay was performed by multiplex PCR. This  Assay tests for 66 bacterial and resistance gene targets.  Please refer to the [facility] Labs test directory  at [email or web address] for details. (05-17 @ 08:30)          CARDIAC MARKERS ( 19 May 2023 07:00 )  x     / x     / 547 U/L / x     / x      CARDIAC MARKERS ( 17 May 2023 18:30 )  x     / x     / 2225 U/L / x     / x      CARDIAC MARKERS ( 17 May 2023 11:00 )  x     / x     / 2989 U/L / x     / x              Culture - Blood (collected 19 May 2023 07:00)  Source: .Blood Blood-Peripheral  Preliminary Report (20 May 2023 13:01):    No growth to date.    Culture - Blood (collected 17 May 2023 08:45)  Source: .Blood Blood  Gram Stain (18 May 2023 01:18):    Growth in aerobic bottle: Gram Negative Rods    Growth in anaerobic bottle: Gram Negative Rods  Final Report (19 May 2023 11:31):    Growth in aerobic and anaerobic bottles: Escherichia coli    See previous culture [id number]    Culture - Blood (collected 17 May 2023 08:30)  Source: .Blood Blood  Gram Stain (18 May 2023 03:36):    Growth in anaerobic bottle: Gram Negative Rods    Growth in aerobic bottle: Gram Negative Rods  Final Report (19 May 2023 11:30):    Growth in aerobic and anaerobic bottles: Escherichia coli    ***Blood Panel PCR results on this specimen are available    approximately 3 hours after the Gram stain result.***    Gram stain, PCR, and/or culture results may not always    correspond due to difference in methodologies.    ************************************************************    This PCR assay was performed by multiplex PCR. This    Assay tests for 66 bacterial and resistance gene targets.    Please refer to the [facility] Labs test directory    at [email or web address] for details.  Organism: Blood Culture PCR  Escherichia coli (19 May 2023 11:30)  Organism: Escherichia coli (19 May 2023 11:30)  Organism: Blood Culture PCR (19 May 2023 11:30)         Anemia Panel:      Thyroid Panel:        Lipase, Serum: 73 U/L (05-19-23 @ 07:00)  Lipase, Serum: 432 U/L (05-17-23 @ 02:25)          RADIOLOGY & ADDITIONAL TESTS:      HEALTH ISSUES - PROBLEM Dx:  Transaminitis    Need for prophylactic measure    Urinary retention    Fall    Hypocalcemia    Thrombocytopenia    Alcohol dependence with withdrawal    AMS (altered mental status)    Rhabdomyolysis    Bacteremia, escherichia coli            Consultant(s) Notes Reviewed:  [  ] YES     Care Discussed with [X] Consultants  [ x ] Patient  [  ] Family [  ] HCP [  ]   [  ] Social Service  [  ] RN, [  ] Physical Therapy,[  ] Palliative care team  DVT PPX: [  ] Lovenox, [  ] S C Heparin, [  ] Coumadin, [  ] Xarelto, [  ] Eliquis, [  ] Pradaxa, [  ] IV Heparin drip, [x  ] SCD [  ] Contraindication 2 to GI Bleed,[  ] Ambulation [  ] Contraindicated 2 to  bleed [  ] Contraindicated 2 to Brain Bleed  Advanced directive: [ x ] None, [  ] DNR/DNI Patient is a 38y old  Male who presents with a chief complaint of Hepatitis (22 May 2023 11:44)    HPI:  38M PMH of alcohol abuse, alcohol withdrawal seizures, fatty liver presents with signs of alcohol abuse/withdrawal. History obtained from wife with  over phone. Patient has been drinking for the past 16 days of unknown amount and had unwitnessed fall from the bed so pt was taken to the ED. Seen and examined pt at bedside. Unable to obtain history from patient due to somnolence in setting of recent ativan use.    In the ED    Vitals: , T 98, Spo2 95% /80     Labs: Plt 65, Na 128, K 3.0, Tbili 9.6, , , , Lactate 4.0, Lipase 432, UA + for bacteria and leuk esterase, Blood alc level 423     Imaging  RUQ U/S Visualization of the gallbladder is limited by overlying bowel gas. No   gross gallstones, gallbladder wall thickening, or pericholecystic fluid. No ultrasonic Grayson's sign epatomegaly and hepatic steatosis.  CT A/P- steatosis and hepatomegaly. No hydronephrosis. 6.3 mm nonobstructing stone lower pole left kidney.  CT head/cervical spine/ maxillofacial non-con: No acute intracranial abnormalities. Mild right periorbital subcutaneous soft tissue swelling. No evidence of orbital or other facial fracture. No vertebral fracture, collapse or subluxation.    ECG: Sinus tachycardia, 115 BPM    Meds Given    dTAP x1, Zofran x 1, Morphine x1, Pepcid x1,2 NS, 5mg haldol x1, 40meq k, 10mg flexiril, zosyn x1, thiamine, folic acid,   Pt seen by ICU team, admitted to ICU for ETOH intoxication & withdrawal. (17 May 2023 09:51)    INTERVAL HPI:  5/18 - Seen and examined patient. Pt was lethargic so unable to obtain ROS. On Precedex drip, Cooling [place] Bacteremia, IV Abx Fever  5/19 - Seen and examined. Patient is lethargic on precedex. Found to be bacteremic now on abx. Bilirubin uptrending. No acute distress. E Coli sepsis, IV Abx   5/20 - Seen and examined. Patient is somnolent on precedex but responds to name and able to answer some questions. Denies CP, SOB, abd pain, nausea. Found to be bacteremic now on abx. Bilirubin uptrending. No acute distress. E Coli sepsis, IV Abx   5/21 -  Seen and examined. Patient is still somnolent on precedex but responds to name and able to answer some questions. Denies CP, SOB, abd pain, nausea. No acute distress. E Coli sepsis, IV Abx Heredia cath will blood   5/22 - Seen and examined. Patient is A&Ox3, but tired and anxious appearing. Precedex off. Answers questions and follows commands. Denies CP, SOB, abd pain, nausea. No acute distress. States he wants to get out of bed and walk. On IV abx for E.coli bacteremia. MRI A/P schedule.      OVERNIGHT EVENTS: none     Home Medications:      MEDICATIONS  (STANDING):  cefTRIAXone   IVPB 2000 milliGRAM(s) IV Intermittent every 24 hours  chlorhexidine 2% Cloths 1 Application(s) Topical <User Schedule>  dexMEDEtomidine Infusion 0.5 MICROgram(s)/kG/Hr (11.9 mL/Hr) IV Continuous <Continuous>  enoxaparin Injectable 40 milliGRAM(s) SubCutaneous every 24 hours  folic acid 1 milliGRAM(s) Oral daily  LORazepam   Injectable 1.5 milliGRAM(s) IV Push every 4 hours  LORazepam   Injectable 2 milliGRAM(s) IV Push every 4 hours  LORazepam   Injectable   IV Push   tamsulosin 0.4 milliGRAM(s) Oral at bedtime  thiamine 100 milliGRAM(s) Oral daily    MEDICATIONS  (PRN):  LORazepam   Injectable 2 milliGRAM(s) IV Push every 2 hours PRN Agitation  LORazepam   Injectable 2 milliGRAM(s) IV Push every 1 hour PRN Symptom-triggered: each CIWA -Ar score 8 or GREATER  LORazepam   Injectable 2 milliGRAM(s) IV Push every 2 hours PRN Symptom-triggered: 2 point increase in CIWA -Ar score and a total score of 7 or LESS      Allergies    No Known Allergies    Intolerances        Social History:  Tobacco: denies  EtOH: chronic heavy drinker  Recreational drug use: denies  Lives with: patient lives with wife  Ambulates: independent  ADLs: independent (17 May 2023 09:51)      REVIEW OF SYSTEMS: aroused   CONSTITUTIONAL: No fever, No chills, [X] fatigue, No myalgia, No Body ache, No Weakness  HEAD: small abrasions on face  EYES: No eye pain,  No visual disturbances, No discharge, NO Redness  ENMT:  No ear pain, No nose bleed, No vertigo; No sinus pain, NO throat pain, No Congestion  NECK: No pain, No stiffness  RESPIRATORY: No cough, NO wheezing, No  hemoptysis, NO  shortness of breath  CARDIOVASCULAR: No chest pain, palpitations  GASTROINTESTINAL: No abdominal pain, NO epigastric pain. No nausea, No vomiting; No diarrhea, No constipation. [  ] BM  GENITOURINARY: No dysuria, No frequency, No urgency, No hematuria, NO incontinence  NEUROLOGICAL: No headaches, No dizziness, No numbness, No tingling, No tremors, No weakness  EXT: No Swelling, No Pain, No Edema  SKIN:  [  ] No itching, burning, rashes, or lesions   MUSCULOSKELETAL: No joint pain ,No Jt swelling; No muscle pain, No back pain, No extremity pain  PSYCHIATRIC: No depression,  No anxiety,  No mood swings ,No difficulty sleeping at night  PAIN SCALE: [ x ] None  [  ] Other-  ROS Unable to obtain due to - [  ] Dementia  [  ] Lethargy [  ] Drowsy [  ] Sedated [  ] non verbal  REST OF REVIEW Of SYSTEM - [  ] Normal     Vital Signs Last 24 Hrs  T(C): 36.4 (22 May 2023 04:29), Max: 37.4 (21 May 2023 20:05)  T(F): 97.6 (22 May 2023 04:29), Max: 99.3 (21 May 2023 20:05)  HR: 129 (22 May 2023 10:30) (67 - 151)  BP: 121/89 (22 May 2023 10:30) (77/51 - 135/81)  BP(mean): 102 (22 May 2023 10:30) (59 - 102)  RR: 20 (22 May 2023 10:30) (15 - 35)  SpO2: 97% (22 May 2023 10:30) (92% - 99%)      Finger Stick        05-21 @ 07:01  -  05-22 @ 07:00  --------------------------------------------------------  IN: 4364.5 mL / OUT: 5365 mL / NET: -1000.5 mL        PHYSICAL EXAM:  GENERAL:  [ x ] NAD , [  ] well appearing, [  ] Agitated, [x  ] Drowsy,  [  ] Lethargy, [  ] confused   HEAD:  [ x ] Normal, [  ] Other  EYES:  [ x ] EOMI, [x  ] PERRLA, [  x] conjunctiva and sclera clear normal, [  ] Other,  [  ] Pallor,[  ] Discharge + Ecchymosis rt eyebrow   ENMT:  [ x ] Normal, [ x ] Moist mucous membranes, [  ] Good dentition, [x  ] No Thrush  NECK:  [ x ] Supple, [ x ] No JVD, [x  ] Normal thyroid, [  ] Lymphadenopathy [  ] Other  CHEST/LUNG:  [x  ] Clear to auscultation bilaterally, [x  ] Breath Sounds equal B/L / Decrease, [  ] poor effort  [ x ] No rales, [ x ] No rhonchi  [ x ]  No wheezing,   HEART:  [  ] Regular rate and rhythm, [ x ] tachycardia, [  ] Bradycardia,  [  ] irregular  [ x ] No murmurs, No rubs, No gallops, [  ] PPM in place (Mfr:  )  ABDOMEN:  [ x ] Soft, [ x ] Nontender, [x  ] Nondistended, [ x ] No mass, [ x ] Bowel sounds present, [  ] obese  NERVOUS SYSTEM:  [x  ] Alert & Oriented X3, [ x ] Nonfocal  [  ] Confusion  [  ] Encephalopathic [  ] Sedated [  ] Unable to assess, [  ] Dementia [  ] Other-  EXTREMITIES: [x  ] 2+ Peripheral Pulses, No clubbing, No cyanosis,  [  ] edema B/L lower EXT. [  ] PVD stasis skin changes B/L Lower EXT, [  ] wound  LYMPH: No lymphadenopathy noted  SKIN:  [x  ] No rashes or lesions, [  ] Pressure Ulcers, [  ] ecchymosis, [  ] Skin Tears, [  ] Other    DIET: Diet, Full Liquid (05-22-23 @ 10:00)  Diet, Regular (05-22-23 @ 09:13)      LABS:                        11.1   3.52  )-----------( 133      ( 22 May 2023 05:30 )             33.7     22 May 2023 05:30    139    |  107    |  7      ----------------------------<  124    3.9     |  26     |  0.71     Ca    8.7        22 May 2023 05:30  Phos  2.8       22 May 2023 05:30  Mg     2.2       22 May 2023 05:30    TPro  5.8    /  Alb  2.0    /  TBili  7.4    /  DBili  x      /  AST  264    /  ALT  243    /  AlkPhos  153    22 May 2023 05:30    PT/INR - ( 21 May 2023 06:08 )   PT: 12.3 sec;   INR: 1.05 ratio         Culture Results:   No growth to date. (05-19 @ 07:00)  Culture Results:   Growth in aerobic and anaerobic bottles: Escherichia coli  See previous culture [id number] (05-17 @ 08:45)  Culture Results:   Growth in aerobic and anaerobic bottles: Escherichia coli  ***Blood Panel PCR results on this specimen are available  approximately 3 hours after the Gram stain result.***  Gram stain, PCR, and/or culture results may not always  correspond due to difference in methodologies.  ************************************************************  This PCR assay was performed by multiplex PCR. This  Assay tests for 66 bacterial and resistance gene targets.  Please refer to the [facility] Labs test directory  at [email or web address] for details. (05-17 @ 08:30)          CARDIAC MARKERS ( 19 May 2023 07:00 )  x     / x     / 547 U/L / x     / x      CARDIAC MARKERS ( 17 May 2023 18:30 )  x     / x     / 2225 U/L / x     / x      CARDIAC MARKERS ( 17 May 2023 11:00 )  x     / x     / 2989 U/L / x     / x              Culture - Blood (collected 19 May 2023 07:00)  Source: .Blood Blood-Peripheral  Preliminary Report (20 May 2023 13:01):    No growth to date.    Culture - Blood (collected 17 May 2023 08:45)  Source: .Blood Blood  Gram Stain (18 May 2023 01:18):    Growth in aerobic bottle: Gram Negative Rods    Growth in anaerobic bottle: Gram Negative Rods  Final Report (19 May 2023 11:31):    Growth in aerobic and anaerobic bottles: Escherichia coli    See previous culture [id number]    Culture - Blood (collected 17 May 2023 08:30)  Source: .Blood Blood  Gram Stain (18 May 2023 03:36):    Growth in anaerobic bottle: Gram Negative Rods    Growth in aerobic bottle: Gram Negative Rods  Final Report (19 May 2023 11:30):    Growth in aerobic and anaerobic bottles: Escherichia coli    ***Blood Panel PCR results on this specimen are available    approximately 3 hours after the Gram stain result.***    Gram stain, PCR, and/or culture results may not always    correspond due to difference in methodologies.    ************************************************************    This PCR assay was performed by multiplex PCR. This    Assay tests for 66 bacterial and resistance gene targets.    Please refer to the [facility] Labs test directory    at [email or web address] for details.  Organism: Blood Culture PCR  Escherichia coli (19 May 2023 11:30)  Organism: Escherichia coli (19 May 2023 11:30)  Organism: Blood Culture PCR (19 May 2023 11:30)       Lipase, Serum: 73 U/L (05-19-23 @ 07:00)  Lipase, Serum: 432 U/L (05-17-23 @ 02:25)    RADIOLOGY & ADDITIONAL TESTS:      HEALTH ISSUES - PROBLEM Dx:  Transaminitis    Need for prophylactic measure    Urinary retention    Fall    Hypocalcemia    Thrombocytopenia    Alcohol dependence with withdrawal    AMS (altered mental status)    Rhabdomyolysis    Bacteremia, escherichia coli            Consultant(s) Notes Reviewed:  [ x ] YES     Care Discussed with [X] Consultants  [ x ] Patient  [  ] Family [  ] HCP [  ]   [  ] Social Service  [ x] RN, [  ] Physical Therapy,[  ] Palliative care team  DVT PPX: [  ] Lovenox, [  ] S C Heparin, [  ] Coumadin, [  ] Xarelto, [  ] Eliquis, [  ] Pradaxa, [  ] IV Heparin drip, [x  ] SCD [  ] Contraindication 2 to GI Bleed,[  ] Ambulation [  ] Contraindicated 2 to  bleed [  ] Contraindicated 2 to Brain Bleed  Advanced directive: [ x ] None, [  ] DNR/DNI Patient is a 38y old  Male who presents with a chief complaint of Hepatitis (22 May 2023 11:44)    HPI:  38M PMH of alcohol abuse, alcohol withdrawal seizures, fatty liver presents with signs of alcohol abuse/withdrawal. History obtained from wife with  over phone. Patient has been drinking for the past 16 days of unknown amount and had unwitnessed fall from the bed so pt was taken to the ED. Seen and examined pt at bedside. Unable to obtain history from patient due to somnolence in setting of recent ativan use.    In the ED    Vitals: , T 98, Spo2 95% /80     Labs: Plt 65, Na 128, K 3.0, Tbili 9.6, , , , Lactate 4.0, Lipase 432, UA + for bacteria and leuk esterase, Blood alc level 423     Imaging  RUQ U/S Visualization of the gallbladder is limited by overlying bowel gas. No   gross gallstones, gallbladder wall thickening, or pericholecystic fluid. No ultrasonic Grayson's sign epatomegaly and hepatic steatosis.  CT A/P- steatosis and hepatomegaly. No hydronephrosis. 6.3 mm nonobstructing stone lower pole left kidney.  CT head/cervical spine/ maxillofacial non-con: No acute intracranial abnormalities. Mild right periorbital subcutaneous soft tissue swelling. No evidence of orbital or other facial fracture. No vertebral fracture, collapse or subluxation.    ECG: Sinus tachycardia, 115 BPM    Meds Given    dTAP x1, Zofran x 1, Morphine x1, Pepcid x1,2 NS, 5mg haldol x1, 40meq k, 10mg flexiril, zosyn x1, thiamine, folic acid,   Pt seen by ICU team, admitted to ICU for ETOH intoxication & withdrawal. (17 May 2023 09:51)    INTERVAL HPI:  5/18 - Seen and examined patient. Pt was lethargic so unable to obtain ROS. On Precedex drip, [place] Bacteremia, IV Abx Fever  5/19 - Seen and examined. Patient is lethargic on precedex. Found to be bacteremic now on abx. Bilirubin uptrending. No acute distress. E Coli sepsis, IV Abx   5/20 - Seen and examined. Patient is somnolent on precedex but responds to name and able to answer some questions. Denies CP, SOB, abd pain, nausea. Found to be bacteremic now on abx. Bilirubin uptrending. No acute distress. E Coli sepsis, IV Abx   5/21 -  Seen and examined. Patient is still somnolent on precedex but responds to name and able to answer some questions. Denies CP, SOB, abd pain, nausea. No acute distress. E Coli sepsis, IV Abx Heredia cath will blood   5/22 - Seen and examined. Patient is A&Ox3, but tired and anxious appearing. Precedex off. Answers questions and follows commands. Denies CP, SOB, abd pain, nausea. No acute distress. States he wants to get out of bed and walk. On IV abx for E.coli bacteremia. MRI A/P schedule.      OVERNIGHT EVENTS: none     Home Medications:      MEDICATIONS  (STANDING):  cefTRIAXone   IVPB 2000 milliGRAM(s) IV Intermittent every 24 hours  chlorhexidine 2% Cloths 1 Application(s) Topical <User Schedule>  dexMEDEtomidine Infusion 0.5 MICROgram(s)/kG/Hr (11.9 mL/Hr) IV Continuous <Continuous>  enoxaparin Injectable 40 milliGRAM(s) SubCutaneous every 24 hours  folic acid 1 milliGRAM(s) Oral daily  LORazepam   Injectable 1.5 milliGRAM(s) IV Push every 4 hours  LORazepam   Injectable 2 milliGRAM(s) IV Push every 4 hours  LORazepam   Injectable   IV Push   tamsulosin 0.4 milliGRAM(s) Oral at bedtime  thiamine 100 milliGRAM(s) Oral daily    MEDICATIONS  (PRN):  LORazepam   Injectable 2 milliGRAM(s) IV Push every 2 hours PRN Agitation  LORazepam   Injectable 2 milliGRAM(s) IV Push every 1 hour PRN Symptom-triggered: each CIWA -Ar score 8 or GREATER  LORazepam   Injectable 2 milliGRAM(s) IV Push every 2 hours PRN Symptom-triggered: 2 point increase in CIWA -Ar score and a total score of 7 or LESS      Allergies    No Known Allergies    Intolerances        Social History:  Tobacco: denies  EtOH: chronic heavy drinker  Recreational drug use: denies  Lives with: patient lives with wife  Ambulates: independent  ADLs: independent (17 May 2023 09:51)      REVIEW OF SYSTEMS: aroused   CONSTITUTIONAL: No fever, No chills, [X] fatigue, No myalgia, No Body ache, No Weakness  HEAD: small abrasions on face  EYES: No eye pain,  No visual disturbances, No discharge, NO Redness  ENMT:  No ear pain, No nose bleed, No vertigo; No sinus pain, NO throat pain, No Congestion  NECK: No pain, No stiffness  RESPIRATORY: No cough, NO wheezing, No  hemoptysis, NO  shortness of breath  CARDIOVASCULAR: No chest pain, palpitations  GASTROINTESTINAL: No abdominal pain, NO epigastric pain. No nausea, No vomiting; No diarrhea, No constipation. [  ] BM  GENITOURINARY: No dysuria, No frequency, No urgency, No hematuria, NO incontinence  NEUROLOGICAL: No headaches, No dizziness, No numbness, No tingling, No tremors, No weakness  EXT: No Swelling, No Pain, No Edema  SKIN:  [  ] No itching, burning, rashes, or lesions   MUSCULOSKELETAL: No joint pain ,No Jt swelling; No muscle pain, No back pain, No extremity pain  PSYCHIATRIC: No depression,  No anxiety,  No mood swings ,No difficulty sleeping at night  PAIN SCALE: [ x ] None  [  ] Other-  ROS Unable to obtain due to - [  ] Dementia  [  ] Lethargy [  ] Drowsy [  ] Sedated [  ] non verbal  REST OF REVIEW Of SYSTEM - [  ] Normal     Vital Signs Last 24 Hrs  T(C): 36.4 (22 May 2023 04:29), Max: 37.4 (21 May 2023 20:05)  T(F): 97.6 (22 May 2023 04:29), Max: 99.3 (21 May 2023 20:05)  HR: 129 (22 May 2023 10:30) (67 - 151)  BP: 121/89 (22 May 2023 10:30) (77/51 - 135/81)  BP(mean): 102 (22 May 2023 10:30) (59 - 102)  RR: 20 (22 May 2023 10:30) (15 - 35)  SpO2: 97% (22 May 2023 10:30) (92% - 99%)      Finger Stick        05-21 @ 07:01  -  05-22 @ 07:00  --------------------------------------------------------  IN: 4364.5 mL / OUT: 5365 mL / NET: -1000.5 mL        PHYSICAL EXAM:  GENERAL:  [ x ] NAD , [  ] well appearing, [  ] Agitated, [x  ] Drowsy,  [  ] Lethargy, [  ] confused   HEAD:  [ x ] Normal, [  ] Other  EYES:  [ x ] EOMI, [x  ] PERRLA, [  x] conjunctiva and sclera clear normal, [  ] Other,  [  ] Pallor,[  ] Discharge + Ecchymosis rt eyebrow   ENMT:  [ x ] Normal, [ x ] Moist mucous membranes, [  ] Good dentition, [x  ] No Thrush  NECK:  [ x ] Supple, [ x ] No JVD, [x  ] Normal thyroid, [  ] Lymphadenopathy [  ] Other  CHEST/LUNG:  [x  ] Clear to auscultation bilaterally, [x  ] Breath Sounds equal B/L / Decrease, [  ] poor effort  [ x ] No rales, [ x ] No rhonchi  [ x ]  No wheezing,   HEART:  [  ] Regular rate and rhythm, [ x ] tachycardia, [  ] Bradycardia,  [  ] irregular  [ x ] No murmurs, No rubs, No gallops, [  ] PPM in place (Mfr:  )  ABDOMEN:  [ x ] Soft, [ x ] Nontender, [x  ] Nondistended, [ x ] No mass, [ x ] Bowel sounds present, [  ] obese  NERVOUS SYSTEM:  [x  ] Alert & Oriented X3, [ x ] Nonfocal  [  ] Confusion  [  ] Encephalopathic [  ] Sedated [  ] Unable to assess, [  ] Dementia [  ] Other-  EXTREMITIES: [x  ] 2+ Peripheral Pulses, No clubbing, No cyanosis,  [  ] edema B/L lower EXT. [  ] PVD stasis skin changes B/L Lower EXT, [  ] wound  LYMPH: No lymphadenopathy noted  SKIN:  [x  ] No rashes or lesions, [  ] Pressure Ulcers, [  ] ecchymosis, [  ] Skin Tears, [  ] Other    DIET: Diet, Full Liquid (05-22-23 @ 10:00)  Diet, Regular (05-22-23 @ 09:13)      LABS:                        11.1   3.52  )-----------( 133      ( 22 May 2023 05:30 )             33.7     22 May 2023 05:30    139    |  107    |  7      ----------------------------<  124    3.9     |  26     |  0.71     Ca    8.7        22 May 2023 05:30  Phos  2.8       22 May 2023 05:30  Mg     2.2       22 May 2023 05:30    TPro  5.8    /  Alb  2.0    /  TBili  7.4    /  DBili  x      /  AST  264    /  ALT  243    /  AlkPhos  153    22 May 2023 05:30    PT/INR - ( 21 May 2023 06:08 )   PT: 12.3 sec;   INR: 1.05 ratio         Culture Results:   No growth to date. (05-19 @ 07:00)  Culture Results:   Growth in aerobic and anaerobic bottles: Escherichia coli  See previous culture [id number] (05-17 @ 08:45)  Culture Results:   Growth in aerobic and anaerobic bottles: Escherichia coli  ***Blood Panel PCR results on this specimen are available  approximately 3 hours after the Gram stain result.***  Gram stain, PCR, and/or culture results may not always  correspond due to difference in methodologies.  ************************************************************  This PCR assay was performed by multiplex PCR. This  Assay tests for 66 bacterial and resistance gene targets.  Please refer to the [facility] test directory  at [email or web address] for details. (05-17 @ 08:30)          CARDIAC MARKERS ( 19 May 2023 07:00 )  x     / x     / 547 U/L / x     / x      CARDIAC MARKERS ( 17 May 2023 18:30 )  x     / x     / 2225 U/L / x     / x      CARDIAC MARKERS ( 17 May 2023 11:00 )  x     / x     / 2989 U/L / x     / x              Culture - Blood (collected 19 May 2023 07:00)  Source: .Blood Blood-Peripheral  Preliminary Report (20 May 2023 13:01):    No growth to date.    Culture - Blood (collected 17 May 2023 08:45)  Source: .Blood Blood  Gram Stain (18 May 2023 01:18):    Growth in aerobic bottle: Gram Negative Rods    Growth in anaerobic bottle: Gram Negative Rods  Final Report (19 May 2023 11:31):    Growth in aerobic and anaerobic bottles: Escherichia coli    See previous culture [id number]    Culture - Blood (collected 17 May 2023 08:30)  Source: .Blood Blood  Gram Stain (18 May 2023 03:36):    Growth in anaerobic bottle: Gram Negative Rods    Growth in aerobic bottle: Gram Negative Rods  Final Report (19 May 2023 11:30):    Growth in aerobic and anaerobic bottles: Escherichia coli    ***Blood Panel PCR results on this specimen are available    approximately 3 hours after the Gram stain result.***    Gram stain, PCR, and/or culture results may not always    correspond due to difference in methodologies.    ************************************************************    This PCR assay was performed by multiplex PCR. This    Assay tests for 66 bacterial and resistance gene targets.    Please refer to the [facility] test directory    at [email or web address] for details.  Organism: Blood Culture PCR  Escherichia coli (19 May 2023 11:30)  Organism: Escherichia coli (19 May 2023 11:30)  Organism: Blood Culture PCR (19 May 2023 11:30)       Lipase, Serum: 73 U/L (05-19-23 @ 07:00)  Lipase, Serum: 432 U/L (05-17-23 @ 02:25)    RADIOLOGY & ADDITIONAL TESTS:  < from: CT Angio Chest PE Protocol w/ IV Cont (05.22.23 @ 16:01) >    FINDINGS:    LUNGS AND AIRWAYS: No endobronchial lesion. Left upper lobe calcified   granuloma. Bilateral lower lobe linear atelectasis.  PLEURA: No pleural effusion.  MEDIASTINUM AND IMELDA: No lymphadenopathy.  VESSELS: No main, right, left, lobar or proximal segmental pulmonary   embolism. Limited evaluation of the distal segmental and subsegmental   branches due to motion.  HEART: Heart size is normal. No pericardial effusion.  CHEST WALL AND LOWER NECK: Within normal limits.  VISUALIZED UPPER ABDOMEN: Within normal limits.  BONES: Degenerative changes of the thoracic spine.    IMPRESSION:  No main, right, left, lobar or proximal segmental pulmonary embolism.      < end of copied text >      HEALTH ISSUES - PROBLEM Dx:  Transaminitis    Need for prophylactic measure    Urinary retention    Fall    Hypocalcemia    Thrombocytopenia    Alcohol dependence with withdrawal    AMS (altered mental status)    Rhabdomyolysis    Bacteremia, escherichia coli            Consultant(s) Notes Reviewed:  [ x ] YES     Care Discussed with [X] Consultants  [ x ] Patient  [  ] Family [  ] HCP [  ]   [  ] Social Service  [ x] RN, [  ] Physical Therapy,[  ] Palliative care team  DVT PPX: [  ] Lovenox, [  ] S C Heparin, [  ] Coumadin, [  ] Xarelto, [  ] Eliquis, [  ] Pradaxa, [  ] IV Heparin drip, [x  ] SCD [  ] Contraindication 2 to GI Bleed,[  ] Ambulation [  ] Contraindicated 2 to  bleed [  ] Contraindicated 2 to Brain Bleed  Advanced directive: [ x ] None, [  ] DNR/DNI

## 2023-05-22 NOTE — PROGRESS NOTE ADULT - SUBJECTIVE AND OBJECTIVE BOX
[facility]  Juliocesar Godfrey PA-C  [address] [place] [address]  [phone]      INTERVAL HPI/OVERNIGHT EVENTS:  Pt s/e in ICU  More alert now  No GI complaints    MEDICATIONS  (STANDING):  cefTRIAXone   IVPB 2000 milliGRAM(s) IV Intermittent every 24 hours  chlorhexidine 2% Cloths 1 Application(s) Topical <User Schedule>  dexMEDEtomidine Infusion 0.5 MICROgram(s)/kG/Hr (11.9 mL/Hr) IV Continuous <Continuous>  enoxaparin Injectable 40 milliGRAM(s) SubCutaneous every 24 hours  folic acid 1 milliGRAM(s) Oral daily  LORazepam   Injectable   IV Push   LORazepam   Injectable 1.5 milliGRAM(s) IV Push every 4 hours  LORazepam   Injectable 2 milliGRAM(s) IV Push every 4 hours  tamsulosin 0.4 milliGRAM(s) Oral at bedtime  thiamine 100 milliGRAM(s) Oral daily    MEDICATIONS  (PRN):  LORazepam   Injectable 2 milliGRAM(s) IV Push every 2 hours PRN Symptom-triggered: 2 point increase in CIWA -Ar score and a total score of 7 or LESS  LORazepam   Injectable 2 milliGRAM(s) IV Push every 1 hour PRN Symptom-triggered: each CIWA -Ar score 8 or GREATER  LORazepam   Injectable 2 milliGRAM(s) IV Push every 2 hours PRN Agitation      Allergies    No Known Allergies    PHYSICAL EXAM:   Vital Signs:  Vital Signs Last 24 Hrs  T(C): 36.4 (22 May 2023 04:29), Max: 37.4 (21 May 2023 20:05)  T(F): 97.6 (22 May 2023 04:29), Max: 99.3 (21 May 2023 20:05)  HR: 129 (22 May 2023 10:30) (67 - 151)  BP: 121/89 (22 May 2023 10:30) (77/51 - 135/81)  BP(mean): 102 (22 May 2023 10:30) (59 - 102)  RR: 20 (22 May 2023 10:30) (15 - 35)  SpO2: 97% (22 May 2023 10:30) (92% - 99%)      Daily     Daily Weight in k.8 (22 May 2023 05:00)    GENERAL:  Appears stated age  HEENT:  NC/AT  CHEST:  Full & symmetric excursion  HEART:  Regular rhythm  ABDOMEN:  Soft, non-tender, non-distended  EXTEREMITIES:  no cyanosis  SKIN:  No rash  NEURO:  Alert      LABS:                        11.1   3.52  )-----------( 133      ( 22 May 2023 05:30 )             33.7         139  |  107  |  7   ----------------------------<  124<H>  3.9   |  26  |  0.71    Ca    8.7      22 May 2023 05:30  Phos  2.8       Mg     2.2         TPro  5.8<L>  /  Alb  2.0<L>  /  TBili  7.4<H>  /  DBili  x   /  AST  264<H>  /  ALT  243<H>  /  AlkPhos  153<H>      PT/INR - ( 21 May 2023 06:08 )   PT: 12.3 sec;   INR: 1.05 ratio

## 2023-05-22 NOTE — PROGRESS NOTE ADULT - ATTENDING COMMENTS
38 M PMH of alcohol abuse, alcohol withdrawal seizures, fatty liver presents with signs of alcohol abuse/withdrawal. Admitted for AMS , alcohol withdrawal.   transaminitis ,Alcoholic hepatitis & Hyponatremia. Low K, Low Ca, Low Mag .  Pt  seen, Examined, case & care plan d/w , residents at detail.  Consults:  ID Dr Gant/Dr Morfin  -IV Abx -Rocephin daily-Cholangitis likely - MRI A/p   GI-Dr Leach follow up-Likely Cholangitis   [facility]   [facility] eval  Care as per ICU Team. d/w -Taper off Precedex   AM labs   DVT PPx  NPO, IVF.    Total Care time is 45 minutes.

## 2023-05-22 NOTE — PROGRESS NOTE ADULT - SUBJECTIVE AND OBJECTIVE BOX
[facility] DIV[facility] of INFECTIOUS DISEASE  Christian Gant MD PhD, Raven Schilling MD, Sophy Palm MD, Sienna Morfin MD, Jermaine Kelly MD  and providing coverage with Kaur Hampton MD  Providing Infectious Disease Consultations at [facility], [facility], [facility], [facility], [facility]'s    Office# [phone] to schedule follow up appointments  Answering Service for urgent calls or New Consults [phone]  Cell# to text for urgent issues Christian Gant [phone]     infectious diseases progress note:    DIANE GARAY is a 38y y. o. Male patient    Overnight and events of the last 24hrs reviewed    Allergies    No Known Allergies    Intolerances        ANTIBIOTICS/RELEVANT:  antimicrobials  cefTRIAXone   IVPB 2000 milliGRAM(s) IV Intermittent every 24 hours    immunologic:    OTHER:  chlorhexidine 2% Cloths 1 Application(s) Topical <User Schedule>  dexMEDEtomidine Infusion 0.5 MICROgram(s)/kG/Hr IV Continuous <Continuous>  enoxaparin Injectable 40 milliGRAM(s) SubCutaneous every 24 hours  folic acid 1 milliGRAM(s) Oral daily  LORazepam   Injectable 2 milliGRAM(s) IV Push every 1 hour PRN  LORazepam   Injectable   IV Push   LORazepam   Injectable 2 milliGRAM(s) IV Push every 4 hours  LORazepam   Injectable 2 milliGRAM(s) IV Push every 2 hours PRN  LORazepam   Injectable 1.5 milliGRAM(s) IV Push every 4 hours  LORazepam   Injectable 2 milliGRAM(s) IV Push every 2 hours PRN  tamsulosin 0.4 milliGRAM(s) Oral at bedtime  thiamine 100 milliGRAM(s) Oral daily      Objective:  Vital Signs Last 24 Hrs  T(C): 36.4 (22 May 2023 04:29), Max: 37.4 (21 May 2023 20:05)  T(F): 97.6 (22 May 2023 04:29), Max: 99.3 (21 May 2023 20:05)  HR: 129 (22 May 2023 10:30) (67 - 151)  BP: 121/89 (22 May 2023 10:30) (77/51 - 135/81)  BP(mean): 102 (22 May 2023 10:30) (59 - 102)  RR: 20 (22 May 2023 10:30) (15 - 35)  SpO2: 97% (22 May 2023 10:30) (92% - 99%)        T(C): 36.4 (05-22-23 @ 04:29), Max: 37.8 (05-21-23 @ 08:00)  T(C): 36.4 (05-22-23 @ 04:29), Max: 37.8 (05-21-23 @ 08:00)  T(C): 36.4 (05-22-23 @ 04:29), Max: 40 (05-18-23 @ 16:22)    PHYSICAL EXAM:  HEENT: NC atraumatic  Neck: supple  Respiratory: no accessory muscle use, breathing comfortably  Cardiovascular: distant  Gastrointestinal: normal appearing, nondistended  Extremities: no clubbing, no cyanosis,        LABS:                          11.1   3.52  )-----------( 133      ( 22 May 2023 05:30 )             33.7       WBC  3.52 05-22 @ 05:30  4.67 05-21 @ 06:08  5.71 05-20 @ 06:08  9.16 05-19 @ 07:00  2.50 05-18 @ 06:19  5.76 05-17 @ 02:25      05-22    139  |  107  |  7   ----------------------------<  124<H>  3.9   |  26  |  0.71    Ca    8.7      22 May 2023 05:30  Phos  2.8     05-22  Mg     2.2     05-22    TPro  5.8<L>  /  Alb  2.0<L>  /  TBili  7.4<H>  /  DBili  x   /  AST  264<H>  /  ALT  243<H>  /  AlkPhos  153<H>  05-22      Creatinine, Serum: 0.71 mg/dL (05-22-23 @ 05:30)  Creatinine, Serum: 0.63 mg/dL (05-21-23 @ 06:08)  Creatinine, Serum: 0.58 mg/dL (05-20-23 @ 06:08)  Creatinine, Serum: 0.77 mg/dL (05-19-23 @ 07:00)  Creatinine, Serum: 0.72 mg/dL (05-18-23 @ 06:19)  Creatinine, Serum: 0.65 mg/dL (05-17-23 @ 23:21)  Creatinine, Serum: 0.50 mg/dL (05-17-23 @ 18:30)  Creatinine, Serum: 0.55 mg/dL (05-17-23 @ 11:00)  Creatinine, Serum: 0.56 mg/dL (05-17-23 @ 10:20)  Creatinine, Serum: 0.61 mg/dL (05-17-23 @ 06:36)  Creatinine, Serum: 0.64 mg/dL (05-17-23 @ 02:25)      PT/INR - ( 21 May 2023 06:08 )   PT: 12.3 sec;   INR: 1.05 ratio                   INFLAMMATORY MARKERS      MICROBIOLOGY:              RADIOLOGY & ADDITIONAL STUDIES:

## 2023-05-22 NOTE — PROGRESS NOTE ADULT - ATTENDING COMMENTS
38 year old male with alcohol use disorder here following a fall. Admitted for alcohol withdrawal.    Alcohol withdrawal with delirium   Alcoholic hepatitis  Hyperbilirubinemia  E. coli bacteremia    NEURO: Off Precedex. Mentation improved. Continue Ativan taper.  CVS: Tachycardia. BP stable.  PULM: Saturating well on room air  GI: Advance diet. GI follow up. Bilirubin improving. MRCP if able.  RENAL: Stop IVF. Encourage PO intake. CK improved. Discontinue Heredia.  ID: Repeat blood culture NGTD. Continue ceftriaxone until 5/28.  HEME: Thrombocytopenia and leukopenia likely due to chronic alcohol use. Monitor.  PPX: Start Lovenox for DVT PPX    PT eval    Full Code    Eligible for tranfer

## 2023-05-22 NOTE — PROGRESS NOTE ADULT - ASSESSMENT
38M originally from [place] who emigrated in 1999 w PMH of alcohol abuse, alcohol withdrawal seizures, fatty liver presented with signs of alcohol abuse/withdrawal. Patient has been drinking for the past 16 days PTA of unknown amount and had unwitnessed fall. E coli bacteremia noted and ID consultation requested.    Acute Cholangitis/E.coli Bacteremia  - BCx +E.coli - susceptibilities reviewed-relatively pansensitive  - 5/19 repeat Bcx NGTD   - s/p zosyn then changed to ceftriaxone    Recommendations  - continue ceftriaxone 2g IV Q24h with anticipated 7 days of effective abx so 5/26 as last day but with sensitive isolate could complete Rx with Cipro 500mg PO BID as pt improves  - appreciate GI recs    Thank you for consulting us and involving us in the management of this most interesting and challenging case.  We will follow along in the care of this patient. Please call us at [phone] or text me directly on my cell# at [phone] with any concerns.

## 2023-05-22 NOTE — PROGRESS NOTE ADULT - PROBLEM SELECTOR PLAN 1
fever/ sepsis  E Coli -Etiology-R/O Cholangitis -elevated Bili   - blood cultures grew ecoli  - fu Repeat Blood c/s x 2 NGTD  - Noted elevated alk phos, LFTs, bilirubin (downtrending)  - switched zosyn to rocephin  - MRCP ordered, pt denies any metallic implants

## 2023-05-22 NOTE — PROGRESS NOTE ADULT - ASSESSMENT
38M with alcohol abuse and fatty liver presents s/p fall found to be in alcohol withdrawal, rhabdomyolysis, and liver failure 2/2 alcoholic hepatitis    Neuro:  - ETOH withdrawal  - Weaned off precedex overnight 5/21, c/w Ativan/Haldol for agitation  - Ativan 2mg q4h standing, 2mg q1h PRN agitation - will taper down Ativan now that pt off precedex  - Thiamine 500mg IVP TID, folic acid 1mg IVP QD    CV:  - No active issues  - Maintain MAP >65    Pulm:   - No issues, saturating well on room air.     GI:  - Mild hepatic encephalopathy, ammonia elevated on admission  - US, CT A/P with hepatic steatosis and hepatomegaly  - Pt tolerating clear liquid diet, Protonix QD  - Liver failure with improving LFTs, avoid hepatotoxic meds  - Bili downtrending, 16.6 -> 10.9 -> 9 -> 7.4  - MRCP ordered, Pt denies any metallic implants    Renal:  - Rhabdo improving  - Continue IVF: LR @ 75cc/hr  - Hyponatremia resolved, likely 2/2 ETOH abuse and hepatic disease  - C/w Flomax  - Monitor renal function and lytes, replete PRN    Heme:  - SCDs for DVT ppx - in setting of improving thrombocytopenia may start Lovenox today  - Thrombocytopenia improving, likely 2/2 bone marrow suppression from ETOH  - LDH elevated, haptoglobin WNL  - Continue to trend INR    ID:  - E.coli bacteremia on BCx 5/17. Repeat BCx from 5/19 NGTD  - Transitioned from Zosyn to Rocephin on 5/20. Continue Rocephin un 5/28  - Lactate downtrended, now WNL    Endo:  - No active issues  - FS q6h while NPO    Skin:  - Heredia, restraints in place    Dispo:  - Full Code 38M with alcohol abuse and fatty liver presents s/p fall found to be in alcohol withdrawal, rhabdomyolysis, and liver failure 2/2 alcoholic hepatitis    Neuro:  - ETOH withdrawal  - Weaned off precedex overnight 5/21, c/w Ativan/Haldol for agitation  - Ativan 2mg q4h standing, 2mg q1h PRN agitation - will taper down Ativan now that pt off precedex  - Thiamine 500mg PO TID, folic acid 1mg PO QD    CV:  - No active issues  - Maintain MAP >65    Pulm:   - No issues, saturating well on room air.     GI:  - Mild hepatic encephalopathy, ammonia elevated on admission  - US, CT A/P with hepatic steatosis and hepatomegaly  - Pt tolerating clear liquid diet, advance to full liquid, DC Protonix  - Liver failure with improving LFTs, avoid hepatotoxic meds  - Bili downtrending, 16.6 -> 10.9 -> 9 -> 7.4  - MRCP ordered, Pt denies any metallic implants    Renal:  - Rhabdo improving  - D/C IVF now tolerating diet  - Hyponatremia resolved, likely 2/2 ETOH abuse and hepatic disease  - C/w Flomax  - D/C squires today  - Monitor renal function and lytes, replete PRN    Heme:  - SCDs for DVT ppx - in setting of improving thrombocytopenia, start Lovenox today  - Thrombocytopenia improving, likely 2/2 bone marrow suppression from ETOH  - LDH elevated, haptoglobin WNL  - Continue to trend INR    ID:  - E.coli bacteremia on BCx 5/17. Repeat BCx from 5/19 NGTD  - Transitioned from Zosyn to Rocephin on 5/20. Continue Rocephin until 5/28  - Lactate downtrended, now WNL    Endo:  - No active issues    Skin:  - D/C Squires, restraints in place    Dispo:  - Full Code  - PT Consult 38M with alcohol abuse and fatty liver presents s/p fall found to be in alcohol withdrawal, rhabdomyolysis, and liver failure 2/2 alcoholic hepatitis    Neuro:  - ETOH withdrawal  - Weaned off precedex overnight 5/21, c/w Ativan/Haldol for agitation  - Ativan 2mg q4h standing, 2mg q1h PRN agitation - will taper down Ativan now that pt off precedex  - Thiamine 500mg PO TID, folic acid 1mg PO QD    CV:  - No active issues  - Maintain MAP >65    Pulm:   - No issues, saturating well on room air.     GI:  - Mild hepatic encephalopathy, ammonia elevated on admission  - US, CT A/P with hepatic steatosis and hepatomegaly  - Pt tolerating clear liquid diet, advance to full liquid, DC Protonix  - Liver failure with improving LFTs, avoid hepatotoxic meds  - Bili downtrending, 16.6 -> 10.9 -> 9 -> 7.4  - MRCP ordered, pt denies any metallic implants    Renal:  - Rhabdo improving  - D/C IVF now tolerating diet  - Hyponatremia resolved, likely 2/2 ETOH abuse and hepatic disease  - C/w Flomax  - D/C squires today, TOV PM  - Monitor renal function and lytes, replete PRN    Heme:  - SCDs for DVT ppx - in setting of improving thrombocytopenia, start Lovenox 40 QD today 5/22  - Thrombocytopenia improving, likely 2/2 bone marrow suppression from ETOH  - LDH elevated, haptoglobin WNL  - Continue to trend INR    ID:  - E.coli bacteremia on BCx 5/17. Repeat BCx from 5/19 NGTD  - Transitioned from Zosyn to Rocephin on 5/20. Continue Rocephin until 5/28  - Lactate downtrended, now WNL    Endo:  - No active issues    Skin:  - D/C Squires, restraints in place    Dispo:  - Full Code  - PT Consult standing/walking/toileting

## 2023-05-23 LAB
ALBUMIN SERPL ELPH-MCNC: 2.4 G/DL — LOW (ref 3.3–5)
ALP SERPL-CCNC: 238 U/L — HIGH (ref 40–120)
ALT FLD-CCNC: 375 U/L — HIGH (ref 12–78)
ANION GAP SERPL CALC-SCNC: 7 MMOL/L — SIGNIFICANT CHANGE UP (ref 5–17)
AST SERPL-CCNC: 388 U/L — HIGH (ref 15–37)
BASOPHILS # BLD AUTO: 0.03 K/UL — SIGNIFICANT CHANGE UP (ref 0–0.2)
BASOPHILS NFR BLD AUTO: 0.8 % — SIGNIFICANT CHANGE UP (ref 0–2)
BILIRUB SERPL-MCNC: 7.1 MG/DL — HIGH (ref 0.2–1.2)
BUN SERPL-MCNC: 5 MG/DL — LOW (ref 7–23)
CALCIUM SERPL-MCNC: 9.1 MG/DL — SIGNIFICANT CHANGE UP (ref 8.5–10.1)
CHLORIDE SERPL-SCNC: 107 MMOL/L — SIGNIFICANT CHANGE UP (ref 96–108)
CO2 SERPL-SCNC: 26 MMOL/L — SIGNIFICANT CHANGE UP (ref 22–31)
CREAT SERPL-MCNC: 0.64 MG/DL — SIGNIFICANT CHANGE UP (ref 0.5–1.3)
EGFR: 124 ML/MIN/1.73M2 — SIGNIFICANT CHANGE UP
EOSINOPHIL # BLD AUTO: 0.08 K/UL — SIGNIFICANT CHANGE UP (ref 0–0.5)
EOSINOPHIL NFR BLD AUTO: 2.1 % — SIGNIFICANT CHANGE UP (ref 0–6)
GLUCOSE SERPL-MCNC: 112 MG/DL — HIGH (ref 70–99)
HCT VFR BLD CALC: 35.1 % — LOW (ref 39–50)
HGB BLD-MCNC: 11.3 G/DL — LOW (ref 13–17)
IMM GRANULOCYTES NFR BLD AUTO: 0.8 % — SIGNIFICANT CHANGE UP (ref 0–0.9)
LYMPHOCYTES # BLD AUTO: 1.61 K/UL — SIGNIFICANT CHANGE UP (ref 1–3.3)
LYMPHOCYTES # BLD AUTO: 41.3 % — SIGNIFICANT CHANGE UP (ref 13–44)
MAGNESIUM SERPL-MCNC: 2 MG/DL — SIGNIFICANT CHANGE UP (ref 1.6–2.6)
MCHC RBC-ENTMCNC: 27.2 PG — SIGNIFICANT CHANGE UP (ref 27–34)
MCHC RBC-ENTMCNC: 32.2 GM/DL — SIGNIFICANT CHANGE UP (ref 32–36)
MCV RBC AUTO: 84.4 FL — SIGNIFICANT CHANGE UP (ref 80–100)
MONOCYTES # BLD AUTO: 0.56 K/UL — SIGNIFICANT CHANGE UP (ref 0–0.9)
MONOCYTES NFR BLD AUTO: 14.4 % — HIGH (ref 2–14)
NEUTROPHILS # BLD AUTO: 1.59 K/UL — LOW (ref 1.8–7.4)
NEUTROPHILS NFR BLD AUTO: 40.6 % — LOW (ref 43–77)
NRBC # BLD: 0 /100 WBCS — SIGNIFICANT CHANGE UP (ref 0–0)
PHOSPHATE SERPL-MCNC: 3.6 MG/DL — SIGNIFICANT CHANGE UP (ref 2.5–4.5)
PLATELET # BLD AUTO: 229 K/UL — SIGNIFICANT CHANGE UP (ref 150–400)
POTASSIUM SERPL-MCNC: 3.5 MMOL/L — SIGNIFICANT CHANGE UP (ref 3.5–5.3)
POTASSIUM SERPL-SCNC: 3.5 MMOL/L — SIGNIFICANT CHANGE UP (ref 3.5–5.3)
PROT SERPL-MCNC: 6.4 G/DL — SIGNIFICANT CHANGE UP (ref 6–8.3)
RBC # BLD: 4.16 M/UL — LOW (ref 4.2–5.8)
RBC # FLD: 17.5 % — HIGH (ref 10.3–14.5)
SODIUM SERPL-SCNC: 140 MMOL/L — SIGNIFICANT CHANGE UP (ref 135–145)
WBC # BLD: 3.9 K/UL — SIGNIFICANT CHANGE UP (ref 3.8–10.5)
WBC # FLD AUTO: 3.9 K/UL — SIGNIFICANT CHANGE UP (ref 3.8–10.5)

## 2023-05-23 PROCEDURE — 93010 ELECTROCARDIOGRAM REPORT: CPT

## 2023-05-23 PROCEDURE — 99233 SBSQ HOSP IP/OBS HIGH 50: CPT | Mod: GC

## 2023-05-23 PROCEDURE — 93010 ELECTROCARDIOGRAM REPORT: CPT | Mod: 77

## 2023-05-23 RX ORDER — POTASSIUM CHLORIDE 20 MEQ
40 PACKET (EA) ORAL ONCE
Refills: 0 | Status: COMPLETED | OUTPATIENT
Start: 2023-05-23 | End: 2023-05-23

## 2023-05-23 RX ORDER — SODIUM CHLORIDE 9 MG/ML
500 INJECTION, SOLUTION INTRAVENOUS ONCE
Refills: 0 | Status: COMPLETED | OUTPATIENT
Start: 2023-05-23 | End: 2023-05-23

## 2023-05-23 RX ORDER — ENOXAPARIN SODIUM 100 MG/ML
40 INJECTION SUBCUTANEOUS EVERY 24 HOURS
Refills: 0 | Status: DISCONTINUED | OUTPATIENT
Start: 2023-05-23 | End: 2023-05-28

## 2023-05-23 RX ORDER — SODIUM CHLORIDE 9 MG/ML
1000 INJECTION, SOLUTION INTRAVENOUS ONCE
Refills: 0 | Status: COMPLETED | OUTPATIENT
Start: 2023-05-23 | End: 2023-05-23

## 2023-05-23 RX ORDER — MULTIVIT-MIN/FERROUS GLUCONATE 9 MG/15 ML
1 LIQUID (ML) ORAL DAILY
Refills: 0 | Status: DISCONTINUED | OUTPATIENT
Start: 2023-05-23 | End: 2023-05-28

## 2023-05-23 RX ORDER — ZOLPIDEM TARTRATE 10 MG/1
5 TABLET ORAL AT BEDTIME
Refills: 0 | Status: DISCONTINUED | OUTPATIENT
Start: 2023-05-23 | End: 2023-05-28

## 2023-05-23 RX ADMIN — SODIUM CHLORIDE 1000 MILLILITER(S): 9 INJECTION, SOLUTION INTRAVENOUS at 09:04

## 2023-05-23 RX ADMIN — Medication 1 MILLIGRAM(S): at 22:04

## 2023-05-23 RX ADMIN — CHLORHEXIDINE GLUCONATE 1 APPLICATION(S): 213 SOLUTION TOPICAL at 05:08

## 2023-05-23 RX ADMIN — Medication 1 MILLIGRAM(S): at 17:15

## 2023-05-23 RX ADMIN — Medication 1 MILLIGRAM(S): at 11:07

## 2023-05-23 RX ADMIN — CEFTRIAXONE 100 MILLIGRAM(S): 500 INJECTION, POWDER, FOR SOLUTION INTRAMUSCULAR; INTRAVENOUS at 13:40

## 2023-05-23 RX ADMIN — DEXMEDETOMIDINE HYDROCHLORIDE IN 0.9% SODIUM CHLORIDE 11.9 MICROGRAM(S)/KG/HR: 4 INJECTION INTRAVENOUS at 15:16

## 2023-05-23 RX ADMIN — Medication 2 MILLIGRAM(S): at 03:08

## 2023-05-23 RX ADMIN — Medication 2 MILLIGRAM(S): at 19:29

## 2023-05-23 RX ADMIN — Medication 1.5 MILLIGRAM(S): at 13:40

## 2023-05-23 RX ADMIN — ENOXAPARIN SODIUM 40 MILLIGRAM(S): 100 INJECTION SUBCUTANEOUS at 11:07

## 2023-05-23 RX ADMIN — Medication 1.5 MILLIGRAM(S): at 10:17

## 2023-05-23 RX ADMIN — DEXMEDETOMIDINE HYDROCHLORIDE IN 0.9% SODIUM CHLORIDE 11.9 MICROGRAM(S)/KG/HR: 4 INJECTION INTRAVENOUS at 00:37

## 2023-05-23 RX ADMIN — Medication 1.5 MILLIGRAM(S): at 05:08

## 2023-05-23 RX ADMIN — Medication 40 MILLIEQUIVALENT(S): at 09:04

## 2023-05-23 RX ADMIN — Medication 100 MILLIGRAM(S): at 11:07

## 2023-05-23 RX ADMIN — Medication 1.5 MILLIGRAM(S): at 01:09

## 2023-05-23 NOTE — PHYSICAL THERAPY INITIAL EVALUATION ADULT - GAIT TRAINING, PT EVAL
Patient will ambulate independently for 200 feet with use of appropriate assistive device to be able to negotiate home environment, within 3 to 5 sessions.

## 2023-05-23 NOTE — PHYSICAL THERAPY INITIAL EVALUATION ADULT - TRANSFER TRAINING, PT EVAL
Patient will perform sit<>stand independently to be able to get up to use the bathroom, within 3 to 5 sessions.

## 2023-05-23 NOTE — PROGRESS NOTE ADULT - PROBLEM SELECTOR PLAN 9
As per nurse patient was having abd pain which resolved after patient voided  ~600cc  - CT A/P- No hydronephrosis. 6.3 mm non obstructing stone lower pole left kidney.  - d/c squires   - Continue flomax  - continue to monitor

## 2023-05-23 NOTE — PROGRESS NOTE ADULT - PROBLEM SELECTOR PLAN 2
Tbili 9.6, , , , Lipase 432 on admission w/ known hx of alcohol abuse   - Noted elevated alk phos, LFTs, bilirubin (downtrending)  - suspect cholangitis; potential need for mechanical intervention beyond abx, Unable to do MRCP as family NOT able to Provide Safety screening Q /A  - CT Abd/Pelv and RUQ U/S showing steatohepatitis without evidence of biliary obstruction or gallstones   - RUQ U/S Visualization of the gallbladder is limited by overlying bowel gas. No gross gallstones, gallbladder wall thickening, or pericholecystic fluid. No ultrasonic Grayson's sign   - Avoid hepatotoxic agents   - Daily CMP  -Elevated serum Lipase-? Alcoholic pancreatitis   - GI Dr. Leach follow up   - management as per ICU  - f/u MRCP

## 2023-05-23 NOTE — PROGRESS NOTE ADULT - PROBLEM SELECTOR PLAN 1
fever/ sepsis  E Coli -Etiology-R/O Cholangitis -elevated Bili   - blood cultures grew ecoli  - fu Repeat Blood c/s x 2 NGTD  - Noted elevated alk phos, LFTs, bilirubin (downtrending)  - continue rocephin until 5/26   - MRCP ordered, pt denies any metallic implants

## 2023-05-23 NOTE — PROGRESS NOTE ADULT - ASSESSMENT
etoh abuse  alcoholic hepatitis    diet as tolerated  calculated DF is 20   no need for sternoids  bili noted; likely 2/2 EtOH; trend  check daily lfts and PT/INR  ciwa protocol  can consider ursodiol  management per ICU    I reviewed the overnight course of events on the unit, re-confirming the patient history. I discussed the care with the patient and their family  Differential diagnosis and plan of care discussed with patient after the evaluation  40 minutes spent on total encounter of which more than fifty percent of the encounter was spent counseling and/or coordinating care by the attending physician.  Advanced care planning was discussed with patient and family.  Advanced care planning forms were reviewed and discussed.  Risks, benefits and alternatives of gastroenterologic procedures were discussed in detail and all questions were answered.

## 2023-05-23 NOTE — PROGRESS NOTE ADULT - ASSESSMENT
38M originally from [facility] who emigrated in 1999 w PMH of alcohol abuse, alcohol withdrawal seizures, fatty liver presented with signs of alcohol abuse/withdrawal. Patient has been drinking for the past 16 days PTA of unknown amount and had unwitnessed fall. E coli bacteremia noted and ID consultation requested.    Acute Cholangitis/E.coli Bacteremia  - BCx +E.coli - susceptibilities reviewed-relatively pansensitive  - 5/19 repeat Bcx NGTD   - s/p zosyn then changed to ceftriaxone    Recommendations  - continue ceftriaxone 2g IV Q24h with anticipated 7 days of effective abx so 5/26 as last day but with sensitive isolate could complete Rx with Cipro 500mg PO BID as pt improves  - appreciate GI recs    Thank you for consulting us and involving us in the management of this most interesting and challenging case.  We will follow along in the care of this patient. Please call us at [phone] or text me directly on my cell# at [phone] with any concerns.

## 2023-05-23 NOTE — PHYSICAL THERAPY INITIAL EVALUATION ADULT - NSPTDISCHREC_GEN_A_CORE
TBD pending medical course, will continue to follow and make determination when medically appropriate

## 2023-05-23 NOTE — PROGRESS NOTE ADULT - NS ATTEST RISK PROBLEM GEN_ALL_CORE FT
Alcohol use disorder c/b withdrawal  Alcoholic hepatitis  Bacteremia
Alcohol use disorder c/b withdrawal  Alcoholic hepatitis  Bacteremia

## 2023-05-23 NOTE — PROGRESS NOTE ADULT - PROBLEM SELECTOR PLAN 3
Chronic known hx of alcohol use disorder  - CIWA Protocol  - wrist restraints for agitation  - Precedex restarted, Ativan for agitation  - Ativan 2mg q4h standing, 2mg q1h PRN agitation  - Thiamine 100mg qd, folic acid 1mg PO QD  - advanced diet to regular  - Addiction Medicine (Radha) consulted, recs appreciated  - management as per ICU

## 2023-05-23 NOTE — PROGRESS NOTE ADULT - ATTENDING COMMENTS
38 M PMH of alcohol abuse, alcohol withdrawal seizures, fatty liver presents with signs of alcohol abuse/withdrawal. Admitted for AMS , alcohol withdrawal.   transaminitis ,Alcoholic hepatitis & Hyponatremia. Low K, Low Ca, Low Mag .  Pt  seen, Examined, case & care plan d/w , residents at detail.  Consults:  ID Dr Gant/Dr Morfin  -IV Abx -Rocephin daily-Cholangitis likely - MRI A/p   GI-Dr Leach follow up-Likely Cholangitis   Det[facility]   Social service eval  Care as per ICU Team.   AM labs   DVT PPx  PO diet   -PT Eval   Total Care time is 45 minutes.

## 2023-05-23 NOTE — PHYSICAL THERAPY INITIAL EVALUATION ADULT - GENERAL OBSERVATIONS, REHAB EVAL
Patient received semi-reclined in bed, NAD, intermittently tachycardic, OK to see for PT as tolerating per nursing. Pt agreeable to PT at this time. All lines intact, +cardiac monitor +RA

## 2023-05-23 NOTE — PROGRESS NOTE ADULT - PROBLEM SELECTOR PLAN 5
Plt 65 on admission likely in setting of Chronic alcoholic liver disease & Likely BM suppression 2/2 ETOH   - Thrombocytopenia likely 2/2 dilution, bone marrow suppression from ETOH  - LDH elevated, Haptoglobin WNL  - daily CBC  - improving Plt 65 on admission -Normal Now   -likely in setting of Chronic alcoholic liver disease & Likely BM suppression 2/2 ETOH   - Thrombocytopenia likely 2/2 dilution, bone marrow suppression from ETOH  - LDH elevated, Haptoglobin WNL  - daily CBC  - improving

## 2023-05-23 NOTE — PHYSICAL THERAPY INITIAL EVALUATION ADULT - PERTINENT HX OF CURRENT PROBLEM, REHAB EVAL
As per EMR, "38M PMH of alcohol abuse, alcohol withdrawal seizures, fatty liver presents with signs of alcohol abuse/withdrawal. History obtained from wife with  over phone. Patient has been drinking for the past 16 days of unknown amount and had unwitnessed fall from the bed so pt was taken to the ED. Seen and examined pt at bedside. Unable to obtain history from patient due to somnolence in setting of recent ativan use." Referred for PT for dispo planning.

## 2023-05-23 NOTE — PROGRESS NOTE ADULT - SUBJECTIVE AND OBJECTIVE BOX
[facility]  Juliocesar Godfrey PA-C  [address] [place] [address]  [phone]      INTERVAL HPI/OVERNIGHT EVENTS:  Pt s/e in ICU  Reports no abdominal pain or new GI complaints    MEDICATIONS  (STANDING):  cefTRIAXone   IVPB 2000 milliGRAM(s) IV Intermittent every 24 hours  chlorhexidine 2% Cloths 1 Application(s) Topical <User Schedule>  dexMEDEtomidine Infusion 0.5 MICROgram(s)/kG/Hr (11.9 mL/Hr) IV Continuous <Continuous>  enoxaparin Injectable 40 milliGRAM(s) SubCutaneous every 24 hours  folic acid 1 milliGRAM(s) Oral daily  LORazepam     Tablet 1.5 milliGRAM(s) Oral every 4 hours  LORazepam     Tablet 1 milliGRAM(s) Oral every 4 hours  tamsulosin 0.4 milliGRAM(s) Oral at bedtime  thiamine 100 milliGRAM(s) Oral daily    MEDICATIONS  (PRN):  LORazepam   Injectable 2 milliGRAM(s) IV Push every 2 hours PRN Symptom-triggered: 2 point increase in CIWA -Ar score and a total score of 7 or LESS  LORazepam   Injectable 2 milliGRAM(s) IV Push every 1 hour PRN Symptom-triggered: each CIWA -Ar score 8 or GREATER  LORazepam   Injectable 2 milliGRAM(s) IV Push every 2 hours PRN Agitation  zolpidem 5 milliGRAM(s) Oral at bedtime PRN Insomnia      Allergies    No Known Allergies      PHYSICAL EXAM:   Vital Signs:  Vital Signs Last 24 Hrs  T(C): 36.9 (23 May 2023 08:00), Max: 37 (22 May 2023 20:38)  T(F): 98.4 (23 May 2023 08:00), Max: 98.6 (22 May 2023 20:38)  HR: 104 (23 May 2023 11:15) (89 - 156)  BP: 106/62 (23 May 2023 11:15) (81/45 - 165/99)  BP(mean): 77 (23 May 2023 11:15) (58 - 122)  RR: 24 (23 May 2023 11:15) (19 - 40)  SpO2: 96% (23 May 2023 11:15) (92% - 98%)    Parameters below as of 23 May 2023 08:00  Patient On (Oxygen Delivery Method): room air      Daily     Daily Weight in k.3 (23 May 2023 03:30)    GENERAL:  Appears stated age  HEENT:  NC/AT  CHEST:  Full & symmetric excursion  HEART:  Regular rhythm  ABDOMEN:  Soft, non-tender, non-distended  EXTEREMITIES:  no cyanosis  SKIN:  No rash  NEURO:  Alert      LABS:                        11.3   3.90  )-----------( 229      ( 23 May 2023 05:40 )             35.1         140  |  107  |  5<L>  ----------------------------<  112<H>  3.5   |  26  |  0.64    Ca    9.1      23 May 2023 05:40  Phos  3.6       Mg     2.0         TPro  6.4  /  Alb  2.4<L>  /  TBili  7.1<H>  /  DBili  x   /  AST  388<H>  /  ALT  375<H>  /  AlkPhos  238<H>

## 2023-05-23 NOTE — PHYSICAL THERAPY INITIAL EVALUATION ADULT - BALANCE TRAINING, PT EVAL
Patient will demonstrate good minus balance to be able to perform functional mobility effectively and reduce risk of falls, within 3 to 5 sessions.

## 2023-05-23 NOTE — PROGRESS NOTE ADULT - SUBJECTIVE AND OBJECTIVE BOX
Patient is a 38y old  Male who presents with a chief complaint of Hepatitis (23 May 2023 10:42)    HPI:  38M PMH of alcohol abuse, alcohol withdrawal seizures, fatty liver presents with signs of alcohol abuse/withdrawal. History obtained from wife with  over phone. Patient has been drinking for the past 16 days of unknown amount and had unwitnessed fall from the bed so pt was taken to the ED. Seen and examined pt at bedside. Unable to obtain history from patient due to somnolence in setting of recent ativan use.    In the ED    Vitals: , T 98, Spo2 95% /80     Labs: Plt 65, Na 128, K 3.0, Tbili 9.6, , , , Lactate 4.0, Lipase 432, UA + for bacteria and leuk esterase, Blood alc level 423     Imaging  RUQ U/S Visualization of the gallbladder is limited by overlying bowel gas. No   gross gallstones, gallbladder wall thickening, or pericholecystic fluid. No ultrasonic Grayson's sign epatomegaly and hepatic steatosis.  CT A/P- steatosis and hepatomegaly. No hydronephrosis. 6.3 mm nonobstructing stone lower pole left kidney.  CT head/cervical spine/ maxillofacial non-con: No acute intracranial abnormalities. Mild right periorbital subcutaneous soft tissue swelling. No evidence of orbital or other facial fracture. No vertebral fracture, collapse or subluxation.    ECG: Sinus tachycardia, 115 BPM    Meds Given    dTAP x1, Zofran x 1, Morphine x1, Pepcid x1,2 NS, 5mg haldol x1, 40meq k, 10mg flexiril, zosyn x1, thiamine, folic acid,   Pt seen by ICU team, admitted to ICU for ETOH intoxication & withdrawal. (17 May 2023 09:51)    INTERVAL HPI:  5/18 - Seen and examined patient. Pt was lethargic so unable to obtain ROS. On Precedex drip, Cooling [place] Bacteremia, IV Abx Fever  5/19 - Seen and examined. Patient is lethargic on precedex. Found to be bacteremic now on abx. Bilirubin uptrending. No acute distress. E Coli sepsis, IV Abx   5/20 - Seen and examined. Patient is somnolent on precedex but responds to name and able to answer some questions. Denies CP, SOB, abd pain, nausea. Found to be bacteremic now on abx. Bilirubin uptrending. No acute distress. E Coli sepsis, IV Abx   5/21 -  Seen and examined. Patient is still somnolent on precedex but responds to name and able to answer some questions. Denies CP, SOB, abd pain, nausea. No acute distress. E Coli sepsis, IV Abx [place] cath will blood   5/22 - Seen and examined. Patient is A&Ox3, but tired and anxious appearing. Precedex off. Answers questions and follows commands. Denies CP, SOB, abd pain, nausea. No acute distress. States he wants to get out of bed and walk. On IV abx for E.coli bacteremia. MRI A/P schedule.  5/23- Seen and examined at bedside. Pt wants to get up and walk. Pt states he had a poor night of sleep. He has no acute complaints. Tolerating diet.      OVERNIGHT EVENTS: overnight pt restarted on precedex for agitation. given 2mg ativan prn, and 5mg ambien for insomnia. S/P 1L LR bolus and CTA was negative for PE.      Home Medications:      MEDICATIONS  (STANDING):  cefTRIAXone   IVPB 2000 milliGRAM(s) IV Intermittent every 24 hours  chlorhexidine 2% Cloths 1 Application(s) Topical <User Schedule>  dexMEDEtomidine Infusion 0.5 MICROgram(s)/kG/Hr (11.9 mL/Hr) IV Continuous <Continuous>  enoxaparin Injectable 40 milliGRAM(s) SubCutaneous every 24 hours  folic acid 1 milliGRAM(s) Oral daily  LORazepam     Tablet 1.5 milliGRAM(s) Oral every 4 hours  LORazepam     Tablet 1 milliGRAM(s) Oral every 4 hours  tamsulosin 0.4 milliGRAM(s) Oral at bedtime  thiamine 100 milliGRAM(s) Oral daily    MEDICATIONS  (PRN):  LORazepam   Injectable 2 milliGRAM(s) IV Push every 2 hours PRN Symptom-triggered: 2 point increase in CIWA -Ar score and a total score of 7 or LESS  LORazepam   Injectable 2 milliGRAM(s) IV Push every 1 hour PRN Symptom-triggered: each CIWA -Ar score 8 or GREATER  LORazepam   Injectable 2 milliGRAM(s) IV Push every 2 hours PRN Agitation      Allergies    No Known Allergies    Intolerances        Social History:  Tobacco: denies  EtOH: chronic heavy drinker  Recreational drug use: denies  Lives with: patient lives with wife  Ambulates: independent  ADLs: independent (17 May 2023 09:51)      REVIEW OF SYSTEMS:  CONSTITUTIONAL: No fever, No chills, No fatigue, No myalgia, No Body ache, No Weakness  EYES: No eye pain,  No visual disturbances, No discharge, NO Redness  ENMT:  No ear pain, No nose bleed, No vertigo; No sinus pain, NO throat pain, No Congestion  NECK: No pain, No stiffness  RESPIRATORY: No cough, NO wheezing, No  hemoptysis, NO  shortness of breath  CARDIOVASCULAR: No chest pain, palpitations  GASTROINTESTINAL: No abdominal pain, NO epigastric pain. No nausea, No vomiting; No diarrhea, No constipation. [  ] BM  GENITOURINARY: No dysuria, No frequency, No urgency, No hematuria, NO incontinence  NEUROLOGICAL: No headaches, No dizziness, No numbness, No tingling, No tremors, No weakness  EXT: No Swelling, No Pain, No Edema  SKIN:  [ x ] No itching, burning, rashes, or lesions   MUSCULOSKELETAL: No joint pain ,No Jt swelling; No muscle pain, No back pain, No extremity pain  PSYCHIATRIC: No depression,  No anxiety,  No mood swings ,No difficulty sleeping at night  PAIN SCALE: [ x ] None  [  ] Other-  ROS Unable to obtain due to - [  ] Dementia  [  ] Lethargy [  ] Drowsy [  ] Sedated [  ] non verbal  REST OF REVIEW Of SYSTEM - [ x ] Normal     Vital Signs Last 24 Hrs  T(C): 36.9 (23 May 2023 08:00), Max: 37 (22 May 2023 20:38)  T(F): 98.4 (23 May 2023 08:00), Max: 98.6 (22 May 2023 20:38)  HR: 101 (23 May 2023 11:00) (89 - 156)  BP: 100/58 (23 May 2023 11:00) (81/45 - 165/99)  BP(mean): 74 (23 May 2023 11:00) (58 - 122)  RR: 23 (23 May 2023 11:00) (19 - 40)  SpO2: 95% (23 May 2023 11:00) (92% - 98%)    Parameters below as of 23 May 2023 08:00  Patient On (Oxygen Delivery Method): room air      Finger Stick        05-22 @ 07:01 - 05-23 @ 07:00  --------------------------------------------------------  IN: 2096.5 mL / OUT: 3950 mL / NET: -1853.5 mL    05-23 @ 07:01 - 05-23 @ 11:19  --------------------------------------------------------  IN: 1040.2 mL / OUT: 600 mL / NET: 440.2 mL        PHYSICAL EXAM:  GENERAL:  [ x ] NAD , [  ] well appearing, [  ] Agitated, [  ] Drowsy,  [  ] Lethargy, [  ] confused   HEAD:  [ x ] Normal, [  ] Other  EYES:  [ x ] EOMI, [x  ] PERRLA, [  x] conjunctiva and sclera clear normal, [  ] Other,  [  ] Pallor,[  ] Discharge + Ecchymosis rt eyebrow   ENMT:  [ x ] Normal, [ x ] Moist mucous membranes, [  ] Good dentition, [x  ] No Thrush  NECK:  [ x ] Supple, [ x ] No JVD, [x  ] Normal thyroid, [  ] Lymphadenopathy [  ] Other  CHEST/LUNG:  [x  ] Clear to auscultation bilaterally, [x  ] Breath Sounds equal B/L / Decrease, [  ] poor effort  [ x ] No rales, [ x ] No rhonchi  [ x ]  No wheezing,   HEART:  [  ] Regular rate and rhythm, [ x ] tachycardia, [  ] Bradycardia,  [  ] irregular  [ x ] No murmurs, No rubs, No gallops, [  ] PPM in place (Mfr:  )  ABDOMEN:  [ x ] Soft, [ x ] Nontender, [x  ] Nondistended, [ x ] No mass, [ x ] Bowel sounds present, [  ] obese  NERVOUS SYSTEM:  [x  ] Alert & Oriented X3, [ x ] Nonfocal  [  ] Confusion  [  ] Encephalopathic [  ] Sedated [  ] Unable to assess, [  ] Dementia [  ] Other-  EXTREMITIES: [x  ] 2+ Peripheral Pulses, No clubbing, No cyanosis,  [  ] edema B/L lower EXT. [  ] PVD stasis skin changes B/L Lower EXT, [  ] wound  LYMPH: No lymphadenopathy noted  SKIN:  [x  ] No rashes or lesions, [  ] Pressure Ulcers, [  ] ecchymosis, [  ] Skin Tears, [  ] Other      DIET: Diet, Regular (05-23-23 @ 07:21)      LABS:                        11.3   3.90  )-----------( 229      ( 23 May 2023 05:40 )             35.1     23 May 2023 05:40    140    |  107    |  5      ----------------------------<  112    3.5     |  26     |  0.64     Ca    9.1        23 May 2023 05:40  Phos  3.6       23 May 2023 05:40  Mg     2.0       23 May 2023 05:40    TPro  6.4    /  Alb  2.4    /  TBili  7.1    /  DBili  x      /  AST  388    /  ALT  375    /  AlkPhos  238    23 May 2023 05:40          Culture Results:   No growth to date. (05-19 @ 07:00)  Culture Results:   Growth in aerobic and anaerobic bottles: Escherichia coli  See previous culture [id number] (05-17 @ 08:45)  Culture Results:   Growth in aerobic and anaerobic bottles: Escherichia coli  ***Blood Panel PCR results on this specimen are available  approximately 3 hours after the Gram stain result.***  Gram stain, PCR, and/or culture results may not always  correspond due to difference in methodologies.  ************************************************************  This PCR assay was performed by multiplex PCR. This  Assay tests for 66 bacterial and resistance gene targets.  Please refer to the [facility] test directory  at [email or web address] for details. (05-17 @ 08:30)          CARDIAC MARKERS ( 19 May 2023 07:00 )  x     / x     / 547 U/L / x     / x      CARDIAC MARKERS ( 17 May 2023 18:30 )  x     / x     / 2225 U/L / x     / x      CARDIAC MARKERS ( 17 May 2023 11:00 )  x     / x     / 2989 U/L / x     / x              Culture - Blood (collected 19 May 2023 07:00)  Source: .Blood Blood-Peripheral  Preliminary Report (20 May 2023 13:01):    No growth to date.    Culture - Blood (collected 17 May 2023 08:45)  Source: .Blood Blood  Gram Stain (18 May 2023 01:18):    Growth in aerobic bottle: Gram Negative Rods    Growth in anaerobic bottle: Gram Negative Rods  Final Report (19 May 2023 11:31):    Growth in aerobic and anaerobic bottles: Escherichia coli    See previous culture [id number]    Culture - Blood (collected 17 May 2023 08:30)  Source: .Blood Blood  Gram Stain (18 May 2023 03:36):    Growth in anaerobic bottle: Gram Negative Rods    Growth in aerobic bottle: Gram Negative Rods  Final Report (19 May 2023 11:30):    Growth in aerobic and anaerobic bottles: Escherichia coli    ***Blood Panel PCR results on this specimen are available    approximately 3 hours after the Gram stain result.***    Gram stain, PCR, and/or culture results may not always    correspond due to difference in methodologies.    ************************************************************    This PCR assay was performed by multiplex PCR. This    Assay tests for 66 bacterial and resistance gene targets.    Please refer to the [facility] test directory    at [email or web address] for details.  Organism: Blood Culture PCR  Escherichia coli (19 May 2023 11:30)  Organism: Escherichia coli (19 May 2023 11:30)  Organism: Blood Culture PCR (19 May 2023 11:30)         Anemia Panel:      Thyroid Panel:        Lipase, Serum: 73 U/L (05-19-23 @ 07:00)  Lipase, Serum: 432 U/L (05-17-23 @ 02:25)          RADIOLOGY & ADDITIONAL TESTS:      HEALTH ISSUES - PROBLEM Dx:  Transaminitis    Need for prophylactic measure    Urinary retention    Fall    Hypocalcemia    Thrombocytopenia    Alcohol dependence with withdrawal    AMS (altered mental status)    Rhabdomyolysis    Bacteremia, escherichia coli            Consultant(s) Notes Reviewed:  [ x ] YES     Care Discussed with [X] Consultants  [ x ] Patient  [  ] Family [  ] HCP [  ]   [  ] Social Service  [ x] RN, [  ] Physical Therapy,[  ] Palliative care team  DVT PPX: [  ] Lovenox, [  ] S C Heparin, [  ] Coumadin, [  ] Xarelto, [  ] Eliquis, [  ] Pradaxa, [  ] IV Heparin drip, [x  ] SCD [  ] Contraindication 2 to GI Bleed,[  ] Ambulation [  ] Contraindicated 2 to  bleed [  ] Contraindicated 2 to Brain Bleed  Advanced directive: [ x ] None, [  ] DNR/DNI   Patient is a 38y old  Male who presents with a chief complaint of Hepatitis (23 May 2023 10:42)    HPI:  38M PMH of alcohol abuse, alcohol withdrawal seizures, fatty liver presents with signs of alcohol abuse/withdrawal. History obtained from wife with  over phone. Patient has been drinking for the past 16 days of unknown amount and had unwitnessed fall from the bed so pt was taken to the ED. Seen and examined pt at bedside. Unable to obtain history from patient due to somnolence in setting of recent ativan use.    In the ED    Vitals: , T 98, Spo2 95% /80     Labs: Plt 65, Na 128, K 3.0, Tbili 9.6, , , , Lactate 4.0, Lipase 432, UA + for bacteria and leuk esterase, Blood alc level 423     Imaging  RUQ U/S Visualization of the gallbladder is limited by overlying bowel gas. No   gross gallstones, gallbladder wall thickening, or pericholecystic fluid. No ultrasonic Grayson's sign epatomegaly and hepatic steatosis.  CT A/P- steatosis and hepatomegaly. No hydronephrosis. 6.3 mm nonobstructing stone lower pole left kidney.  CT head/cervical spine/ maxillofacial non-con: No acute intracranial abnormalities. Mild right periorbital subcutaneous soft tissue swelling. No evidence of orbital or other facial fracture. No vertebral fracture, collapse or subluxation.    ECG: Sinus tachycardia, 115 BPM    Meds Given    dTAP x1, Zofran x 1, Morphine x1, Pepcid x1,2 NS, 5mg haldol x1, 40meq k, 10mg flexiril, zosyn x1, thiamine, folic acid,   Pt seen by ICU team, admitted to ICU for ETOH intoxication & withdrawal. (17 May 2023 09:51)    INTERVAL HPI:  5/18 - Seen and examined patient. Pt was lethargic so unable to obtain ROS. On Precedex drip, Cooling [place] Bacteremia, IV Abx Fever  5/19 - Seen and examined. Patient is lethargic on precedex. Found to be bacteremic now on abx. Bilirubin uptrending. No acute distress. E Coli sepsis, IV Abx   5/20 - Seen and examined. Patient is somnolent on precedex but responds to name and able to answer some questions. Denies CP, SOB, abd pain, nausea. Found to be bacteremic now on abx. Bilirubin uptrending. No acute distress. E Coli sepsis, IV Abx   5/21 -  Seen and examined. Patient is still somnolent on precedex but responds to name and able to answer some questions. Denies CP, SOB, abd pain, nausea. No acute distress. E Coli sepsis, IV Abx [place] cath will blood   5/22 - Seen and examined. Patient is A&Ox3, but tired and anxious appearing. Precedex off. Answers questions and follows commands. Denies CP, SOB, abd pain, nausea. No acute distress. States he wants to get out of bed and walk. On IV abx for E.coli bacteremia. MRI A/P schedule.  5/23- Seen and examined at bedside. Pt wants to get up and walk. Pt states he had a poor night of sleep. He has no acute complaints. Tolerating diet.IV Abx       OVERNIGHT EVENTS: overnight pt restarted on precedex for agitation. given 2mg ativan prn, and 5mg ambien for insomnia. S/P 1L LR bolus and CTA was negative for PE.      Home Medications:      MEDICATIONS  (STANDING):  cefTRIAXone   IVPB 2000 milliGRAM(s) IV Intermittent every 24 hours  chlorhexidine 2% Cloths 1 Application(s) Topical <User Schedule>  dexMEDEtomidine Infusion 0.5 MICROgram(s)/kG/Hr (11.9 mL/Hr) IV Continuous <Continuous>  enoxaparin Injectable 40 milliGRAM(s) SubCutaneous every 24 hours  folic acid 1 milliGRAM(s) Oral daily  LORazepam     Tablet 1.5 milliGRAM(s) Oral every 4 hours  LORazepam     Tablet 1 milliGRAM(s) Oral every 4 hours  tamsulosin 0.4 milliGRAM(s) Oral at bedtime  thiamine 100 milliGRAM(s) Oral daily    MEDICATIONS  (PRN):  LORazepam   Injectable 2 milliGRAM(s) IV Push every 2 hours PRN Symptom-triggered: 2 point increase in CIWA -Ar score and a total score of 7 or LESS  LORazepam   Injectable 2 milliGRAM(s) IV Push every 1 hour PRN Symptom-triggered: each CIWA -Ar score 8 or GREATER  LORazepam   Injectable 2 milliGRAM(s) IV Push every 2 hours PRN Agitation      Allergies    No Known Allergies    Intolerances        Social History:  Tobacco: denies  EtOH: chronic heavy drinker  Recreational drug use: denies  Lives with: patient lives with wife  Ambulates: independent  ADLs: independent (17 May 2023 09:51)      REVIEW OF SYSTEMS: i feel OK   CONSTITUTIONAL: No fever, No chills, No fatigue, No myalgia, No Body ache, No Weakness  EYES: No eye pain,  No visual disturbances, No discharge, NO Redness  ENMT:  No ear pain, No nose bleed, No vertigo; No sinus pain, NO throat pain, No Congestion  NECK: No pain, No stiffness  RESPIRATORY: No cough, NO wheezing, No  hemoptysis, NO  shortness of breath  CARDIOVASCULAR: No chest pain, palpitations  GASTROINTESTINAL: No abdominal pain, NO epigastric pain. No nausea, No vomiting; No diarrhea, No constipation. [  ] BM  GENITOURINARY: No dysuria, No frequency, No urgency, No hematuria, NO incontinence  NEUROLOGICAL: No headaches, No dizziness, No numbness, No tingling, No tremors, No weakness  EXT: No Swelling, No Pain, No Edema  SKIN:  [ x ] No itching, burning, rashes, or lesions   MUSCULOSKELETAL: No joint pain ,No Jt swelling; No muscle pain, No back pain, No extremity pain  PSYCHIATRIC: No depression,  No anxiety,  No mood swings ,No difficulty sleeping at night  PAIN SCALE: [ x ] None  [  ] Other-  ROS Unable to obtain due to - [  ] Dementia  [  ] Lethargy [  ] Drowsy [  ] Sedated [  ] non verbal  REST OF REVIEW Of SYSTEM - [ x ] Normal     Vital Signs Last 24 Hrs  T(C): 36.9 (23 May 2023 08:00), Max: 37 (22 May 2023 20:38)  T(F): 98.4 (23 May 2023 08:00), Max: 98.6 (22 May 2023 20:38)  HR: 101 (23 May 2023 11:00) (89 - 156)  BP: 100/58 (23 May 2023 11:00) (81/45 - 165/99)  BP(mean): 74 (23 May 2023 11:00) (58 - 122)  RR: 23 (23 May 2023 11:00) (19 - 40)  SpO2: 95% (23 May 2023 11:00) (92% - 98%)    Parameters below as of 23 May 2023 08:00  Patient On (Oxygen Delivery Method): room air      Finger Stick        05-22 @ 07:01 - 05-23 @ 07:00  --------------------------------------------------------  IN: 2096.5 mL / OUT: 3950 mL / NET: -1853.5 mL    05-23 @ 07:01 - 05-23 @ 11:19  --------------------------------------------------------  IN: 1040.2 mL / OUT: 600 mL / NET: 440.2 mL        PHYSICAL EXAM:  GENERAL:  [ x ] NAD , [ x ] well appearing, [  ] Agitated, [  ] Drowsy,  [  ] Lethargy, [  ] confused   HEAD:  [ x ] Normal, [  ] Other  EYES:  [ x ] EOMI, [x  ] PERRLA, [  x] conjunctiva and sclera clear normal, [  ] Other,  [ x ] Pallor,[  ] Discharge + Ecchymosis rt eyebrow   ENMT:  [ x ] Normal, [ x ] Moist mucous membranes, [  ] Good dentition, [x  ] No Thrush  NECK:  [ x ] Supple, [ x ] No JVD, [x  ] Normal thyroid, [  ] Lymphadenopathy [  ] Other  CHEST/LUNG:  [x  ] Clear to auscultation bilaterally, [x  ] Breath Sounds equal B/L / Decrease, [  ] poor effort  [ x ] No rales, [ x ] No rhonchi  [ x ]  No wheezing,   HEART:  [  ] Regular rate and rhythm, [ x ] tachycardia, [  ] Bradycardia,  [  ] irregular  [ x ] No murmurs, No rubs, No gallops, [  ] PPM in place (Mfr:  )  ABDOMEN:  [ x ] Soft, [ x ] Nontender, [x  ] Nondistended, [ x ] No mass, [ x ] Bowel sounds present, [ x ] obese  NERVOUS SYSTEM:  [x  ] Alert & Oriented X3, [ x ] Nonfocal  [  ] Confusion  [  ] Encephalopathic [  ] Sedated [  ] Unable to assess, [  ] Dementia [  ] Other-  EXTREMITIES: [x  ] 2+ Peripheral Pulses, No clubbing, No cyanosis,  [  ] edema B/L lower EXT. [  ] PVD stasis skin changes B/L Lower EXT, [  ] wound  LYMPH: No lymphadenopathy noted  SKIN:  [x  ] No rashes or lesions, [  ] Pressure Ulcers, [  ] ecchymosis, [  ] Skin Tears, [ x ] Other- Multiple skin tattoos on Body      DIET: Diet, Regular (05-23-23 @ 07:21)      LABS:                        11.3   3.90  )-----------( 229      ( 23 May 2023 05:40 )             35.1     23 May 2023 05:40    140    |  107    |  5      ----------------------------<  112    3.5     |  26     |  0.64     Ca    9.1        23 May 2023 05:40  Phos  3.6       23 May 2023 05:40  Mg     2.0       23 May 2023 05:40    TPro  6.4    /  Alb  2.4    /  TBili  7.1    /  DBili  x      /  AST  388    /  ALT  375    /  AlkPhos  238    23 May 2023 05:40          Culture Results:   No growth to date. (05-19 @ 07:00)  Culture Results:   Growth in aerobic and anaerobic bottles: Escherichia coli  See previous culture [id number] (05-17 @ 08:45)  Culture Results:   Growth in aerobic and anaerobic bottles: Escherichia coli  ***Blood Panel PCR results on this specimen are available  approximately 3 hours after the Gram stain result.***  Gram stain, PCR, and/or culture results may not always  correspond due to difference in methodologies.  ************************************************************  This PCR assay was performed by multiplex PCR. This  Assay tests for 66 bacterial and resistance gene targets.  Please refer to the [facility] Labs test directory  at [email or web address] for details. (05-17 @ 08:30)          CARDIAC MARKERS ( 19 May 2023 07:00 )  x     / x     / 547 U/L / x     / x      CARDIAC MARKERS ( 17 May 2023 18:30 )  x     / x     / 2225 U/L / x     / x      CARDIAC MARKERS ( 17 May 2023 11:00 )  x     / x     / 2989 U/L / x     / x              Culture - Blood (collected 19 May 2023 07:00)  Source: .Blood Blood-Peripheral  Preliminary Report (20 May 2023 13:01):    No growth to date.    Culture - Blood (collected 17 May 2023 08:45)  Source: .Blood Blood  Gram Stain (18 May 2023 01:18):    Growth in aerobic bottle: Gram Negative Rods    Growth in anaerobic bottle: Gram Negative Rods  Final Report (19 May 2023 11:31):    Growth in aerobic and anaerobic bottles: Escherichia coli    See previous culture [id number]    Culture - Blood (collected 17 May 2023 08:30)  Source: .Blood Blood  Gram Stain (18 May 2023 03:36):    Growth in anaerobic bottle: Gram Negative Rods    Growth in aerobic bottle: Gram Negative Rods  Final Report (19 May 2023 11:30):    Growth in aerobic and anaerobic bottles: Escherichia coli    ***Blood Panel PCR results on this specimen are available    approximately 3 hours after the Gram stain result.***    Gram stain, PCR, and/or culture results may not always    correspond due to difference in methodologies.    ************************************************************    This PCR assay was performed by multiplex PCR. This    Assay tests for 66 bacterial and resistance gene targets.    Please refer to the [facility] Labs test directory    at [email or web address] for details.  Organism: Blood Culture PCR  Escherichia coli (19 May 2023 11:30)  Organism: Escherichia coli (19 May 2023 11:30)  Organism: Blood Culture PCR (19 May 2023 11:30)         Anemia Panel:      Thyroid Panel:        Lipase, Serum: 73 U/L (05-19-23 @ 07:00)  Lipase, Serum: 432 U/L (05-17-23 @ 02:25)          RADIOLOGY & ADDITIONAL TESTS:      HEALTH ISSUES - PROBLEM Dx:  Transaminitis    Need for prophylactic measure    Urinary retention    Fall    Hypocalcemia    Thrombocytopenia    Alcohol dependence with withdrawal    AMS (altered mental status)    Rhabdomyolysis    Bacteremia, escherichia coli            Consultant(s) Notes Reviewed:  [ x ] YES     Care Discussed with [X] Consultants  [ x ] Patient  [  ] Family [  ] HCP [  ]   [  ] Social Service  [ x] RN, [  ] Physical Therapy,[  ] Palliative care team  DVT PPX: [  ] Lovenox, [  ] S C Heparin, [  ] Coumadin, [  ] Xarelto, [  ] Eliquis, [  ] Pradaxa, [  ] IV Heparin drip, [x  ] SCD [  ] Contraindication 2 to GI Bleed,[  ] Ambulation [  ] Contraindicated 2 to  bleed [  ] Contraindicated 2 to Brain Bleed  Advanced directive: [ x ] None, [  ] DNR/DNI

## 2023-05-23 NOTE — PHYSICAL THERAPY INITIAL EVALUATION ADULT - BED MOBILITY TRAINING, PT EVAL
Patient will perform supine<>sit independently to be able to reposition in bed and maintain skin integrity, within 3 to 5 sessions.

## 2023-05-23 NOTE — PROGRESS NOTE ADULT - SUBJECTIVE AND OBJECTIVE BOX
Date/Time Patient Seen:  		  Referring MD:   Data Reviewed	       Patient is a 38y old  Male who presents with a chief complaint of Hepatitis (22 May 2023 11:47)      Subjective/HPI     PAST MEDICAL & SURGICAL HISTORY:  No pertinent past medical history    Alcohol abuse    H/O hernia repair    S/P appendectomy          Medication list         MEDICATIONS  (STANDING):  cefTRIAXone   IVPB 2000 milliGRAM(s) IV Intermittent every 24 hours  chlorhexidine 2% Cloths 1 Application(s) Topical <User Schedule>  dexMEDEtomidine Infusion 0.5 MICROgram(s)/kG/Hr (11.9 mL/Hr) IV Continuous <Continuous>  folic acid 1 milliGRAM(s) Oral daily  LORazepam   Injectable 1.5 milliGRAM(s) IV Push every 4 hours  LORazepam   Injectable 1 milliGRAM(s) IV Push every 4 hours  LORazepam   Injectable   IV Push   tamsulosin 0.4 milliGRAM(s) Oral at bedtime  thiamine 100 milliGRAM(s) Oral daily    MEDICATIONS  (PRN):  LORazepam   Injectable 2 milliGRAM(s) IV Push every 2 hours PRN Symptom-triggered: 2 point increase in CIWA -Ar score and a total score of 7 or LESS  LORazepam   Injectable 2 milliGRAM(s) IV Push every 1 hour PRN Symptom-triggered: each CIWA -Ar score 8 or GREATER  LORazepam   Injectable 2 milliGRAM(s) IV Push every 2 hours PRN Agitation         Vitals log        ICU Vital Signs Last 24 Hrs  T(C): 36.8 (23 May 2023 04:07), Max: 37 (22 May 2023 20:38)  T(F): 98.2 (23 May 2023 04:07), Max: 98.6 (22 May 2023 20:38)  HR: 119 (23 May 2023 05:00) (89 - 156)  BP: 124/75 (23 May 2023 05:00) (81/45 - 165/99)  BP(mean): 93 (23 May 2023 05:00) (58 - 122)  ABP: --  ABP(mean): --  RR: 25 (23 May 2023 05:00) (19 - 40)  SpO2: 95% (23 May 2023 05:00) (92% - 99%)             Input and Output:  I&O's Detail    21 May 2023 07:01  -  22 May 2023 07:00  --------------------------------------------------------  IN:    Dexmedetomidine: 84.5 mL    Dexmedetomidine: 95 mL    IV PiggyBack: 50 mL    Lactated Ringers: 1575 mL    Lactated Ringers Bolus: 1000 mL    Oral Fluid: 1560 mL  Total IN: 4364.5 mL    OUT:    Indwelling Catheter - Urethral (mL): 5365 mL  Total OUT: 5365 mL    Total NET: -1000.5 mL      22 May 2023 07:01  -  23 May 2023 05:35  --------------------------------------------------------  IN:    Dexmedetomidine: 28.4 mL    IV PiggyBack: 50 mL    Lactated Ringers: 225 mL    Lactated Ringers Bolus: 1000 mL    Oral Fluid: 660 mL  Total IN: 1963.4 mL    OUT:    Indwelling Catheter - Urethral (mL): 2050 mL    Voided (mL): 1500 mL  Total OUT: 3550 mL    Total NET: -1586.6 mL          Lab Data                        12.9   3.75  )-----------( 199      ( 22 May 2023 17:08 )             39.9     05-22    140  |  109<H>  |  5<L>  ----------------------------<  117<H>  4.1   |  26  |  0.70    Ca    9.2      22 May 2023 17:08  Phos  2.6     05-22  Mg     2.1     05-22    TPro  7.0  /  Alb  2.6<L>  /  TBili  8.8<H>  /  DBili  x   /  AST  328<H>  /  ALT  318<H>  /  AlkPhos  210<H>  05-22            Review of Systems	      Objective     Physical Examination    heart s1s2  lung dc BS      Pertinent Lab findings & Imaging      Giovanna:  NO   Adequate UO     I&O's Detail    21 May 2023 07:01  -  22 May 2023 07:00  --------------------------------------------------------  IN:    Dexmedetomidine: 84.5 mL    Dexmedetomidine: 95 mL    IV PiggyBack: 50 mL    Lactated Ringers: 1575 mL    Lactated Ringers Bolus: 1000 mL    Oral Fluid: 1560 mL  Total IN: 4364.5 mL    OUT:    Indwelling Catheter - Urethral (mL): 5365 mL  Total OUT: 5365 mL    Total NET: -1000.5 mL      22 May 2023 07:01  -  23 May 2023 05:35  --------------------------------------------------------  IN:    Dexmedetomidine: 28.4 mL    IV PiggyBack: 50 mL    Lactated Ringers: 225 mL    Lactated Ringers Bolus: 1000 mL    Oral Fluid: 660 mL  Total IN: 1963.4 mL    OUT:    Indwelling Catheter - Urethral (mL): 2050 mL    Voided (mL): 1500 mL  Total OUT: 3550 mL    Total NET: -1586.6 mL               Discussed with:     Cultures:	        Radiology

## 2023-05-23 NOTE — PROGRESS NOTE ADULT - SUBJECTIVE AND OBJECTIVE BOX
[facility] DIV[facility] of INFECTIOUS DISEASE  Christian Gant MD PhD, Raven Schilling MD, Sophy Palm MD, Sienna Morfin MD, Jermaine Kelly MD  and providing coverage with Kaur Hampton MD  Providing Infectious Disease Consultations at [facility], [facility], [facility], [facility]'s    Office# [phone] to schedule follow up appointments  Answering Service for urgent calls or New Consults [phone]  Cell# to text for urgent issues Christian Gant [phone]     infectious diseases progress note:    DIANE GARAY is a 38y y. o. Male patient    Overnight and events of the last 24hrs reviewed    Allergies    No Known Allergies    Intolerances        ANTIBIOTICS/RELEVANT:  antimicrobials  cefTRIAXone   IVPB 2000 milliGRAM(s) IV Intermittent every 24 hours    immunologic:    OTHER:  chlorhexidine 2% Cloths 1 Application(s) Topical <User Schedule>  dexMEDEtomidine Infusion 0.5 MICROgram(s)/kG/Hr IV Continuous <Continuous>  enoxaparin Injectable 40 milliGRAM(s) SubCutaneous every 24 hours  folic acid 1 milliGRAM(s) Oral daily  LORazepam   Injectable 1.5 milliGRAM(s) IV Push every 4 hours  LORazepam   Injectable 1 milliGRAM(s) IV Push every 4 hours  LORazepam   Injectable   IV Push   LORazepam   Injectable 2 milliGRAM(s) IV Push every 2 hours PRN  LORazepam   Injectable 2 milliGRAM(s) IV Push every 1 hour PRN  LORazepam   Injectable 2 milliGRAM(s) IV Push every 2 hours PRN  tamsulosin 0.4 milliGRAM(s) Oral at bedtime  thiamine 100 milliGRAM(s) Oral daily      Objective:  Vital Signs Last 24 Hrs  T(C): 36.9 (23 May 2023 08:00), Max: 37 (22 May 2023 20:38)  T(F): 98.4 (23 May 2023 08:00), Max: 98.6 (22 May 2023 20:38)  HR: 116 (23 May 2023 08:00) (89 - 156)  BP: 109/74 (23 May 2023 08:00) (81/45 - 165/99)  BP(mean): 87 (23 May 2023 08:00) (58 - 122)  RR: 27 (23 May 2023 08:00) (19 - 40)  SpO2: 95% (23 May 2023 08:00) (92% - 98%)    Parameters below as of 23 May 2023 08:00  Patient On (Oxygen Delivery Method): room air        T(C): 36.9 (05-23-23 @ 08:00), Max: 37.4 (05-21-23 @ 20:05)  T(C): 36.9 (05-23-23 @ 08:00), Max: 37.8 (05-21-23 @ 08:00)  T(C): 36.9 (05-23-23 @ 08:00), Max: 37.8 (05-21-23 @ 08:00)    PHYSICAL EXAM:  HEENT: NC atraumatic  Neck: supple  Respiratory: no accessory muscle use, breathing comfortably  Cardiovascular: distant  Gastrointestinal: normal appearing, nondistended  Extremities: no clubbing, no cyanosis,        LABS:                          11.3   3.90  )-----------( 229      ( 23 May 2023 05:40 )             35.1       WBC  3.90 05-23 @ 05:40  3.75 05-22 @ 17:08  3.52 05-22 @ 05:30  4.67 05-21 @ 06:08  5.71 05-20 @ 06:08  9.16 05-19 @ 07:00  2.50 05-18 @ 06:19  5.76 05-17 @ 02:25      05-23    140  |  107  |  5<L>  ----------------------------<  112<H>  3.5   |  26  |  0.64    Ca    9.1      23 May 2023 05:40  Phos  3.6     05-23  Mg     2.0     05-23    TPro  6.4  /  Alb  2.4<L>  /  TBili  7.1<H>  /  DBili  x   /  AST  388<H>  /  ALT  375<H>  /  AlkPhos  238<H>  05-23      Creatinine, Serum: 0.64 mg/dL (05-23-23 @ 05:40)  Creatinine, Serum: 0.70 mg/dL (05-22-23 @ 17:08)  Creatinine, Serum: 0.71 mg/dL (05-22-23 @ 05:30)  Creatinine, Serum: 0.63 mg/dL (05-21-23 @ 06:08)  Creatinine, Serum: 0.58 mg/dL (05-20-23 @ 06:08)  Creatinine, Serum: 0.77 mg/dL (05-19-23 @ 07:00)  Creatinine, Serum: 0.72 mg/dL (05-18-23 @ 06:19)  Creatinine, Serum: 0.65 mg/dL (05-17-23 @ 23:21)  Creatinine, Serum: 0.50 mg/dL (05-17-23 @ 18:30)  Creatinine, Serum: 0.55 mg/dL (05-17-23 @ 11:00)  Creatinine, Serum: 0.56 mg/dL (05-17-23 @ 10:20)  Creatinine, Serum: 0.61 mg/dL (05-17-23 @ 06:36)  Creatinine, Serum: 0.64 mg/dL (05-17-23 @ 02:25)                INFLAMMATORY MARKERS      MICROBIOLOGY:              RADIOLOGY & ADDITIONAL STUDIES:

## 2023-05-23 NOTE — PROGRESS NOTE ADULT - ATTENDING COMMENTS
38 year old male with alcohol use disorder here following a fall. Admitted for alcohol withdrawal.    Alcohol withdrawal with delirium   Alcoholic hepatitis  Hyperbilirubinemia  E. coli bacteremia    NEURO: Restarted on Precedex for agitation. Taper off if able. Mentation improved. Continue Ativan taper.  CVS: Tachycardia. BP stable. Give 1 L LR today.  PULM: Saturating well on room air  GI: Advance diet. GI follow up. Bilirubin improving.  RENAL: Encourage PO intake. CK improved. Heredia discontinued.  ID: Repeat blood culture NGTD. Continue ceftriaxone until 5/28.  HEME: Thrombocytopenia and leukopenia likely due to chronic alcohol use. Monitor.  PPX: Start Lovenox for DVT PPX today    PT eval    Full Code    Patient updated with

## 2023-05-23 NOTE — PROGRESS NOTE ADULT - SUBJECTIVE AND OBJECTIVE BOX
INTERVAL HPI/OVERNIGHT EVENTS: overnight pt restarted on precedex for agitation. given 2mg ativan prn, and 5mg ambien for insomnia. S/P 1L LR bolus and CTA was negative for PE.    SUBJECTIVE: Patient seen and examined at bedside. Pt states he had a poor night of sleep. He would like to get up and walk today. He has no acute complaints. Tolerating diet.    ROS:  CV: Denies chest pain  Resp: Denies SOB  GI: Denies abdominal pain, constipation, diarrhea, nausea, vomiting  : Denies dysuria  ID: Denies fevers, chills  MSK: Denies joint pain     OBJECTIVE:    VITAL SIGNS:  ICU Vital Signs Last 24 Hrs  T(C): 36.9 (23 May 2023 08:00), Max: 37 (22 May 2023 20:38)  T(F): 98.4 (23 May 2023 08:00), Max: 98.6 (22 May 2023 20:38)  HR: 123 (23 May 2023 09:15) (89 - 156)  BP: 130/72 (23 May 2023 09:15) (81/45 - 165/99)  BP(mean): 92 (23 May 2023 09:15) (58 - 122)  ABP: --  ABP(mean): --  RR: 27 (23 May 2023 09:15) (19 - 40)  SpO2: 96% (23 May 2023 09:15) (92% - 98%)    O2 Parameters below as of 23 May 2023 08:00  Patient On (Oxygen Delivery Method): room air        05-22 @ 07:01  -  05-23 @ 07:00  --------------------------------------------------------  IN: 2096.5 mL / OUT: 3950 mL / NET: -1853.5 mL    05-23 @ 07:01  -  05-23 @ 10:43  --------------------------------------------------------  IN: 1022.4 mL / OUT: 0 mL / NET: 1022.4 mL      CAPILLARY BLOOD GLUCOSE      POCT Blood Glucose.: 117 mg/dL (23 May 2023 05:10)      PHYSICAL EXAM:  General: NAD, comfortable  HEENT: NCAT, PERRL, scleral and sublingual icterus, ecchymoses/laceration noted above R orbit  Neck: no JVD, no cervical lymphadenopathy  Respiratory: CTA b/l, no wheezing, rhonchi, rales, on room air  Cardiovascular: tachycardic, normal S1S2, no M/R/G  Abdomen: soft, NT/ND, bowel sounds in all four quadrants, no palpable masses  Extremities: WWP, no clubbing, cyanosis, or edema  Neurology: A&Ox3, nonfocal, sensation intact, slight tremor, no asterixis    MEDICATIONS:  MEDICATIONS  (STANDING):  cefTRIAXone   IVPB 2000 milliGRAM(s) IV Intermittent every 24 hours  chlorhexidine 2% Cloths 1 Application(s) Topical <User Schedule>  dexMEDEtomidine Infusion 0.5 MICROgram(s)/kG/Hr (11.9 mL/Hr) IV Continuous <Continuous>  enoxaparin Injectable 40 milliGRAM(s) SubCutaneous every 24 hours  folic acid 1 milliGRAM(s) Oral daily  tamsulosin 0.4 milliGRAM(s) Oral at bedtime  thiamine 100 milliGRAM(s) Oral daily    MEDICATIONS  (PRN):  LORazepam   Injectable 2 milliGRAM(s) IV Push every 2 hours PRN Symptom-triggered: 2 point increase in CIWA -Ar score and a total score of 7 or LESS  LORazepam   Injectable 2 milliGRAM(s) IV Push every 1 hour PRN Symptom-triggered: each CIWA -Ar score 8 or GREATER  LORazepam   Injectable 2 milliGRAM(s) IV Push every 2 hours PRN Agitation      ALLERGIES:  Allergies    No Known Allergies    Intolerances        LABS:                        11.3   3.90  )-----------( 229      ( 23 May 2023 05:40 )             35.1     05-23    140  |  107  |  5<L>  ----------------------------<  112<H>  3.5   |  26  |  0.64    Ca    9.1      23 May 2023 05:40  Phos  3.6     05-23  Mg     2.0     05-23    TPro  6.4  /  Alb  2.4<L>  /  TBili  7.1<H>  /  DBili  x   /  AST  388<H>  /  ALT  375<H>  /  AlkPhos  238<H>  05-23        Radiology: reviewed      CENTRAL LINE: N  OSEGUERA: N  A-LINE: N    GLOBAL ISSUE/BEST PRACTICE:  Analgesia: N  Sedation: Precedex  HOB elevation: yes  Stress ulcer prophylaxis: Protonix  VTE prophylaxis: SCDs/Lovenox  Glycemic control: N  Nutrition: N    CODE STATUS: Full Code

## 2023-05-23 NOTE — PROGRESS NOTE ADULT - ASSESSMENT
38 year old male with a history of alcohol abuse, alcohol withdrawal seizures, fatty liver presents with signs of alcohol abuse/withdrawal    etoh use   jennifer  harry  hepatitis  steatosis    ativan - atc  ativan prn  on ABX  planned for MRCP    trend LFT  serial LABS  I and O  monitor VS and Sat  ciwa monitoring  pulse ox - tele monitoring  folic acid  thiamine

## 2023-05-23 NOTE — PROGRESS NOTE ADULT - ASSESSMENT
38M with alcohol abuse and fatty liver presents s/p fall found to be in alcohol withdrawal, rhabdomyolysis, and liver failure 2/2 alcoholic hepatitis    Neuro:  - ETOH withdrawal  - On precedex for agitation, will attempt to wean, c/w Ativan/Haldol for agitation  - Ativan taper 1.5 mg q4h x 6, 1mg x 6 standing, 2mg q1h PRN agitation - will continue taper and order PO  - Thiamine 500mg PO TID, folic acid 1mg PO QD  - Ambien QHS    CV:  - No active issues  - Maintain MAP >65    Pulm:   - No issues, saturating well on room air.     GI:  - Mild hepatic encephalopathy, ammonia elevated on admission  - US, CT A/P with hepatic steatosis and hepatomegaly  - Pt tolerating clear liquid diet, advance to full liquid, DC Protonix  - Liver failure with elevated LFTs, avoid hepatotoxic meds  - Bili downtrending, 16.6 -> 10.9 -> 9 -> 7.4  - MRCP ordered, pt denies any metallic implants, will d/w GI    Renal:  - Rhabdo improving  - 1L LR bolus ordered  - Hyponatremia resolved, likely 2/2 ETOH abuse and hepatic disease  - K repleted 5/23  - C/w Flomax  - Heredia DC 5/22, pt voiding appropriately, continue to monitor UOP  - Monitor renal function and lytes, replete PRN    Heme:  - SCDs for DVT ppx - in setting of improving thrombocytopenia, start Lovenox 40 QD today 5/23  - Thrombocytopenia improving, likely 2/2 bone marrow suppression from ETOH  - LDH elevated, haptoglobin WNL  - Continue to trend INR    ID:  - E.coli bacteremia on BCx 5/17. Repeat BCx from 5/19 NGTD  - Transitioned from Zosyn to Rocephin on 5/20. Continue Rocephin until 5/26  - Lactate downtrended, now WNL    Endo:  - No active issues    Skin:  - No tubes/lines/drains    Dispo:  - Full Code  - PT Consult ordered

## 2023-05-23 NOTE — PHYSICAL THERAPY INITIAL EVALUATION ADULT - FOLLOWS COMMANDS/ANSWERS QUESTIONS, REHAB EVAL
intermittently impulsive, occasional repeat commands/50% of the time/able to follow single-step instructions

## 2023-05-24 LAB
ALBUMIN SERPL ELPH-MCNC: 2.9 G/DL — LOW (ref 3.3–5)
ALP SERPL-CCNC: 327 U/L — HIGH (ref 40–120)
ALT FLD-CCNC: 564 U/L — HIGH (ref 12–78)
ANION GAP SERPL CALC-SCNC: 9 MMOL/L — SIGNIFICANT CHANGE UP (ref 5–17)
AST SERPL-CCNC: 487 U/L — HIGH (ref 15–37)
BASOPHILS # BLD AUTO: 0.05 K/UL — SIGNIFICANT CHANGE UP (ref 0–0.2)
BASOPHILS NFR BLD AUTO: 0.9 % — SIGNIFICANT CHANGE UP (ref 0–2)
BILIRUB SERPL-MCNC: 7.3 MG/DL — HIGH (ref 0.2–1.2)
BUN SERPL-MCNC: 7 MG/DL — SIGNIFICANT CHANGE UP (ref 7–23)
CALCIUM SERPL-MCNC: 9.3 MG/DL — SIGNIFICANT CHANGE UP (ref 8.5–10.1)
CHLORIDE SERPL-SCNC: 109 MMOL/L — HIGH (ref 96–108)
CO2 SERPL-SCNC: 20 MMOL/L — LOW (ref 22–31)
CREAT SERPL-MCNC: 0.66 MG/DL — SIGNIFICANT CHANGE UP (ref 0.5–1.3)
CULTURE RESULTS: SIGNIFICANT CHANGE UP
EGFR: 123 ML/MIN/1.73M2 — SIGNIFICANT CHANGE UP
EOSINOPHIL # BLD AUTO: 0.08 K/UL — SIGNIFICANT CHANGE UP (ref 0–0.5)
EOSINOPHIL NFR BLD AUTO: 1.4 % — SIGNIFICANT CHANGE UP (ref 0–6)
GLUCOSE SERPL-MCNC: 105 MG/DL — HIGH (ref 70–99)
HCT VFR BLD CALC: 40.2 % — SIGNIFICANT CHANGE UP (ref 39–50)
HGB BLD-MCNC: 13.1 G/DL — SIGNIFICANT CHANGE UP (ref 13–17)
IMM GRANULOCYTES NFR BLD AUTO: 1.1 % — HIGH (ref 0–0.9)
LYMPHOCYTES # BLD AUTO: 1.91 K/UL — SIGNIFICANT CHANGE UP (ref 1–3.3)
LYMPHOCYTES # BLD AUTO: 34.5 % — SIGNIFICANT CHANGE UP (ref 13–44)
MCHC RBC-ENTMCNC: 27.8 PG — SIGNIFICANT CHANGE UP (ref 27–34)
MCHC RBC-ENTMCNC: 32.6 GM/DL — SIGNIFICANT CHANGE UP (ref 32–36)
MCV RBC AUTO: 85.4 FL — SIGNIFICANT CHANGE UP (ref 80–100)
MONOCYTES # BLD AUTO: 0.82 K/UL — SIGNIFICANT CHANGE UP (ref 0–0.9)
MONOCYTES NFR BLD AUTO: 14.8 % — HIGH (ref 2–14)
NEUTROPHILS # BLD AUTO: 2.61 K/UL — SIGNIFICANT CHANGE UP (ref 1.8–7.4)
NEUTROPHILS NFR BLD AUTO: 47.3 % — SIGNIFICANT CHANGE UP (ref 43–77)
NRBC # BLD: 0 /100 WBCS — SIGNIFICANT CHANGE UP (ref 0–0)
PLATELET # BLD AUTO: 362 K/UL — SIGNIFICANT CHANGE UP (ref 150–400)
POTASSIUM SERPL-MCNC: 3.8 MMOL/L — SIGNIFICANT CHANGE UP (ref 3.5–5.3)
POTASSIUM SERPL-SCNC: 3.8 MMOL/L — SIGNIFICANT CHANGE UP (ref 3.5–5.3)
PROT SERPL-MCNC: 7.4 G/DL — SIGNIFICANT CHANGE UP (ref 6–8.3)
RBC # BLD: 4.71 M/UL — SIGNIFICANT CHANGE UP (ref 4.2–5.8)
RBC # FLD: 18 % — HIGH (ref 10.3–14.5)
SODIUM SERPL-SCNC: 138 MMOL/L — SIGNIFICANT CHANGE UP (ref 135–145)
SPECIMEN SOURCE: SIGNIFICANT CHANGE UP
WBC # BLD: 5.53 K/UL — SIGNIFICANT CHANGE UP (ref 3.8–10.5)
WBC # FLD AUTO: 5.53 K/UL — SIGNIFICANT CHANGE UP (ref 3.8–10.5)

## 2023-05-24 RX ORDER — LACTULOSE 10 G/15ML
15 SOLUTION ORAL DAILY
Refills: 0 | Status: DISCONTINUED | OUTPATIENT
Start: 2023-05-24 | End: 2023-05-28

## 2023-05-24 RX ORDER — ZOLPIDEM TARTRATE 10 MG/1
5 TABLET ORAL ONCE
Refills: 0 | Status: DISCONTINUED | OUTPATIENT
Start: 2023-05-24 | End: 2023-05-24

## 2023-05-24 RX ORDER — IBUPROFEN 200 MG
600 TABLET ORAL ONCE
Refills: 0 | Status: COMPLETED | OUTPATIENT
Start: 2023-05-24 | End: 2023-05-24

## 2023-05-24 RX ADMIN — Medication 1 MILLIGRAM(S): at 02:20

## 2023-05-24 RX ADMIN — CHLORHEXIDINE GLUCONATE 1 APPLICATION(S): 213 SOLUTION TOPICAL at 06:47

## 2023-05-24 RX ADMIN — Medication 100 MILLIGRAM(S): at 11:39

## 2023-05-24 RX ADMIN — Medication 600 MILLIGRAM(S): at 02:11

## 2023-05-24 RX ADMIN — Medication 1 MILLIGRAM(S): at 11:39

## 2023-05-24 RX ADMIN — ZOLPIDEM TARTRATE 5 MILLIGRAM(S): 10 TABLET ORAL at 01:25

## 2023-05-24 RX ADMIN — Medication 1 TABLET(S): at 11:38

## 2023-05-24 RX ADMIN — Medication 0.5 MILLIGRAM(S): at 22:09

## 2023-05-24 RX ADMIN — ZOLPIDEM TARTRATE 5 MILLIGRAM(S): 10 TABLET ORAL at 22:19

## 2023-05-24 RX ADMIN — TAMSULOSIN HYDROCHLORIDE 0.4 MILLIGRAM(S): 0.4 CAPSULE ORAL at 22:09

## 2023-05-24 RX ADMIN — Medication 1 MILLIGRAM(S): at 06:46

## 2023-05-24 RX ADMIN — Medication 1 MILLIGRAM(S): at 10:25

## 2023-05-24 RX ADMIN — Medication 0.5 MILLIGRAM(S): at 18:30

## 2023-05-24 RX ADMIN — SODIUM CHLORIDE 500 MILLILITER(S): 9 INJECTION, SOLUTION INTRAVENOUS at 00:07

## 2023-05-24 RX ADMIN — Medication 2 MILLIGRAM(S): at 09:00

## 2023-05-24 RX ADMIN — Medication 1 MILLIGRAM(S): at 14:59

## 2023-05-24 RX ADMIN — ENOXAPARIN SODIUM 40 MILLIGRAM(S): 100 INJECTION SUBCUTANEOUS at 10:25

## 2023-05-24 RX ADMIN — Medication 600 MILLIGRAM(S): at 01:29

## 2023-05-24 RX ADMIN — LACTULOSE 15 GRAM(S): 10 SOLUTION ORAL at 17:20

## 2023-05-24 RX ADMIN — CEFTRIAXONE 100 MILLIGRAM(S): 500 INJECTION, POWDER, FOR SOLUTION INTRAMUSCULAR; INTRAVENOUS at 15:00

## 2023-05-24 RX ADMIN — Medication 2 MILLIGRAM(S): at 17:20

## 2023-05-24 NOTE — PROGRESS NOTE ADULT - ATTENDING COMMENTS
38 M PMH of alcohol abuse, alcohol withdrawal seizures, fatty liver presents with signs of alcohol abuse/withdrawal. Admitted for AMS , alcohol withdrawal.   transaminitis ,Alcoholic hepatitis & Hyponatremia. Low K, Low Ca, Low Mag .  Pt  seen, Examined, case & care plan d/w , residents at detail.  Consults:  ID Dr Gant/Dr Morfin  -IV Abx -Rocephin daily-Cholangitis As D/W ID -pt is Improving, No need for MRI,   GI-Dr Leach follow up-Likely Cholangitis Rx with IV Abx , No Need for MRCP at this time   Detox -Dr Garcia follow up. IV Ativan PRN & RTC  Social service eval  AM labs   DVT PPx  PO diet   -PT Eval --> Pending   Total Care time is 60  minutes.

## 2023-05-24 NOTE — PROGRESS NOTE ADULT - PROBLEM SELECTOR PLAN 5
Plt 65 on admission -Normal Now   -likely in setting of Chronic alcoholic liver disease & Likely BM suppression 2/2 ETOH   - Thrombocytopenia likely 2/2 dilution, bone marrow suppression from ETOH  - LDH elevated, Haptoglobin WNL  - daily CBC  - improving

## 2023-05-24 NOTE — PROGRESS NOTE ADULT - SUBJECTIVE AND OBJECTIVE BOX
[facility] DIV[facility] of INFECTIOUS DISEASE  Christian Gant MD PhD, Raven Schilling MD, Sophy Palm MD, Sienna Morfin MD, Jermaine Kelly MD  and providing coverage with Kaur Hampton MD  Providing Infectious Disease Consultations at [facility], [facility], [facility], [facility]'s    Office# [phone] to schedule follow up appointments  Answering Service for urgent calls or New Consults [phone]  Cell# to text for urgent issues Christian Gant [phone]     infectious diseases progress note:    DIANE GARAY is a 38y y. o. Male patient    Overnight and events of the last 24hrs reviewed    Allergies    No Known Allergies    Intolerances        ANTIBIOTICS/RELEVANT:  antimicrobials  cefTRIAXone   IVPB 2000 milliGRAM(s) IV Intermittent every 24 hours    immunologic:    OTHER:  chlorhexidine 2% Cloths 1 Application(s) Topical <User Schedule>  enoxaparin Injectable 40 milliGRAM(s) SubCutaneous every 24 hours  folic acid 1 milliGRAM(s) Oral daily  LORazepam     Tablet 1 milliGRAM(s) Oral every 4 hours  LORazepam     Tablet 0.5 milliGRAM(s) Oral every 4 hours  LORazepam   Injectable 2 milliGRAM(s) IV Push every 2 hours PRN  LORazepam   Injectable 2 milliGRAM(s) IV Push every 1 hour PRN  LORazepam   Injectable 2 milliGRAM(s) IV Push every 2 hours PRN  multivitamin/minerals 1 Tablet(s) Oral daily  tamsulosin 0.4 milliGRAM(s) Oral at bedtime  thiamine 100 milliGRAM(s) Oral daily  zolpidem 5 milliGRAM(s) Oral at bedtime PRN      Objective:  Vital Signs Last 24 Hrs  T(C): 36.6 (24 May 2023 10:18), Max: 37.8 (23 May 2023 23:35)  T(F): 97.9 (24 May 2023 10:18), Max: 100.1 (23 May 2023 23:35)  HR: 122 (24 May 2023 05:30) (105 - 136)  BP: 136/89 (24 May 2023 05:30) (104/74 - 149/100)  BP(mean): 92 (23 May 2023 21:39) (83 - 115)  RR: 18 (24 May 2023 05:30) (18 - 30)  SpO2: 98% (24 May 2023 05:30) (94% - 99%)    Parameters below as of 24 May 2023 05:30  Patient On (Oxygen Delivery Method): room air        T(C): 36.6 (05-24-23 @ 10:18), Max: 37.8 (05-23-23 @ 23:35)  T(C): 36.6 (05-24-23 @ 10:18), Max: 37.8 (05-23-23 @ 23:35)  T(C): 36.6 (05-24-23 @ 10:18), Max: 37.8 (05-21-23 @ 08:00)    PHYSICAL EXAM:  HEENT: NC atraumatic  Neck: supple  Respiratory: no accessory muscle use, breathing comfortably  Cardiovascular: distant  Gastrointestinal: normal appearing, nondistended  Extremities: no clubbing, no cyanosis,        LABS:                          13.1   5.53  )-----------( 362      ( 24 May 2023 07:45 )             40.2       WBC  5.53 05-24 @ 07:45  3.90 05-23 @ 05:40  3.75 05-22 @ 17:08  3.52 05-22 @ 05:30  4.67 05-21 @ 06:08  5.71 05-20 @ 06:08  9.16 05-19 @ 07:00  2.50 05-18 @ 06:19      05-24    138  |  109<H>  |  7   ----------------------------<  105<H>  3.8   |  20<L>  |  0.66    Ca    9.3      24 May 2023 07:45  Phos  3.6     05-23  Mg     2.0     05-23    TPro  7.4  /  Alb  2.9<L>  /  TBili  7.3<H>  /  DBili  x   /  AST  487<H>  /  ALT  564<H>  /  AlkPhos  327<H>  05-24      Creatinine, Serum: 0.66 mg/dL (05-24-23 @ 07:45)  Creatinine, Serum: 0.64 mg/dL (05-23-23 @ 05:40)  Creatinine, Serum: 0.70 mg/dL (05-22-23 @ 17:08)  Creatinine, Serum: 0.71 mg/dL (05-22-23 @ 05:30)  Creatinine, Serum: 0.63 mg/dL (05-21-23 @ 06:08)  Creatinine, Serum: 0.58 mg/dL (05-20-23 @ 06:08)  Creatinine, Serum: 0.77 mg/dL (05-19-23 @ 07:00)  Creatinine, Serum: 0.72 mg/dL (05-18-23 @ 06:19)  Creatinine, Serum: 0.65 mg/dL (05-17-23 @ 23:21)  Creatinine, Serum: 0.50 mg/dL (05-17-23 @ 18:30)                INFLAMMATORY MARKERS      MICROBIOLOGY:              RADIOLOGY & ADDITIONAL STUDIES:

## 2023-05-24 NOTE — PROGRESS NOTE ADULT - SUBJECTIVE AND OBJECTIVE BOX
Patient is a 38y old  Male who presents with a chief complaint of Hepatitis (24 May 2023 12:02)    HPI:  38M PMH of alcohol abuse, alcohol withdrawal seizures, fatty liver presents with signs of alcohol abuse/withdrawal. History obtained from wife with  over phone. Patient has been drinking for the past 16 days of unknown amount and had unwitnessed fall from the bed so pt was taken to the ED. Seen and examined pt at bedside. Unable to obtain history from patient due to somnolence in setting of recent ativan use.    In the ED    Vitals: , T 98, Spo2 95% /80     Labs: Plt 65, Na 128, K 3.0, Tbili 9.6, , , , Lactate 4.0, Lipase 432, UA + for bacteria and leuk esterase, Blood alc level 423     Imaging  RUQ U/S Visualization of the gallbladder is limited by overlying bowel gas. No   gross gallstones, gallbladder wall thickening, or pericholecystic fluid. No ultrasonic Grayson's sign epatomegaly and hepatic steatosis.  CT A/P- steatosis and hepatomegaly. No hydronephrosis. 6.3 mm nonobstructing stone lower pole left kidney.  CT head/cervical spine/ maxillofacial non-con: No acute intracranial abnormalities. Mild right periorbital subcutaneous soft tissue swelling. No evidence of orbital or other facial fracture. No vertebral fracture, collapse or subluxation.    ECG: Sinus tachycardia, 115 BPM    Meds Given    dTAP x1, Zofran x 1, Morphine x1, Pepcid x1,2 NS, 5mg haldol x1, 40meq k, 10mg flexiril, zosyn x1, thiamine, folic acid,   Pt seen by ICU team, admitted to ICU for ETOH intoxication & withdrawal. (17 May 2023 09:51)    INTERVAL HPI:  5/18 - Seen and examined patient. Pt was lethargic so unable to obtain ROS. On Precedex drip, Cooling [place] Bacteremia, IV Abx Fever  5/19 - Seen and examined. Patient is lethargic on precedex. Found to be bacteremic now on abx. Bilirubin uptrending. No acute distress. E Coli sepsis, IV Abx   5/20 - Seen and examined. Patient is somnolent on precedex but responds to name and able to answer some questions. Denies CP, SOB, abd pain, nausea. Found to be bacteremic now on abx. Bilirubin uptrending. No acute distress. E Coli sepsis, IV Abx   5/21 -  Seen and examined. Patient is still somnolent on precedex but responds to name and able to answer some questions. Denies CP, SOB, abd pain, nausea. No acute distress. E Coli sepsis, IV Abx Heredia cath will blood   5/22 - Seen and examined. Patient is A&Ox3, but tired and anxious appearing. Precedex off. Answers questions and follows commands. Denies CP, SOB, abd pain, nausea. No acute distress. States he wants to get out of bed and walk. On IV abx for E.coli bacteremia. MRI A/P schedule.  5/23- Seen and examined at bedside. Pt wants to get up and walk. Pt states he had a poor night of sleep. He has no acute complaints. Tolerating diet.IV Abx   5/24- Seen and examined at bedside. Pt wants to get up and walk around. Pt states he had a poor night of sleep. He denies any other acute complaints. Downgraded from ICU     OVERNIGHT EVENTS: Overnight resident was called because pt was tachycardic to 180's. EKG showed sinus. Pt was given 500cc bolus fluid and ativan as per CIWA protocol.     Home Medications:      MEDICATIONS  (STANDING):  cefTRIAXone   IVPB 2000 milliGRAM(s) IV Intermittent every 24 hours  chlorhexidine 2% Cloths 1 Application(s) Topical <User Schedule>  enoxaparin Injectable 40 milliGRAM(s) SubCutaneous every 24 hours  folic acid 1 milliGRAM(s) Oral daily  LORazepam     Tablet 1 milliGRAM(s) Oral every 4 hours  LORazepam     Tablet 0.5 milliGRAM(s) Oral every 4 hours  multivitamin/minerals 1 Tablet(s) Oral daily  tamsulosin 0.4 milliGRAM(s) Oral at bedtime  thiamine 100 milliGRAM(s) Oral daily    MEDICATIONS  (PRN):  LORazepam   Injectable 2 milliGRAM(s) IV Push every 2 hours PRN Symptom-triggered: 2 point increase in CIWA -Ar score and a total score of 7 or LESS  LORazepam   Injectable 2 milliGRAM(s) IV Push every 1 hour PRN Symptom-triggered: each CIWA -Ar score 8 or GREATER  LORazepam   Injectable 2 milliGRAM(s) IV Push every 2 hours PRN Agitation  zolpidem 5 milliGRAM(s) Oral at bedtime PRN Insomnia      Allergies    No Known Allergies    Intolerances        Social History:  Tobacco: denies  EtOH: chronic heavy drinker  Recreational drug use: denies  Lives with: patient lives with wife  Ambulates: independent  ADLs: independent (17 May 2023 09:51)      REVIEW OF SYSTEMS:  CONSTITUTIONAL: No fever, No chills, No fatigue, No myalgia, No Body ache, No Weakness  EYES: No eye pain,  No visual disturbances, No discharge, NO Redness  ENMT:  No ear pain, No nose bleed, No vertigo; No sinus pain, NO throat pain, No Congestion  NECK: No pain, No stiffness  RESPIRATORY: No cough, NO wheezing, No  hemoptysis, NO  shortness of breath  CARDIOVASCULAR: No chest pain, palpitations  GASTROINTESTINAL: No abdominal pain, NO epigastric pain. No nausea, No vomiting; No diarrhea, No constipation. [  ] BM  GENITOURINARY: No dysuria, No frequency, No urgency, No hematuria, NO incontinence  NEUROLOGICAL: No headaches, No dizziness, No numbness, No tingling, No tremors, No weakness  EXT: No Swelling, No Pain, No Edema  SKIN:  [ x ] No itching, burning, rashes, or lesions   MUSCULOSKELETAL: No joint pain ,No Jt swelling; No muscle pain, No back pain, No extremity pain  PSYCHIATRIC: No depression,  No anxiety,  No mood swings ,No difficulty sleeping at night  PAIN SCALE: [ x ] None  [  ] Other-  ROS Unable to obtain due to - [  ] Dementia  [  ] Lethargy [  ] Drowsy [  ] Sedated [  ] non verbal  REST OF REVIEW Of SYSTEM - [ x ] Normal     Vital Signs Last 24 Hrs  T(C): 36.6 (24 May 2023 10:18), Max: 37.8 (23 May 2023 23:35)  T(F): 97.9 (24 May 2023 10:18), Max: 100.1 (23 May 2023 23:35)  HR: 122 (24 May 2023 05:30) (105 - 136)  BP: 136/89 (24 May 2023 05:30) (104/74 - 149/100)  BP(mean): 92 (23 May 2023 21:39) (83 - 115)  RR: 18 (24 May 2023 05:30) (18 - 30)  SpO2: 98% (24 May 2023 05:30) (94% - 99%)    Parameters below as of 24 May 2023 05:30  Patient On (Oxygen Delivery Method): room air      Finger Stick        05-23 @ 07:01  -  05-24 @ 07:00  --------------------------------------------------------  IN: 1658.5 mL / OUT: 1300 mL / NET: 358.5 mL        PHYSICAL EXAM:  GENERAL:  [ x ] NAD , [ x ] well appearing, [  ] Agitated, [  ] Drowsy,  [  ] Lethargy, [  ] confused   HEAD:  [ x ] Normal, [  ] Other  EYES:  [ x ] EOMI, [x  ] PERRLA, [  x] conjunctiva and sclera clear normal, [  ] Other,  [ x ] Pallor,[  ] Discharge + Ecchymosis rt eyebrow   ENMT:  [ x ] Normal, [ x ] Moist mucous membranes, [  ] Good dentition, [x  ] No Thrush  NECK:  [ x ] Supple, [ x ] No JVD, [x  ] Normal thyroid, [  ] Lymphadenopathy [  ] Other  CHEST/LUNG:  [x  ] Clear to auscultation bilaterally, [x  ] Breath Sounds equal B/L / Decrease, [  ] poor effort  [ x ] No rales, [ x ] No rhonchi  [ x ]  No wheezing,   HEART:  [  ] Regular rate and rhythm, [ x ] tachycardia, [  ] Bradycardia,  [  ] irregular  [ x ] No murmurs, No rubs, No gallops, [  ] PPM in place (Mfr:  )  ABDOMEN:  [ x ] Soft, [ x ] Nontender, [x  ] Nondistended, [ x ] No mass, [ x ] Bowel sounds present, [ x ] obese  NERVOUS SYSTEM:  [x  ] Alert & Oriented X3, [ x ] Nonfocal  [  ] Confusion  [  ] Encephalopathic [  ] Sedated [  ] Unable to assess, [  ] Dementia [  ] Other-  EXTREMITIES: [x  ] 2+ Peripheral Pulses, No clubbing, No cyanosis,  [  ] edema B/L lower EXT. [  ] PVD stasis skin changes B/L Lower EXT, [  ] wound  LYMPH: No lymphadenopathy noted  SKIN:  [x  ] No rashes or lesions, [  ] Pressure Ulcers, [  ] ecchymosis, [  ] Skin Tears, [ x ] Other- Multiple skin tattoos on Body    DIET: Diet, Regular (05-23-23 @ 07:21)      LABS:                        13.1   5.53  )-----------( 362      ( 24 May 2023 07:45 )             40.2     24 May 2023 07:45    138    |  109    |  7      ----------------------------<  105    3.8     |  20     |  0.66     Ca    9.3        24 May 2023 07:45    TPro  7.4    /  Alb  2.9    /  TBili  7.3    /  DBili  x      /  AST  487    /  ALT  564    /  AlkPhos  327    24 May 2023 07:45          Culture Results:   No growth to date. (05-19 @ 07:00)          CARDIAC MARKERS ( 19 May 2023 07:00 )  x     / x     / 547 U/L / x     / x      CARDIAC MARKERS ( 17 May 2023 18:30 )  x     / x     / 2225 U/L / x     / x              Culture - Blood (collected 19 May 2023 07:00)  Source: .Blood Blood-Peripheral  Preliminary Report (20 May 2023 13:01):    No growth to date.         Anemia Panel:      Thyroid Panel:        Lipase, Serum: 73 U/L (05-19-23 @ 07:00)          RADIOLOGY & ADDITIONAL TESTS:      HEALTH ISSUES - PROBLEM Dx:  Transaminitis    Need for prophylactic measure    Urinary retention    Fall    Hypocalcemia    Thrombocytopenia    Alcohol dependence with withdrawal    AMS (altered mental status)    Rhabdomyolysis    Bacteremia, escherichia coli            Consultant(s) Notes Reviewed:  [ x ] YES     Care Discussed with [X] Consultants  [ x ] Patient  [  ] Family [  ] HCP [  ]   [  ] Social Service  [ x] RN, [  ] Physical Therapy,[  ] Palliative care team  DVT PPX: [  ] Lovenox, [  ] S C Heparin, [  ] Coumadin, [  ] Xarelto, [  ] Eliquis, [  ] Pradaxa, [  ] IV Heparin drip, [x  ] SCD [  ] Contraindication 2 to GI Bleed,[  ] Ambulation [  ] Contraindicated 2 to  bleed [  ] Contraindicated 2 to Brain Bleed  Advanced directive: [ x ] None, [  ] DNR/DNI   Patient is a 38y old  Male who presents with a chief complaint of Hepatitis (24 May 2023 12:02)    HPI:  38M PMH of alcohol abuse, alcohol withdrawal seizures, fatty liver presents with signs of alcohol abuse/withdrawal. History obtained from wife with  over phone. Patient has been drinking for the past 16 days of unknown amount and had unwitnessed fall from the bed so pt was taken to the ED. Seen and examined pt at bedside. Unable to obtain history from patient due to somnolence in setting of recent ativan use.    In the ED    Vitals: , T 98, Spo2 95% /80     Labs: Plt 65, Na 128, K 3.0, Tbili 9.6, , , , Lactate 4.0, Lipase 432, UA + for bacteria and leuk esterase, Blood alc level 423     Imaging  RUQ U/S Visualization of the gallbladder is limited by overlying bowel gas. No   gross gallstones, gallbladder wall thickening, or pericholecystic fluid. No ultrasonic Grayson's sign epatomegaly and hepatic steatosis.  CT A/P- steatosis and hepatomegaly. No hydronephrosis. 6.3 mm nonobstructing stone lower pole left kidney.  CT head/cervical spine/ maxillofacial non-con: No acute intracranial abnormalities. Mild right periorbital subcutaneous soft tissue swelling. No evidence of orbital or other facial fracture. No vertebral fracture, collapse or subluxation.    ECG: Sinus tachycardia, 115 BPM    Meds Given    dTAP x1, Zofran x 1, Morphine x1, Pepcid x1,2 NS, 5mg haldol x1, 40meq k, 10mg flexiril, zosyn x1, thiamine, folic acid,   Pt seen by ICU team, admitted to ICU for ETOH intoxication & withdrawal. (17 May 2023 09:51)    INTERVAL HPI:  5/18 - Seen and examined patient. Pt was lethargic so unable to obtain ROS. On Precedex drip, Cooling [place] Bacteremia, IV Abx Fever  5/19 - Seen and examined. Patient is lethargic on precedex. Found to be bacteremic now on abx. Bilirubin uptrending. No acute distress. E Coli sepsis, IV Abx   5/20 - Seen and examined. Patient is somnolent on precedex but responds to name and able to answer some questions. Denies CP, SOB, abd pain, nausea. Found to be bacteremic now on abx. Bilirubin uptrending. No acute distress. E Coli sepsis, IV Abx   5/21 -  Seen and examined. Patient is still somnolent on precedex but responds to name and able to answer some questions. Denies CP, SOB, abd pain, nausea. No acute distress. E Coli sepsis, IV Abx Heredia cath will blood   5/22 - Seen and examined. Patient is A&Ox3, but tired and anxious appearing. Precedex off. Answers questions and follows commands. Denies CP, SOB, abd pain, nausea. No acute distress. States he wants to get out of bed and walk. On IV abx for E.coli bacteremia. MRI A/P schedule.  5/23- Seen and examined at bedside. Pt wants to get up and walk. Pt states he had a poor night of sleep. He has no acute complaints. Tolerating diet.IV Abx   5/24- Seen and examined at bedside. Pt wants to get up and walk around. Pt states he had a poor night of sleep. He denies any other acute complaints. Downgraded from ICU on IV Abx for E Coli sepsis .IV Ativan     OVERNIGHT EVENTS: Overnight resident was called because pt was tachycardic to 180's. EKG showed sinus. Pt was given 500cc bolus fluid and ativan as per [facility] protocol.     Home Medications:      MEDICATIONS  (STANDING):  cefTRIAXone   IVPB 2000 milliGRAM(s) IV Intermittent every 24 hours  chlorhexidine 2% Cloths 1 Application(s) Topical <User Schedule>  enoxaparin Injectable 40 milliGRAM(s) SubCutaneous every 24 hours  folic acid 1 milliGRAM(s) Oral daily  LORazepam     Tablet 1 milliGRAM(s) Oral every 4 hours  LORazepam     Tablet 0.5 milliGRAM(s) Oral every 4 hours  multivitamin/minerals 1 Tablet(s) Oral daily  tamsulosin 0.4 milliGRAM(s) Oral at bedtime  thiamine 100 milliGRAM(s) Oral daily    MEDICATIONS  (PRN):  LORazepam   Injectable 2 milliGRAM(s) IV Push every 2 hours PRN Symptom-triggered: 2 point increase in CIWA -Ar score and a total score of 7 or LESS  LORazepam   Injectable 2 milliGRAM(s) IV Push every 1 hour PRN Symptom-triggered: each CIWA -Ar score 8 or GREATER  LORazepam   Injectable 2 milliGRAM(s) IV Push every 2 hours PRN Agitation  zolpidem 5 milliGRAM(s) Oral at bedtime PRN Insomnia      Allergies    No Known Allergies    Intolerances        Social History:  Tobacco: denies  EtOH: chronic heavy drinker  Recreational drug use: denies  Lives with: patient lives with wife  Ambulates: independent  ADLs: independent (17 May 2023 09:51)      REVIEW OF SYSTEMS: i am OK   CONSTITUTIONAL: No fever, No chills, No fatigue, No myalgia, No Body ache, No Weakness  EYES: No eye pain,  No visual disturbances, No discharge, NO Redness  ENMT:  No ear pain, No nose bleed, No vertigo; No sinus pain, NO throat pain, No Congestion  NECK: No pain, No stiffness  RESPIRATORY: No cough, NO wheezing, No  hemoptysis, NO  shortness of breath  CARDIOVASCULAR: No chest pain, palpitations  GASTROINTESTINAL: No abdominal pain, NO epigastric pain. No nausea, No vomiting; No diarrhea, + constipation. [  ] BM  GENITOURINARY: No dysuria, No frequency, No urgency, No hematuria, NO incontinence  NEUROLOGICAL: No headaches, No dizziness, No numbness, No tingling, No tremors, No weakness  EXT: No Swelling, No Pain, No Edema  SKIN:  [ x ] No itching, burning, rashes, or lesions   MUSCULOSKELETAL: No joint pain ,No Jt swelling; No muscle pain, No back pain, No extremity pain  PSYCHIATRIC: No depression,  No anxiety,  No mood swings ,No difficulty sleeping at night  PAIN SCALE: [ x ] None  [  ] Other-  ROS Unable to obtain due to - [  ] Dementia  [  ] Lethargy [  ] Drowsy [  ] Sedated [  ] non verbal  REST OF REVIEW Of SYSTEM - [ x ] Normal     Vital Signs Last 24 Hrs  T(C): 36.6 (24 May 2023 10:18), Max: 37.8 (23 May 2023 23:35)  T(F): 97.9 (24 May 2023 10:18), Max: 100.1 (23 May 2023 23:35)  HR: 122 (24 May 2023 05:30) (105 - 136)  BP: 136/89 (24 May 2023 05:30) (104/74 - 149/100)  BP(mean): 92 (23 May 2023 21:39) (83 - 115)  RR: 18 (24 May 2023 05:30) (18 - 30)  SpO2: 98% (24 May 2023 05:30) (94% - 99%)    Parameters below as of 24 May 2023 05:30  Patient On (Oxygen Delivery Method): room air      Finger Stick        05-23 @ 07:01  -  05-24 @ 07:00  --------------------------------------------------------  IN: 1658.5 mL / OUT: 1300 mL / NET: 358.5 mL        PHYSICAL EXAM:  GENERAL:  [ x ] NAD , [ x ] well appearing, [  ] Agitated, [  ] Drowsy,  [  ] Lethargy, [  ] confused   HEAD:  [ x ] Normal, [  ] Other  EYES:  [ x ] EOMI, [x  ] PERRLA, [  x] conjunctiva and sclera clear normal, [  ] Other,  [ x ] Pallor,[  ] Discharge + Ecchymosis rt eyebrow   ENMT:  [ x ] Normal, [ x ] Moist mucous membranes, [  ] Good dentition, [x  ] No Thrush  NECK:  [ x ] Supple, [ x ] No JVD, [x  ] Normal thyroid, [  ] Lymphadenopathy [  ] Other  CHEST/LUNG:  [x  ] Clear to auscultation bilaterally, [x  ] Breath Sounds equal B/L / Decrease, [  ] poor effort  [ x ] No rales, [ x ] No rhonchi  [ x ]  No wheezing,   HEART:  [  ] Regular rate and rhythm, [ x ] tachycardia, [  ] Bradycardia,  [  ] irregular  [ x ] No murmurs, No rubs, No gallops, [  ] PPM in place (Mfr:  )  ABDOMEN:  [ x ] Soft, [ x ] Nontender, [x  ] Nondistended, [ x ] No mass, [ x ] Bowel sounds present, [ x ] obese  NERVOUS SYSTEM:  [x  ] Alert & Oriented X3, [ x ] Nonfocal  [  ] Confusion  [  ] Encephalopathic [  ] Sedated [  ] Unable to assess, [  ] Dementia [  ] Other-  EXTREMITIES: [x  ] 2+ Peripheral Pulses, No clubbing, No cyanosis,  [  ] edema B/L lower EXT. [  ] PVD stasis skin changes B/L Lower EXT, [  ] wound  LYMPH: No lymphadenopathy noted  SKIN:  [x  ] No rashes or lesions, [  ] Pressure Ulcers, [  ] ecchymosis, [  ] Skin Tears, [ x ] Other- Multiple skin tattoos on Body    DIET: Diet, Regular (05-23-23 @ 07:21)      LABS:                        13.1   5.53  )-----------( 362      ( 24 May 2023 07:45 )             40.2     24 May 2023 07:45    138    |  109    |  7      ----------------------------<  105    3.8     |  20     |  0.66     Ca    9.3        24 May 2023 07:45    TPro  7.4    /  Alb  2.9    /  TBili  7.3    /  DBili  x      /  AST  487    /  ALT  564    /  AlkPhos  327    24 May 2023 07:45          Culture Results:   No growth to date. (05-19 @ 07:00)          CARDIAC MARKERS ( 19 May 2023 07:00 )  x     / x     / 547 U/L / x     / x      CARDIAC MARKERS ( 17 May 2023 18:30 )  x     / x     / 2225 U/L / x     / x              Culture - Blood (collected 19 May 2023 07:00)  Source: .Blood Blood-Peripheral  Preliminary Report (20 May 2023 13:01):    No growth to date.      Lipase, Serum: 73 U/L (05-19-23 @ 07:00)      RADIOLOGY & ADDITIONAL TESTS:      HEALTH ISSUES - PROBLEM Dx:  Transaminitis    Need for prophylactic measure    Urinary retention    Fall    Hypocalcemia    Thrombocytopenia    Alcohol dependence with withdrawal    AMS (altered mental status)    Rhabdomyolysis    Bacteremia, escherichia coli            Consultant(s) Notes Reviewed:  [ x ] YES     Care Discussed with [X] Consultants  [ x ] Patient  [ x ] Family [  ] HCP [  ]   [  ] Social Service  [ x] RN, [  ] Physical Therapy,[  ] Palliative care team  DVT PPX: [  ] Lovenox, [  ] S C Heparin, [  ] Coumadin, [  ] Xarelto, [  ] Eliquis, [  ] Pradaxa, [  ] IV Heparin drip, [x  ] SCD [  ] Contraindication 2 to GI Bleed,[x  ] Ambulation [  ] Contraindicated 2 to  bleed [  ] Contraindicated 2 to Brain Bleed  Advanced directive: [ x ] None, [  ] DNR/DNI

## 2023-05-24 NOTE — PROGRESS NOTE ADULT - PROBLEM SELECTOR PLAN 3
Chronic known hx of alcohol use disorder  - CIWA Protocol  - wrist restraints for agitation  - Precedex restarted, Ativan for agitation  - Ativan 2mg q4h standing, 2mg q1h PRN agitation  - Thiamine 100mg qd, folic acid 1mg PO QD  - advanced diet to regular  - Addiction Medicine (Radha) consulted, recs appreciated

## 2023-05-24 NOTE — PROGRESS NOTE ADULT - ASSESSMENT
38M originally from [facility] who emigrated in 1999 w PMH of alcohol abuse, alcohol withdrawal seizures, fatty liver presented with signs of alcohol abuse/withdrawal. Patient has been drinking for the past 16 days PTA of unknown amount and had unwitnessed fall. E coli bacteremia noted and ID consultation requested.    Acute Cholangitis/E.coli Bacteremia  - BCx +E.coli - susceptibilities reviewed-relatively pansensitive  - 5/19 repeat Bcx NGTD   - s/p zosyn then changed to ceftriaxone    Recommendations  - continue ceftriaxone 2g IV Q24h with anticipated 7 days of effective abx so 5/26 as last day followed by anticipated dc  -pt now transferred out of the ICU and on general medical floor  - appreciate GI recs, currently no plan for MRCP, pt is now up and able to ambulate  -explained to Solitario that in the future it would not be a good idea to continue drinking    Thank you for consulting us and involving us in the management of this most interesting and challenging case.  We will follow along in the care of this patient. Please call us at [phone] or text me directly on my cell# at [phone] with any concerns.

## 2023-05-24 NOTE — PROGRESS NOTE ADULT - SUBJECTIVE AND OBJECTIVE BOX
[facility]  Juliocesar Godfrey PA-C  [address] [place] [address]  [phone]      INTERVAL HPI/OVERNIGHT EVENTS:  Pt s/e  No abdominal pain or GI complaints    MEDICATIONS  (STANDING):  cefTRIAXone   IVPB 2000 milliGRAM(s) IV Intermittent every 24 hours  chlorhexidine 2% Cloths 1 Application(s) Topical <User Schedule>  enoxaparin Injectable 40 milliGRAM(s) SubCutaneous every 24 hours  folic acid 1 milliGRAM(s) Oral daily  LORazepam     Tablet 1 milliGRAM(s) Oral every 4 hours  LORazepam     Tablet 0.5 milliGRAM(s) Oral every 4 hours  multivitamin/minerals 1 Tablet(s) Oral daily  tamsulosin 0.4 milliGRAM(s) Oral at bedtime  thiamine 100 milliGRAM(s) Oral daily    MEDICATIONS  (PRN):  LORazepam   Injectable 2 milliGRAM(s) IV Push every 2 hours PRN Symptom-triggered: 2 point increase in CIWA -Ar score and a total score of 7 or LESS  LORazepam   Injectable 2 milliGRAM(s) IV Push every 1 hour PRN Symptom-triggered: each CIWA -Ar score 8 or GREATER  LORazepam   Injectable 2 milliGRAM(s) IV Push every 2 hours PRN Agitation  zolpidem 5 milliGRAM(s) Oral at bedtime PRN Insomnia      Allergies    No Known Allergies      PHYSICAL EXAM:   Vital Signs:  Vital Signs Last 24 Hrs  T(C): 36.6 (24 May 2023 10:18), Max: 37.8 (23 May 2023 23:35)  T(F): 97.9 (24 May 2023 10:18), Max: 100.1 (23 May 2023 23:35)  HR: 122 (24 May 2023 05:30) (105 - 136)  BP: 136/89 (24 May 2023 05:30) (104/74 - 149/100)  BP(mean): 92 (23 May 2023 21:39) (83 - 115)  RR: 18 (24 May 2023 05:30) (18 - 30)  SpO2: 98% (24 May 2023 05:30) (94% - 99%)    Parameters below as of 24 May 2023 05:30  Patient On (Oxygen Delivery Method): room air      Daily     Daily Weight in k.8 (24 May 2023 05:30)    GENERAL:  Appears stated age  HEENT:  NC/AT  CHEST:  Full & symmetric excursion  HEART:  Regular rhythm  ABDOMEN:  Soft, non-tender, non-distended  EXTEREMITIES:  no cyanosis  SKIN:  No rash  NEURO:  Alert      LABS:                        13.1   5.53  )-----------( 362      ( 24 May 2023 07:45 )             40.2         138  |  109<H>  |  7   ----------------------------<  105<H>  3.8   |  20<L>  |  0.66    Ca    9.3      24 May 2023 07:45  Phos  3.6       Mg     2.0         TPro  7.4  /  Alb  2.9<L>  /  TBili  7.3<H>  /  DBili  x   /  AST  487<H>  /  ALT  564<H>  /  AlkPhos  327<H>

## 2023-05-24 NOTE — PROGRESS NOTE ADULT - ASSESSMENT
etoh abuse  alcoholic hepatitis    diet as tolerated  calculated DF is 20   no need for sternoids  bili noted; likely 2/2 EtOH; trend  check daily lfts and PT/INR  ciwa protocol  can consider ursodiol  can consider mrcp if symptomatic; currently no symptoms    I reviewed the overnight course of events on the unit, re-confirming the patient history. I discussed the care with the patient and their family  Differential diagnosis and plan of care discussed with patient after the evaluation  40 minutes spent on total encounter of which more than fifty percent of the encounter was spent counseling and/or coordinating care by the attending physician.  Advanced care planning was discussed with patient and family.  Advanced care planning forms were reviewed and discussed.  Risks, benefits and alternatives of gastroenterologic procedures were discussed in detail and all questions were answered.

## 2023-05-24 NOTE — PROGRESS NOTE ADULT - PROBLEM SELECTOR PLAN 2
Tbili 9.6, , , , Lipase 432 on admission w/ known hx of alcohol abuse   - Noted elevated alk phos, LFTs, bilirubin (downtrending)  - suspect cholangitis; potential need for mechanical intervention beyond abx, Unable to do MRCP as family NOT able to Provide Safety screening Q /A  - CT Abd/Pelv and RUQ U/S showing steatohepatitis without evidence of biliary obstruction or gallstones   - RUQ U/S Visualization of the gallbladder is limited by overlying bowel gas. No gross gallstones, gallbladder wall thickening, or pericholecystic fluid. No ultrasonic Grayson's sign   - Avoid hepatotoxic agents   - Daily CMP  -Elevated serum Lipase-? Alcoholic pancreatitis   - GI Dr. Leach follow up   - f/u MRCP

## 2023-05-25 LAB
ALBUMIN SERPL ELPH-MCNC: 3 G/DL — LOW (ref 3.3–5)
ALP SERPL-CCNC: 391 U/L — HIGH (ref 40–120)
ALT FLD-CCNC: 590 U/L — HIGH (ref 12–78)
ANION GAP SERPL CALC-SCNC: 7 MMOL/L — SIGNIFICANT CHANGE UP (ref 5–17)
AST SERPL-CCNC: 428 U/L — HIGH (ref 15–37)
BILIRUB SERPL-MCNC: 6.3 MG/DL — HIGH (ref 0.2–1.2)
BUN SERPL-MCNC: 7 MG/DL — SIGNIFICANT CHANGE UP (ref 7–23)
CALCIUM SERPL-MCNC: 9.3 MG/DL — SIGNIFICANT CHANGE UP (ref 8.5–10.1)
CHLORIDE SERPL-SCNC: 104 MMOL/L — SIGNIFICANT CHANGE UP (ref 96–108)
CO2 SERPL-SCNC: 23 MMOL/L — SIGNIFICANT CHANGE UP (ref 22–31)
CREAT SERPL-MCNC: 0.66 MG/DL — SIGNIFICANT CHANGE UP (ref 0.5–1.3)
EGFR: 123 ML/MIN/1.73M2 — SIGNIFICANT CHANGE UP
GLUCOSE SERPL-MCNC: 116 MG/DL — HIGH (ref 70–99)
HCT VFR BLD CALC: 40.3 % — SIGNIFICANT CHANGE UP (ref 39–50)
HGB BLD-MCNC: 13.4 G/DL — SIGNIFICANT CHANGE UP (ref 13–17)
MCHC RBC-ENTMCNC: 28.9 PG — SIGNIFICANT CHANGE UP (ref 27–34)
MCHC RBC-ENTMCNC: 33.3 GM/DL — SIGNIFICANT CHANGE UP (ref 32–36)
MCV RBC AUTO: 86.9 FL — SIGNIFICANT CHANGE UP (ref 80–100)
NRBC # BLD: 0 /100 WBCS — SIGNIFICANT CHANGE UP (ref 0–0)
PLATELET # BLD AUTO: 472 K/UL — HIGH (ref 150–400)
POTASSIUM SERPL-MCNC: 3.5 MMOL/L — SIGNIFICANT CHANGE UP (ref 3.5–5.3)
POTASSIUM SERPL-SCNC: 3.5 MMOL/L — SIGNIFICANT CHANGE UP (ref 3.5–5.3)
PROT SERPL-MCNC: 7.5 G/DL — SIGNIFICANT CHANGE UP (ref 6–8.3)
RBC # BLD: 4.64 M/UL — SIGNIFICANT CHANGE UP (ref 4.2–5.8)
RBC # FLD: 18 % — HIGH (ref 10.3–14.5)
SODIUM SERPL-SCNC: 134 MMOL/L — LOW (ref 135–145)
WBC # BLD: 4.8 K/UL — SIGNIFICANT CHANGE UP (ref 3.8–10.5)
WBC # FLD AUTO: 4.8 K/UL — SIGNIFICANT CHANGE UP (ref 3.8–10.5)

## 2023-05-25 PROCEDURE — 93010 ELECTROCARDIOGRAM REPORT: CPT

## 2023-05-25 RX ORDER — SIMETHICONE 80 MG/1
80 TABLET, CHEWABLE ORAL ONCE
Refills: 0 | Status: COMPLETED | OUTPATIENT
Start: 2023-05-25 | End: 2023-05-25

## 2023-05-25 RX ADMIN — Medication 30 MILLILITER(S): at 01:21

## 2023-05-25 RX ADMIN — Medication 0.5 MILLIGRAM(S): at 14:14

## 2023-05-25 RX ADMIN — TAMSULOSIN HYDROCHLORIDE 0.4 MILLIGRAM(S): 0.4 CAPSULE ORAL at 21:36

## 2023-05-25 RX ADMIN — CEFTRIAXONE 100 MILLIGRAM(S): 500 INJECTION, POWDER, FOR SOLUTION INTRAMUSCULAR; INTRAVENOUS at 14:19

## 2023-05-25 RX ADMIN — Medication 100 MILLIGRAM(S): at 12:42

## 2023-05-25 RX ADMIN — Medication 0.5 MILLIGRAM(S): at 06:13

## 2023-05-25 RX ADMIN — Medication 0.5 MILLIGRAM(S): at 17:52

## 2023-05-25 RX ADMIN — LACTULOSE 15 GRAM(S): 10 SOLUTION ORAL at 12:42

## 2023-05-25 RX ADMIN — Medication 1 TABLET(S): at 12:42

## 2023-05-25 RX ADMIN — Medication 0.5 MILLIGRAM(S): at 02:39

## 2023-05-25 RX ADMIN — ENOXAPARIN SODIUM 40 MILLIGRAM(S): 100 INJECTION SUBCUTANEOUS at 12:43

## 2023-05-25 RX ADMIN — SIMETHICONE 80 MILLIGRAM(S): 80 TABLET, CHEWABLE ORAL at 04:20

## 2023-05-25 RX ADMIN — Medication 0.5 MILLIGRAM(S): at 09:48

## 2023-05-25 RX ADMIN — Medication 1 MILLIGRAM(S): at 12:43

## 2023-05-25 NOTE — SOCIAL WORK PROGRESS NOTE - NSSWPROGRESSNOTE_GEN_ALL_CORE
KARI met with this pt at bedside using  line Memo #[id number]- pt from home, plans to return home upon DC. He reports he is not interested in inpatient rehab or outpatient, but does plan on going to Anabaptism and is open to an AA list. KARI to provide list of AA meetings to pt, he reports he will return home with family on DC and that he has a ride home. KARI remains available.

## 2023-05-25 NOTE — PROGRESS NOTE ADULT - PROBLEM SELECTOR PLAN 8
s/p unwitnessed fall with small abrasions of face  - CT head/cervical spine/ maxillofacial non-con: No acute intracranial abnormalities. Mild right periorbital subcutaneous soft tissue swelling. No evidence of orbital or other facial fracture. No vertebral fracture, collapse or subluxation.  - repeat CT head noncon to evaluate right posterior fossa for SDH showed no hemmorhage s/p unwitnessed fall with small abrasions of face  - CT head/cervical spine/ maxillofacial non-con: No acute intracranial abnormalities. Mild right periorbital subcutaneous soft tissue swelling. No evidence of orbital or other facial fracture. No vertebral fracture, collapse or subluxation.  - repeat CT head noncon to evaluate right posterior fossa for SDH showed no hemorrhage

## 2023-05-25 NOTE — PROGRESS NOTE ADULT - PROBLEM SELECTOR PLAN 2
Tbili 9.6, , , , Lipase 432 on admission w/ known hx of alcohol abuse   - Noted elevated alk phos, LFTs, bilirubin (downtrending)  - suspect cholangitis; potential need for mechanical intervention beyond abx  - CT Abd/Pelv and RUQ U/S showing steatohepatitis without evidence of biliary obstruction or gallstones   - RUQ U/S Visualization of the gallbladder is limited by overlying bowel gas. No gross gallstones, gallbladder wall thickening, or pericholecystic fluid. No ultrasonic Grayson's sign   - Avoid hepatotoxic agents   - Daily CMP  - Lipase improved to wnl   - GI Dr. Leach follow up Tbili 9.6, , , , Lipase 432 on admission w/ known hx of alcohol abuse   - Noted elevated alk phos, LFTs, bilirubin (downtrending)  - suspect cholangitis; potential need for mechanical intervention beyond abx  - CT Abd/Pelv and RUQ U/S showing steatohepatitis without evidence of biliary obstruction or gallstones   - RUQ U/S Visualization of the gallbladder is limited by overlying bowel gas. No gross gallstones, gallbladder wall thickening, or pericholecystic fluid. No ultrasonic Grayson's sign   - Avoid hepatotoxic agents   - Daily CMP  - Lipase improved to wnl   - GI Dr. Leach follow up- Alcoholic Hepatitis

## 2023-05-25 NOTE — CHART NOTE - NSCHARTNOTEFT_GEN_A_CORE
Called by RN, pt c/o neck and left upper chest pain.  Pt seen and examined at bedside. Pt points to pain on the left side of his and as well as just inferior to his L shoulder. Pt admits to feeling palpitations. Pt denies fever, chest pain, shortness of breath. Admits to some dizziness and nausea earlier after taking medication. Of note, patient has been consistently tachycardic likely due to alcohol withdrawal.     T(F): 98.3 (05-25-23 @ 22:51), Max: 99.1 (05-25-23 @ 20:05)  HR: 115 (05-25-23 @ 22:51) (115 - 119)  BP: 127/88 (05-25-23 @ 22:51) (127/87 - 127/88)  RR: 18 (05-25-23 @ 22:51) (18 - 18)  SpO2: 97% (05-25-23 @ 22:51) (95% - 97%)    Physical Exam:  Gen: NAD, sitting up in bed comfortably  HEENT: scleral icterus, moist mucous membranes  Cardio: +S1, +S2, tachycardia  Lungs: CTA B/L, No w/r/r, no increased WOB   Abd: soft, NT/ND, +BS x 4 quadrants, no rebound/guarding   Ext: No pedal edema, no calf tenderness, pulses intact  Neuro: AAOx3, answers questions appropriately     Assessment/Plan: 38M PMH of alcohol abuse, alcohol withdrawal seizures, fatty liver presents with signs of alcohol abuse/withdrawal. Admitted for AMS , alcohol withdrawal found to have alcoholic hepatitis, electrolyte abnormalities, ecoli bacteremia.   - ibuprofen 600mg x1  - EKG STAT  - STAT cardiac enzymes  - Will continue to monitor, RN to call if any changes Called by RN, pt c/o neck and left upper chest pain.  Pt seen and examined at bedside. Pt points to pain on the left side of his and as well as just inferior to his L shoulder. Pt admits to feeling palpitations. Pt denies fever, chest pain, shortness of breath. Admits to some dizziness and nausea earlier after taking medication. Of note, patient has been consistently tachycardic likely due to alcohol withdrawal.     T(F): 98.3 (05-25-23 @ 22:51), Max: 99.1 (05-25-23 @ 20:05)  HR: 115 (05-25-23 @ 22:51) (115 - 119)  BP: 127/88 (05-25-23 @ 22:51) (127/87 - 127/88)  RR: 18 (05-25-23 @ 22:51) (18 - 18)  SpO2: 97% (05-25-23 @ 22:51) (95% - 97%)    Physical Exam:  Gen: NAD, sitting up in bed comfortably  HEENT: scleral icterus, moist mucous membranes  Cardio: +S1, +S2, tachycardia  Lungs: CTA B/L, No w/r/r, no increased WOB   Abd: soft, NT/ND, +BS x 4 quadrants, no rebound/guarding   Ext: No pedal edema, no calf tenderness, pulses intact  Neuro: AAOx3, answers questions appropriately     Assessment/Plan: 38M PMH of alcohol abuse, alcohol withdrawal seizures, fatty liver presents with signs of alcohol abuse/withdrawal. Admitted for AMS , alcohol withdrawal found to have alcoholic hepatitis, electrolyte abnormalities, ecoli bacteremia.   - ibuprofen 600mg x1  - EKG STAT  - STAT cardiac enzymes  - Will continue to monitor, RN to call if any changes    ADDENDUM  - EKG reviewed, sinus tachycardia no change from previous  - Troponin, cardiac enzymes within normal  - Will continue to monitor, RN to call if any changes.

## 2023-05-25 NOTE — PROGRESS NOTE ADULT - ATTENDING COMMENTS
38 M PMH of alcohol abuse, alcohol withdrawal seizures, fatty liver presents with signs of alcohol abuse/withdrawal. Admitted for AMS , alcohol withdrawal.   transaminitis ,Alcoholic hepatitis & Hyponatremia. Low K, Low Ca, Low Mag .  Pt  seen, Examined, case & care plan d/w , residents at detail.  Consults:  ID Dr Gant/Dr Morfin  -IV Abx -Rocephin daily- Rx Cholangitis As D/W ID -pt is Improving, No need for MRI,   GI-Dr Leach follow up-Likely Cholangitis Rx with IV Abx 5/26 as last day followed by dc off abx  , No Need for MRCP at this time   Detox -Dr Garcia follow up. IV Ativan PRN & RTC  Social service eval  AM labs   DVT PPx  PO diet   -PT Eval -pending decision.  Total Care time is 60  minutes.

## 2023-05-25 NOTE — PROGRESS NOTE ADULT - PROBLEM SELECTOR PLAN 5
Plt 65 on admission -Normal Now   -likely in setting of Chronic alcoholic liver disease & Likely BM suppression 2/2 ETOH   - Thrombocytopenia likely 2/2 dilution, bone marrow suppression from ETOH  - LDH elevated, Haptoglobin WNL  - daily CBC  - improved

## 2023-05-25 NOTE — PROGRESS NOTE ADULT - PROBLEM SELECTOR PLAN 3
Chronic known hx of alcohol use disorder  - [facility] Protocol  - Ativan for agitation  - Ativan 2mg q4h standing, 2mg q1h PRN agitation  - Thiamine 100mg qd, folic acid 1mg PO QD  - advanced diet to regular  - Addiction Medicine (Radha) consulted, recs appreciated Chronic known hx of alcohol use disorder  - CIWA Protocol  - Ativan for agitation  - Ativan 2mg q4h standing, 2mg q1h PRN agitation  - Thiamine 100mg qd, folic acid 1mg PO QD  - advanced diet to regular  - Addiction Medicine (Radha)  -Ativan RTC

## 2023-05-25 NOTE — PROGRESS NOTE ADULT - SUBJECTIVE AND OBJECTIVE BOX
[facility]  Juliocesar Godfrey PA-C  [address] [place] [address]  [phone]      INTERVAL HPI/OVERNIGHT EVENTS:  Pt s/e  Reports feeling well denies GI complaints  LFTs noted    MEDICATIONS  (STANDING):  cefTRIAXone   IVPB 2000 milliGRAM(s) IV Intermittent every 24 hours  enoxaparin Injectable 40 milliGRAM(s) SubCutaneous every 24 hours  folic acid 1 milliGRAM(s) Oral daily  lactulose Syrup 15 Gram(s) Oral daily  LORazepam     Tablet 0.5 milliGRAM(s) Oral every 4 hours  LORazepam     Tablet 0.5 milliGRAM(s) Oral every 12 hours  multivitamin/minerals 1 Tablet(s) Oral daily  tamsulosin 0.4 milliGRAM(s) Oral at bedtime  thiamine 100 milliGRAM(s) Oral daily    MEDICATIONS  (PRN):  LORazepam   Injectable 2 milliGRAM(s) IV Push every 2 hours PRN Symptom-triggered: 2 point increase in CIWA -Ar score and a total score of 7 or LESS  LORazepam   Injectable 2 milliGRAM(s) IV Push every 1 hour PRN Symptom-triggered: each CIWA -Ar score 8 or GREATER  LORazepam   Injectable 2 milliGRAM(s) IV Push every 2 hours PRN Agitation  zolpidem 5 milliGRAM(s) Oral at bedtime PRN Insomnia      Allergies    No Known Allergies      PHYSICAL EXAM:   Vital Signs:  Vital Signs Last 24 Hrs  T(C): 36.5 (25 May 2023 04:24), Max: 37.3 (24 May 2023 21:08)  T(F): 97.7 (25 May 2023 04:24), Max: 99.2 (24 May 2023 21:08)  HR: 122 (25 May 2023 04:24) (122 - 133)  BP: 120/83 (25 May 2023 04:24) (120/83 - 140/93)  BP(mean): --  RR: 16 (25 May 2023 04:24) (16 - 20)  SpO2: 94% (25 May 2023 04:24) (93% - 96%)    Parameters below as of 25 May 2023 04:24  Patient On (Oxygen Delivery Method): room air      GENERAL:  Appears stated age  HEENT:  NC/AT  CHEST:  Full & symmetric excursion  HEART:  Regular rhythm  ABDOMEN:  Soft, non-tender, non-distended  EXTEREMITIES:  no cyanosis  SKIN:  No rash  NEURO:  Alert      LABS:                        13.4   4.80  )-----------( 472      ( 25 May 2023 07:05 )             40.3     05-25    134<L>  |  104  |  7   ----------------------------<  116<H>  3.5   |  23  |  0.66    Ca    9.3      25 May 2023 07:05    TPro  7.5  /  Alb  3.0<L>  /  TBili  6.3<H>  /  DBili  x   /  AST  428<H>  /  ALT  590<H>  /  AlkPhos  391<H>  05-25

## 2023-05-25 NOTE — PROGRESS NOTE ADULT - PROBLEM SELECTOR PLAN 1
fever/ sepsis  E Coli -Etiology-R/O Cholangitis -elevated Bili   - blood cultures grew ecoli  - fu Repeat Blood c/s x 2 NG Final   - Noted elevated alk phos, LFTs, bilirubin (downtrending)  - continue rocephin until 5/26 as per ID   - MRCP no longer required

## 2023-05-25 NOTE — PROGRESS NOTE ADULT - PROBLEM SELECTOR PLAN 9
As per nurse patient was having abd pain which resolved after patient voided  ~600cc  - CT A/P- No hydronephrosis. 6.3 mm non obstructing stone lower pole left kidney.  - s/p squires   - Continue flomax  - continue to monitor

## 2023-05-25 NOTE — PROGRESS NOTE ADULT - SUBJECTIVE AND OBJECTIVE BOX
Patient is a 38y old  Male who presents with a chief complaint of Hepatitis (25 May 2023 11:09)    HPI:  38M PMH of alcohol abuse, alcohol withdrawal seizures, fatty liver presents with signs of alcohol abuse/withdrawal. History obtained from wife with  over phone. Patient has been drinking for the past 16 days of unknown amount and had unwitnessed fall from the bed so pt was taken to the ED. Seen and examined pt at bedside. Unable to obtain history from patient due to somnolence in setting of recent ativan use.    In the ED    Vitals: , T 98, Spo2 95% /80     Labs: Plt 65, Na 128, K 3.0, Tbili 9.6, , , , Lactate 4.0, Lipase 432, UA + for bacteria and leuk esterase, Blood alc level 423     Imaging  RUQ U/S Visualization of the gallbladder is limited by overlying bowel gas. No   gross gallstones, gallbladder wall thickening, or pericholecystic fluid. No ultrasonic Grayson's sign epatomegaly and hepatic steatosis.  CT A/P- steatosis and hepatomegaly. No hydronephrosis. 6.3 mm nonobstructing stone lower pole left kidney.  CT head/cervical spine/ maxillofacial non-con: No acute intracranial abnormalities. Mild right periorbital subcutaneous soft tissue swelling. No evidence of orbital or other facial fracture. No vertebral fracture, collapse or subluxation.    ECG: Sinus tachycardia, 115 BPM    Meds Given    dTAP x1, Zofran x 1, Morphine x1, Pepcid x1,2 NS, 5mg haldol x1, 40meq k, 10mg flexiril, zosyn x1, thiamine, folic acid,   Pt seen by ICU team, admitted to ICU for ETOH intoxication & withdrawal. (17 May 2023 09:51)    INTERVAL HPI:  5/18 - Seen and examined patient. Pt was lethargic so unable to obtain ROS. On Precedex drip, Cooling [place] Bacteremia, IV Abx Fever  5/19 - Seen and examined. Patient is lethargic on precedex. Found to be bacteremic now on abx. Bilirubin uptrending. No acute distress. E Coli sepsis, IV Abx   5/20 - Seen and examined. Patient is somnolent on precedex but responds to name and able to answer some questions. Denies CP, SOB, abd pain, nausea. Found to be bacteremic now on abx. Bilirubin uptrending. No acute distress. E Coli sepsis, IV Abx   5/21 -  Seen and examined. Patient is still somnolent on precedex but responds to name and able to answer some questions. Denies CP, SOB, abd pain, nausea. No acute distress. E Coli sepsis, IV Abx Heredia cath will blood   5/22 - Seen and examined. Patient is A&Ox3, but tired and anxious appearing. Precedex off. Answers questions and follows commands. Denies CP, SOB, abd pain, nausea. No acute distress. States he wants to get out of bed and walk. On IV abx for E.coli bacteremia. MRI A/P schedule.  5/23- Seen and examined at bedside. Pt wants to get up and walk. Pt states he had a poor night of sleep. He has no acute complaints. Tolerating diet.IV Abx   5/24- Seen and examined at bedside. Pt wants to get up and walk around. Pt states he had a poor night of sleep. He denies any other acute complaints. Downgraded from ICU on IV Abx for E Coli sepsis .IV Ativan   5/25- Seen and examined at bedside. Pt was asking about potential discharge. Pt endorses difficulty sleeping. Pt also spoke with me about alcohol and I explained to him the importance of avoiding alcohol. 5/26 will be last day of rocephin per ID. Pt no longer requiring MRCP.       OVERNIGHT EVENTS: Overnight resident received phone call that pt was experiencing bloating and discomfort. He was given simethicone and Maalox.     Home Medications:      MEDICATIONS  (STANDING):  cefTRIAXone   IVPB 2000 milliGRAM(s) IV Intermittent every 24 hours  enoxaparin Injectable 40 milliGRAM(s) SubCutaneous every 24 hours  folic acid 1 milliGRAM(s) Oral daily  lactulose Syrup 15 Gram(s) Oral daily  LORazepam     Tablet 0.5 milliGRAM(s) Oral every 4 hours  LORazepam     Tablet 0.5 milliGRAM(s) Oral every 12 hours  multivitamin/minerals 1 Tablet(s) Oral daily  tamsulosin 0.4 milliGRAM(s) Oral at bedtime  thiamine 100 milliGRAM(s) Oral daily    MEDICATIONS  (PRN):  LORazepam   Injectable 2 milliGRAM(s) IV Push every 2 hours PRN Symptom-triggered: 2 point increase in CIWA -Ar score and a total score of 7 or LESS  LORazepam   Injectable 2 milliGRAM(s) IV Push every 1 hour PRN Symptom-triggered: each CIWA -Ar score 8 or GREATER  LORazepam   Injectable 2 milliGRAM(s) IV Push every 2 hours PRN Agitation  zolpidem 5 milliGRAM(s) Oral at bedtime PRN Insomnia      Allergies    No Known Allergies    Intolerances        Social History:  Tobacco: denies  EtOH: chronic heavy drinker  Recreational drug use: denies  Lives with: patient lives with wife  Ambulates: independent  ADLs: independent (17 May 2023 09:51)      REVIEW OF SYSTEMS:  CONSTITUTIONAL: No fever, No chills, No fatigue, No myalgia, No Body ache, No Weakness  EYES: No eye pain,  No visual disturbances, No discharge, NO Redness  ENMT:  No ear pain, No nose bleed, No vertigo; No sinus pain, NO throat pain, No Congestion  NECK: No pain, No stiffness  RESPIRATORY: No cough, NO wheezing, No  hemoptysis, NO  shortness of breath  CARDIOVASCULAR: No chest pain, palpitations  GASTROINTESTINAL: No abdominal pain, NO epigastric pain. No nausea, No vomiting; No diarrhea, No constipation. [  ] BM  GENITOURINARY: No dysuria, No frequency, No urgency, No hematuria, NO incontinence  NEUROLOGICAL: No headaches, No dizziness, No numbness, No tingling, No tremors, No weakness  EXT: No Swelling, No Pain, No Edema  SKIN:  [  ] No itching, burning, rashes, or lesions   MUSCULOSKELETAL: No joint pain ,No Jt swelling; No muscle pain, No back pain, No extremity pain  PSYCHIATRIC: No depression,  No anxiety,  No mood swings ,No difficulty sleeping at night  PAIN SCALE: [  ] None  [  ] Other-  ROS Unable to obtain due to - [  ] Dementia  [  ] Lethargy [  ] Drowsy [  ] Sedated [  ] non verbal  REST OF REVIEW Of SYSTEM - [  ] Normal     Vital Signs Last 24 Hrs  T(C): 36.5 (25 May 2023 04:24), Max: 37.3 (24 May 2023 21:08)  T(F): 97.7 (25 May 2023 04:24), Max: 99.2 (24 May 2023 21:08)  HR: 122 (25 May 2023 04:24) (122 - 133)  BP: 120/83 (25 May 2023 04:24) (120/83 - 140/93)  BP(mean): --  RR: 16 (25 May 2023 04:24) (16 - 18)  SpO2: 94% (25 May 2023 04:24) (93% - 94%)    Parameters below as of 25 May 2023 04:24  Patient On (Oxygen Delivery Method): room air      Finger Stick        05-24 @ 07:01  -  05-25 @ 07:00  --------------------------------------------------------  IN: 0 mL / OUT: 800 mL / NET: -800 mL        PHYSICAL EXAM:  GENERAL:  [  ] NAD , [  ] well appearing, [  ] Agitated, [  ] Drowsy,  [  ] Lethargy, [  ] confused   HEAD:  [  ] Normal, [  ] Other  EYES:  [  ] EOMI, [  ] PERRLA, [  ] conjunctiva and sclera clear normal, [  ] Other,  [  ] Pallor,[  ] Discharge  ENMT:  [  ] Normal, [  ] Moist mucous membranes, [  ] Good dentition, [  ] No Thrush  NECK:  [  ] Supple, [  ] No JVD, [  ] Normal thyroid, [  ] Lymphadenopathy [  ] Other  CHEST/LUNG:  [  ] Clear to auscultation bilaterally, [  ] Breath Sounds equal B/L / Decrease, [  ] poor effort  [  ] No rales, [  ] No rhonchi  [  ]  No wheezing,   HEART:  [  ] Regular rate and rhythm, [  ] tachycardia, [  ] Bradycardia,  [  ] irregular  [  ] No murmurs, No rubs, No gallops, [  ] PPM in place (Mfr:  )  ABDOMEN:  [  ] Soft, [  ] Nontender, [  ] Nondistended, [  ] No mass, [  ] Bowel sounds present, [  ] obese  NERVOUS SYSTEM:  [  ] Alert & Oriented X3, [  ] Nonfocal  [  ] Confusion  [  ] Encephalopathic [  ] Sedated [  ] Unable to assess, [  ] Dementia [  ] Other-  EXTREMITIES: [  ] 2+ Peripheral Pulses, No clubbing, No cyanosis,  [  ] edema B/L lower EXT. [  ] PVD stasis skin changes B/L Lower EXT, [  ] wound  LYMPH: No lymphadenopathy noted  SKIN:  [  ] No rashes or lesions, [  ] Pressure Ulcers, [  ] ecchymosis, [  ] Skin Tears, [  ] Other    DIET: Diet, Regular (05-23-23 @ 07:21)      LABS:                        13.4   4.80  )-----------( 472      ( 25 May 2023 07:05 )             40.3     25 May 2023 07:05    134    |  104    |  7      ----------------------------<  116    3.5     |  23     |  0.66     Ca    9.3        25 May 2023 07:05    TPro  7.5    /  Alb  3.0    /  TBili  6.3    /  DBili  x      /  AST  428    /  ALT  590    /  AlkPhos  391    25 May 2023 07:05          Culture Results:   No Growth Final (05-19 @ 07:00)          CARDIAC MARKERS ( 19 May 2023 07:00 )  x     / x     / 547 U/L / x     / x              Culture - Blood (collected 19 May 2023 07:00)  Source: .Blood Blood-Peripheral  Final Report (24 May 2023 13:01):    No Growth Final         Anemia Panel:      Thyroid Panel:        Lipase, Serum: 73 U/L (05-19-23 @ 07:00)          RADIOLOGY & ADDITIONAL TESTS:      HEALTH ISSUES - PROBLEM Dx:  Transaminitis    Need for prophylactic measure    Urinary retention    Fall    Hypocalcemia    Thrombocytopenia    Alcohol dependence with withdrawal    AMS (altered mental status)    Rhabdomyolysis    Bacteremia, escherichia coli            Consultant(s) Notes Reviewed:  [  ] YES     Care Discussed with [X] Consultants  [  ] Patient  [  ] Family [  ] HCP [  ]   [  ] Social Service  [  ] RN, [  ] Physical Therapy,[  ] Palliative care team  DVT PPX: [  ] Lovenox, [  ] S C Heparin, [  ] Coumadin, [  ] Xarelto, [  ] Eliquis, [  ] Pradaxa, [  ] IV Heparin drip, [  ] SCD [  ] Contraindication 2 to GI Bleed,[  ] Ambulation [  ] Contraindicated 2 to  bleed [  ] Contraindicated 2 to Brain Bleed  Advanced directive: [  ] None, [  ] DNR/DNI Patient is a 38y old  Male who presents with a chief complaint of Hepatitis (25 May 2023 11:09)    HPI:  38M PMH of alcohol abuse, alcohol withdrawal seizures, fatty liver presents with signs of alcohol abuse/withdrawal. History obtained from wife with  over phone. Patient has been drinking for the past 16 days of unknown amount and had unwitnessed fall from the bed so pt was taken to the ED. Seen and examined pt at bedside. Unable to obtain history from patient due to somnolence in setting of recent ativan use.    In the ED    Vitals: , T 98, Spo2 95% /80     Labs: Plt 65, Na 128, K 3.0, Tbili 9.6, , , , Lactate 4.0, Lipase 432, UA + for bacteria and leuk esterase, Blood alc level 423     Imaging  RUQ U/S Visualization of the gallbladder is limited by overlying bowel gas. No   gross gallstones, gallbladder wall thickening, or pericholecystic fluid. No ultrasonic Grayson's sign epatomegaly and hepatic steatosis.  CT A/P- steatosis and hepatomegaly. No hydronephrosis. 6.3 mm nonobstructing stone lower pole left kidney.  CT head/cervical spine/ maxillofacial non-con: No acute intracranial abnormalities. Mild right periorbital subcutaneous soft tissue swelling. No evidence of orbital or other facial fracture. No vertebral fracture, collapse or subluxation.    ECG: Sinus tachycardia, 115 BPM    Meds Given    dTAP x1, Zofran x 1, Morphine x1, Pepcid x1,2 NS, 5mg haldol x1, 40meq k, 10mg flexiril, zosyn x1, thiamine, folic acid,   Pt seen by ICU team, admitted to ICU for ETOH intoxication & withdrawal. (17 May 2023 09:51)    INTERVAL HPI:  5/18 - Seen and examined patient. Pt was lethargic so unable to obtain ROS. On Precedex drip, Cooling [place] Bacteremia, IV Abx Fever  5/19 - Seen and examined. Patient is lethargic on precedex. Found to be bacteremic now on abx. Bilirubin uptrending. No acute distress. E Coli sepsis, IV Abx   5/20 - Seen and examined. Patient is somnolent on precedex but responds to name and able to answer some questions. Denies CP, SOB, abd pain, nausea. Found to be bacteremic now on abx. Bilirubin uptrending. No acute distress. E Coli sepsis, IV Abx   5/21 -  Seen and examined. Patient is still somnolent on precedex but responds to name and able to answer some questions. Denies CP, SOB, abd pain, nausea. No acute distress. E Coli sepsis, IV Abx Heredia cath will blood   5/22 - Seen and examined. Patient is A&Ox3, but tired and anxious appearing. Precedex off. Answers questions and follows commands. Denies CP, SOB, abd pain, nausea. No acute distress. States he wants to get out of bed and walk. On IV abx for E.coli bacteremia. MRI A/P schedule.  5/23- Seen and examined at bedside. Pt wants to get up and walk. Pt states he had a poor night of sleep. He has no acute complaints. Tolerating diet.IV Abx   5/24- Seen and examined at bedside. Pt wants to get up and walk around. Pt states he had a poor night of sleep. He denies any other acute complaints. Downgraded from ICU on IV Abx for E Coli sepsis .IV Ativan   5/25- Seen and examined at bedside. Pt was asking about potential discharge. Pt endorses difficulty sleeping. Pt also spoke with me about alcohol and I explained to him the importance of avoiding alcohol. 5/26 will be last day of rocephin per ID. Pt no longer requiring MRCP.       OVERNIGHT EVENTS: Overnight resident received phone call that pt was experiencing bloating and discomfort. He was given simethicone and Maalox.     Home Medications:      MEDICATIONS  (STANDING):  cefTRIAXone   IVPB 2000 milliGRAM(s) IV Intermittent every 24 hours  enoxaparin Injectable 40 milliGRAM(s) SubCutaneous every 24 hours  folic acid 1 milliGRAM(s) Oral daily  lactulose Syrup 15 Gram(s) Oral daily  LORazepam     Tablet 0.5 milliGRAM(s) Oral every 4 hours  LORazepam     Tablet 0.5 milliGRAM(s) Oral every 12 hours  multivitamin/minerals 1 Tablet(s) Oral daily  tamsulosin 0.4 milliGRAM(s) Oral at bedtime  thiamine 100 milliGRAM(s) Oral daily    MEDICATIONS  (PRN):  LORazepam   Injectable 2 milliGRAM(s) IV Push every 2 hours PRN Symptom-triggered: 2 point increase in CIWA -Ar score and a total score of 7 or LESS  LORazepam   Injectable 2 milliGRAM(s) IV Push every 1 hour PRN Symptom-triggered: each CIWA -Ar score 8 or GREATER  LORazepam   Injectable 2 milliGRAM(s) IV Push every 2 hours PRN Agitation  zolpidem 5 milliGRAM(s) Oral at bedtime PRN Insomnia      Allergies    No Known Allergies    Intolerances        Social History:  Tobacco: denies  EtOH: chronic heavy drinker  Recreational drug use: denies  Lives with: patient lives with wife  Ambulates: independent  ADLs: independent (17 May 2023 09:51)      REVIEW OF SYSTEMS:  CONSTITUTIONAL: difficulty sleeping   EYES: No eye pain,  No visual disturbances, No discharge, NO Redness  ENMT:  No ear pain, No nose bleed, No vertigo; No sinus pain, NO throat pain, No Congestion  NECK: No pain, No stiffness  RESPIRATORY: No cough, NO wheezing, No  hemoptysis, NO  shortness of breath  CARDIOVASCULAR: No chest pain, palpitations  GASTROINTESTINAL: No abdominal pain, NO epigastric pain. No nausea, No vomiting; No diarrhea, No constipation. [ x ] BM  GENITOURINARY: No dysuria, No frequency, No urgency, No hematuria, NO incontinence  NEUROLOGICAL: No headaches, No dizziness, No numbness, No tingling, No tremors, No weakness  EXT: No Swelling, No Pain, No Edema  SKIN:  [ x ] No itching, burning, rashes, or lesions   MUSCULOSKELETAL: No joint pain ,No Jt swelling; No muscle pain, No back pain, No extremity pain  PSYCHIATRIC: No depression,  No anxiety,  No mood swings ,No difficulty sleeping at night  PAIN SCALE: [ x ] None  [  ] Other-  ROS Unable to obtain due to - [  ] Dementia  [  ] Lethargy [  ] Drowsy [  ] Sedated [  ] non verbal  REST OF REVIEW Of SYSTEM - [ x ] Normal     Vital Signs Last 24 Hrs  T(C): 36.5 (25 May 2023 04:24), Max: 37.3 (24 May 2023 21:08)  T(F): 97.7 (25 May 2023 04:24), Max: 99.2 (24 May 2023 21:08)  HR: 122 (25 May 2023 04:24) (122 - 133)  BP: 120/83 (25 May 2023 04:24) (120/83 - 140/93)  BP(mean): --  RR: 16 (25 May 2023 04:24) (16 - 18)  SpO2: 94% (25 May 2023 04:24) (93% - 94%)    Parameters below as of 25 May 2023 04:24  Patient On (Oxygen Delivery Method): room air      Finger Stick        05-24 @ 07:01  -  05-25 @ 07:00  --------------------------------------------------------  IN: 0 mL / OUT: 800 mL / NET: -800 mL        PHYSICAL EXAM:  GENERAL:  [ x ] NAD , [ x ] well appearing, [  ] Agitated, [  ] Drowsy,  [  ] Lethargy, [  ] confused   HEAD:  [ x ] Normal, [  ] Other  EYES:  [ x ] EOMI, [x  ] PERRLA, [  x] conjunctiva and sclera clear normal, [  ] Other,  [ x ] Pallor,[  ] Discharge + Ecchymosis rt eyebrow   ENMT:  [ x ] Normal, [ x ] Moist mucous membranes, [  ] Good dentition, [x  ] No Thrush  NECK:  [ x ] Supple, [ x ] No JVD, [x  ] Normal thyroid, [  ] Lymphadenopathy [  ] Other  CHEST/LUNG:  [x  ] Clear to auscultation bilaterally, [x  ] Breath Sounds equal B/L / Decrease, [  ] poor effort  [ x ] No rales, [ x ] No rhonchi  [ x ]  No wheezing,   HEART:  [  ] Regular rate and rhythm, [ x ] tachycardia, [  ] Bradycardia,  [  ] irregular  [ x ] No murmurs, No rubs, No gallops, [  ] PPM in place (Mfr:  )  ABDOMEN:  [ x ] Soft, [ x ] Nontender, [x  ] Nondistended, [ x ] No mass, [ x ] Bowel sounds present, [ x ] obese  NERVOUS SYSTEM:  [x  ] Alert & Oriented X3, [ x ] Nonfocal  [  ] Confusion  [  ] Encephalopathic [  ] Sedated [  ] Unable to assess, [  ] Dementia [  ] Other-  EXTREMITIES: [x  ] 2+ Peripheral Pulses, No clubbing, No cyanosis,  [  ] edema B/L lower EXT. [  ] PVD stasis skin changes B/L Lower EXT, [  ] wound  LYMPH: No lymphadenopathy noted  SKIN:  [x  ] No rashes or lesions, [  ] Pressure Ulcers, [  ] ecchymosis, [  ] Skin Tears, [ x ] Other- Multiple skin tattoos on Body    DIET: Diet, Regular (05-23-23 @ 07:21)      LABS:                        13.4   4.80  )-----------( 472      ( 25 May 2023 07:05 )             40.3     25 May 2023 07:05    134    |  104    |  7      ----------------------------<  116    3.5     |  23     |  0.66     Ca    9.3        25 May 2023 07:05    TPro  7.5    /  Alb  3.0    /  TBili  6.3    /  DBili  x      /  AST  428    /  ALT  590    /  AlkPhos  391    25 May 2023 07:05          Culture Results:   No Growth Final (05-19 @ 07:00)          CARDIAC MARKERS ( 19 May 2023 07:00 )  x     / x     / 547 U/L / x     / x              Culture - Blood (collected 19 May 2023 07:00)  Source: .Blood Blood-Peripheral  Final Report (24 May 2023 13:01):    No Growth Final         Anemia Panel:      Thyroid Panel:        Lipase, Serum: 73 U/L (05-19-23 @ 07:00)          RADIOLOGY & ADDITIONAL TESTS:      HEALTH ISSUES - PROBLEM Dx:  Transaminitis    Need for prophylactic measure    Urinary retention    Fall    Hypocalcemia    Thrombocytopenia    Alcohol dependence with withdrawal    AMS (altered mental status)    Rhabdomyolysis    Bacteremia, escherichia coli            Care Discussed with [X] Consultants  [ x ] Patient  [ x ] Family [  ] HCP [  ]   [  ] Social Service  [ x] RN, [  ] Physical Therapy,[  ] Palliative care team  DVT PPX: [  ] Lovenox, [  ] S C Heparin, [  ] Coumadin, [  ] Xarelto, [  ] Eliquis, [  ] Pradaxa, [  ] IV Heparin drip, [x  ] SCD [  ] Contraindication 2 to GI Bleed,[x  ] Ambulation [  ] Contraindicated 2 to  bleed [  ] Contraindicated 2 to Brain Bleed  Advanced directive: [ x ] None, [  ] DNR/DNI

## 2023-05-25 NOTE — PROGRESS NOTE ADULT - SUBJECTIVE AND OBJECTIVE BOX
[facility] DIV[facility] of INFECTIOUS DISEASE  Christian Gant MD PhD, Raven Schilling MD, Sophy Palm MD, Sienna Morfin MD, Jermaine Kelly MD  and providing coverage with Kaur Hampton MD  Providing Infectious Disease Consultations at [facility], [facility], [facility]'s    Office# [phone] to schedule follow up appointments  Answering Service for urgent calls or New Consults [phone]  Cell# to text for urgent issues Christian Gant [phone]     infectious diseases progress note:    DIANE GARAY is a 38y y. o. Male patient    Overnight and events of the last 24hrs reviewed    Allergies    No Known Allergies    Intolerances        ANTIBIOTICS/RELEVANT:  antimicrobials  cefTRIAXone   IVPB 2000 milliGRAM(s) IV Intermittent every 24 hours    immunologic:    OTHER:  enoxaparin Injectable 40 milliGRAM(s) SubCutaneous every 24 hours  folic acid 1 milliGRAM(s) Oral daily  lactulose Syrup 15 Gram(s) Oral daily  LORazepam     Tablet 0.5 milliGRAM(s) Oral every 4 hours  LORazepam     Tablet 0.5 milliGRAM(s) Oral every 12 hours  LORazepam   Injectable 2 milliGRAM(s) IV Push every 2 hours PRN  LORazepam   Injectable 2 milliGRAM(s) IV Push every 1 hour PRN  LORazepam   Injectable 2 milliGRAM(s) IV Push every 2 hours PRN  multivitamin/minerals 1 Tablet(s) Oral daily  tamsulosin 0.4 milliGRAM(s) Oral at bedtime  thiamine 100 milliGRAM(s) Oral daily  zolpidem 5 milliGRAM(s) Oral at bedtime PRN      Objective:  Vital Signs Last 24 Hrs  T(C): 36.5 (25 May 2023 04:24), Max: 37.3 (24 May 2023 21:08)  T(F): 97.7 (25 May 2023 04:24), Max: 99.2 (24 May 2023 21:08)  HR: 122 (25 May 2023 04:24) (122 - 133)  BP: 120/83 (25 May 2023 04:24) (120/83 - 140/93)  BP(mean): --  RR: 16 (25 May 2023 04:24) (16 - 20)  SpO2: 94% (25 May 2023 04:24) (93% - 96%)    Parameters below as of 25 May 2023 04:24  Patient On (Oxygen Delivery Method): room air        T(C): 36.5 (05-25-23 @ 04:24), Max: 37.8 (05-23-23 @ 23:35)  T(C): 36.5 (05-25-23 @ 04:24), Max: 37.8 (05-23-23 @ 23:35)  T(C): 36.5 (05-25-23 @ 04:24), Max: 37.8 (05-23-23 @ 23:35)    PHYSICAL EXAM:  HEENT: NC atraumatic  Neck: supple  Respiratory: no accessory muscle use, breathing comfortably  Cardiovascular: distant  Gastrointestinal: normal appearing, nondistended  Extremities: no clubbing, no cyanosis,        LABS:                          13.4   4.80  )-----------( 472      ( 25 May 2023 07:05 )             40.3       WBC  4.80 05-25 @ 07:05  5.53 05-24 @ 07:45  3.90 05-23 @ 05:40  3.75 05-22 @ 17:08  3.52 05-22 @ 05:30  4.67 05-21 @ 06:08  5.71 05-20 @ 06:08  9.16 05-19 @ 07:00      05-25    134<L>  |  104  |  7   ----------------------------<  116<H>  3.5   |  23  |  0.66    Ca    9.3      25 May 2023 07:05    TPro  7.5  /  Alb  3.0<L>  /  TBili  6.3<H>  /  DBili  x   /  AST  428<H>  /  ALT  590<H>  /  AlkPhos  391<H>  05-25      Creatinine, Serum: 0.66 mg/dL (05-25-23 @ 07:05)  Creatinine, Serum: 0.66 mg/dL (05-24-23 @ 07:45)  Creatinine, Serum: 0.64 mg/dL (05-23-23 @ 05:40)  Creatinine, Serum: 0.70 mg/dL (05-22-23 @ 17:08)  Creatinine, Serum: 0.71 mg/dL (05-22-23 @ 05:30)  Creatinine, Serum: 0.63 mg/dL (05-21-23 @ 06:08)  Creatinine, Serum: 0.58 mg/dL (05-20-23 @ 06:08)  Creatinine, Serum: 0.77 mg/dL (05-19-23 @ 07:00)                INFLAMMATORY MARKERS      MICROBIOLOGY:              RADIOLOGY & ADDITIONAL STUDIES:

## 2023-05-25 NOTE — PROGRESS NOTE ADULT - PROBLEM SELECTOR PLAN 4
-Likely metabolic Encephelopathy / sepsis now   s/p unwitnessed fall with small abrasions of face  - CT head/cervical spine/ maxillofacial non-con: No acute intracranial abnormalities. Mild right periorbital subcutaneous soft tissue swelling. No evidence of orbital or other facial fracture. No vertebral fracture, collapse or subluxation.  - repeat CT head noncon: no acute acute signs of brain hemorrhage

## 2023-05-25 NOTE — PROGRESS NOTE ADULT - ASSESSMENT
38M originally from [facility] who emigrated in 1999 w PMH of alcohol abuse, alcohol withdrawal seizures, fatty liver presented with signs of alcohol abuse/withdrawal. Patient has been drinking for the past 16 days PTA of unknown amount and had unwitnessed fall. E coli bacteremia noted and ID consultation requested.    Acute Cholangitis/E.coli Bacteremia  - BCx +E.coli - susceptibilities reviewed-relatively pansensitive  - 5/19 repeat Bcx NGTD   - s/p zosyn then changed to ceftriaxone    Recommendations  - continue ceftriaxone 2g IV Q24h with anticipated 7 days of effective abx so 5/26 as last day followed by dc off abx  -pt now transferred out of the ICU and on general medical floor  - appreciate GI recs, currently no plan for MRCP, pt is now up and able to ambulate  -explained to Solitario that in the future it would not be a good idea to continue drinking    Thank you for consulting us and involving us in the management of this most interesting and challenging case.  We will follow along in the care of this patient. Please call us at [phone] or text me directly on my cell# at [phone] with any concerns.

## 2023-05-26 LAB
ALBUMIN SERPL ELPH-MCNC: 2.9 G/DL — LOW (ref 3.3–5)
ALP SERPL-CCNC: 302 U/L — HIGH (ref 40–120)
ALT FLD-CCNC: 471 U/L — HIGH (ref 12–78)
ANION GAP SERPL CALC-SCNC: 6 MMOL/L — SIGNIFICANT CHANGE UP (ref 5–17)
AST SERPL-CCNC: 269 U/L — HIGH (ref 15–37)
BILIRUB SERPL-MCNC: 4.9 MG/DL — HIGH (ref 0.2–1.2)
BUN SERPL-MCNC: 9 MG/DL — SIGNIFICANT CHANGE UP (ref 7–23)
CALCIUM SERPL-MCNC: 9.4 MG/DL — SIGNIFICANT CHANGE UP (ref 8.5–10.1)
CHLORIDE SERPL-SCNC: 104 MMOL/L — SIGNIFICANT CHANGE UP (ref 96–108)
CK MB BLD-MCNC: <2.9 % — SIGNIFICANT CHANGE UP (ref 0–3.5)
CK MB CFR SERPL CALC: <1 NG/ML — SIGNIFICANT CHANGE UP (ref 0–3.6)
CK SERPL-CCNC: 34 U/L — SIGNIFICANT CHANGE UP (ref 26–308)
CO2 SERPL-SCNC: 25 MMOL/L — SIGNIFICANT CHANGE UP (ref 22–31)
CREAT SERPL-MCNC: 0.71 MG/DL — SIGNIFICANT CHANGE UP (ref 0.5–1.3)
EGFR: 120 ML/MIN/1.73M2 — SIGNIFICANT CHANGE UP
GLUCOSE SERPL-MCNC: 109 MG/DL — HIGH (ref 70–99)
HCT VFR BLD CALC: 37.8 % — LOW (ref 39–50)
HGB BLD-MCNC: 12.1 G/DL — LOW (ref 13–17)
MCHC RBC-ENTMCNC: 28.7 PG — SIGNIFICANT CHANGE UP (ref 27–34)
MCHC RBC-ENTMCNC: 32 GM/DL — SIGNIFICANT CHANGE UP (ref 32–36)
MCV RBC AUTO: 89.8 FL — SIGNIFICANT CHANGE UP (ref 80–100)
NRBC # BLD: 0 /100 WBCS — SIGNIFICANT CHANGE UP (ref 0–0)
PLATELET # BLD AUTO: 585 K/UL — HIGH (ref 150–400)
POTASSIUM SERPL-MCNC: 3.9 MMOL/L — SIGNIFICANT CHANGE UP (ref 3.5–5.3)
POTASSIUM SERPL-SCNC: 3.9 MMOL/L — SIGNIFICANT CHANGE UP (ref 3.5–5.3)
PROT SERPL-MCNC: 7 G/DL — SIGNIFICANT CHANGE UP (ref 6–8.3)
RBC # BLD: 4.21 M/UL — SIGNIFICANT CHANGE UP (ref 4.2–5.8)
RBC # FLD: 17.7 % — HIGH (ref 10.3–14.5)
SODIUM SERPL-SCNC: 135 MMOL/L — SIGNIFICANT CHANGE UP (ref 135–145)
TROPONIN I, HIGH SENSITIVITY RESULT: 3.9 NG/L — SIGNIFICANT CHANGE UP
WBC # BLD: 4.94 K/UL — SIGNIFICANT CHANGE UP (ref 3.8–10.5)
WBC # FLD AUTO: 4.94 K/UL — SIGNIFICANT CHANGE UP (ref 3.8–10.5)

## 2023-05-26 PROCEDURE — 99222 1ST HOSP IP/OBS MODERATE 55: CPT

## 2023-05-26 RX ORDER — METOPROLOL TARTRATE 50 MG
25 TABLET ORAL EVERY 12 HOURS
Refills: 0 | Status: DISCONTINUED | OUTPATIENT
Start: 2023-05-26 | End: 2023-05-28

## 2023-05-26 RX ORDER — TAMSULOSIN HYDROCHLORIDE 0.4 MG/1
0.4 CAPSULE ORAL AT BEDTIME
Refills: 0 | Status: DISCONTINUED | OUTPATIENT
Start: 2023-05-26 | End: 2023-05-28

## 2023-05-26 RX ORDER — SODIUM CHLORIDE 9 MG/ML
1000 INJECTION INTRAMUSCULAR; INTRAVENOUS; SUBCUTANEOUS
Refills: 0 | Status: DISCONTINUED | OUTPATIENT
Start: 2023-05-26 | End: 2023-05-27

## 2023-05-26 RX ORDER — ONDANSETRON 8 MG/1
4 TABLET, FILM COATED ORAL ONCE
Refills: 0 | Status: COMPLETED | OUTPATIENT
Start: 2023-05-26 | End: 2023-05-26

## 2023-05-26 RX ORDER — LANOLIN ALCOHOL/MO/W.PET/CERES
5 CREAM (GRAM) TOPICAL ONCE
Refills: 0 | Status: COMPLETED | OUTPATIENT
Start: 2023-05-26 | End: 2023-05-26

## 2023-05-26 RX ORDER — IBUPROFEN 200 MG
600 TABLET ORAL ONCE
Refills: 0 | Status: COMPLETED | OUTPATIENT
Start: 2023-05-26 | End: 2023-05-26

## 2023-05-26 RX ADMIN — ONDANSETRON 4 MILLIGRAM(S): 8 TABLET, FILM COATED ORAL at 16:51

## 2023-05-26 RX ADMIN — Medication 1 TABLET(S): at 11:18

## 2023-05-26 RX ADMIN — TAMSULOSIN HYDROCHLORIDE 0.4 MILLIGRAM(S): 0.4 CAPSULE ORAL at 22:55

## 2023-05-26 RX ADMIN — Medication 0.5 MILLIGRAM(S): at 05:37

## 2023-05-26 RX ADMIN — ENOXAPARIN SODIUM 40 MILLIGRAM(S): 100 INJECTION SUBCUTANEOUS at 11:18

## 2023-05-26 RX ADMIN — CEFTRIAXONE 100 MILLIGRAM(S): 500 INJECTION, POWDER, FOR SOLUTION INTRAMUSCULAR; INTRAVENOUS at 13:19

## 2023-05-26 RX ADMIN — Medication 1 MILLIGRAM(S): at 11:19

## 2023-05-26 RX ADMIN — LACTULOSE 15 GRAM(S): 10 SOLUTION ORAL at 11:19

## 2023-05-26 RX ADMIN — Medication 600 MILLIGRAM(S): at 01:10

## 2023-05-26 RX ADMIN — Medication 0.5 MILLIGRAM(S): at 17:20

## 2023-05-26 RX ADMIN — Medication 100 MILLIGRAM(S): at 11:18

## 2023-05-26 RX ADMIN — Medication 25 MILLIGRAM(S): at 17:20

## 2023-05-26 RX ADMIN — SODIUM CHLORIDE 75 MILLILITER(S): 9 INJECTION INTRAMUSCULAR; INTRAVENOUS; SUBCUTANEOUS at 10:10

## 2023-05-26 RX ADMIN — Medication 5 MILLIGRAM(S): at 03:17

## 2023-05-26 RX ADMIN — Medication 600 MILLIGRAM(S): at 00:35

## 2023-05-26 NOTE — PROVIDER CONTACT NOTE (OTHER) - ASSESSMENT
Pt CIWA at a 9, visible tremors, headache, tachy  Pt denies palpitations or heart racing
pain 5/10
Pt is alert, calm, rubbing abdomen
vital recorded

## 2023-05-26 NOTE — CONSULT NOTE ADULT - NS ATTEND AMEND GEN_ALL_CORE FT
Seems that his tachycardia is reactive.  To start low dose bb and monitor on telemetry.  No evidence of acute ischemia or volume overload.  To follow closely while admitted.

## 2023-05-26 NOTE — CONSULT NOTE ADULT - SUBJECTIVE AND OBJECTIVE BOX
[facility] - Alice Guevara, Navjot Rojas Savella, Goodger  Office Number: [phone]    Initial Consult Note: This is a38 y/o M with no cardiac history presented for ETOH withdrawal symptoms and s/p fall.  We are called for sinus tachycardia    CHIEF COMPLAINT: Patient is a 38y old  Male who presents with a chief complaint of Hepatitis (26 May 2023 12:58)    HPI:  38M PMH of alcohol abuse, alcohol withdrawal seizures, fatty liver presents with signs of alcohol abuse/withdrawal. History obtained from wife with  over phone. Patient has been drinking for the past 16 days of unknown amount and had unwitnessed fall from the bed so pt was taken to the ED. Seen and examined pt at bedside. Unable to obtain history from patient due to somnolence in setting of recent ativan use.    In the ED    Vitals: , T 98, Spo2 95% /80     Labs: Plt 65, Na 128, K 3.0, Tbili 9.6, , , , Lactate 4.0, Lipase 432, UA + for bacteria and leuk esterase, Blood alc level 423     Imaging  RUQ U/S Visualization of the gallbladder is limited by overlying bowel gas. No   gross gallstones, gallbladder wall thickening, or pericholecystic fluid. No ultrasonic Grayson's sign epatomegaly and hepatic steatosis.  CT A/P- steatosis and hepatomegaly. No hydronephrosis. 6.3 mm nonobstructing stone lower pole left kidney.  CT head/cervical spine/ maxillofacial non-con: No acute intracranial abnormalities. Mild right periorbital subcutaneous soft tissue swelling. No evidence of orbital or other facial fracture. No vertebral fracture, collapse or subluxation.    ECG: Sinus tachycardia, 115 BPM    Meds Given    dTAP x1, Zofran x 1, Morphine x1, Pepcid x1,2 NS, 5mg haldol x1, 40meq k, 10mg flexiril, zosyn x1, thiamine, folic acid,   Pt seen by ICU team, admitted to ICU for ETOH intoxication & withdrawal. (17 May 2023 09:51)    PAST MEDICAL & SURGICAL HISTORY:  Alcohol abuse  H/O hernia repair  S/P appendectomy    SOCIAL HISTORY:  No tobacco, ethanol, or drug abuse.  FAMILY HISTORY:  Family history of renal disease  Uncle    No family history of acute MI or sudden cardiac death.  MEDICATIONS  (STANDING):  cefTRIAXone   IVPB 2000 milliGRAM(s) IV Intermittent every 24 hours  enoxaparin Injectable 40 milliGRAM(s) SubCutaneous every 24 hours  folic acid 1 milliGRAM(s) Oral daily  lactulose Syrup 15 Gram(s) Oral daily  LORazepam     Tablet 0.5 milliGRAM(s) Oral every 12 hours  multivitamin/minerals 1 Tablet(s) Oral daily  sodium chloride 0.9%. 1000 milliLiter(s) (75 mL/Hr) IV Continuous <Continuous>  tamsulosin 0.4 milliGRAM(s) Oral at bedtime  thiamine 100 milliGRAM(s) Oral daily    MEDICATIONS  (PRN):  LORazepam   Injectable 2 milliGRAM(s) IV Push every 2 hours PRN Symptom-triggered: 2 point increase in CIWA -Ar score and a total score of 7 or LESS  LORazepam   Injectable 2 milliGRAM(s) IV Push every 1 hour PRN Symptom-triggered: each CIWA -Ar score 8 or GREATER  LORazepam   Injectable 2 milliGRAM(s) IV Push every 2 hours PRN Agitation  zolpidem 5 milliGRAM(s) Oral at bedtime PRN Insomnia    Allergies    No Known Allergies    Intolerances    REVIEW OF SYSTEMS:  CONSTITUTIONAL: No weakness, fevers or chills  EYES/ENT: No visual changes;  No vertigo or throat pain   NECK: No pain or stiffness  RESPIRATORY: No cough, wheezing, hemoptysis; No shortness of breath  CARDIOVASCULAR: No chest pain or palpitations  GASTROINTESTINAL: No abdominal pain. No nausea, vomiting, or hematemesis; No diarrhea or constipation. No melena or hematochezia.  GENITOURINARY: No dysuria, frequency or hematuria  NEUROLOGICAL: No numbness or weakness  SKIN: No itching or rash  All other review of systems is negative unless indicated above  VITAL SIGNS:   Vital Signs Last 24 Hrs  T(C): 36.8 (26 May 2023 05:00), Max: 37.3 (25 May 2023 20:05)  T(F): 98.3 (26 May 2023 05:00), Max: 99.1 (25 May 2023 20:05)  HR: 108 (26 May 2023 07:46) (108 - 119)  BP: 102/63 (26 May 2023 05:00) (102/63 - 127/88)  BP(mean): --  RR: 17 (26 May 2023 05:00) (17 - 18)  SpO2: 94% (26 May 2023 05:00) (94% - 97%)    Parameters below as of 26 May 2023 05:00  Patient On (Oxygen Delivery Method): room air    I&O's Summary    25 May 2023 07:01  -  26 May 2023 07:00  --------------------------------------------------------  IN: 0 mL / OUT: 500 mL / NET: -500 mL    On Exam:  Constitutional: NAD, alert and oriented x 3  Lungs:  Non-labored, breath sounds are clear bilaterally, No wheezing, rales or rhonchi  Cardiovascular: RRR.  S1 and S2 positive.  No murmurs, rubs, gallops or clicks  Gastrointestinal: Bowel Sounds present, soft, nontender.   Lymph: No peripheral edema. No cervical lymphadenopathy.  Neurological: Alert, no focal deficits  Skin: No rashes or ulcers   Psych:  Mood & affect appropriate.    LABS: All Labs Reviewed:                        12.1   4.94  )-----------( 585      ( 26 May 2023 06:30 )             37.8                         13.4   4.80  )-----------( 472      ( 25 May 2023 07:05 )             40.3                         13.1   5.53  )-----------( 362      ( 24 May 2023 07:45 )             40.2     26 May 2023 06:30    135    |  104    |  9      ----------------------------<  109    3.9     |  25     |  0.71   25 May 2023 07:05    134    |  104    |  7      ----------------------------<  116    3.5     |  23     |  0.66   24 May 2023 07:45    138    |  109    |  7      ----------------------------<  105    3.8     |  20     |  0.66     Ca    9.4        26 May 2023 06:30  Ca    9.3        25 May 2023 07:05  Ca    9.3        24 May 2023 07:45    TPro  7.0    /  Alb  2.9    /  TBili  4.9    /  DBili  x      /  AST  269    /  ALT  471    /  AlkPhos  302    26 May 2023 06:30  TPro  7.5    /  Alb  3.0    /  TBili  6.3    /  DBili  x      /  AST  428    /  ALT  590    /  AlkPhos  391    25 May 2023 07:05  TPro  7.4    /  Alb  2.9    /  TBili  7.3    /  DBili  x      /  AST  487    /  ALT  564    /  AlkPhos  327    24 May 2023 07:45    CARDIAC MARKERS ( 26 May 2023 00:02 )  x     / x     / 34 U/L / x     / <1.0 ng/mL    RADIOLOGY:    ACC: [id number] [facility]   ORDERED BY: SHANTEL RENTERIA     PROCEDURE DATE:  05/22/2023          INTERPRETATION:  CLINICAL INFORMATION: Sinus tachycardia with dyspnea.    COMPARISON: CTPA chest 1/14/2023    CONTRAST/COMPLICATIONS:  IV Contrast: Omnipaque 350  54 cc administered   46 cc discarded  Oral Contrast: NONE  Complications: None reported at time of study completion    PROCEDURE:  CT Angiography of the Chest. Mild respiratory motion artifact.  Sagittal and coronal reformats were performed as well as 3D (MIP)   reconstructions.    FINDINGS:    LUNGS AND AIRWAYS: No endobronchial lesion. Left upper lobe calcified   granuloma. Bilateral lower lobe linear atelectasis.  PLEURA: No pleural effusion.  MEDIASTINUM AND IMELDA: No lymphadenopathy.  VESSELS: No main, right, left, lobar or proximal segmental pulmonary   embolism. Limited evaluation of the distal segmental and subsegmental   branches due to motion.  HEART: Heart size is normal. No pericardial effusion.  CHEST WALL AND LOWER NECK: Within normal limits.  VISUALIZED UPPER ABDOMEN: Within normal limits.  BONES: Degenerative changes of the thoracic spine.    IMPRESSION:  No main, right, left, lobar or proximal segmental pulmonary embolism.    ---End of Report ---     PAYAL MAXWELL MD; Resident Radiology  This document has been electronically signed.  KAIN DUNCAN MD; Attending Radiologist  This document has been electronically signed. May 22 2023  4:30PM  EKG:    Ventricular Rate 109 BPM    Atrial Rate 109 BPM    P-R Interval 128 ms    QRS Duration 86 ms    Q-T Interval 328 ms    QTC Calculation(Bazett) 441 ms    P Axis 29 degrees    R Axis -5 degrees    T Axis 25 degrees    Diagnosis Line Sinus tachycardia  Otherwise normal ECG  When compared with ECG of 23-MAY-2023 22:36,  premature supraventricular complexes are no longer present  Confirmed by VERONICA ROJAS ([id number]) on 5/26/2023 12:05:39 PM    Assessment/Plan:  38y Male    Chel Weeks DNP, NP-C, AGACNP-C  Cardiology  Call TEAMS       [facility] - Alice Guevara, Navjot Rojas Savella, Goodger  Office Number: [phone]    Initial Consult Note: This is a 39 y/o M with no cardiac history presented for ETOH withdrawal symptoms and s/p fall.  We are called for sinus tachycardia    CHIEF COMPLAINT: Patient is a 38y old  Male who presents with a chief complaint of Hepatitis (26 May 2023 12:58)    HPI:  38M PMH of alcohol abuse, alcohol withdrawal seizures, fatty liver presents with signs of alcohol abuse/withdrawal. History obtained from wife with  over phone. Patient has been drinking for the past 16 days of unknown amount and had unwitnessed fall from the bed so pt was taken to the ED. Seen and examined pt at bedside. Unable to obtain history from patient due to somnolence in setting of recent ativan use.    In the ED    Vitals: , T 98, Spo2 95% /80     Labs: Plt 65, Na 128, K 3.0, Tbili 9.6, , , , Lactate 4.0, Lipase 432, UA + for bacteria and leuk esterase, Blood alc level 423     Imaging  RUQ U/S Visualization of the gallbladder is limited by overlying bowel gas. No   gross gallstones, gallbladder wall thickening, or pericholecystic fluid. No ultrasonic Grayson's sign epatomegaly and hepatic steatosis.  CT A/P- steatosis and hepatomegaly. No hydronephrosis. 6.3 mm nonobstructing stone lower pole left kidney.  CT head/cervical spine/ maxillofacial non-con: No acute intracranial abnormalities. Mild right periorbital subcutaneous soft tissue swelling. No evidence of orbital or other facial fracture. No vertebral fracture, collapse or subluxation.    ECG: Sinus tachycardia, 115 BPM    Meds Given    dTAP x1, Zofran x 1, Morphine x1, Pepcid x1,2 NS, 5mg haldol x1, 40meq k, 10mg flexiril, zosyn x1, thiamine, folic acid,   Pt seen by ICU team, admitted to ICU for ETOH intoxication & withdrawal. (17 May 2023 09:51)    PAST MEDICAL & SURGICAL HISTORY:  Alcohol abuse  H/O hernia repair  S/P appendectomy    SOCIAL HISTORY:  No tobacco, ethanol, or drug abuse.  FAMILY HISTORY:  Family history of renal disease  Uncle    No family history of acute MI or sudden cardiac death.  MEDICATIONS  (STANDING):  cefTRIAXone   IVPB 2000 milliGRAM(s) IV Intermittent every 24 hours  enoxaparin Injectable 40 milliGRAM(s) SubCutaneous every 24 hours  folic acid 1 milliGRAM(s) Oral daily  lactulose Syrup 15 Gram(s) Oral daily  LORazepam     Tablet 0.5 milliGRAM(s) Oral every 12 hours  multivitamin/minerals 1 Tablet(s) Oral daily  sodium chloride 0.9%. 1000 milliLiter(s) (75 mL/Hr) IV Continuous <Continuous>  tamsulosin 0.4 milliGRAM(s) Oral at bedtime  thiamine 100 milliGRAM(s) Oral daily    MEDICATIONS  (PRN):  LORazepam   Injectable 2 milliGRAM(s) IV Push every 2 hours PRN Symptom-triggered: 2 point increase in CIWA -Ar score and a total score of 7 or LESS  LORazepam   Injectable 2 milliGRAM(s) IV Push every 1 hour PRN Symptom-triggered: each CIWA -Ar score 8 or GREATER  LORazepam   Injectable 2 milliGRAM(s) IV Push every 2 hours PRN Agitation  zolpidem 5 milliGRAM(s) Oral at bedtime PRN Insomnia    Allergies    No Known Allergies    Intolerances    REVIEW OF SYSTEMS:  CONSTITUTIONAL: No weakness, fevers or chills  EYES/ENT: No visual changes;  No vertigo or throat pain   NECK: No pain or stiffness  RESPIRATORY: No cough, wheezing, hemoptysis; No shortness of breath  CARDIOVASCULAR: No chest pain or palpitations  GASTROINTESTINAL: No abdominal pain. No nausea, vomiting, or hematemesis; No diarrhea or constipation. No melena or hematochezia.  GENITOURINARY: No dysuria, frequency or hematuria  NEUROLOGICAL: No numbness or weakness  SKIN: No itching or rash  All other review of systems is negative unless indicated above  VITAL SIGNS:   Vital Signs Last 24 Hrs  T(C): 36.8 (26 May 2023 05:00), Max: 37.3 (25 May 2023 20:05)  T(F): 98.3 (26 May 2023 05:00), Max: 99.1 (25 May 2023 20:05)  HR: 108 (26 May 2023 07:46) (108 - 119)  BP: 102/63 (26 May 2023 05:00) (102/63 - 127/88)  BP(mean): --  RR: 17 (26 May 2023 05:00) (17 - 18)  SpO2: 94% (26 May 2023 05:00) (94% - 97%)    Parameters below as of 26 May 2023 05:00  Patient On (Oxygen Delivery Method): room air    I&O's Summary    25 May 2023 07:01  -  26 May 2023 07:00  --------------------------------------------------------  IN: 0 mL / OUT: 500 mL / NET: -500 mL    On Exam:  Constitutional: NAD, alert and oriented x 3  Lungs:  Non-labored, breath sounds are clear bilaterally, No wheezing, rales or rhonchi  Cardiovascular: RRR.  S1 and S2 positive.  No murmurs, rubs, gallops or clicks  Gastrointestinal: Bowel Sounds present, soft, nontender.   Lymph: No peripheral edema. No cervical lymphadenopathy.  Neurological: Alert, no focal deficits  Skin: No rashes or ulcers   Psych:  Mood & affect appropriate.    LABS: All Labs Reviewed:                        12.1   4.94  )-----------( 585      ( 26 May 2023 06:30 )             37.8                         13.4   4.80  )-----------( 472      ( 25 May 2023 07:05 )             40.3                         13.1   5.53  )-----------( 362      ( 24 May 2023 07:45 )             40.2     26 May 2023 06:30    135    |  104    |  9      ----------------------------<  109    3.9     |  25     |  0.71   25 May 2023 07:05    134    |  104    |  7      ----------------------------<  116    3.5     |  23     |  0.66   24 May 2023 07:45    138    |  109    |  7      ----------------------------<  105    3.8     |  20     |  0.66     Ca    9.4        26 May 2023 06:30  Ca    9.3        25 May 2023 07:05  Ca    9.3        24 May 2023 07:45    TPro  7.0    /  Alb  2.9    /  TBili  4.9    /  DBili  x      /  AST  269    /  ALT  471    /  AlkPhos  302    26 May 2023 06:30  TPro  7.5    /  Alb  3.0    /  TBili  6.3    /  DBili  x      /  AST  428    /  ALT  590    /  AlkPhos  391    25 May 2023 07:05  TPro  7.4    /  Alb  2.9    /  TBili  7.3    /  DBili  x      /  AST  487    /  ALT  564    /  AlkPhos  327    24 May 2023 07:45    CARDIAC MARKERS ( 26 May 2023 00:02 )  x     / x     / 34 U/L / x     / <1.0 ng/mL    RADIOLOGY:    ACC: [id number] [facility]   ORDERED BY: SHANTEL RENTERIA     PROCEDURE DATE:  05/22/2023          INTERPRETATION:  CLINICAL INFORMATION: Sinus tachycardia with dyspnea.    COMPARISON: CTPA chest 1/14/2023    CONTRAST/COMPLICATIONS:  IV Contrast: Omnipaque 350  54 cc administered   46 cc discarded  Oral Contrast: NONE  Complications: None reported at time of study completion    PROCEDURE:  CT Angiography of the Chest. Mild respiratory motion artifact.  Sagittal and coronal reformats were performed as well as 3D (MIP)   reconstructions.    FINDINGS:    LUNGS AND AIRWAYS: No endobronchial lesion. Left upper lobe calcified   granuloma. Bilateral lower lobe linear atelectasis.  PLEURA: No pleural effusion.  MEDIASTINUM AND IMELDA: No lymphadenopathy.  VESSELS: No main, right, left, lobar or proximal segmental pulmonary   embolism. Limited evaluation of the distal segmental and subsegmental   branches due to motion.  HEART: Heart size is normal. No pericardial effusion.  CHEST WALL AND LOWER NECK: Within normal limits.  VISUALIZED UPPER ABDOMEN: Within normal limits.  BONES: Degenerative changes of the thoracic spine.    IMPRESSION:  No main, right, left, lobar or proximal segmental pulmonary embolism.    ---End of Report ---     PAYAL MAXWELL MD; Resident Radiology  This document has been electronically signed.  KAIN DUNCAN MD; Attending Radiologist  This document has been electronically signed. May 22 2023  4:30PM  EKG:    Ventricular Rate 109 BPM    Atrial Rate 109 BPM    P-R Interval 128 ms    QRS Duration 86 ms    Q-T Interval 328 ms    QTC Calculation(Bazett) 441 ms    P Axis 29 degrees    R Axis -5 degrees    T Axis 25 degrees    Diagnosis Line Sinus tachycardia  Otherwise normal ECG  When compared with ECG of 23-MAY-2023 22:36,  premature supraventricular complexes are no longer present  Confirmed by VERONICA ROJAS ([id number]) on 5/26/2023 12:05:39 PM

## 2023-05-26 NOTE — PHARMACOTHERAPY INTERVENTION NOTE - COMMENTS
Patient is a 38-year old male not on anticoagulation due to thrombocytopenia. Discussed with ICU team resuming anticoagulation since platelets have increased from 88 to 133 K/uL. ICU team agreed and patient ordered for enoxaparin 40 mg every 24 hours for DVT/PE prophylaxis. Also, discussed with team adjusting folic acid and thiamine orders from IV to PO since patient is tolerating PO medications. Team agreed and orders entered as per discussion  
Patient is a 38-year old male on lorazepam IV taper for the treatment of VENKAT. Discussed with ICU team adjusting IV taper to PO since patient’s diet was advanced yesterday and pt is tolerating oral medications. ICU team agreed and lorazepam orders were reentered and timed accordingly  
Patient is a 38-year old male ordered for ceftriaxone 2 g every 24 hours for the treatment of E. Coli bacteremia. Patient is scheduled to complete 7-day course today. Reached out to primary team to adjust stop date for order to complete following today’s dose. Team agreed and order completed as per discussion 
Patient is a 39 y/o M ordered for Zosyn 3.375 g every 8 hrs empirically for bacteremia, blood culture growing e.coli. Discussed with Dr. Lobo and recommended switching to ceftriaxone 2 g every 24 hrs. MD agreed and order was entered.

## 2023-05-26 NOTE — PROGRESS NOTE ADULT - ASSESSMENT
38M originally from [facility] who emigrated in 1999 w PMH of alcohol abuse, alcohol withdrawal seizures, fatty liver presented with signs of alcohol abuse/withdrawal. Patient has been drinking for the past 16 days PTA of unknown amount and had unwitnessed fall. E coli bacteremia noted and ID consultation requested.    Acute Cholangitis/E.coli Bacteremia  - BCx +E.coli - susceptibilities reviewed-relatively pansensitive  - 5/19 repeat Bcx NGTD   - s/p zosyn then changed to ceftriaxone     Recommendations  -continue ceftriaxone 2g IV Q24h with anticipated 7 days, today last day  -additional management per primary team    Stable from ID standpoint  D/c planning per primary team    D/w Dr. Néstor Schilling covering weekend service  Infectious Diseases will continue to follow. Please call with any questions.   Sophy Palm M.D.  [facility] [phone]

## 2023-05-26 NOTE — PROGRESS NOTE ADULT - SUBJECTIVE AND OBJECTIVE BOX
[facility], Division of Infectious Diseases  LAUREN Gallegos Y. Patel, S. Shah, [facility]. [facility]  [phone]    Name: DIANE GARAY  Age: 38y  Gender: Male  MRN: [id number]    Interval History:  Patient seen and examined at bedside this morning  No acute overnight events. Afebrile  No complaints  Notes reviewed    Antibiotics:  cefTRIAXone   IVPB 2000 milliGRAM(s) IV Intermittent every 24 hours      Medications:  cefTRIAXone   IVPB 2000 milliGRAM(s) IV Intermittent every 24 hours  enoxaparin Injectable 40 milliGRAM(s) SubCutaneous every 24 hours  folic acid 1 milliGRAM(s) Oral daily  lactulose Syrup 15 Gram(s) Oral daily  LORazepam     Tablet 0.5 milliGRAM(s) Oral every 12 hours  LORazepam   Injectable 2 milliGRAM(s) IV Push every 2 hours PRN  LORazepam   Injectable 2 milliGRAM(s) IV Push every 1 hour PRN  LORazepam   Injectable 2 milliGRAM(s) IV Push every 2 hours PRN  multivitamin/minerals 1 Tablet(s) Oral daily  sodium chloride 0.9%. 1000 milliLiter(s) IV Continuous <Continuous>  tamsulosin 0.4 milliGRAM(s) Oral at bedtime  thiamine 100 milliGRAM(s) Oral daily  zolpidem 5 milliGRAM(s) Oral at bedtime PRN      Review of Systems:  A 10-point review of systems was obtained.   Review of systems otherwise negative except as previously noted.    Allergies: No Known Allergies    For details regarding the patient's past medical history, social history, family history, and other miscellaneous elements, please refer the initial infectious diseases consultation and/or the admitting history and physical examination for this admission.    Objective:  Vitals:   T(C): 36.8 (05-26-23 @ 05:00), Max: 37.3 (05-25-23 @ 20:05)  HR: 108 (05-26-23 @ 07:46) (108 - 119)  BP: 102/63 (05-26-23 @ 05:00) (102/63 - 127/88)  RR: 17 (05-26-23 @ 05:00) (17 - 18)  SpO2: 94% (05-26-23 @ 05:00) (94% - 97%)    Physical Examination:  General: no acute distress  HEENT: NC/AT, EOMI  Cardio: S1, S2 heard, RRR, no murmurs  Resp: decreased breath sounds  Abd: soft, NT, ND,  Ext: no edema or cyanosis  Skin: warm, dry, no visible rash      Laboratory Studies:  CBC:                       12.1   4.94  )-----------( 585      ( 26 May 2023 06:30 )             37.8     CMP: 05-26    135  |  104  |  9   ----------------------------<  109<H>  3.9   |  25  |  0.71    Ca    9.4      26 May 2023 06:30    TPro  7.0  /  Alb  2.9<L>  /  TBili  4.9<H>  /  DBili  x   /  AST  269<H>  /  ALT  471<H>  /  AlkPhos  302<H>  05-26    LIVER FUNCTIONS - ( 26 May 2023 06:30 )  Alb: 2.9 g/dL / Pro: 7.0 g/dL / ALK PHOS: 302 U/L / ALT: 471 U/L / AST: 269 U/L / GGT: x               Microbiology: reviewed    Culture - Blood (collected 05-19-23 @ 07:00)  Source: .Blood Blood-Peripheral  Final Report (05-24-23 @ 13:01):    No Growth Final    Culture - Blood (collected 05-17-23 @ 08:45)  Source: .Blood Blood  Gram Stain (05-18-23 @ 01:18):    Growth in aerobic bottle: Gram Negative Rods    Growth in anaerobic bottle: Gram Negative Rods  Final Report (05-19-23 @ 11:31):    Growth in aerobic and anaerobic bottles: Escherichia coli    See previous culture [id number]    Culture - Blood (collected 05-17-23 @ 08:30)  Source: .Blood Blood  Gram Stain (05-18-23 @ 03:36):    Growth in anaerobic bottle: Gram Negative Rods    Growth in aerobic bottle: Gram Negative Rods  Final Report (05-19-23 @ 11:30):    Growth in aerobic and anaerobic bottles: Escherichia coli    ***Blood Panel PCR results on this specimen are available    approximately 3 hours after the Gram stain result.***    Gram stain, PCR, and/or culture results may not always    correspond due to difference in methodologies.    ************************************************************    This PCR assay was performed by multiplex PCR. This    Assay tests for 66 bacterial and resistance gene targets.    Please refer to the [facility] test directory    at [email or web address] for details.  Organism: Blood Culture PCR  Escherichia coli (05-19-23 @ 11:30)  Organism: Escherichia coli (05-19-23 @ 11:30)      Method Type: CANDIDA      -  Amikacin: S <=16      -  Ampicillin: R >16 These ampicillin results predict results for amoxicillin      -  Ampicillin/Sulbactam: I 16/8 Enterobacter, Klebsiella aerogenes, Citrobacter, and Serratia may develop resistance during prolonged therapy (3-4 days)      -  Aztreonam: S <=4      -  Cefazolin: S <=2 Enterobacter, Klebsiella aerogenes, Citrobacter, and Serratia may develop resistance during prolonged therapy (3-4 days)      -  Cefepime: S <=2      -  Cefoxitin: S <=8      -  Ceftriaxone: S <=1 Enterobacter, Klebsiella aerogenes, Citrobacter, and Serratia may develop resistance during prolonged therapy      -  Ciprofloxacin: S <=0.25      -  Ertapenem: S <=0.5      -  Gentamicin: S <=2      -  Imipenem: S <=1      -  Levofloxacin: S <=0.5      -  Meropenem: S <=1      -  Piperacillin/Tazobactam: S <=8      -  Tobramycin: S <=2      -  Trimethoprim/Sulfamethoxazole: S <=0.5/9.5  Organism: Blood Culture PCR (05-19-23 @ 11:30)      Method Type: PCR      -  Escherichia coli: Detec          Radiology: reviewed

## 2023-05-26 NOTE — PROGRESS NOTE ADULT - PROBLEM SELECTOR PLAN 3
Chronic known hx of alcohol use disorder  - CIWA Protocol  - Ativan for agitation  - Ativan .5mg Q12 x 4 doses   - Ativan 2mg q4h standing, 2mg q1h PRN agitation  - Thiamine 100mg qd, folic acid 1mg PO QD  - On regular diet   - Addiction Medicine (Radha)  -Ativan RTC

## 2023-05-26 NOTE — CONSULT NOTE ADULT - ASSESSMENT
38M PMH of alcohol abuse, alcohol withdrawal seizures, fatty liver presented with signs of alcohol abuse/withdrawal. Patient has been drinking for the past 16 days PTA of unknown amount and had unwitnessed fall. E coli bacteremia noted and ID consultation requested.    RECOMMENDATIONS  Noted E coli bacteremia with biliary localization most compelling (Acute Cholangitis). Noted elevated alk phos, LFTs, exam and no other obv source. GI involved for discussion regarding potential MRCP, potential need for mechanical intervention beyond abx  -rec continue Zosyn with potential for polymicrobial process for now but potential de-escalation to Ceftriaxone/Metronidazole as pt improves    Thank you for consulting us and involving us in the management of this most interesting and challenging case.  We will follow along in the care of this patient. Please call us at [phone] or text me directly on my cell# at [phone] with any concerns.  
38 year old male with a history of alcohol abuse, alcohol withdrawal seizures, fatty liver presents with signs of alcohol abuse/withdrawal    etoh use   jennifer  harry  hepatitis  steatosis    IVF  trend LFT  serial LABS  I and O  monitor VS and Sat  Ativan IV ATC - would use 3 mg IVP every 4 hours with HOLD parameters for Lethargy and or Sedation  Ativan IV 2 mg IVP prn for ciwa > 6 - every 15 min   ciwa monitoring  pulse ox - tele monitoring  folic acid  thiamine  
etoh abuse  alcoholic hepatitis    diet as tolerated  calculated DF is 20   no need for sternoids  check daily lfts and PT/INR  ciwa protocol  monitor for withdrawl 
A/P: Patient is a 39 yo male with PMH of alcohol abuse and history of withdrawal with previous admission for aforementioned back in 2020 that required ICU admission and most recently back in January at which time he left AMA whom now presents after a fall while intoxicated now with:     1. Alcohol abuse with withdrawal   2. Alcoholic hepatitis  3. Pancreatitis  4. AMS, Metabolic encephalopathy  5. Hypocalcemia  6. Hypokalemia  7. Thrombocytopenia  8. Hyponatremia  9. Lactic acidosis   10. Hypomagnesemia    Patient s/p fall while intoxicated.   EtOH level 423.  Initial CTH with no acute findings.   Will send urine toxicology and ammonia levels.   Patient scored 19 on CIWA, was given Ativan 2mg IVP.   Somnolent now, history limited.  Rpt CTH for worsening mental status.    Appears to be protecting his airway at this time.   Will place on ETCO2 monitoring.   Will start an Ativan taper.   Rpt Lactate, suspect impaired clearance due to liver dysfunction.   CT Abd done for c/o abdominal pain which showed steatosis and hepatomegaly.   Abd US showed no gross gallstones, gallbladder wall thickening, or pericholecystic fluid. No ultrasonic Grayson's sign. Hepatomegaly and hepatic steatosis.  Lipase 432. Chemical pancreatitis, no radiographic evidence.   Markedly elevated LFTS in the setting of alcohol abuse.   Will send hepatitis panel.   MELD-Na Score 33, estimated 90-day mortality of 65%.   Child Dodson Class C.   Platelets 65.   INR 1.2.  No signs of bleeding at this time.   CK 2989.  Start LR @ 125cc/hr.   Will monitor Na level carefully.   Mg 1.5. Ca corrected 7.9. K 2.8.  Give KCl 10mEq IVPB x 4 doses now.   Give Magnesium Sulfate 2gm IVPB now.   Give Calcium gluconate 2gm IVPB now.   Will continue with aggressive electrolyte repletion.   Patient critically ill and requires ICU admission, needs close monitoring and frequent reassessment as patient is high risk for requiring intubation.   Discussed with Dr. Lobo.     I have spent a total of 65 mins of nonconsecutive critical care time managing this patient.  This included review of relevant history, clinical examination, review of data and images, discussion of treatment with the multidisciplinary team and any consultants involved in this patient’s care as well as family discussion.     I affirm that this patient is critically ill and at high risk for sudden, fatal deterioration due to one or more of the above stated active issues. I managed/supervised life or organ support interventions that required frequent assessment. This time does not include bedside procedures that are documented separately.     
Assessment/Plan:  This is a 37 y/o M with no cardiac history presented for ETOH withdrawal symptoms and s/p fall.  We are called for sinus tachycardia    Sinus Tachycardia  - Tele shows sinus tach at 110-120.  No evidence of Afib  - He presented with ETOH withdrawal, so his tachycardia, is likely reactive  - We had seen him recently for same  - For now, would start Lopressor 25 mg q12H  - Obtain TTE to evaluate cardiac structure  - Encourage PO fluids  - Monitor on tele for now     - EKG showed Sinus tach, rate 100's.  No ischemic changes  - CE's negative.  NO anginal complaints  - No evidence of volume overload.  No O2 requirement    - BP stable and controlled at systolic 110's-120's    - +Bacteremia.  Continue Abx per ID  - LFT's elevated.  GI following    - Monitor elctrolytes, replete to keep K>4 and Mag>2  - Will continue to follow    Chel Weeks DNP, NP-C, AGACNP-C  Cardiology  Call TEAMS    
Patient baseline mental status

## 2023-05-26 NOTE — PROGRESS NOTE ADULT - ASSESSMENT
38 year old male with a history of alcohol abuse, alcohol withdrawal seizures, fatty liver presents with signs of alcohol abuse/withdrawal    etoh use   jennifer  harry  hepatitis  steatosis    on ativan  pain rx regimen  GI following  VS noted  labs reviewed  no evidence of uncontrolled w/d sx  replete lytes  nutrition  social work follow up  on emp ABX

## 2023-05-26 NOTE — PROGRESS NOTE ADULT - SUBJECTIVE AND OBJECTIVE BOX
Date/Time Patient Seen:  		  Referring MD:   Data Reviewed	       Patient is a 38y old  Male who presents with a chief complaint of Hepatitis (25 May 2023 12:23)      Subjective/HPI     PAST MEDICAL & SURGICAL HISTORY:  No pertinent past medical history    Alcohol abuse    H/O hernia repair    S/P appendectomy          Medication list         MEDICATIONS  (STANDING):  cefTRIAXone   IVPB 2000 milliGRAM(s) IV Intermittent every 24 hours  enoxaparin Injectable 40 milliGRAM(s) SubCutaneous every 24 hours  folic acid 1 milliGRAM(s) Oral daily  lactulose Syrup 15 Gram(s) Oral daily  LORazepam     Tablet 0.5 milliGRAM(s) Oral every 12 hours  multivitamin/minerals 1 Tablet(s) Oral daily  tamsulosin 0.4 milliGRAM(s) Oral at bedtime  thiamine 100 milliGRAM(s) Oral daily    MEDICATIONS  (PRN):  LORazepam   Injectable 2 milliGRAM(s) IV Push every 2 hours PRN Symptom-triggered: 2 point increase in CIWA -Ar score and a total score of 7 or LESS  LORazepam   Injectable 2 milliGRAM(s) IV Push every 1 hour PRN Symptom-triggered: each CIWA -Ar score 8 or GREATER  LORazepam   Injectable 2 milliGRAM(s) IV Push every 2 hours PRN Agitation  zolpidem 5 milliGRAM(s) Oral at bedtime PRN Insomnia         Vitals log        ICU Vital Signs Last 24 Hrs  T(C): 36.8 (26 May 2023 05:00), Max: 37.3 (25 May 2023 20:05)  T(F): 98.3 (26 May 2023 05:00), Max: 99.1 (25 May 2023 20:05)  HR: 108 (26 May 2023 07:46) (108 - 122)  BP: 102/63 (26 May 2023 05:00) (102/63 - 127/88)  BP(mean): --  ABP: --  ABP(mean): --  RR: 17 (26 May 2023 05:00) (17 - 18)  SpO2: 94% (26 May 2023 05:00) (93% - 97%)    O2 Parameters below as of 26 May 2023 05:00  Patient On (Oxygen Delivery Method): room air                 Input and Output:  I&O's Detail    25 May 2023 07:01  -  26 May 2023 07:00  --------------------------------------------------------  IN:  Total IN: 0 mL    OUT:    Voided (mL): 500 mL  Total OUT: 500 mL    Total NET: -500 mL          Lab Data                        12.1   4.94  )-----------( 585      ( 26 May 2023 06:30 )             37.8     05-26    135  |  104  |  9   ----------------------------<  109<H>  3.9   |  25  |  0.71    Ca    9.4      26 May 2023 06:30    TPro  7.0  /  Alb  2.9<L>  /  TBili  4.9<H>  /  DBili  x   /  AST  269<H>  /  ALT  471<H>  /  AlkPhos  302<H>  05-26      CARDIAC MARKERS ( 26 May 2023 00:02 )  x     / x     / 34 U/L / x     / <1.0 ng/mL        Review of Systems	      Objective     Physical Examination    heart s1s2  lung dc BS      Pertinent Lab findings & Imaging      Giovanna:  NO   Adequate UO     I&O's Detail    25 May 2023 07:01  -  26 May 2023 07:00  --------------------------------------------------------  IN:  Total IN: 0 mL    OUT:    Voided (mL): 500 mL  Total OUT: 500 mL    Total NET: -500 mL               Discussed with:     Cultures:	        Radiology

## 2023-05-26 NOTE — PROGRESS NOTE ADULT - ASSESSMENT
38M PMH of alcohol abuse, alcohol withdrawal seizures, fatty liver presents with signs of alcohol abuse/withdrawal. Admitted for AMS , alcohol withdrawal found to have alcoholic hepatitis, electrolyte abnormalities, ecoli bacteremia.  38M PMH of alcohol abuse, alcohol withdrawal seizures, fatty liver presents with signs of alcohol abuse/withdrawal. Admitted for AMS , alcohol withdrawal found to have alcoholic hepatitis, electrolyte abnormalities, E-COLI bacteremia.

## 2023-05-26 NOTE — PROGRESS NOTE ADULT - PROBLEM SELECTOR PLAN 1
fever/ sepsis  E Coli -Etiology-R/O Cholangitis -elevated Bili   - blood cultures grew ecoli  - fu Repeat Blood c/s x 2 NG Final   - Noted elevated alk phos, LFTs, bilirubin (downtrending)  - continue rocephin until 5/26 as per ID (last dose today)   - MRCP no longer required fever/ sepsis  E Coli -Etiology-R/O Cholangitis -elevated Bili   - blood cultures grew ecoli  - fu Repeat Blood c/s x 2 NG Final   - Noted elevated alk phos, LFTs, bilirubin (downtrending)  - continue Rocephin until 5/26 as per ID (last dose today)   - MRCP no longer required

## 2023-05-26 NOTE — CHART NOTE - NSCHARTNOTEFT_GEN_A_CORE
Assessment:   Pt seen for nutrition follow up.  Chart reviewed, hospital course noted.     Brief History:   "38M PMH of alcohol abuse, alcohol withdrawal seizures, fatty liver presents with signs of alcohol abuse/withdrawal. Admitted for AMS , alcohol withdrawal found to have alcoholic hepatitis, electrolyte abnormalities, E-COLI bacteremia. "    Pt seen at bedside, room very dark, shades drawn, lights off, difficult to see pt.  As per help of [place] speaking staff as , pt reports not eating well due to medicines making him nauseous and have poor appetite.  Amenable to Ensure shakes in the interim. +BM noted 5/24.  Due to hx fatty liver, recommend diet change to low fat if po intake improves.      Factors impacting intake: [ ] none [ ] nausea  [ ] vomiting [ ] diarrhea [ ] constipation  [ ]chewing problems [ ] swallowing issues  [x] other: poor appetite    Diet Prescription: Diet, Regular (05-23-23 @ 07:21)    Intake: poor    Current Weight: 5/17 209.4#, 5/23 199#, 5/24 197.9#      Pertinent Medications: MEDICATIONS  (STANDING):  enoxaparin Injectable 40 milliGRAM(s) SubCutaneous every 24 hours  folic acid 1 milliGRAM(s) Oral daily  lactulose Syrup 15 Gram(s) Oral daily  LORazepam     Tablet 0.5 milliGRAM(s) Oral every 12 hours  metoprolol tartrate 25 milliGRAM(s) Oral every 12 hours  multivitamin/minerals 1 Tablet(s) Oral daily  sodium chloride 0.9%. 1000 milliLiter(s) (75 mL/Hr) IV Continuous <Continuous>  tamsulosin 0.4 milliGRAM(s) Oral at bedtime  thiamine 100 milliGRAM(s) Oral daily    MEDICATIONS  (PRN):  LORazepam   Injectable 2 milliGRAM(s) IV Push every 2 hours PRN Symptom-triggered: 2 point increase in CIWA -Ar score and a total score of 7 or LESS  LORazepam   Injectable 2 milliGRAM(s) IV Push every 1 hour PRN Symptom-triggered: each CIWA -Ar score 8 or GREATER  LORazepam   Injectable 2 milliGRAM(s) IV Push every 2 hours PRN Agitation  zolpidem 5 milliGRAM(s) Oral at bedtime PRN Insomnia    Pertinent Labs: 05-26 Na135 mmol/L Glu 109 mg/dL<H> K+ 3.9 mmol/L Cr  0.71 mg/dL BUN 9 mg/dL 05-23 Phos 3.6 mg/dL 05-26 Alb 2.9 g/dL<L>    Skin:  no pressure injuries  Edema: none noted    Estimated Needs:   [x] no change since previous assessment on: 5/18 based on 143#  kcal/day: 2066-8717 (25-30 loraine/kg)  gms protein/day: 77-90gms (1.2-1.4gm/kg)    Previous Nutrition Diagnosis:   [x] Altered nutrition related labs (Na, K, Phos)-  resolved  [x]  Malnutrition- ongoing     New Nutrition Diagnosis: [ ] not applicable       Interventions:   Recommend  [ ] Continue current diet  [x] Change Diet To:   low fat  [x] Nutrition Supplement- add Ensure Enlive 8oz bid  [ ] Nutrition Support  [ ] Other:     Monitoring and Evaluation:   [x] PO intake [ x ] Tolerance to diet prescription [ x ] weights [ x ] labs [ x ] follow up per protocol  [x] other: s/s GI distress, bowel function, skin integrity/ edema

## 2023-05-26 NOTE — PROGRESS NOTE ADULT - SUBJECTIVE AND OBJECTIVE BOX
[facility]  Juliocesar Godfrey PA-C  [address] [place] [address]  [phone]      INTERVAL HPI/OVERNIGHT EVENTS:  Pt s/e  Reports no new GI events  LFTs trending down    MEDICATIONS  (STANDING):  cefTRIAXone   IVPB 2000 milliGRAM(s) IV Intermittent every 24 hours  enoxaparin Injectable 40 milliGRAM(s) SubCutaneous every 24 hours  folic acid 1 milliGRAM(s) Oral daily  lactulose Syrup 15 Gram(s) Oral daily  LORazepam     Tablet 0.5 milliGRAM(s) Oral every 12 hours  multivitamin/minerals 1 Tablet(s) Oral daily  sodium chloride 0.9%. 1000 milliLiter(s) (75 mL/Hr) IV Continuous <Continuous>  tamsulosin 0.4 milliGRAM(s) Oral at bedtime  thiamine 100 milliGRAM(s) Oral daily    MEDICATIONS  (PRN):  LORazepam   Injectable 2 milliGRAM(s) IV Push every 2 hours PRN Symptom-triggered: 2 point increase in CIWA -Ar score and a total score of 7 or LESS  LORazepam   Injectable 2 milliGRAM(s) IV Push every 1 hour PRN Symptom-triggered: each CIWA -Ar score 8 or GREATER  LORazepam   Injectable 2 milliGRAM(s) IV Push every 2 hours PRN Agitation  zolpidem 5 milliGRAM(s) Oral at bedtime PRN Insomnia      Allergies    No Known Allergies      PHYSICAL EXAM:   Vital Signs:  Vital Signs Last 24 Hrs  T(C): 36.8 (26 May 2023 05:00), Max: 37.3 (25 May 2023 20:05)  T(F): 98.3 (26 May 2023 05:00), Max: 99.1 (25 May 2023 20:05)  HR: 108 (26 May 2023 07:46) (108 - 122)  BP: 102/63 (26 May 2023 05:00) (102/63 - 127/88)  BP(mean): --  RR: 17 (26 May 2023 05:00) (17 - 18)  SpO2: 94% (26 May 2023 05:00) (93% - 97%)    Parameters below as of 26 May 2023 05:00  Patient On (Oxygen Delivery Method): room air      GENERAL:  Appears stated age  HEENT:  NC/AT  CHEST:  Full & symmetric excursion  HEART:  Regular rhythm  ABDOMEN:  Soft, non-tender, non-distended  EXTEREMITIES:  no cyanosis  SKIN:  No rash  NEURO:  Alert      LABS:                        12.1   4.94  )-----------( 585      ( 26 May 2023 06:30 )             37.8     05-26    135  |  104  |  9   ----------------------------<  109<H>  3.9   |  25  |  0.71    Ca    9.4      26 May 2023 06:30    TPro  7.0  /  Alb  2.9<L>  /  TBili  4.9<H>  /  DBili  x   /  AST  269<H>  /  ALT  471<H>  /  AlkPhos  302<H>  05-26

## 2023-05-26 NOTE — PROVIDER CONTACT NOTE (OTHER) - ACTION/TREATMENT ORDERED:
md agnes ferreira pt
MD ordered Maalox x1 dose
MD notified  no new orders at this time  will continue to monitor
MD notified   Give PRN ativan 2 mg IV push per orders and recheck in 15 minutes
md to review chart

## 2023-05-26 NOTE — PROGRESS NOTE ADULT - PROBLEM SELECTOR PLAN 2
Tbili 9.6, , , , Lipase 432 on admission w/ known hx of alcohol abuse   - Noted elevated alk phos, LFTs, bilirubin (downtrending)  - suspect cholangitis; potential need for mechanical intervention beyond abx  - CT Abd/Pelv and RUQ U/S showing steatohepatitis without evidence of biliary obstruction or gallstones   - RUQ U/S Visualization of the gallbladder is limited by overlying bowel gas. No gross gallstones, gallbladder wall thickening, or pericholecystic fluid. No ultrasonic Grayson's sign   - Avoid hepatotoxic agents   - Daily CMP  - Lipase improved to wnl   - GI Dr. Leach follow up- Alcoholic Hepatitis

## 2023-05-26 NOTE — CHART NOTE - NSCHARTNOTEFT_GEN_A_CORE
Called by Rn for L. sided axilla/chest pain. Patient seen and examined at bedside. Patient complains of same pain that occurred last night and this morning. The pain is located in under the L. axilla region and not in chest region. Patient denies chest pain, palpitations, SOB. Patient's pain is TTP, likely musculoskeletal in nature s/p fall. Patient overnight was given tylenol. EKG was done which showed sinus tachy (same as previous). Cardiac enzymes were negative. Pt was evaluated by cardio this AM. Will continue to monitor.

## 2023-05-26 NOTE — PROGRESS NOTE ADULT - PROBLEM SELECTOR PLAN 8
s/p unwitnessed fall with small abrasions of face  - CT head/cervical spine/ maxillofacial non-con: No acute intracranial abnormalities. Mild right periorbital subcutaneous soft tissue swelling. No evidence of orbital or other facial fracture. No vertebral fracture, collapse or subluxation.  - repeat CT head noncon to evaluate right posterior fossa for SDH showed no hemorrhage

## 2023-05-26 NOTE — CONSULT NOTE ADULT - CONSULT REASON
Sinus Tachycardia
alcoholic hepatitis
harry  jennifer  etoh use
bacteremia
Alcohol withdrawal, Acute Liver Failure

## 2023-05-26 NOTE — PROGRESS NOTE ADULT - SUBJECTIVE AND OBJECTIVE BOX
Patient is a 38y old  Male who presents with a chief complaint of Hepatitis (26 May 2023 08:28)    HPI:  38M PMH of alcohol abuse, alcohol withdrawal seizures, fatty liver presents with signs of alcohol abuse/withdrawal. History obtained from wife with  over phone. Patient has been drinking for the past 16 days of unknown amount and had unwitnessed fall from the bed so pt was taken to the ED. Seen and examined pt at bedside. Unable to obtain history from patient due to somnolence in setting of recent ativan use.    In the ED    Vitals: , T 98, Spo2 95% /80     Labs: Plt 65, Na 128, K 3.0, Tbili 9.6, , , , Lactate 4.0, Lipase 432, UA + for bacteria and leuk esterase, Blood alc level 423     Imaging  RUQ U/S Visualization of the gallbladder is limited by overlying bowel gas. No   gross gallstones, gallbladder wall thickening, or pericholecystic fluid. No ultrasonic Grayson's sign epatomegaly and hepatic steatosis.  CT A/P- steatosis and hepatomegaly. No hydronephrosis. 6.3 mm nonobstructing stone lower pole left kidney.  CT head/cervical spine/ maxillofacial non-con: No acute intracranial abnormalities. Mild right periorbital subcutaneous soft tissue swelling. No evidence of orbital or other facial fracture. No vertebral fracture, collapse or subluxation.    ECG: Sinus tachycardia, 115 BPM    Meds Given    dTAP x1, Zofran x 1, Morphine x1, Pepcid x1,2 NS, 5mg haldol x1, 40meq k, 10mg flexiril, zosyn x1, thiamine, folic acid,   Pt seen by ICU team, admitted to ICU for ETOH intoxication & withdrawal. (17 May 2023 09:51)    INTERVAL HPI:  5/18 - Seen and examined patient. Pt was lethargic so unable to obtain ROS. On Precedex drip, Cooling [place] Bacteremia, IV Abx Fever  5/19 - Seen and examined. Patient is lethargic on precedex. Found to be bacteremic now on abx. Bilirubin uptrending. No acute distress. E Coli sepsis, IV Abx   5/20 - Seen and examined. Patient is somnolent on precedex but responds to name and able to answer some questions. Denies CP, SOB, abd pain, nausea. Found to be bacteremic now on abx. Bilirubin uptrending. No acute distress. E Coli sepsis, IV Abx   5/21 -  Seen and examined. Patient is still somnolent on precedex but responds to name and able to answer some questions. Denies CP, SOB, abd pain, nausea. No acute distress. E Coli sepsis, IV Abx Heredia cath will blood   5/22 - Seen and examined. Patient is A&Ox3, but tired and anxious appearing. Precedex off. Answers questions and follows commands. Denies CP, SOB, abd pain, nausea. No acute distress. States he wants to get out of bed and walk. On IV abx for E.coli bacteremia. MRI A/P schedule.  5/23- Seen and examined at bedside. Pt wants to get up and walk. Pt states he had a poor night of sleep. He has no acute complaints. Tolerating diet.IV Abx   5/24- Seen and examined at bedside. Pt wants to get up and walk around. Pt states he had a poor night of sleep. He denies any other acute complaints. Downgraded from ICU on IV Abx for E Coli sepsis .IV Ativan   5/25- Seen and examined at bedside. Pt was asking about potential discharge. Pt endorses difficulty sleeping. Pt also spoke with me about alcohol and I explained to him the importance of avoiding alcohol. 5/26 will be last day of rocephin per ID. Pt no longer requiring MRCP.   5/26- Seen and examined at bedside. Pt endorses difficulty speaking and neck pain from sleeping position. Overall he states "I feel good". Pt last day of rocephin today. On ativan.     OVERNIGHT EVENTS: Overnight patient was complaining of L.shoulder/ chest pain. Patient was give ibuprofen with improvement. EKG was obtained which showed sinus tachy same as previous. Cardiac enzymes were WNL.     Home Medications:      MEDICATIONS  (STANDING):  cefTRIAXone   IVPB 2000 milliGRAM(s) IV Intermittent every 24 hours  enoxaparin Injectable 40 milliGRAM(s) SubCutaneous every 24 hours  folic acid 1 milliGRAM(s) Oral daily  lactulose Syrup 15 Gram(s) Oral daily  LORazepam     Tablet 0.5 milliGRAM(s) Oral every 12 hours  multivitamin/minerals 1 Tablet(s) Oral daily  tamsulosin 0.4 milliGRAM(s) Oral at bedtime  thiamine 100 milliGRAM(s) Oral daily    MEDICATIONS  (PRN):  LORazepam   Injectable 2 milliGRAM(s) IV Push every 2 hours PRN Symptom-triggered: 2 point increase in CIWA -Ar score and a total score of 7 or LESS  LORazepam   Injectable 2 milliGRAM(s) IV Push every 1 hour PRN Symptom-triggered: each CIWA -Ar score 8 or GREATER  LORazepam   Injectable 2 milliGRAM(s) IV Push every 2 hours PRN Agitation  zolpidem 5 milliGRAM(s) Oral at bedtime PRN Insomnia      No Known Allergies      Social History:  Tobacco: denies  EtOH: chronic heavy drinker  Recreational drug use: denies  Lives with: patient lives with wife  Ambulates: independent  ADLs: independent (17 May 2023 09:51)      REVIEW OF SYSTEMS:  CONSTITUTIONAL: difficulty sleeping   EYES: No eye pain,  No visual disturbances, No discharge, NO Redness  ENMT:  No ear pain, No nose bleed, No vertigo; No sinus pain, NO throat pain, No Congestion  NECK: No pain, No stiffness  RESPIRATORY: No cough, NO wheezing, No  hemoptysis, NO  shortness of breath  CARDIOVASCULAR: No chest pain, palpitations  GASTROINTESTINAL: No abdominal pain, NO epigastric pain. No nausea, No vomiting; No diarrhea, No constipation. [ x ] BM  GENITOURINARY: No dysuria, No frequency, No urgency, No hematuria, NO incontinence  NEUROLOGICAL: No headaches, No dizziness, No numbness, No tingling, No tremors, No weakness  EXT: No Swelling, No Pain, No Edema  SKIN:  [ x ] No itching, burning, rashes, or lesions   MUSCULOSKELETAL: No joint pain ,No Jt swelling; + muscle pain- neck pain, No back pain, No extremity pain  PSYCHIATRIC: No depression,  No anxiety,  No mood swings ,No difficulty sleeping at night  PAIN SCALE: [ x ] None  [  ] Other-  ROS Unable to obtain due to - [  ] Dementia  [  ] Lethargy [  ] Drowsy [  ] Sedated [  ] non verbal  REST OF REVIEW Of SYSTEM - [ x ] Normal     Vital Signs Last 24 Hrs  T(C): 36.8 (26 May 2023 05:00), Max: 37.3 (25 May 2023 20:05)  T(F): 98.3 (26 May 2023 05:00), Max: 99.1 (25 May 2023 20:05)  HR: 108 (26 May 2023 07:46) (108 - 122)  BP: 102/63 (26 May 2023 05:00) (102/63 - 127/88)  BP(mean): --  RR: 17 (26 May 2023 05:00) (17 - 18)  SpO2: 94% (26 May 2023 05:00) (93% - 97%)    Parameters below as of 26 May 2023 05:00  Patient On (Oxygen Delivery Method): room air        CAPILLARY BLOOD GLUCOSE          I&O's Summary    25 May 2023 07:01  -  26 May 2023 07:00  --------------------------------------------------------  IN: 0 mL / OUT: 500 mL / NET: -500 mL      PHYSICAL EXAM:  GENERAL:  [ x ] NAD , [ x ] well appearing, [  ] Agitated, [  ] Drowsy,  [  ] Lethargy, [  ] confused   HEAD:  [ x ] Normal, [  ] Other  EYES:  [ x ] EOMI, [x  ] PERRLA, [  x] conjunctiva and sclera clear normal, [  ] Other,  [ x ] Pallor,[  ] Discharge + Ecchymosis rt eyebrow   ENMT:  [ x ] Normal, [ x ] Moist mucous membranes, [  ] Good dentition, [x  ] No Thrush  NECK:  [ x ] Supple, [ x ] No JVD, [x  ] Normal thyroid, [  ] Lymphadenopathy [  ] Other  CHEST/LUNG:  [x  ] Clear to auscultation bilaterally, [x  ] Breath Sounds equal B/L / Decrease, [  ] poor effort  [ x ] No rales, [ x ] No rhonchi  [ x ]  No wheezing,   HEART:  [  ] Regular rate and rhythm, [ x ] tachycardia, [  ] Bradycardia,  [  ] irregular  [ x ] No murmurs, No rubs, No gallops, [  ] PPM in place (Mfr:  )  ABDOMEN:  [ x ] Soft, [ x ] Nontender, [x  ] Nondistended, [ x ] No mass, [ x ] Bowel sounds present, [ x ] obese  NERVOUS SYSTEM:  [x  ] Alert & Oriented X3, [ x ] Nonfocal  [  ] Confusion  [  ] Encephalopathic [  ] Sedated [  ] Unable to assess, [  ] Dementia [  ] Other-  EXTREMITIES: [x  ] 2+ Peripheral Pulses, No clubbing, No cyanosis,  [  ] edema B/L lower EXT. [  ] PVD stasis skin changes B/L Lower EXT, [  ] wound  LYMPH: No lymphadenopathy noted  SKIN:  [x  ] No rashes or lesions, [  ] Pressure Ulcers, [  ] ecchymosis, [  ] Skin Tears, [ x ] Other- Multiple skin tattoos on Body    DIET: Diet, Regular (05-23-23 @ 07:21)      LABS:                        12.1   4.94  )-----------( 585      ( 26 May 2023 06:30 )             37.8     26 May 2023 06:30    135    |  104    |  9      ----------------------------<  109    3.9     |  25     |  0.71     Ca    9.4        26 May 2023 06:30    TPro  7.0    /  Alb  2.9    /  TBili  4.9    /  DBili  x      /  AST  269    /  ALT  471    /  AlkPhos  302    26 May 2023 06:30            CARDIAC MARKERS ( 26 May 2023 00:02 )  x     / x     / 34 U/L / x     / <1.0 ng/mL         Anemia Panel:      Thyroid Panel:            HEALTH ISSUES - PROBLEM Dx:  Transaminitis    Need for prophylactic measure    Urinary retention    Fall    Hypocalcemia    Thrombocytopenia    Alcohol dependence with withdrawal    AMS (altered mental status)    Rhabdomyolysis    Bacteremia, escherichia coli        Care Discussed with [X] Consultants  [ x ] Patient  [ x ] Family [  ] HCP [  ]   [  ] Social Service  [ x] RN, [  ] Physical Therapy,[  ] Palliative care team  DVT PPX: [  ] Lovenox, [  ] S C Heparin, [  ] Coumadin, [  ] Xarelto, [  ] Eliquis, [  ] Pradaxa, [  ] IV Heparin drip, [x  ] SCD [  ] Contraindication 2 to GI Bleed,[x  ] Ambulation [  ] Contraindicated 2 to  bleed [  ] Contraindicated 2 to Brain Bleed  Advanced directive: [ x ] None, [  ] DNR/DNI   Patient is a 38y old  Male who presents with a chief complaint of Hepatitis (26 May 2023 08:28)    HPI:  38M PMH of alcohol abuse, alcohol withdrawal seizures, fatty liver presents with signs of alcohol abuse/withdrawal. History obtained from wife with  over phone. Patient has been drinking for the past 16 days of unknown amount and had unwitnessed fall from the bed so pt was taken to the ED. Seen and examined pt at bedside. Unable to obtain history from patient due to somnolence in setting of recent ativan use.    In the ED    Vitals: , T 98, Spo2 95% /80     Labs: Plt 65, Na 128, K 3.0, Tbili 9.6, , , , Lactate 4.0, Lipase 432, UA + for bacteria and leuk esterase, Blood alc level 423     Imaging  RUQ U/S Visualization of the gallbladder is limited by overlying bowel gas. No   gross gallstones, gallbladder wall thickening, or pericholecystic fluid. No ultrasonic Grayson's sign epatomegaly and hepatic steatosis.  CT A/P- steatosis and hepatomegaly. No hydronephrosis. 6.3 mm nonobstructing stone lower pole left kidney.  CT head/cervical spine/ maxillofacial non-con: No acute intracranial abnormalities. Mild right periorbital subcutaneous soft tissue swelling. No evidence of orbital or other facial fracture. No vertebral fracture, collapse or subluxation.    ECG: Sinus tachycardia, 115 BPM    Meds Given    dTAP x1, Zofran x 1, Morphine x1, Pepcid x1,2 NS, 5mg haldol x1, 40meq k, 10mg flexiril, zosyn x1, thiamine, folic acid,   Pt seen by ICU team, admitted to ICU for ETOH intoxication & withdrawal. (17 May 2023 09:51)    INTERVAL HPI:  5/18 - Seen and examined patient. Pt was lethargic so unable to obtain ROS. On Precedex drip, Cooling [place] Bacteremia, IV Abx Fever  5/19 - Seen and examined. Patient is lethargic on precedex. Found to be bacteremic now on abx. Bilirubin uptrending. No acute distress. E Coli sepsis, IV Abx   5/20 - Seen and examined. Patient is somnolent on precedex but responds to name and able to answer some questions. Denies CP, SOB, abd pain, nausea. Found to be bacteremic now on abx. Bilirubin uptrending. No acute distress. E Coli sepsis, IV Abx   5/21 -  Seen and examined. Patient is still somnolent on precedex but responds to name and able to answer some questions. Denies CP, SOB, abd pain, nausea. No acute distress. E Coli sepsis, IV Abx Heredia cath will blood   5/22 - Seen and examined. Patient is A&Ox3, but tired and anxious appearing. Precedex off. Answers questions and follows commands. Denies CP, SOB, abd pain, nausea. No acute distress. States he wants to get out of bed and walk. On IV abx for E.coli bacteremia. MRI A/P schedule.  5/23- Seen and examined at bedside. Pt wants to get up and walk. Pt states he had a poor night of sleep. He has no acute complaints. Tolerating diet.IV Abx   5/24- Seen and examined at bedside. Pt wants to get up and walk around. Pt states he had a poor night of sleep. He denies any other acute complaints. Downgraded from ICU on IV Abx for E Coli sepsis .IV Ativan   5/25- Seen and examined at bedside. Pt was asking about potential discharge. Pt endorses difficulty sleeping. Pt also spoke with me about alcohol and I explained to him the importance of avoiding alcohol. 5/26 will be last day of rocephin per ID. Pt no longer requiring MRCP.   5/26- Seen and examined at bedside. Pt endorses difficulty speaking and neck pain from sleeping position. Overall he states "I feel good". Pt last day of rocephin today. On ativan.     OVERNIGHT EVENTS: Overnight patient was complaining of L. shoulder/ chest pain. Patient was give ibuprofen with improvement. EKG was obtained which showed sinus tachy same as previous. Cardiac enzymes were WNL.     Home Medications:      MEDICATIONS  (STANDING):  cefTRIAXone   IVPB 2000 milliGRAM(s) IV Intermittent every 24 hours  enoxaparin Injectable 40 milliGRAM(s) SubCutaneous every 24 hours  folic acid 1 milliGRAM(s) Oral daily  lactulose Syrup 15 Gram(s) Oral daily  LORazepam     Tablet 0.5 milliGRAM(s) Oral every 12 hours  multivitamin/minerals 1 Tablet(s) Oral daily  tamsulosin 0.4 milliGRAM(s) Oral at bedtime  thiamine 100 milliGRAM(s) Oral daily    MEDICATIONS  (PRN):  LORazepam   Injectable 2 milliGRAM(s) IV Push every 2 hours PRN Symptom-triggered: 2 point increase in CIWA -Ar score and a total score of 7 or LESS  LORazepam   Injectable 2 milliGRAM(s) IV Push every 1 hour PRN Symptom-triggered: each CIWA -Ar score 8 or GREATER  LORazepam   Injectable 2 milliGRAM(s) IV Push every 2 hours PRN Agitation  zolpidem 5 milliGRAM(s) Oral at bedtime PRN Insomnia      No Known Allergies      Social History:  Tobacco: denies  EtOH: chronic heavy drinker  Recreational drug use: denies  Lives with: patient lives with wife  Ambulates: independent  ADLs: independent (17 May 2023 09:51)      REVIEW OF SYSTEMS: i am ok  CONSTITUTIONAL: difficulty sleeping   EYES: No eye pain,  No visual disturbances, No discharge, NO Redness  ENMT:  No ear pain, No nose bleed, No vertigo; No sinus pain, NO throat pain, No Congestion  NECK: No pain, No stiffness  RESPIRATORY: No cough, NO wheezing, No  hemoptysis, NO  shortness of breath  CARDIOVASCULAR: No chest pain, palpitations  GASTROINTESTINAL: No abdominal pain, NO epigastric pain. No nausea, No vomiting; No diarrhea, No constipation. [ x ] BM  GENITOURINARY: No dysuria, No frequency, No urgency, No hematuria, NO incontinence  NEUROLOGICAL: No headaches, No dizziness, No numbness, No tingling, No tremors, No weakness  EXT: No Swelling, No Pain, No Edema  SKIN:  [ x ] No itching, burning, rashes, or lesions   MUSCULOSKELETAL: No joint pain ,No Jt swelling; + muscle pain- neck pain, No back pain, No extremity pain  PSYCHIATRIC: No depression,  No anxiety,  No mood swings ,No difficulty sleeping at night  PAIN SCALE: [ x ] None  [  ] Other-  ROS Unable to obtain due to - [  ] Dementia  [  ] Lethargy [  ] Drowsy [  ] Sedated [  ] non verbal  REST OF REVIEW Of SYSTEM - [ x ] Normal     Vital Signs Last 24 Hrs  T(C): 36.8 (26 May 2023 05:00), Max: 37.3 (25 May 2023 20:05)  T(F): 98.3 (26 May 2023 05:00), Max: 99.1 (25 May 2023 20:05)  HR: 108 (26 May 2023 07:46) (108 - 122)  BP: 102/63 (26 May 2023 05:00) (102/63 - 127/88)  BP(mean): --  RR: 17 (26 May 2023 05:00) (17 - 18)  SpO2: 94% (26 May 2023 05:00) (93% - 97%)    Parameters below as of 26 May 2023 05:00  Patient On (Oxygen Delivery Method): room air        CAPILLARY BLOOD GLUCOSE          I&O's Summary    25 May 2023 07:01  -  26 May 2023 07:00  --------------------------------------------------------  IN: 0 mL / OUT: 500 mL / NET: -500 mL      PHYSICAL EXAM:  GENERAL:  [ x ] NAD , [ x ] well appearing, [  ] Agitated, [  ] Drowsy,  [  ] Lethargy, [  ] confused   HEAD:  [ x ] Normal, [  ] Other  EYES:  [ x ] EOMI, [x  ] PERRLA, [  x] conjunctiva and sclera clear normal, [  ] Other,  [ x ] Pallor,[  ] Discharge + Ecchymosis rt eyebrow   ENMT:  [ x ] Normal, [ x ] Moist mucous membranes, [  ] Good dentition, [x  ] No Thrush  NECK:  [ x ] Supple, [ x ] No JVD, [x  ] Normal thyroid, [  ] Lymphadenopathy [  ] Other  CHEST/LUNG:  [x  ] Clear to auscultation bilaterally, [x  ] Breath Sounds equal B/L / Decrease, [  ] poor effort  [ x ] No rales, [ x ] No rhonchi  [ x ]  No wheezing,   HEART:  [  ] Regular rate and rhythm, [ x ] tachycardia, [  ] Bradycardia,  [  ] irregular  [ x ] No murmurs, No rubs, No gallops, [  ] PPM in place (Mfr:  )  ABDOMEN:  [ x ] Soft, [ x ] Nontender, [x  ] Nondistended, [ x ] No mass, [ x ] Bowel sounds present, [ x ] obese  NERVOUS SYSTEM:  [x  ] Alert & Oriented X3, [ x ] Nonfocal  [  ] Confusion  [  ] Encephalopathic [  ] Sedated [  ] Unable to assess, [  ] Dementia [  ] Other-  EXTREMITIES: [x  ] 2+ Peripheral Pulses, No clubbing, No cyanosis,  [  ] edema B/L lower EXT. [  ] PVD stasis skin changes B/L Lower EXT, [  ] wound  LYMPH: No lymphadenopathy noted  SKIN:  [x  ] No rashes or lesions, [  ] Pressure Ulcers, [  ] ecchymosis, [  ] Skin Tears, [ x ] Other- Multiple skin tattoos on Body    DIET: Diet, Regular (05-23-23 @ 07:21)      LABS:                        12.1   4.94  )-----------( 585      ( 26 May 2023 06:30 )             37.8     26 May 2023 06:30    135    |  104    |  9      ----------------------------<  109    3.9     |  25     |  0.71     Ca    9.4        26 May 2023 06:30    TPro  7.0    /  Alb  2.9    /  TBili  4.9    /  DBili  x      /  AST  269    /  ALT  471    /  AlkPhos  302    26 May 2023 06:30      CARDIAC MARKERS ( 26 May 2023 00:02 )  x     / x     / 34 U/L / x     / <1.0 ng/mL    HEALTH ISSUES - PROBLEM Dx:  Transaminitis    Need for prophylactic measure    Urinary retention    Fall    Hypocalcemia    Thrombocytopenia    Alcohol dependence with withdrawal    AMS (altered mental status)    Rhabdomyolysis    Bacteremia, escherichia coli        Care Discussed with [X] Consultants  [ x ] Patient  [ x ] Family [  ] HCP [  ]   [  ] Social Service  [ x] RN, [  ] Physical Therapy,[  ] Palliative care team  DVT PPX: [ x ] Lovenox, [  ] S C Heparin, [  ] Coumadin, [  ] Xarelto, [  ] Eliquis, [  ] Pradaxa, [  ] IV Heparin drip, [x  ] SCD [  ] Contraindication 2 to GI Bleed,[x  ] Ambulation [  ] Contraindicated 2 to  bleed [  ] Contraindicated 2 to Brain Bleed  Advanced directive: [ x ] None, [  ] DNR/DNI

## 2023-05-27 DIAGNOSIS — R00.0 TACHYCARDIA, UNSPECIFIED: ICD-10-CM

## 2023-05-27 LAB
ALBUMIN SERPL ELPH-MCNC: 3 G/DL — LOW (ref 3.3–5)
ALP SERPL-CCNC: 278 U/L — HIGH (ref 40–120)
ALT FLD-CCNC: 475 U/L — HIGH (ref 12–78)
ANION GAP SERPL CALC-SCNC: 6 MMOL/L — SIGNIFICANT CHANGE UP (ref 5–17)
AST SERPL-CCNC: 293 U/L — HIGH (ref 15–37)
BILIRUB SERPL-MCNC: 4.3 MG/DL — HIGH (ref 0.2–1.2)
BUN SERPL-MCNC: 8 MG/DL — SIGNIFICANT CHANGE UP (ref 7–23)
CALCIUM SERPL-MCNC: 9 MG/DL — SIGNIFICANT CHANGE UP (ref 8.5–10.1)
CHLORIDE SERPL-SCNC: 107 MMOL/L — SIGNIFICANT CHANGE UP (ref 96–108)
CO2 SERPL-SCNC: 21 MMOL/L — LOW (ref 22–31)
CREAT SERPL-MCNC: 0.62 MG/DL — SIGNIFICANT CHANGE UP (ref 0.5–1.3)
EGFR: 125 ML/MIN/1.73M2 — SIGNIFICANT CHANGE UP
GLUCOSE SERPL-MCNC: 98 MG/DL — SIGNIFICANT CHANGE UP (ref 70–99)
HCT VFR BLD CALC: 38.4 % — LOW (ref 39–50)
HGB BLD-MCNC: 12.2 G/DL — LOW (ref 13–17)
MCHC RBC-ENTMCNC: 28.8 PG — SIGNIFICANT CHANGE UP (ref 27–34)
MCHC RBC-ENTMCNC: 31.8 GM/DL — LOW (ref 32–36)
MCV RBC AUTO: 90.6 FL — SIGNIFICANT CHANGE UP (ref 80–100)
NRBC # BLD: 0 /100 WBCS — SIGNIFICANT CHANGE UP (ref 0–0)
PLATELET # BLD AUTO: 660 K/UL — HIGH (ref 150–400)
POTASSIUM SERPL-MCNC: 3.6 MMOL/L — SIGNIFICANT CHANGE UP (ref 3.5–5.3)
POTASSIUM SERPL-SCNC: 3.6 MMOL/L — SIGNIFICANT CHANGE UP (ref 3.5–5.3)
PROT SERPL-MCNC: 7.1 G/DL — SIGNIFICANT CHANGE UP (ref 6–8.3)
RBC # BLD: 4.24 M/UL — SIGNIFICANT CHANGE UP (ref 4.2–5.8)
RBC # FLD: 18.5 % — HIGH (ref 10.3–14.5)
SODIUM SERPL-SCNC: 134 MMOL/L — LOW (ref 135–145)
WBC # BLD: 5.28 K/UL — SIGNIFICANT CHANGE UP (ref 3.8–10.5)
WBC # FLD AUTO: 5.28 K/UL — SIGNIFICANT CHANGE UP (ref 3.8–10.5)

## 2023-05-27 PROCEDURE — 99232 SBSQ HOSP IP/OBS MODERATE 35: CPT

## 2023-05-27 RX ORDER — METOPROLOL TARTRATE 50 MG
1 TABLET ORAL
Qty: 0 | Refills: 0 | DISCHARGE
Start: 2023-05-27

## 2023-05-27 RX ORDER — MULTIVIT-MIN/FERROUS GLUCONATE 9 MG/15 ML
1 LIQUID (ML) ORAL
Qty: 0 | Refills: 0 | DISCHARGE
Start: 2023-05-27

## 2023-05-27 RX ORDER — LACTULOSE 10 G/15ML
22.5 SOLUTION ORAL
Qty: 0 | Refills: 0 | DISCHARGE
Start: 2023-05-27

## 2023-05-27 RX ORDER — POTASSIUM CHLORIDE 20 MEQ
20 PACKET (EA) ORAL ONCE
Refills: 0 | Status: COMPLETED | OUTPATIENT
Start: 2023-05-27 | End: 2023-05-27

## 2023-05-27 RX ORDER — SODIUM CHLORIDE 9 MG/ML
1000 INJECTION INTRAMUSCULAR; INTRAVENOUS; SUBCUTANEOUS
Refills: 0 | Status: DISCONTINUED | OUTPATIENT
Start: 2023-05-27 | End: 2023-05-28

## 2023-05-27 RX ORDER — THIAMINE MONONITRATE (VIT B1) 100 MG
1 TABLET ORAL
Qty: 0 | Refills: 0 | DISCHARGE
Start: 2023-05-27

## 2023-05-27 RX ORDER — TAMSULOSIN HYDROCHLORIDE 0.4 MG/1
1 CAPSULE ORAL
Qty: 0 | Refills: 0 | DISCHARGE
Start: 2023-05-27

## 2023-05-27 RX ADMIN — Medication 1 TABLET(S): at 11:38

## 2023-05-27 RX ADMIN — Medication 20 MILLIEQUIVALENT(S): at 13:07

## 2023-05-27 RX ADMIN — ZOLPIDEM TARTRATE 5 MILLIGRAM(S): 10 TABLET ORAL at 01:25

## 2023-05-27 RX ADMIN — Medication 100 MILLIGRAM(S): at 11:38

## 2023-05-27 RX ADMIN — Medication 25 MILLIGRAM(S): at 05:18

## 2023-05-27 RX ADMIN — LACTULOSE 15 GRAM(S): 10 SOLUTION ORAL at 11:38

## 2023-05-27 RX ADMIN — TAMSULOSIN HYDROCHLORIDE 0.4 MILLIGRAM(S): 0.4 CAPSULE ORAL at 22:07

## 2023-05-27 RX ADMIN — Medication 0.5 MILLIGRAM(S): at 05:18

## 2023-05-27 RX ADMIN — SODIUM CHLORIDE 75 MILLILITER(S): 9 INJECTION INTRAMUSCULAR; INTRAVENOUS; SUBCUTANEOUS at 00:08

## 2023-05-27 RX ADMIN — SODIUM CHLORIDE 90 MILLILITER(S): 9 INJECTION INTRAMUSCULAR; INTRAVENOUS; SUBCUTANEOUS at 13:07

## 2023-05-27 RX ADMIN — Medication 25 MILLIGRAM(S): at 17:08

## 2023-05-27 RX ADMIN — Medication 1 MILLIGRAM(S): at 11:38

## 2023-05-27 RX ADMIN — SODIUM CHLORIDE 90 MILLILITER(S): 9 INJECTION INTRAMUSCULAR; INTRAVENOUS; SUBCUTANEOUS at 22:07

## 2023-05-27 RX ADMIN — ENOXAPARIN SODIUM 40 MILLIGRAM(S): 100 INJECTION SUBCUTANEOUS at 11:39

## 2023-05-27 NOTE — PROGRESS NOTE ADULT - ASSESSMENT
38 year old male with a history of alcohol abuse, alcohol withdrawal seizures, fatty liver presents with signs of alcohol abuse/withdrawal    etoh use   jennifer  harry  hepatitis  steatosis    LFT pending this am   CP reported  Cardio eval noted  on IVF    pain rx regimen  GI following  VS noted  labs reviewed  no evidence of uncontrolled w/d sx  replete lytes  nutrition  social work follow up

## 2023-05-27 NOTE — PROGRESS NOTE ADULT - ASSESSMENT
38M PMH of alcohol abuse, alcohol withdrawal seizures, fatty liver presents with signs of alcohol abuse/withdrawal. Admitted for AMS , alcohol withdrawal found to have alcoholic hepatitis, electrolyte abnormalities, E-COLI bacteremia.

## 2023-05-27 NOTE — PROGRESS NOTE ADULT - ATTENDING COMMENTS
38 M PMH of alcohol abuse, alcohol withdrawal seizures, fatty liver presents with signs of alcohol abuse/withdrawal. Admitted for AMS , alcohol withdrawal.   transaminitis ,Alcoholic hepatitis & Hyponatremia. Low K, Low Ca, Low Mag .  Pt  seen, Examined, case & care plan d/w , residents at detail.  Consults:  Cardiology-Dr Anand- Lopressor 25 mg 2x day  ID Dr Gant/-Abx completed   GI-Dr Leach on case   Detox -Dr Garcia follow up. IV Ativan PRN   Social service eval  AM labs   DVT PPx  PO diet   -PT Eval --> No PT need   Total Care time is 60  minutes.

## 2023-05-27 NOTE — PROGRESS NOTE ADULT - SUBJECTIVE AND OBJECTIVE BOX
[facility] -- Alice Guevara Pannella, Patel, Savella, Goodger: Office # [phone]    Follow Up:  Tachycardia     Subjective/Observations: Patient awake, alert, resting in bed. Denies chest pain, palpitations and dizziness. Denies any difficulty breathing. No orthopnea and PND. Tolerating room air. Continues to be tachy at times, although improving     REVIEW OF SYSTEMS: All other review of systems are negative unless indicated above    PAST MEDICAL & SURGICAL HISTORY:  Alcohol abuse    H/O hernia repair    S/P appendectomy    MEDICATIONS  (STANDING):  enoxaparin Injectable 40 milliGRAM(s) SubCutaneous every 24 hours  folic acid 1 milliGRAM(s) Oral daily  lactulose Syrup 15 Gram(s) Oral daily  metoprolol tartrate 25 milliGRAM(s) Oral every 12 hours  multivitamin/minerals 1 Tablet(s) Oral daily  sodium chloride 0.9%. 1000 milliLiter(s) (75 mL/Hr) IV Continuous <Continuous>  tamsulosin 0.4 milliGRAM(s) Oral at bedtime  thiamine 100 milliGRAM(s) Oral daily    MEDICATIONS  (PRN):  LORazepam   Injectable 2 milliGRAM(s) IV Push every 1 hour PRN Symptom-triggered: each CIWA -Ar score 8 or GREATER  LORazepam   Injectable 2 milliGRAM(s) IV Push every 2 hours PRN Agitation  LORazepam   Injectable 2 milliGRAM(s) IV Push every 2 hours PRN Symptom-triggered: 2 point increase in CIWA -Ar score and a total score of 7 or LESS  zolpidem 5 milliGRAM(s) Oral at bedtime PRN Insomnia    Allergies  No Known Allergies    Vital Signs Last 24 Hrs  T(C): 37 (27 May 2023 04:45), Max: 37.4 (26 May 2023 21:21)  T(F): 98.6 (27 May 2023 04:45), Max: 99.3 (26 May 2023 21:21)  HR: 95 (27 May 2023 04:45) (95 - 97)  BP: 115/74 (27 May 2023 04:45) (115/74 - 129/81)  BP(mean): --  RR: 17 (27 May 2023 04:45) (17 - 18)  SpO2: 97% (27 May 2023 04:45) (95% - 97%)    Parameters below as of 27 May 2023 04:45  Patient On (Oxygen Delivery Method): room air      I&O's Summary        TELE: SR mostly on 80s, nonsustained 110-130s   PHYSICAL EXAM:  Constitutional: NAD, awake and alert  HEENT: Moist Mucous Membranes, Anicteric  Pulmonary: Non-labored, breath sounds are clear bilaterally, No wheezing, rales or rhonchi  Cardiovascular: Regular, S1 and S2, No murmurs, No rubs, gallops or clicks  Gastrointestinal:  soft, nontender, nondistended   Lymph: No peripheral edema. No lymphadenopathy.   Skin: No visible rashes or ulcers.  Psych:  Mood & affect appropriate      LABS: All Labs Reviewed:                        12.2   5.28  )-----------( 660      ( 27 May 2023 06:05 )             38.4                         12.1   4.94  )-----------( 585      ( 26 May 2023 06:30 )             37.8                         13.4   4.80  )-----------( 472      ( 25 May 2023 07:05 )             40.3     27 May 2023 06:05    134    |  107    |  8      ----------------------------<  98     3.6     |  21     |  0.62   26 May 2023 06:30    135    |  104    |  9      ----------------------------<  109    3.9     |  25     |  0.71   25 May 2023 07:05    134    |  104    |  7      ----------------------------<  116    3.5     |  23     |  0.66     Ca    9.0        27 May 2023 06:05  Ca    9.4        26 May 2023 06:30  Ca    9.3        25 May 2023 07:05    TPro  7.1    /  Alb  3.0    /  TBili  4.3    /  DBili  x      /  AST  293    /  ALT  475    /  AlkPhos  278    27 May 2023 06:05  TPro  7.0    /  Alb  2.9    /  TBili  4.9    /  DBili  x      /  AST  269    /  ALT  471    /  AlkPhos  302    26 May 2023 06:30  TPro  7.5    /  Alb  3.0    /  TBili  6.3    /  DBili  x      /  AST  428    /  ALT  590    /  AlkPhos  391    25 May 2023 07:05   LIVER FUNCTIONS - ( 27 May 2023 06:05 )  Alb: 3.0 g/dL / Pro: 7.1 g/dL / ALK PHOS: 278 U/L / ALT: 475 U/L / AST: 293 U/L / GGT: x           Creatine Kinase, Serum: 34 U/L (05-26-23 @ 00:02)  Troponin I, High Sensitivity Result: 3.9 ng/L (05-26-23 @ 00:02)  Troponin I, High Sensitivity Result: 10.0 ng/L (05-22-23 @ 17:08)  Creatine Kinase, Serum: 547 U/L (05-19-23 @ 07:00)  Creatine Kinase, Serum: 2225 U/L (05-17-23 @ 18:30)    12 Lead ECG:   Ventricular Rate 109 BPM    Atrial Rate 109 BPM    P-R Interval 128 ms    QRS Duration 86 ms    Q-T Interval 328 ms    QTC Calculation(Bazett) 441 ms    P Axis 29 degrees    R Axis -5 degrees    T Axis 25 degrees    Diagnosis Line Sinus tachycardia  Otherwise normal ECG  When compared with ECG of 23-MAY-2023 22:36,  premature supraventricular complexes are no longer present  Confirmed by VERONICA ROJAS ([id number]) on 5/26/2023 12:05:39 PM (05-25-23 @ 23:33)

## 2023-05-27 NOTE — PROGRESS NOTE ADULT - SUBJECTIVE AND OBJECTIVE BOX
Date/Time Patient Seen:  		  Referring MD:   Data Reviewed	       Patient is a 38y old  Male who presents with a chief complaint of Hepatitis (26 May 2023 13:14)      Subjective/HPI     PAST MEDICAL & SURGICAL HISTORY:  No pertinent past medical history    Alcohol abuse    H/O hernia repair    S/P appendectomy          Medication list         MEDICATIONS  (STANDING):  enoxaparin Injectable 40 milliGRAM(s) SubCutaneous every 24 hours  folic acid 1 milliGRAM(s) Oral daily  lactulose Syrup 15 Gram(s) Oral daily  metoprolol tartrate 25 milliGRAM(s) Oral every 12 hours  multivitamin/minerals 1 Tablet(s) Oral daily  sodium chloride 0.9%. 1000 milliLiter(s) (75 mL/Hr) IV Continuous <Continuous>  tamsulosin 0.4 milliGRAM(s) Oral at bedtime  thiamine 100 milliGRAM(s) Oral daily    MEDICATIONS  (PRN):  LORazepam   Injectable 2 milliGRAM(s) IV Push every 1 hour PRN Symptom-triggered: each CIWA -Ar score 8 or GREATER  LORazepam   Injectable 2 milliGRAM(s) IV Push every 2 hours PRN Agitation  LORazepam   Injectable 2 milliGRAM(s) IV Push every 2 hours PRN Symptom-triggered: 2 point increase in CIWA -Ar score and a total score of 7 or LESS  zolpidem 5 milliGRAM(s) Oral at bedtime PRN Insomnia         Vitals log        ICU Vital Signs Last 24 Hrs  T(C): 37 (27 May 2023 04:45), Max: 37.4 (26 May 2023 21:21)  T(F): 98.6 (27 May 2023 04:45), Max: 99.3 (26 May 2023 21:21)  HR: 95 (27 May 2023 04:45) (95 - 108)  BP: 115/74 (27 May 2023 04:45) (115/74 - 129/81)  BP(mean): --  ABP: --  ABP(mean): --  RR: 17 (27 May 2023 04:45) (17 - 18)  SpO2: 97% (27 May 2023 04:45) (95% - 97%)    O2 Parameters below as of 27 May 2023 04:45  Patient On (Oxygen Delivery Method): room air                 Input and Output:  I&O's Detail    25 May 2023 07:01  -  26 May 2023 07:00  --------------------------------------------------------  IN:  Total IN: 0 mL    OUT:    Voided (mL): 500 mL  Total OUT: 500 mL    Total NET: -500 mL          Lab Data                        12.1   4.94  )-----------( 585      ( 26 May 2023 06:30 )             37.8     05-26    135  |  104  |  9   ----------------------------<  109<H>  3.9   |  25  |  0.71    Ca    9.4      26 May 2023 06:30    TPro  7.0  /  Alb  2.9<L>  /  TBili  4.9<H>  /  DBili  x   /  AST  269<H>  /  ALT  471<H>  /  AlkPhos  302<H>  05-26      CARDIAC MARKERS ( 26 May 2023 00:02 )  x     / x     / 34 U/L / x     / <1.0 ng/mL        Review of Systems	      Objective     Physical Examination    heart s1s2  lung dec BS  head nc      Pertinent Lab findings & Imaging      Giovanna:  NO   Adequate UO     I&O's Detail    25 May 2023 07:01  -  26 May 2023 07:00  --------------------------------------------------------  IN:  Total IN: 0 mL    OUT:    Voided (mL): 500 mL  Total OUT: 500 mL    Total NET: -500 mL               Discussed with:     Cultures:	        Radiology

## 2023-05-27 NOTE — PROGRESS NOTE ADULT - PROBLEM SELECTOR PLAN 5
-Likely metabolic Encephelopathy / sepsis now   s/p unwitnessed fall with small abrasions of face  - CT head/cervical spine/ maxillofacial non-con: No acute intracranial abnormalities. Mild right periorbital subcutaneous soft tissue swelling. No evidence of orbital or other facial fracture. No vertebral fracture, collapse or subluxation.  - repeat CT head noncon: no acute acute signs of brain hemorrhage  - RESOLVED

## 2023-05-27 NOTE — PROGRESS NOTE ADULT - PROBLEM SELECTOR PLAN 1
fever/ sepsis  E Coli -Etiology-R/O Cholangitis -elevated Bili   - blood cultures grew ecoli  - fu Repeat Blood c/s x 2 NG Final   - Noted elevated alk phos, LFTs, bilirubin (downtrending)  - s/p Rocephin   - MRCP no longer required

## 2023-05-27 NOTE — PROGRESS NOTE ADULT - ASSESSMENT
39 y/o M with no cardiac history presented for ETOH withdrawal symptoms and s/p fall.  We are called for sinus tachycardia    Sinus Tachycardia  - Tele shows sinus rhythm 80s mostly with tachy at times 110-130.  No evidence of Afib  - He presented with ETOH withdrawal, so his tachycardia, is likely reactive  - We had seen him recently for same  - For now, would start Lopressor 25 mg q12H  - Obtain TTE to evaluate cardiac structure  - Encourage PO fluids    - EKG showed Sinus tach, rate 100's.  No ischemic changes  - CE's negative.  NO anginal complaints  - No evidence of volume overload.  No O2 requirement    - BP stable and controlled at systolic 110's-120's    - +Bacteremia.  Continue Abx per ID  - LFT's elevated.  GI following    - Monitor and replete lytes, keep K>4, Mg>2.  - Will continue to follow.    Avery Booth, MS FNP, AGAP  Nurse Practitioner- Cardiology   Spectra #[phone]

## 2023-05-27 NOTE — PROGRESS NOTE ADULT - PROBLEM SELECTOR PLAN 4
Chronic known hx of alcohol use disorder  - CIWA Protocol  - Ativan for agitation  - Ativan 2mg q4h standing, 2mg q1h PRN agitation  - Thiamine 100mg qd, folic acid 1mg PO QD  - On regular diet   - Addiction Medicine (Radha)  -Ativan RTC

## 2023-05-27 NOTE — PROGRESS NOTE ADULT - SUBJECTIVE AND OBJECTIVE BOX
Patient is a 38y old  Male who presents with a chief complaint of Hepatitis (27 May 2023 06:22)    HPI:  38M PMH of alcohol abuse, alcohol withdrawal seizures, fatty liver presents with signs of alcohol abuse/withdrawal. History obtained from wife with  over phone. Patient has been drinking for the past 16 days of unknown amount and had unwitnessed fall from the bed so pt was taken to the ED. Seen and examined pt at bedside. Unable to obtain history from patient due to somnolence in setting of recent ativan use.    In the ED    Vitals: , T 98, Spo2 95% /80     Labs: Plt 65, Na 128, K 3.0, Tbili 9.6, , , , Lactate 4.0, Lipase 432, UA + for bacteria and leuk esterase, Blood alc level 423     Imaging  RUQ U/S Visualization of the gallbladder is limited by overlying bowel gas. No   gross gallstones, gallbladder wall thickening, or pericholecystic fluid. No ultrasonic Grayson's sign epatomegaly and hepatic steatosis.  CT A/P- steatosis and hepatomegaly. No hydronephrosis. 6.3 mm nonobstructing stone lower pole left kidney.  CT head/cervical spine/ maxillofacial non-con: No acute intracranial abnormalities. Mild right periorbital subcutaneous soft tissue swelling. No evidence of orbital or other facial fracture. No vertebral fracture, collapse or subluxation.    ECG: Sinus tachycardia, 115 BPM    Meds Given    dTAP x1, Zofran x 1, Morphine x1, Pepcid x1,2 NS, 5mg haldol x1, 40meq k, 10mg flexiril, zosyn x1, thiamine, folic acid,   Pt seen by ICU team, admitted to ICU for ETOH intoxication & withdrawal. (17 May 2023 09:51)    INTERVAL HPI:  5/18 - Seen and examined patient. Pt was lethargic so unable to obtain ROS. On Precedex drip, Cooling [place] Bacteremia, IV Abx Fever  5/19 - Seen and examined. Patient is lethargic on precedex. Found to be bacteremic now on abx. Bilirubin uptrending. No acute distress. E Coli sepsis, IV Abx   5/20 - Seen and examined. Patient is somnolent on precedex but responds to name and able to answer some questions. Denies CP, SOB, abd pain, nausea. Found to be bacteremic now on abx. Bilirubin uptrending. No acute distress. E Coli sepsis, IV Abx   5/21 -  Seen and examined. Patient is still somnolent on precedex but responds to name and able to answer some questions. Denies CP, SOB, abd pain, nausea. No acute distress. E Coli sepsis, IV Abx Heredia cath will blood   5/22 - Seen and examined. Patient is A&Ox3, but tired and anxious appearing. Precedex off. Answers questions and follows commands. Denies CP, SOB, abd pain, nausea. No acute distress. States he wants to get out of bed and walk. On IV abx for E.coli bacteremia. MRI A/P schedule.  5/23- Seen and examined at bedside. Pt wants to get up and walk. Pt states he had a poor night of sleep. He has no acute complaints. Tolerating diet.IV Abx   5/24- Seen and examined at bedside. Pt wants to get up and walk around. Pt states he had a poor night of sleep. He denies any other acute complaints. Downgraded from ICU on IV Abx for E Coli sepsis .IV Ativan   5/25- Seen and examined at bedside. Pt was asking about potential discharge. Pt endorses difficulty sleeping. Pt also spoke with me about alcohol and I explained to him the importance of avoiding alcohol. 5/26 will be last day of rocephin per ID. Pt no longer requiring MRCP.   5/26- Seen and examined at bedside. Pt endorses difficulty speaking and neck pain from sleeping position. Overall he states "I feel good". Pt last day of rocephin today. On ativan.   5/27- Seen and examined at bedside. Pt endorses feeling much better today and was able to sleep. Pt completed abx yesterday. Started on lopressor 25mg BID. F/u TTE. D/c planning    OVERNIGHT EVENTS: Pt received flomax overnight.     Home Medications:  folic acid 1 mg oral tablet: 1 tab(s) orally once a day (27 May 2023 06:08)  lactulose 10 g/15 mL oral syrup: 22.5 milliliter(s) orally once a day (27 May 2023 06:08)  metoprolol tartrate 25 mg oral tablet: 1 tab(s) orally every 12 hours (27 May 2023 06:08)  Multiple Vitamins with Minerals oral tablet: 1 tab(s) orally once a day (27 May 2023 06:08)  tamsulosin 0.4 mg oral capsule: 1 cap(s) orally once a day (at bedtime) (27 May 2023 06:08)  thiamine 100 mg oral tablet: 1 tab(s) orally once a day (27 May 2023 06:08)      MEDICATIONS  (STANDING):  enoxaparin Injectable 40 milliGRAM(s) SubCutaneous every 24 hours  folic acid 1 milliGRAM(s) Oral daily  lactulose Syrup 15 Gram(s) Oral daily  metoprolol tartrate 25 milliGRAM(s) Oral every 12 hours  multivitamin/minerals 1 Tablet(s) Oral daily  sodium chloride 0.9%. 1000 milliLiter(s) (75 mL/Hr) IV Continuous <Continuous>  tamsulosin 0.4 milliGRAM(s) Oral at bedtime  thiamine 100 milliGRAM(s) Oral daily    MEDICATIONS  (PRN):  LORazepam   Injectable 2 milliGRAM(s) IV Push every 1 hour PRN Symptom-triggered: each CIWA -Ar score 8 or GREATER  LORazepam   Injectable 2 milliGRAM(s) IV Push every 2 hours PRN Symptom-triggered: 2 point increase in CIWA -Ar score and a total score of 7 or LESS  LORazepam   Injectable 2 milliGRAM(s) IV Push every 2 hours PRN Agitation  zolpidem 5 milliGRAM(s) Oral at bedtime PRN Insomnia      Allergies    No Known Allergies    Intolerances        Social History:  Tobacco: denies  EtOH: chronic heavy drinker  Recreational drug use: denies  Lives with: patient lives with wife  Ambulates: independent  ADLs: independent (17 May 2023 09:51)      REVIEW OF SYSTEMS:  CONSTITUTIONAL: No fever, No chills, No fatigue, No myalgia, No Body ache, No Weakness  EYES: No eye pain,  No visual disturbances, No discharge, NO Redness  ENMT:  No ear pain, No nose bleed, No vertigo; No sinus pain, NO throat pain, No Congestion  NECK: No pain, No stiffness  RESPIRATORY: No cough, NO wheezing, No  hemoptysis, NO  shortness of breath  CARDIOVASCULAR: No chest pain, palpitations  GASTROINTESTINAL: No abdominal pain, NO epigastric pain. No nausea, No vomiting; No diarrhea, No constipation. [ x ] BM  GENITOURINARY: No dysuria, No frequency, No urgency, No hematuria, NO incontinence  NEUROLOGICAL: No headaches, No dizziness, No numbness, No tingling, No tremors, No weakness  EXT: No Swelling, No Pain, No Edema  SKIN:  [ x ] No itching, burning, rashes, or lesions   MUSCULOSKELETAL: No joint pain ,No Jt swelling; No muscle pain, No back pain, No extremity pain  PSYCHIATRIC: No depression,  No anxiety,  No mood swings ,No difficulty sleeping at night  PAIN SCALE: [ x ] None  [  ] Other-  ROS Unable to obtain due to - [  ] Dementia  [  ] Lethargy [  ] Drowsy [  ] Sedated [  ] non verbal  REST OF REVIEW Of SYSTEM - [ x ] Normal     Vital Signs Last 24 Hrs  T(C): 37 (27 May 2023 04:45), Max: 37.4 (26 May 2023 21:21)  T(F): 98.6 (27 May 2023 04:45), Max: 99.3 (26 May 2023 21:21)  HR: 95 (27 May 2023 04:45) (95 - 97)  BP: 115/74 (27 May 2023 04:45) (115/74 - 129/81)  BP(mean): --  RR: 17 (27 May 2023 04:45) (17 - 18)  SpO2: 97% (27 May 2023 04:45) (95% - 97%)    Parameters below as of 27 May 2023 04:45  Patient On (Oxygen Delivery Method): room air      Finger Stick          PHYSICAL EXAM:  GENERAL:  [ x ] NAD , [ x ] well appearing, [  ] Agitated, [  ] Drowsy,  [  ] Lethargy, [  ] confused   HEAD:  [ x ] Normal, [  ] Other  EYES:  [ x ] EOMI, [x  ] PERRLA, [  x] conjunctiva and sclera clear normal, [  ] Other,  [ x ] Pallor,[  ] Discharge + Ecchymosis rt eyebrow   ENMT:  [ x ] Normal, [ x ] Moist mucous membranes, [  ] Good dentition, [x  ] No Thrush  NECK:  [ x ] Supple, [ x ] No JVD, [x  ] Normal thyroid, [  ] Lymphadenopathy [  ] Other  CHEST/LUNG:  [x  ] Clear to auscultation bilaterally, [x  ] Breath Sounds equal B/L / Decrease, [  ] poor effort  [ x ] No rales, [ x ] No rhonchi  [ x ]  No wheezing,   HEART:  [  ] Regular rate and rhythm, [ x ] tachycardia, [  ] Bradycardia,  [  ] irregular  [ x ] No murmurs, No rubs, No gallops, [  ] PPM in place (Mfr:  )  ABDOMEN:  [ x ] Soft, [ x ] Nontender, [x  ] Nondistended, [ x ] No mass, [ x ] Bowel sounds present, [ x ] obese  NERVOUS SYSTEM:  [x  ] Alert & Oriented X3, [ x ] Nonfocal  [  ] Confusion  [  ] Encephalopathic [  ] Sedated [  ] Unable to assess, [  ] Dementia [  ] Other-  EXTREMITIES: [x  ] 2+ Peripheral Pulses, No clubbing, No cyanosis,  [  ] edema B/L lower EXT. [  ] PVD stasis skin changes B/L Lower EXT, [  ] wound  LYMPH: No lymphadenopathy noted  SKIN:  [x  ] No rashes or lesions, [  ] Pressure Ulcers, [  ] ecchymosis, [  ] Skin Tears, [ x ] Other- Multiple skin tattoos on Body    DIET: Diet, Regular:   Low Fat (LOWFAT)  Supplement Feeding Modality:  Oral  Ensure Enlive Cans or Servings Per Day:  1       Frequency:  Two Times a day (05-26-23 @ 15:22)      LABS:                        12.2   5.28  )-----------( 660      ( 27 May 2023 06:05 )             38.4     27 May 2023 06:05    134    |  107    |  8      ----------------------------<  98     3.6     |  21     |  0.62     Ca    9.0        27 May 2023 06:05    TPro  7.1    /  Alb  3.0    /  TBili  4.3    /  DBili  x      /  AST  293    /  ALT  475    /  AlkPhos  278    27 May 2023 06:05                  CARDIAC MARKERS ( 26 May 2023 00:02 )  x     / x     / 34 U/L / x     / <1.0 ng/mL             Anemia Panel:      Thyroid Panel:                RADIOLOGY & ADDITIONAL TESTS:      HEALTH ISSUES - PROBLEM Dx:  Transaminitis    Need for prophylactic measure    Urinary retention    Fall    Hypocalcemia    Thrombocytopenia    Alcohol dependence with withdrawal    AMS (altered mental status)    Rhabdomyolysis    Bacteremia, escherichia coli            Care Discussed with [X] Consultants  [ x ] Patient  [ x ] Family [  ] HCP [  ]   [  ] Social Service  [ x] RN, [  ] Physical Therapy,[  ] Palliative care team  DVT PPX: [ x ] Lovenox, [  ] S C Heparin, [  ] Coumadin, [  ] Xarelto, [  ] Eliquis, [  ] Pradaxa, [  ] IV Heparin drip, [x  ] SCD [  ] Contraindication 2 to GI Bleed,[x  ] Ambulation [  ] Contraindicated 2 to  bleed [  ] Contraindicated 2 to Brain Bleed  Advanced directive: [ x ] None, [  ] DNR/DNI   Patient is a 38y old  Male who presents with a chief complaint of Hepatitis (27 May 2023 06:22)    HPI:  38M PMH of alcohol abuse, alcohol withdrawal seizures, fatty liver presents with signs of alcohol abuse/withdrawal. History obtained from wife with  over phone. Patient has been drinking for the past 16 days of unknown amount and had unwitnessed fall from the bed so pt was taken to the ED. Seen and examined pt at bedside. Unable to obtain history from patient due to somnolence in setting of recent ativan use.    In the ED    Vitals: , T 98, Spo2 95% /80     Labs: Plt 65, Na 128, K 3.0, Tbili 9.6, , , , Lactate 4.0, Lipase 432, UA + for bacteria and leuk esterase, Blood alc level 423     Imaging  RUQ U/S Visualization of the gallbladder is limited by overlying bowel gas. No   gross gallstones, gallbladder wall thickening, or pericholecystic fluid. No ultrasonic Grayson's sign epatomegaly and hepatic steatosis.  CT A/P- steatosis and hepatomegaly. No hydronephrosis. 6.3 mm nonobstructing stone lower pole left kidney.  CT head/cervical spine/ maxillofacial non-con: No acute intracranial abnormalities. Mild right periorbital subcutaneous soft tissue swelling. No evidence of orbital or other facial fracture. No vertebral fracture, collapse or subluxation.    ECG: Sinus tachycardia, 115 BPM    Meds Given    dTAP x1, Zofran x 1, Morphine x1, Pepcid x1,2 NS, 5mg haldol x1, 40meq k, 10mg flexiril, zosyn x1, thiamine, folic acid,   Pt seen by ICU team, admitted to ICU for ETOH intoxication & withdrawal. (17 May 2023 09:51)    INTERVAL HPI:  5/18 - Seen and examined patient. Pt was lethargic so unable to obtain ROS. On Precedex drip, Cooling [place] Bacteremia, IV Abx Fever  5/19 - Seen and examined. Patient is lethargic on precedex. Found to be bacteremic now on abx. Bilirubin uptrending. No acute distress. E Coli sepsis, IV Abx   5/20 - Seen and examined. Patient is somnolent on precedex but responds to name and able to answer some questions. Denies CP, SOB, abd pain, nausea. Found to be bacteremic now on abx. Bilirubin uptrending. No acute distress. E Coli sepsis, IV Abx   5/21 -  Seen and examined. Patient is still somnolent on precedex but responds to name and able to answer some questions. Denies CP, SOB, abd pain, nausea. No acute distress. E Coli sepsis, IV Abx Heredia cath will blood   5/22 - Seen and examined. Patient is A&Ox3, but tired and anxious appearing. Precedex off. Answers questions and follows commands. Denies CP, SOB, abd pain, nausea. No acute distress. States he wants to get out of bed and walk. On IV abx for E.coli bacteremia. MRI A/P schedule.  5/23- Seen and examined at bedside. Pt wants to get up and walk. Pt states he had a poor night of sleep. He has no acute complaints. Tolerating diet.IV Abx   5/24- Seen and examined at bedside. Pt wants to get up and walk around. Pt states he had a poor night of sleep. He denies any other acute complaints. Downgraded from ICU on IV Abx for E Coli sepsis .IV Ativan   5/25- Seen and examined at bedside. Pt was asking about potential discharge. Pt endorses difficulty sleeping. Pt also spoke with me about alcohol and I explained to him the importance of avoiding alcohol. 5/26 will be last day of rocephin per ID. Pt no longer requiring MRCP.   5/26- Seen and examined at bedside. Pt endorses difficulty speaking and neck pain from sleeping position. Overall he states "I feel good". Pt last day of rocephin today. On ativan.   5/27- Seen and examined at bedside. Pt endorses feeling much better today and was able to sleep. Pt completed abx yesterday. Started on lopressor 25mg BID. F/u TTE. D/c planning    OVERNIGHT EVENTS: Pt received flomax overnight.     Home Medications:  folic acid 1 mg oral tablet: 1 tab(s) orally once a day (27 May 2023 06:08)  lactulose 10 g/15 mL oral syrup: 22.5 milliliter(s) orally once a day (27 May 2023 06:08)  metoprolol tartrate 25 mg oral tablet: 1 tab(s) orally every 12 hours (27 May 2023 06:08)  Multiple Vitamins with Minerals oral tablet: 1 tab(s) orally once a day (27 May 2023 06:08)  tamsulosin 0.4 mg oral capsule: 1 cap(s) orally once a day (at bedtime) (27 May 2023 06:08)  thiamine 100 mg oral tablet: 1 tab(s) orally once a day (27 May 2023 06:08)      MEDICATIONS  (STANDING):  enoxaparin Injectable 40 milliGRAM(s) SubCutaneous every 24 hours  folic acid 1 milliGRAM(s) Oral daily  lactulose Syrup 15 Gram(s) Oral daily  metoprolol tartrate 25 milliGRAM(s) Oral every 12 hours  multivitamin/minerals 1 Tablet(s) Oral daily  sodium chloride 0.9%. 1000 milliLiter(s) (75 mL/Hr) IV Continuous <Continuous>  tamsulosin 0.4 milliGRAM(s) Oral at bedtime  thiamine 100 milliGRAM(s) Oral daily    MEDICATIONS  (PRN):  LORazepam   Injectable 2 milliGRAM(s) IV Push every 1 hour PRN Symptom-triggered: each CIWA -Ar score 8 or GREATER  LORazepam   Injectable 2 milliGRAM(s) IV Push every 2 hours PRN Symptom-triggered: 2 point increase in CIWA -Ar score and a total score of 7 or LESS  LORazepam   Injectable 2 milliGRAM(s) IV Push every 2 hours PRN Agitation  zolpidem 5 milliGRAM(s) Oral at bedtime PRN Insomnia      Allergies    No Known Allergies    Intolerances        Social History:  Tobacco: denies  EtOH: chronic heavy drinker  Recreational drug use: denies  Lives with: patient lives with wife  Ambulates: independent  ADLs: independent (17 May 2023 09:51)      REVIEW OF SYSTEMS:i feel so much better   CONSTITUTIONAL: No fever, No chills, No fatigue, No myalgia, No Body ache, No Weakness  EYES: No eye pain,  No visual disturbances, No discharge, NO Redness  ENMT:  No ear pain, No nose bleed, No vertigo; No sinus pain, NO throat pain, No Congestion  NECK: No pain, No stiffness  RESPIRATORY: No cough, NO wheezing, No  hemoptysis, NO  shortness of breath  CARDIOVASCULAR: No chest pain, palpitations  GASTROINTESTINAL: No abdominal pain, NO epigastric pain. No nausea, No vomiting; No diarrhea, No constipation. [ x ] BM  GENITOURINARY: No dysuria, No frequency, No urgency, No hematuria, NO incontinence  NEUROLOGICAL: No headaches, No dizziness, No numbness, No tingling, No tremors, No weakness  EXT: No Swelling, No Pain, No Edema  SKIN:  [ x ] No itching, burning, rashes, or lesions   MUSCULOSKELETAL: No joint pain ,No Jt swelling; No muscle pain, No back pain, No extremity pain  PSYCHIATRIC: No depression,  No anxiety,  No mood swings ,No difficulty sleeping at night  PAIN SCALE: [ x ] None  [  ] Other-  ROS Unable to obtain due to - [  ] Dementia  [  ] Lethargy [  ] Drowsy [  ] Sedated [  ] non verbal  REST OF REVIEW Of SYSTEM - [ x ] Normal     Vital Signs Last 24 Hrs  T(C): 37 (27 May 2023 04:45), Max: 37.4 (26 May 2023 21:21)  T(F): 98.6 (27 May 2023 04:45), Max: 99.3 (26 May 2023 21:21)  HR: 95 (27 May 2023 04:45) (95 - 97)  BP: 115/74 (27 May 2023 04:45) (115/74 - 129/81)  BP(mean): --  RR: 17 (27 May 2023 04:45) (17 - 18)  SpO2: 97% (27 May 2023 04:45) (95% - 97%)    Parameters below as of 27 May 2023 04:45  Patient On (Oxygen Delivery Method): room air      Finger Stick          PHYSICAL EXAM: i am OK, feels better   GENERAL:  [ x ] NAD , [ x ] well appearing, [  ] Agitated, [  ] Drowsy,  [  ] Lethargy, [  ] confused   HEAD:  [ x ] Normal, [  ] Other  EYES:  [ x ] EOMI, [x  ] PERRLA, [  x] conjunctiva and sclera clear normal, [  ] Other,  [ x ] Pallor,[  ] Discharge + Ecchymosis rt eyebrow   ENMT:  [ x ] Normal, [ x ] Moist mucous membranes, [  ] Good dentition, [x  ] No Thrush  NECK:  [ x ] Supple, [ x ] No JVD, [x  ] Normal thyroid, [  ] Lymphadenopathy [  ] Other  CHEST/LUNG:  [x  ] Clear to auscultation bilaterally, [x  ] Breath Sounds equal B/L / Decrease, [  ] poor effort  [ x ] No rales, [ x ] No rhonchi  [ x ]  No wheezing,   HEART:  [  ] Regular rate and rhythm, [ x ] tachycardia, [  ] Bradycardia,  [  ] irregular  [ x ] No murmurs, No rubs, No gallops, [  ] PPM in place (Mfr:  )  ABDOMEN:  [ x ] Soft, [ x ] Nontender, [x  ] Nondistended, [ x ] No mass, [ x ] Bowel sounds present, [ x ] obese  NERVOUS SYSTEM:  [x  ] Alert & Oriented X3, [ x ] Nonfocal  [  ] Confusion  [  ] Encephalopathic [  ] Sedated [  ] Unable to assess, [  ] Dementia [  ] Other-  EXTREMITIES: [x  ] 2+ Peripheral Pulses, No clubbing, No cyanosis,  [  ] edema B/L lower EXT. [  ] PVD stasis skin changes B/L Lower EXT, [  ] wound  LYMPH: No lymphadenopathy noted  SKIN:  [x  ] No rashes or lesions, [  ] Pressure Ulcers, [  ] ecchymosis, [  ] Skin Tears, [ x ] Other- Multiple skin tattoos on Body    DIET: Diet, Regular:   Low Fat (LOWFAT)  Supplement Feeding Modality:  Oral  Ensure Enlive Cans or Servings Per Day:  1       Frequency:  Two Times a day (05-26-23 @ 15:22)      LABS:                        12.2   5.28  )-----------( 660      ( 27 May 2023 06:05 )             38.4     27 May 2023 06:05    134    |  107    |  8      ----------------------------<  98     3.6     |  21     |  0.62     Ca    9.0        27 May 2023 06:05    TPro  7.1    /  Alb  3.0    /  TBili  4.3    /  DBili  x      /  AST  293    /  ALT  475    /  AlkPhos  278    27 May 2023 06:05        CARDIAC MARKERS ( 26 May 2023 00:02 )  x     / x     / 34 U/L / x     / <1.0 ng/mL        RADIOLOGY & ADDITIONAL TESTS:      HEALTH ISSUES - PROBLEM Dx:  Transaminitis    Need for prophylactic measure    Urinary retention    Fall    Hypocalcemia    Thrombocytopenia    Alcohol dependence with withdrawal    AMS (altered mental status)    Rhabdomyolysis    Bacteremia, escherichia coli            Care Discussed with [X] Consultants  [ x ] Patient  [ x ] Family [  ] HCP [  ]   [  ] Social Service  [ x] RN, [  ] Physical Therapy,[  ] Palliative care team  DVT PPX: [ x ] Lovenox, [  ] S C Heparin, [  ] Coumadin, [  ] Xarelto, [  ] Eliquis, [  ] Pradaxa, [  ] IV Heparin drip, [x  ] SCD [  ] Contraindication 2 to GI Bleed,[x  ] Ambulation [  ] Contraindicated 2 to  bleed [  ] Contraindicated 2 to Brain Bleed  Advanced directive: [ x ] None, [  ] DNR/DNI

## 2023-05-27 NOTE — PROGRESS NOTE ADULT - PROBLEM SELECTOR PLAN 2
Tele shows sinus tach at 110-120.  No evidence of Afib  - He presented with ETOH withdrawal, so his tachycardia, is likely reactive  - Started on Lopressor 25 mg q12H  - F/u TTE  - Encourage PO fluids  - Monitor on tele for now   - CE's negative.  NO anginal complaints  - Cardio following, recs appreciated

## 2023-05-28 VITALS
DIASTOLIC BLOOD PRESSURE: 68 MMHG | SYSTOLIC BLOOD PRESSURE: 103 MMHG | TEMPERATURE: 99 F | OXYGEN SATURATION: 96 % | RESPIRATION RATE: 18 BRPM | HEART RATE: 92 BPM

## 2023-05-28 DIAGNOSIS — D75.839 THROMBOCYTOSIS, UNSPECIFIED: ICD-10-CM

## 2023-05-28 LAB
ALBUMIN SERPL ELPH-MCNC: 2.9 G/DL — LOW (ref 3.3–5)
ALP SERPL-CCNC: 266 U/L — HIGH (ref 40–120)
ALT FLD-CCNC: 460 U/L — HIGH (ref 12–78)
ANION GAP SERPL CALC-SCNC: 6 MMOL/L — SIGNIFICANT CHANGE UP (ref 5–17)
AST SERPL-CCNC: 290 U/L — HIGH (ref 15–37)
BILIRUB SERPL-MCNC: 3.4 MG/DL — HIGH (ref 0.2–1.2)
BUN SERPL-MCNC: 6 MG/DL — LOW (ref 7–23)
CALCIUM SERPL-MCNC: 8.8 MG/DL — SIGNIFICANT CHANGE UP (ref 8.5–10.1)
CHLORIDE SERPL-SCNC: 108 MMOL/L — SIGNIFICANT CHANGE UP (ref 96–108)
CO2 SERPL-SCNC: 24 MMOL/L — SIGNIFICANT CHANGE UP (ref 22–31)
CREAT SERPL-MCNC: 0.58 MG/DL — SIGNIFICANT CHANGE UP (ref 0.5–1.3)
EGFR: 128 ML/MIN/1.73M2 — SIGNIFICANT CHANGE UP
GLUCOSE SERPL-MCNC: 87 MG/DL — SIGNIFICANT CHANGE UP (ref 70–99)
HCT VFR BLD CALC: 36.1 % — LOW (ref 39–50)
HGB BLD-MCNC: 11.4 G/DL — LOW (ref 13–17)
MAGNESIUM SERPL-MCNC: 2.3 MG/DL — SIGNIFICANT CHANGE UP (ref 1.6–2.6)
MCHC RBC-ENTMCNC: 29.4 PG — SIGNIFICANT CHANGE UP (ref 27–34)
MCHC RBC-ENTMCNC: 31.6 GM/DL — LOW (ref 32–36)
MCV RBC AUTO: 93 FL — SIGNIFICANT CHANGE UP (ref 80–100)
NRBC # BLD: 0 /100 WBCS — SIGNIFICANT CHANGE UP (ref 0–0)
PHOSPHATE SERPL-MCNC: 3.1 MG/DL — SIGNIFICANT CHANGE UP (ref 2.5–4.5)
PLATELET # BLD AUTO: 727 K/UL — HIGH (ref 150–400)
POTASSIUM SERPL-MCNC: 3.3 MMOL/L — LOW (ref 3.5–5.3)
POTASSIUM SERPL-SCNC: 3.3 MMOL/L — LOW (ref 3.5–5.3)
PROT SERPL-MCNC: 6.7 G/DL — SIGNIFICANT CHANGE UP (ref 6–8.3)
RBC # BLD: 3.88 M/UL — LOW (ref 4.2–5.8)
RBC # FLD: 18.8 % — HIGH (ref 10.3–14.5)
SODIUM SERPL-SCNC: 138 MMOL/L — SIGNIFICANT CHANGE UP (ref 135–145)
WBC # BLD: 5.71 K/UL — SIGNIFICANT CHANGE UP (ref 3.8–10.5)
WBC # FLD AUTO: 5.71 K/UL — SIGNIFICANT CHANGE UP (ref 3.8–10.5)

## 2023-05-28 PROCEDURE — 93306 TTE W/DOPPLER COMPLETE: CPT | Mod: 26

## 2023-05-28 PROCEDURE — 99232 SBSQ HOSP IP/OBS MODERATE 35: CPT

## 2023-05-28 RX ORDER — METOPROLOL TARTRATE 50 MG
1 TABLET ORAL
Qty: 14 | Refills: 0
Start: 2023-05-28

## 2023-05-28 RX ORDER — FOLIC ACID 0.8 MG
1 TABLET ORAL
Qty: 14 | Refills: 0 | DISCHARGE
Start: 2023-05-28

## 2023-05-28 RX ORDER — MULTIVIT-MIN/FERROUS GLUCONATE 9 MG/15 ML
1 LIQUID (ML) ORAL
Qty: 14 | Refills: 0
Start: 2023-05-28

## 2023-05-28 RX ORDER — ASPIRIN/CALCIUM CARB/MAGNESIUM 324 MG
1 TABLET ORAL
Qty: 14 | Refills: 0
Start: 2023-05-28

## 2023-05-28 RX ORDER — TAMSULOSIN HYDROCHLORIDE 0.4 MG/1
1 CAPSULE ORAL
Qty: 30 | Refills: 0
Start: 2023-05-28

## 2023-05-28 RX ORDER — FOLIC ACID 0.8 MG
1 TABLET ORAL
Qty: 30 | Refills: 0
Start: 2023-05-28

## 2023-05-28 RX ORDER — THIAMINE MONONITRATE (VIT B1) 100 MG
1 TABLET ORAL
Qty: 30 | Refills: 0
Start: 2023-05-28

## 2023-05-28 RX ORDER — METOPROLOL TARTRATE 50 MG
1 TABLET ORAL
Qty: 30 | Refills: 0
Start: 2023-05-28

## 2023-05-28 RX ORDER — THIAMINE MONONITRATE (VIT B1) 100 MG
1 TABLET ORAL
Qty: 14 | Refills: 0
Start: 2023-05-28

## 2023-05-28 RX ORDER — ASPIRIN/CALCIUM CARB/MAGNESIUM 324 MG
1 TABLET ORAL
Qty: 30 | Refills: 0
Start: 2023-05-28

## 2023-05-28 RX ORDER — POTASSIUM CHLORIDE 20 MEQ
40 PACKET (EA) ORAL ONCE
Refills: 0 | Status: COMPLETED | OUTPATIENT
Start: 2023-05-28 | End: 2023-05-28

## 2023-05-28 RX ORDER — ASPIRIN/CALCIUM CARB/MAGNESIUM 324 MG
81 TABLET ORAL DAILY
Refills: 0 | Status: DISCONTINUED | OUTPATIENT
Start: 2023-05-28 | End: 2023-05-28

## 2023-05-28 RX ORDER — FOLIC ACID 0.8 MG
1 TABLET ORAL
Qty: 14 | Refills: 0
Start: 2023-05-28

## 2023-05-28 RX ORDER — MULTIVIT-MIN/FERROUS GLUCONATE 9 MG/15 ML
1 LIQUID (ML) ORAL
Qty: 30 | Refills: 0
Start: 2023-05-28

## 2023-05-28 RX ADMIN — Medication 40 MILLIEQUIVALENT(S): at 11:19

## 2023-05-28 RX ADMIN — Medication 81 MILLIGRAM(S): at 15:08

## 2023-05-28 RX ADMIN — ENOXAPARIN SODIUM 40 MILLIGRAM(S): 100 INJECTION SUBCUTANEOUS at 11:20

## 2023-05-28 RX ADMIN — Medication 1 MILLIGRAM(S): at 11:20

## 2023-05-28 RX ADMIN — Medication 1 TABLET(S): at 11:20

## 2023-05-28 RX ADMIN — Medication 25 MILLIGRAM(S): at 06:26

## 2023-05-28 RX ADMIN — Medication 100 MILLIGRAM(S): at 11:19

## 2023-05-28 NOTE — PROGRESS NOTE ADULT - ATTENDING COMMENTS
38 M PMH of alcohol abuse, alcohol withdrawal seizures, fatty liver presents with signs of alcohol abuse/withdrawal. Admitted for AMS , alcohol withdrawal.   transaminitis ,Alcoholic hepatitis & Hyponatremia. Low K, Low Ca, Low Mag .  Pt  seen, Examined, case & care plan d/w , residents at detail.  Consults:  Cardiology-Dr Anand- Lopressor 25 mg 2x day, ECHO pending   ID Dr Gant/-Abx completed   GI-Dr Leach on case   Detox -Dr Garcia follow up. IV Ativan PRN   Social service eval  AM labs   DVT PPx  PO diet   -PT Eval --> No PT need   D/C home today  Total Care time is 60  minutes.

## 2023-05-28 NOTE — PROGRESS NOTE ADULT - PROBLEM SELECTOR PROBLEM 7
Hypocalcemia
Hypocalcemia
Rhabdomyolysis
Hypocalcemia
Hypocalcemia
Rhabdomyolysis
Hypocalcemia

## 2023-05-28 NOTE — PROGRESS NOTE ADULT - SUBJECTIVE AND OBJECTIVE BOX
Patient is a 38y old  Male who presents with a chief complaint of Hepatitis (28 May 2023 06:00)    HPI:  38M PMH of alcohol abuse, alcohol withdrawal seizures, fatty liver presents with signs of alcohol abuse/withdrawal. History obtained from wife with  over phone. Patient has been drinking for the past 16 days of unknown amount and had unwitnessed fall from the bed so pt was taken to the ED. Seen and examined pt at bedside. Unable to obtain history from patient due to somnolence in setting of recent ativan use.    In the ED    Vitals: , T 98, Spo2 95% /80     Labs: Plt 65, Na 128, K 3.0, Tbili 9.6, , , , Lactate 4.0, Lipase 432, UA + for bacteria and leuk esterase, Blood alc level 423     Imaging  RUQ U/S Visualization of the gallbladder is limited by overlying bowel gas. No   gross gallstones, gallbladder wall thickening, or pericholecystic fluid. No ultrasonic Grayson's sign epatomegaly and hepatic steatosis.  CT A/P- steatosis and hepatomegaly. No hydronephrosis. 6.3 mm nonobstructing stone lower pole left kidney.  CT head/cervical spine/ maxillofacial non-con: No acute intracranial abnormalities. Mild right periorbital subcutaneous soft tissue swelling. No evidence of orbital or other facial fracture. No vertebral fracture, collapse or subluxation.    ECG: Sinus tachycardia, 115 BPM    Meds Given    dTAP x1, Zofran x 1, Morphine x1, Pepcid x1,2 NS, 5mg haldol x1, 40meq k, 10mg flexiril, zosyn x1, thiamine, folic acid,   Pt seen by ICU team, admitted to ICU for ETOH intoxication & withdrawal. (17 May 2023 09:51)    INTERVAL HPI:  5/18 - Seen and examined patient. Pt was lethargic so unable to obtain ROS. On Precedex drip, Cooling Cantil Bacteremia, IV Abx Fever  5/19 - Seen and examined. Patient is lethargic on precedex. Found to be bacteremic now on abx. Bilirubin uptrending. No acute distress. E Coli sepsis, IV Abx   5/20 - Seen and examined. Patient is somnolent on precedex but responds to name and able to answer some questions. Denies CP, SOB, abd pain, nausea. Found to be bacteremic now on abx. Bilirubin uptrending. No acute distress. E Coli sepsis, IV Abx   5/21 -  Seen and examined. Patient is still somnolent on precedex but responds to name and able to answer some questions. Denies CP, SOB, abd pain, nausea. No acute distress. E Coli sepsis, IV Abx Heredia cath will blood   5/22 - Seen and examined. Patient is A&Ox3, but tired and anxious appearing. Precedex off. Answers questions and follows commands. Denies CP, SOB, abd pain, nausea. No acute distress. States he wants to get out of bed and walk. On IV abx for E.coli bacteremia. MRI A/P schedule.  5/23- Seen and examined at bedside. Pt wants to get up and walk. Pt states he had a poor night of sleep. He has no acute complaints. Tolerating diet.IV Abx   5/24- Seen and examined at bedside. Pt wants to get up and walk around. Pt states he had a poor night of sleep. He denies any other acute complaints. Downgraded from ICU on IV Abx for E Coli sepsis .IV Ativan   5/25- Seen and examined at bedside. Pt was asking about potential discharge. Pt endorses difficulty sleeping. Pt also spoke with me about alcohol and I explained to him the importance of avoiding alcohol. 5/26 will be last day of rocephin per ID. Pt no longer requiring MRCP.   5/26- Seen and examined at bedside. Pt endorses difficulty speaking and neck pain from sleeping position. Overall he states "I feel good". Pt last day of rocephin today. On ativan.   5/27- Seen and examined at bedside. Pt endorses feeling much better today and was able to sleep. Pt completed abx yesterday. Started on lopressor 25mg BID. F/u TTE. D/c planning  5/28- Seen and examined at bedside. Pt endorses feeling well today and was able to sleep as well. Continue lopressor. f/u tte. D/c planning        OVERNIGHT EVENTS: no acute events overnight     Home Medications:  folic acid 1 mg oral tablet: 1 tab(s) orally once a day (27 May 2023 06:08)  lactulose 10 g/15 mL oral syrup: 22.5 milliliter(s) orally once a day (27 May 2023 06:08)  metoprolol tartrate 25 mg oral tablet: 1 tab(s) orally every 12 hours (27 May 2023 06:08)  Multiple Vitamins with Minerals oral tablet: 1 tab(s) orally once a day (27 May 2023 06:08)  tamsulosin 0.4 mg oral capsule: 1 cap(s) orally once a day (at bedtime) (27 May 2023 06:08)  thiamine 100 mg oral tablet: 1 tab(s) orally once a day (27 May 2023 06:08)      MEDICATIONS  (STANDING):  enoxaparin Injectable 40 milliGRAM(s) SubCutaneous every 24 hours  folic acid 1 milliGRAM(s) Oral daily  lactulose Syrup 15 Gram(s) Oral daily  metoprolol tartrate 25 milliGRAM(s) Oral every 12 hours  multivitamin/minerals 1 Tablet(s) Oral daily  sodium chloride 0.9%. 1000 milliLiter(s) (90 mL/Hr) IV Continuous <Continuous>  tamsulosin 0.4 milliGRAM(s) Oral at bedtime  thiamine 100 milliGRAM(s) Oral daily    MEDICATIONS  (PRN):  LORazepam   Injectable 2 milliGRAM(s) IV Push every 1 hour PRN Symptom-triggered: each CIWA -Ar score 8 or GREATER  LORazepam   Injectable 2 milliGRAM(s) IV Push every 2 hours PRN Agitation  LORazepam   Injectable 2 milliGRAM(s) IV Push every 2 hours PRN Symptom-triggered: 2 point increase in CIWA -Ar score and a total score of 7 or LESS  zolpidem 5 milliGRAM(s) Oral at bedtime PRN Insomnia      Allergies    No Known Allergies    Intolerances        Social History:  Tobacco: denies  EtOH: chronic heavy drinker  Recreational drug use: denies  Lives with: patient lives with wife  Ambulates: independent  ADLs: independent (17 May 2023 09:51)      REVIEW OF SYSTEMS:  CONSTITUTIONAL: No fever, No chills, No fatigue, No myalgia, No Body ache, No Weakness  EYES: No eye pain,  No visual disturbances, No discharge, NO Redness  ENMT:  No ear pain, No nose bleed, No vertigo; No sinus pain, NO throat pain, No Congestion  NECK: No pain, No stiffness  RESPIRATORY: No cough, NO wheezing, No  hemoptysis, NO  shortness of breath  CARDIOVASCULAR: No chest pain, palpitations  GASTROINTESTINAL: No abdominal pain, NO epigastric pain. No nausea, No vomiting; No diarrhea, No constipation. [x  ] BM  GENITOURINARY: No dysuria, No frequency, No urgency, No hematuria, NO incontinence  NEUROLOGICAL: No headaches, No dizziness, No numbness, No tingling, No tremors, No weakness  EXT: No Swelling, No Pain, No Edema  SKIN:  [ x ] No itching, burning, rashes, or lesions   MUSCULOSKELETAL: No joint pain ,No Jt swelling; No muscle pain, No back pain, No extremity pain  PSYCHIATRIC: No depression,  No anxiety,  No mood swings ,No difficulty sleeping at night  PAIN SCALE: [x  ] None  [  ] Other-  ROS Unable to obtain due to - [  ] Dementia  [  ] Lethargy [  ] Drowsy [  ] Sedated [  ] non verbal  REST OF REVIEW Of SYSTEM - [x  ] Normal     Vital Signs Last 24 Hrs  T(C): 36.7 (28 May 2023 05:00), Max: 36.8 (27 May 2023 21:18)  T(F): 98.1 (28 May 2023 05:00), Max: 98.2 (27 May 2023 21:18)  HR: 94 (28 May 2023 05:00) (94 - 110)  BP: 115/73 (28 May 2023 05:00) (115/73 - 136/81)  BP(mean): --  RR: 17 (28 May 2023 05:00) (17 - 18)  SpO2: 92% (28 May 2023 05:00) (92% - 98%)    Parameters below as of 28 May 2023 05:00  Patient On (Oxygen Delivery Method): room air      Finger Stick        05-27 @ 07:01  -  05-28 @ 07:00  --------------------------------------------------------  IN: 1080 mL / OUT: 0 mL / NET: 1080 mL        PHYSICAL EXAM:   GENERAL:  [ x ] NAD , [ x ] well appearing, [  ] Agitated, [  ] Drowsy,  [  ] Lethargy, [  ] confused   HEAD:  [ x ] Normal, [  ] Other  EYES:  [ x ] EOMI, [x  ] PERRLA, [  x] conjunctiva and sclera clear normal, [  ] Other,  [ x ] Pallor,[  ] Discharge + Ecchymosis rt eyebrow   ENMT:  [ x ] Normal, [ x ] Moist mucous membranes, [  ] Good dentition, [x  ] No Thrush  NECK:  [ x ] Supple, [ x ] No JVD, [x  ] Normal thyroid, [  ] Lymphadenopathy [  ] Other  CHEST/LUNG:  [x  ] Clear to auscultation bilaterally, [x  ] Breath Sounds equal B/L / Decrease, [  ] poor effort  [ x ] No rales, [ x ] No rhonchi  [ x ]  No wheezing,   HEART:  [  ] Regular rate and rhythm, [ x ] tachycardia, [  ] Bradycardia,  [  ] irregular  [ x ] No murmurs, No rubs, No gallops, [  ] PPM in place (Mfr:  )  ABDOMEN:  [ x ] Soft, [ x ] Nontender, [x  ] Nondistended, [ x ] No mass, [ x ] Bowel sounds present, [ x ] obese  NERVOUS SYSTEM:  [x  ] Alert & Oriented X3, [ x ] Nonfocal  [  ] Confusion  [  ] Encephalopathic [  ] Sedated [  ] Unable to assess, [  ] Dementia [  ] Other-  EXTREMITIES: [x  ] 2+ Peripheral Pulses, No clubbing, No cyanosis,  [  ] edema B/L lower EXT. [  ] PVD stasis skin changes B/L Lower EXT, [  ] wound  LYMPH: No lymphadenopathy noted  SKIN:  [x  ] No rashes or lesions, [  ] Pressure Ulcers, [  ] ecchymosis, [  ] Skin Tears, [ x ] Other- Multiple skin tattoos on Body    DIET: Diet, Regular:   Low Fat (LOWFAT)  Supplement Feeding Modality:  Oral  Ensure Enlive Cans or Servings Per Day:  1       Frequency:  Two Times a day (05-26-23 @ 15:22)      LABS:      Ca    9.0        27 May 2023 06:05                    CARDIAC MARKERS ( 26 May 2023 00:02 )  x     / x     / 34 U/L / x     / <1.0 ng/mL             Anemia Panel:      Thyroid Panel:                RADIOLOGY & ADDITIONAL TESTS:      HEALTH ISSUES - PROBLEM Dx:  Bacteremia, escherichia coli    Sinus tachycardia    Transaminitis    Alcohol dependence with withdrawal    AMS (altered mental status)    Thrombocytopenia    Rhabdomyolysis    Hypocalcemia    Fall    Urinary retention    Need for prophylactic measure            Care Discussed with [X] Consultants  [ x ] Patient  [ x ] Family [  ] HCP [  ]   [  ] Social Service  [ x] RN, [  ] Physical Therapy,[  ] Palliative care team  DVT PPX: [ x ] Lovenox, [  ] S C Heparin, [  ] Coumadin, [  ] Xarelto, [  ] Eliquis, [  ] Pradaxa, [  ] IV Heparin drip, [x  ] SCD [  ] Contraindication 2 to GI Bleed,[x  ] Ambulation [  ] Contraindicated 2 to  bleed [  ] Contraindicated 2 to Brain Bleed  Advanced directive: [ x ] None, [  ] DNR/DNI

## 2023-05-28 NOTE — PROGRESS NOTE ADULT - NS ATTEND AMEND GEN_ALL_CORE FT
Patient with reactive sinus tachycardia.  Starting low dose bb.  To follow while admitted.  Remainder of management per primary team.
HR improved on metoprolol.  Continue dosing as above.  To follow while admitted.

## 2023-05-28 NOTE — DISCHARGE NOTE NURSING/CASE MANAGEMENT/SOCIAL WORK - NSDCPEFALRISK_GEN_ALL_CORE
For information on Fall & Injury Prevention, visit: [email or web address] OR  [email or web address] OR  https://www.cdc.gov/steadi/patient.html

## 2023-05-28 NOTE — PROGRESS NOTE ADULT - REASON FOR ADMISSION
Hepatitis

## 2023-05-28 NOTE — PROGRESS NOTE ADULT - PROBLEM SELECTOR PROBLEM 2
Transaminitis
Sinus tachycardia
Transaminitis
Sinus tachycardia
Transaminitis

## 2023-05-28 NOTE — PROGRESS NOTE ADULT - ASSESSMENT
37 y/o M with no cardiac history presented for ETOH withdrawal symptoms and s/p fall.  We are called for sinus tachycardia    Sinus Tachycardia  - HR improving overnight SR 80s. No evidence of Afib  - He presented with ETOH withdrawal, his tachycardia, is likely reactive, recently recently seen for same reason   - BP stable and controlled at systolic 110's-120's  - continue Lopressor 25 mg q12H  - Obtain TTE to evaluate cardiac structure  - Encourage PO fluids    - EKG showed Sinus tach, rate 100's.  No ischemic changes  - CE's negative.  NO anginal complaints  - No evidence of volume overload.  No O2 requirement    - +Bacteremia.  Continue Abx per ID  - LFT's elevated.  GI following  - CIWA protocol, addiction management ongoing     - Monitor and replete Lytes. Keep K > 4 and Mg > 2  - Will continue to follow.    Veena Salmeron, [facility]  Nurse Practitioner - Cardiology   Spectra #[phone]/ [phone]

## 2023-05-28 NOTE — PROGRESS NOTE ADULT - PROVIDER SPECIALTY LIST ADULT
Addiction Medicine
Addiction Medicine
Gastroenterology
Infectious Disease
Internal Medicine
Addiction Medicine
Critical Care
Gastroenterology
Gastroenterology
Infectious Disease
Internal Medicine
Addiction Medicine
Cardiology
Cardiology
Critical Care
Gastroenterology
Infectious Disease
Internal Medicine
Critical Care
Infectious Disease
Internal Medicine
Addiction Medicine
Critical Care
Gastroenterology
Internal Medicine

## 2023-05-28 NOTE — PROGRESS NOTE ADULT - PROBLEM SELECTOR PLAN 7
Rhabdo improved, CK downtrended  - likely due to fall and alcohol use  - improved
- replete as needed
- replete as needed
6.7 -> 6.5  - ordered calcium gluconate 2g x1  - replete as needed
- replete as needed
- replete as needed
Rhabdo improved, CK downtrended  - likely due to fall and alcohol use  - improved
- replete as needed
- replete as needed
- Improved
- Improved

## 2023-05-28 NOTE — PROGRESS NOTE ADULT - SUBJECTIVE AND OBJECTIVE BOX
[facility] -- Ismael Greene,  Alice, Navjot Anand Savella, Goodger  Office # [phone]    Follow Up: Tachycardia     Subjective/Observations: Patient seen and examined, awake, alert, resting comfortably in bed, denies chest pain, dyspnea, palpitations or dizziness, orthopnea and PND. Tolerating room air. IVF infusing     REVIEW OF SYSTEMS: All other review of systems is negative unless indicated above  PAST MEDICAL & SURGICAL HISTORY:  Alcohol abuse      H/O hernia repair      S/P appendectomy        MEDICATIONS  (STANDING):  enoxaparin Injectable 40 milliGRAM(s) SubCutaneous every 24 hours  folic acid 1 milliGRAM(s) Oral daily  lactulose Syrup 15 Gram(s) Oral daily  metoprolol tartrate 25 milliGRAM(s) Oral every 12 hours  multivitamin/minerals 1 Tablet(s) Oral daily  potassium chloride    Tablet ER 40 milliEquivalent(s) Oral once  tamsulosin 0.4 milliGRAM(s) Oral at bedtime  thiamine 100 milliGRAM(s) Oral daily    MEDICATIONS  (PRN):  LORazepam   Injectable 2 milliGRAM(s) IV Push every 1 hour PRN Symptom-triggered: each CIWA -Ar score 8 or GREATER  LORazepam   Injectable 2 milliGRAM(s) IV Push every 2 hours PRN Agitation  LORazepam   Injectable 2 milliGRAM(s) IV Push every 2 hours PRN Symptom-triggered: 2 point increase in CIWA -Ar score and a total score of 7 or LESS  zolpidem 5 milliGRAM(s) Oral at bedtime PRN Insomnia    Allergies    No Known Allergies    Intolerances      Vital Signs Last 24 Hrs  T(C): 36.7 (28 May 2023 05:00), Max: 36.8 (27 May 2023 21:18)  T(F): 98.1 (28 May 2023 05:00), Max: 98.2 (27 May 2023 21:18)  HR: 94 (28 May 2023 05:00) (94 - 110)  BP: 115/73 (28 May 2023 05:00) (115/73 - 136/81)  BP(mean): --  RR: 17 (28 May 2023 05:00) (17 - 18)  SpO2: 92% (28 May 2023 05:00) (92% - 98%)    Parameters below as of 28 May 2023 05:00  Patient On (Oxygen Delivery Method): room air      I&O's Summary    27 May 2023 07:01  -  28 May 2023 07:00  --------------------------------------------------------  IN: 1080 mL / OUT: 0 mL / NET: 1080 mL        TELE: SR 80's   PHYSICAL EXAM:  Constitutional: NAD, awake and alert  HEENT: Moist Mucous Membranes, Anicteric  Pulmonary: Non-labored, breath sounds are clear bilaterally, No wheezing, rales or rhonchi  Cardiovascular: Regular, S1 and S2, No murmurs, rubs, gallops or clicks  Gastrointestinal: Bowel Sounds present, soft, nontender.   Lymph: No peripheral edema. No lymphadenopathy.  Skin: No visible rashes or ulcers.  Psych:  Mood & affect appropriate  LABS: All Labs Reviewed:                        11.4   5.71  )-----------( 727      ( 28 May 2023 06:50 )             36.1                         12.2   5.28  )-----------( 660      ( 27 May 2023 06:05 )             38.4                         12.1   4.94  )-----------( 585      ( 26 May 2023 06:30 )             37.8     28 May 2023 06:50    138    |  108    |  6      ----------------------------<  87     3.3     |  24     |  0.58   27 May 2023 06:05    134    |  107    |  8      ----------------------------<  98     3.6     |  21     |  0.62   26 May 2023 06:30    135    |  104    |  9      ----------------------------<  109    3.9     |  25     |  0.71     Ca    8.8        28 May 2023 06:50  Ca    9.0        27 May 2023 06:05  Ca    9.4        26 May 2023 06:30  Phos  3.1       28 May 2023 06:50  Mg     2.3       28 May 2023 06:50    TPro  6.7    /  Alb  2.9    /  TBili  3.4    /  DBili  x      /  AST  290    /  ALT  460    /  AlkPhos  266    28 May 2023 06:50  TPro  7.1    /  Alb  3.0    /  TBili  4.3    /  DBili  x      /  AST  293    /  ALT  475    /  AlkPhos  278    27 May 2023 06:05  TPro  7.0    /  Alb  2.9    /  TBili  4.9    /  DBili  x      /  AST  269    /  ALT  471    /  AlkPhos  302    26 May 2023 06:30          12 Lead ECG:   Ventricular Rate 109 BPM    Atrial Rate 109 BPM    P-R Interval 128 ms    QRS Duration 86 ms    Q-T Interval 328 ms    QTC Calculation(Bazett) 441 ms    P Axis 29 degrees    R Axis -5 degrees    T Axis 25 degrees    Diagnosis Line Sinus tachycardia  Otherwise normal ECG  When compared with ECG of 23-MAY-2023 22:36,  premature supraventricular complexes are no longer present  Confirmed by VERONICA ANAND ([id number]) on 5/26/2023 12:05:39 PM (05-25-23 @ 23:33)

## 2023-05-28 NOTE — PROGRESS NOTE ADULT - PROBLEM SELECTOR PLAN 4
Chronic known hx of alcohol use disorder  - CIWA Protocol  - Ativan for agitation  - Ativan 2mg q4h standing, 2mg q1h PRN agitation  - Thiamine 100mg qd, folic acid 1mg PO QD  - On regular diet   - Addiction Medicine (Radha)  -Ativan RTC Tbili 9.6, , , , Lipase 432 on admission w/ known hx of alcohol abuse   - Noted elevated alk phos, LFTs, bilirubin (downtrending)  - suspect cholangitis; potential need for mechanical intervention beyond abx  - CT Abd/Pelv and RUQ U/S showing steatohepatitis without evidence of biliary obstruction or gallstones   - RUQ U/S Visualization of the gallbladder is limited by overlying bowel gas. No gross gallstones, gallbladder wall thickening, or pericholecystic fluid. No ultrasonic Grayson's sign   - Avoid hepatotoxic agents   - Daily CMP  - Lipase improved to wnl   - GI Dr. Leach follow up- Alcoholic Hepatitis

## 2023-05-28 NOTE — PROGRESS NOTE ADULT - SUBJECTIVE AND OBJECTIVE BOX
Date/Time Patient Seen:  		  Referring MD:   Data Reviewed	       Patient is a 38y old  Male who presents with a chief complaint of Hepatitis (27 May 2023 08:53)      Subjective/HPI     PAST MEDICAL & SURGICAL HISTORY:  No pertinent past medical history    Alcohol abuse    H/O hernia repair    S/P appendectomy          Medication list         MEDICATIONS  (STANDING):  enoxaparin Injectable 40 milliGRAM(s) SubCutaneous every 24 hours  folic acid 1 milliGRAM(s) Oral daily  lactulose Syrup 15 Gram(s) Oral daily  metoprolol tartrate 25 milliGRAM(s) Oral every 12 hours  multivitamin/minerals 1 Tablet(s) Oral daily  sodium chloride 0.9%. 1000 milliLiter(s) (90 mL/Hr) IV Continuous <Continuous>  tamsulosin 0.4 milliGRAM(s) Oral at bedtime  thiamine 100 milliGRAM(s) Oral daily    MEDICATIONS  (PRN):  LORazepam   Injectable 2 milliGRAM(s) IV Push every 1 hour PRN Symptom-triggered: each CIWA -Ar score 8 or GREATER  LORazepam   Injectable 2 milliGRAM(s) IV Push every 2 hours PRN Symptom-triggered: 2 point increase in CIWA -Ar score and a total score of 7 or LESS  LORazepam   Injectable 2 milliGRAM(s) IV Push every 2 hours PRN Agitation  zolpidem 5 milliGRAM(s) Oral at bedtime PRN Insomnia         Vitals log        ICU Vital Signs Last 24 Hrs  T(C): 36.7 (28 May 2023 05:00), Max: 36.8 (27 May 2023 21:18)  T(F): 98.1 (28 May 2023 05:00), Max: 98.2 (27 May 2023 21:18)  HR: 94 (28 May 2023 05:00) (94 - 110)  BP: 115/73 (28 May 2023 05:00) (115/73 - 136/81)  BP(mean): --  ABP: --  ABP(mean): --  RR: 17 (28 May 2023 05:00) (17 - 18)  SpO2: 92% (28 May 2023 05:00) (92% - 98%)    O2 Parameters below as of 28 May 2023 05:00  Patient On (Oxygen Delivery Method): room air                 Input and Output:  I&O's Detail      Lab Data                        12.2   5.28  )-----------( 660      ( 27 May 2023 06:05 )             38.4     05-27    134<L>  |  107  |  8   ----------------------------<  98  3.6   |  21<L>  |  0.62    Ca    9.0      27 May 2023 06:05    TPro  7.1  /  Alb  3.0<L>  /  TBili  4.3<H>  /  DBili  x   /  AST  293<H>  /  ALT  475<H>  /  AlkPhos  278<H>  05-27            Review of Systems	      Objective     Physical Examination    heart s1s2  lung dec BS  head nc  tachy      Pertinent Lab findings & Imaging      Giovanna:  NO   Adequate UO     I&O's Detail           Discussed with:     Cultures:	        Radiology

## 2023-05-28 NOTE — PROGRESS NOTE ADULT - ASSESSMENT
38 year old male with a history of alcohol abuse, alcohol withdrawal seizures, fatty liver presents with signs of alcohol abuse/withdrawal    etoh use   jennifer  harry  hepatitis  steatosis    LFT pending this am   Cardio eval noted  on IVF and B Blocker    pain rx regimen  GI following  VS noted  labs reviewed  no evidence of uncontrolled w/d sx  replete lytes  nutrition  social work follow up

## 2023-05-28 NOTE — PROGRESS NOTE ADULT - PROBLEM SELECTOR PLAN 6
Plt 65 on admission -Normal Now   -likely in setting of Chronic alcoholic liver disease & Likely BM suppression 2/2 ETOH   - Thrombocytopenia likely 2/2 dilution, bone marrow suppression from ETOH  - LDH elevated, Haptoglobin WNL  - daily CBC  - improved -Likely metabolic Encephelopathy / sepsis now   s/p unwitnessed fall with small abrasions of face  - CT head/cervical spine/ maxillofacial non-con: No acute intracranial abnormalities. Mild right periorbital subcutaneous soft tissue swelling. No evidence of orbital or other facial fracture. No vertebral fracture, collapse or subluxation.  - repeat CT head noncon: no acute acute signs of brain hemorrhage  - RESOLVED

## 2023-05-28 NOTE — PROGRESS NOTE ADULT - PROBLEM SELECTOR PLAN 11
SCDs b/l dvt ppx given age
SCDs b/l dvt ppx given age
Plt 65 on admission likely in setting of Chronic alcoholic liver disease & Likely BM suppression 2/2 ETOH   - Thrombocytopenia likely 2/2 dilution, bone marrow suppression from ETOH  - daily CBC  - f/u LDH, haptoglobin
Plt 65 on admission likely in setting of Chronic alcoholic liver disease & Likely BM suppression 2/2 ETOH   - Thrombocytopenia likely 2/2 dilution, bone marrow suppression from ETOH  - daily CBC  - f/u LDH, haptoglobin

## 2023-05-28 NOTE — PROGRESS NOTE ADULT - PROBLEM SELECTOR PLAN 1
fever/ sepsis  E Coli -Etiology-R/O Cholangitis -elevated Bili   - blood cultures grew ecoli  - fu Repeat Blood c/s x 2 NG Final   - Noted elevated alk phos, LFTs, bilirubin (downtrending)  - s/p Rocephin   - MRCP no longer required fever/ sepsis  E Coli -Etiology-R/O Cholangitis -elevated Bili   - blood cultures grew ecoli  - fu Repeat Blood c/s x 2 NG Final   - Noted elevated alk phos, LFTs, bilirubin (downtrending)  - s/p Rocephin -completed   - MRCP no longer required

## 2023-05-28 NOTE — DISCHARGE NOTE NURSING/CASE MANAGEMENT/SOCIAL WORK - NSDCVIVACCINE_GEN_ALL_CORE_FT
Tdap; 17-May-2023 00:21; Joe Huffman (RN); [employer]; [id number] (Exp. Date: 22-Feb-2025); IntraMuscular; Deltoid Right.; 0.5 milliLiter(s); VIS (VIS Published: 09-May-2013, VIS Presented: 17-May-2023);

## 2023-05-28 NOTE — DISCHARGE NOTE NURSING/CASE MANAGEMENT/SOCIAL WORK - PATIENT PORTAL LINK FT
You can access the FollowMyHealth Patient Portal offered by [facility] by registering at the following website: [email or web address]. By joining [employer]’s FollowMyHealth portal, you will also be able to view your health information using other applications (apps) compatible with our system.

## 2023-05-28 NOTE — PROGRESS NOTE ADULT - PROBLEM SELECTOR PLAN 3
Tbili 9.6, , , , Lipase 432 on admission w/ known hx of alcohol abuse   - Noted elevated alk phos, LFTs, bilirubin (downtrending)  - suspect cholangitis; potential need for mechanical intervention beyond abx  - CT Abd/Pelv and RUQ U/S showing steatohepatitis without evidence of biliary obstruction or gallstones   - RUQ U/S Visualization of the gallbladder is limited by overlying bowel gas. No gross gallstones, gallbladder wall thickening, or pericholecystic fluid. No ultrasonic Grayson's sign   - Avoid hepatotoxic agents   - Daily CMP  - Lipase improved to wnl   - GI Dr. Leach follow up- Alcoholic Hepatitis Reactive likely  -Start Ec ASA 81 mg daily  Follow up with hematology as out pt for CBC & Work up for elevated platelets

## 2023-05-28 NOTE — PROGRESS NOTE ADULT - PROBLEM SELECTOR PLAN 5
-Likely metabolic Encephelopathy / sepsis now   s/p unwitnessed fall with small abrasions of face  - CT head/cervical spine/ maxillofacial non-con: No acute intracranial abnormalities. Mild right periorbital subcutaneous soft tissue swelling. No evidence of orbital or other facial fracture. No vertebral fracture, collapse or subluxation.  - repeat CT head noncon: no acute acute signs of brain hemorrhage  - RESOLVED Chronic known hx of alcohol use disorder  - CIWA Protocol  - Ativan for agitation  - Ativan 2mg q4h standing, 2mg q1h PRN agitation  - Thiamine 100mg qd, folic acid 1mg PO QD  - On regular diet   - Addiction Medicine (Radha)  -Ativan RTC

## 2023-05-29 PROCEDURE — 82248 BILIRUBIN DIRECT: CPT

## 2023-05-29 PROCEDURE — 80074 ACUTE HEPATITIS PANEL: CPT

## 2023-05-29 PROCEDURE — 83690 ASSAY OF LIPASE: CPT

## 2023-05-29 PROCEDURE — 85610 PROTHROMBIN TIME: CPT

## 2023-05-29 PROCEDURE — 82570 ASSAY OF URINE CREATININE: CPT

## 2023-05-29 PROCEDURE — 80048 BASIC METABOLIC PNL TOTAL CA: CPT

## 2023-05-29 PROCEDURE — 83880 ASSAY OF NATRIURETIC PEPTIDE: CPT

## 2023-05-29 PROCEDURE — 81001 URINALYSIS AUTO W/SCOPE: CPT

## 2023-05-29 PROCEDURE — 85025 COMPLETE CBC W/AUTO DIFF WBC: CPT

## 2023-05-29 PROCEDURE — 80076 HEPATIC FUNCTION PANEL: CPT

## 2023-05-29 PROCEDURE — 76705 ECHO EXAM OF ABDOMEN: CPT

## 2023-05-29 PROCEDURE — 87040 BLOOD CULTURE FOR BACTERIA: CPT

## 2023-05-29 PROCEDURE — 99285 EMERGENCY DEPT VISIT HI MDM: CPT | Mod: 25

## 2023-05-29 PROCEDURE — 74177 CT ABD & PELVIS W/CONTRAST: CPT | Mod: MA

## 2023-05-29 PROCEDURE — 93306 TTE W/DOPPLER COMPLETE: CPT

## 2023-05-29 PROCEDURE — 82553 CREATINE MB FRACTION: CPT

## 2023-05-29 PROCEDURE — 93005 ELECTROCARDIOGRAM TRACING: CPT

## 2023-05-29 PROCEDURE — 83605 ASSAY OF LACTIC ACID: CPT

## 2023-05-29 PROCEDURE — 83615 LACTATE (LD) (LDH) ENZYME: CPT

## 2023-05-29 PROCEDURE — 72125 CT NECK SPINE W/O DYE: CPT | Mod: MA

## 2023-05-29 PROCEDURE — 84300 ASSAY OF URINE SODIUM: CPT

## 2023-05-29 PROCEDURE — 82140 ASSAY OF AMMONIA: CPT

## 2023-05-29 PROCEDURE — 87186 SC STD MICRODIL/AGAR DIL: CPT

## 2023-05-29 PROCEDURE — 70486 CT MAXILLOFACIAL W/O DYE: CPT | Mod: MA

## 2023-05-29 PROCEDURE — 83010 ASSAY OF HAPTOGLOBIN QUANT: CPT

## 2023-05-29 PROCEDURE — 83935 ASSAY OF URINE OSMOLALITY: CPT

## 2023-05-29 PROCEDURE — 82247 BILIRUBIN TOTAL: CPT

## 2023-05-29 PROCEDURE — 97112 NEUROMUSCULAR REEDUCATION: CPT

## 2023-05-29 PROCEDURE — 85730 THROMBOPLASTIN TIME PARTIAL: CPT

## 2023-05-29 PROCEDURE — 96374 THER/PROPH/DIAG INJ IV PUSH: CPT

## 2023-05-29 PROCEDURE — 83735 ASSAY OF MAGNESIUM: CPT

## 2023-05-29 PROCEDURE — 71275 CT ANGIOGRAPHY CHEST: CPT

## 2023-05-29 PROCEDURE — 97110 THERAPEUTIC EXERCISES: CPT

## 2023-05-29 PROCEDURE — 84484 ASSAY OF TROPONIN QUANT: CPT

## 2023-05-29 PROCEDURE — 97162 PT EVAL MOD COMPLEX 30 MIN: CPT

## 2023-05-29 PROCEDURE — 97530 THERAPEUTIC ACTIVITIES: CPT

## 2023-05-29 PROCEDURE — 80053 COMPREHEN METABOLIC PANEL: CPT

## 2023-05-29 PROCEDURE — 82550 ASSAY OF CK (CPK): CPT

## 2023-05-29 PROCEDURE — 70450 CT HEAD/BRAIN W/O DYE: CPT

## 2023-05-29 PROCEDURE — 36415 COLL VENOUS BLD VENIPUNCTURE: CPT

## 2023-05-29 PROCEDURE — 84100 ASSAY OF PHOSPHORUS: CPT

## 2023-05-29 PROCEDURE — 85027 COMPLETE CBC AUTOMATED: CPT

## 2023-05-29 PROCEDURE — 82962 GLUCOSE BLOOD TEST: CPT

## 2023-05-29 PROCEDURE — 90715 TDAP VACCINE 7 YRS/> IM: CPT

## 2023-05-29 PROCEDURE — 80307 DRUG TEST PRSMV CHEM ANLYZR: CPT

## 2023-05-29 PROCEDURE — 87150 DNA/RNA AMPLIFIED PROBE: CPT

## 2023-05-29 PROCEDURE — 97116 GAIT TRAINING THERAPY: CPT

## 2023-08-24 NOTE — ED ADULT TRIAGE NOTE - PATIENT ON (OXYGEN DELIVERY METHOD)
Quality 130: Documentation Of Current Medications In The Medical Record: Current Medications Documented Quality 431: Preventive Care And Screening: Unhealthy Alcohol Use - Screening: Patient not identified as an unhealthy alcohol user when screened for unhealthy alcohol use using a systematic screening method Detail Level: Detailed room air Quality 226: Preventive Care And Screening: Tobacco Use: Screening And Cessation Intervention: Patient screened for tobacco use and is an ex/non-smoker

## 2023-09-26 NOTE — CONSULT NOTE ADULT - CONSULT REQUESTED DATE/TIME
27-Aug-2020 11:01
27-Aug-2020 11:19
02-Sep-2020 11:53
27-Aug-2020 14:27
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 States he was seen in [facility] ED on 9/22 and tested positive for covid.

## 2023-10-04 NOTE — PROGRESS NOTE ADULT - THIS PATIENT HAS THE FOLLOWING CONDITION(S)/DIAGNOSES ON THIS ADMISSION:
None
Hydroxyzine Pregnancy And Lactation Text: This medication is not safe during pregnancy and should not be taken. It is also excreted in breast milk and breast feeding isn't recommended.

## 2023-11-16 NOTE — ED PROVIDER NOTE - RESPIRATORY, MLM
Airway  Date/Time: 11/16/2023 7:58 AM  Urgency: elective    Airway not difficult    Staffing  Performed: CRNA   Authorized by: Klaus Verde MD    Performed by: CRESCENCIO Caputo-FARHAD  Patient location during procedure: OR    Indications and Patient Condition  Indications for airway management: anesthesia  Spontaneous ventilation: present  Sedation level: deep  Preoxygenated: yes  Patient position: sniffing  Mask difficulty assessment: 0 - not attempted    Final Airway Details  Final airway type: supraglottic airway      Successful airway: classic  Size 5     Number of attempts at approach: 1           Breath sounds clear and equal bilaterally.

## 2024-05-08 NOTE — ED ADULT TRIAGE NOTE - SPO2 (%)
Patient identification verified with 2 identifiers.    Location: [facility] - 1st Floor Anticoagulation Clinic [address] [id number]    Referring Physician: DR Dawson  Enrollment/ Re-enrollment date:    INR Goal: 2.0-3.0  INR monitoring is per [facility] protocol.  Anticoagulation Medication: warfarin  Indication: Atrial Fibrillation/Atrial Flutter    Subjective   Bleeding signs/symptoms: No    Bruising: No   Major bleeding event: No  Thrombosis signs/symptoms: No  Thromboembolic event: No  Missed doses: No  Extra doses: No  Medication changes: No  Dietary changes: No  Change in health: No  Change in activity: No  Alcohol: No  Other concerns: No    Upcoming Procedures:  Does the Patient Have any upcoming procedures that require interruption in anticoagulation therapy? no  Does the patient require bridging? no      Anticoagulation Summary  As of 2024      INR goal:  2.0-3.0   TTR:  63.1% (6 mo)   INR used for dosin.10 (2024)   Weekly warfarin total:  14 mg               Assessment/Plan   Therapeutic     1. New dose: no change    2. Next INR: 1 month      Education provided to patient during the visit:  Patient instructed to call in interim with questions, concerns and changes.        
96

## 2024-06-24 NOTE — PROVIDER CONTACT NOTE (CRITICAL VALUE NOTIFICATION) - TEST AND RESULT REPORTED:
Pt voiced understand message per MD.  
Lacatate 2.8
Lactate 4.0
Potassium 2.8 Lactic acid 4.3
Total Bili 16.6
lactate 2.3
positive blood c/s done 5/17 growth in aerobic and anearobic gram  neg ronan
Potassium 2.8 and Calcium 6.5

## 2024-09-09 NOTE — PROGRESS NOTE ADULT - PROBLEM SELECTOR PROBLEM 6
09/09/24      Jayjay Jaramillo  [address]  [address] [facility] [address]      To Whom It May Concern:    Mr. Jayjay Jaramillo is a patient in our clinic and needs assistance with medication administration. Would recommend the staff at [facility] to administer the medications as prescribed.   Please contact our office if you have any questions.         Sincerely,         Troy Beltran MD  [facility]  [address] [place] [id number]  Phone: [phone]  Fax: [phone]                 Rhabdomyolysis

## 2024-10-27 NOTE — PROGRESS NOTE ADULT - ATTENDING COMMENTS
38M with alcohol abuse and fatty liver presents s/p fall found to be in alcohol withdrawal, rhabdomyolysis, alcoholic hepatitis with course complicated by E Coli bacteremia.    Problems:  Alcohol withdrawal  Alcoholic hepatitis  Sinus tachycardia  E Coli bacteremia  hyperbilirubinemia  pancytopenia    Neuro: ETOH withdrawal with high CIWA, continue ativan 2mg q4 with ativan 2mg q1hr prn agitation. Add haldol prn agitation. Taper off precedex as tolerated. ammonia mildly elevated but likely not contributing to mental status  Pulm: no active issues  CV: tachycardia improving, continue tele monitoring  Skin/Lines: squires catheter  GI: Liver failure 2/2 alcoholic hepatitis; trend MELD daily; GI consulted, appreciate recommendations; LFTs and bili improving; MRCP ordered, however, family is unable to answer questions regarding MRI safety screen, will attempt to find additional answers  /Renal: lactic acidosis likely 2/2 liver failure, resolved; also with rhabdomyolysis, decrease IVF, trend I&Os, BMPs,  Heme/DVT PPX: SCDs given thrombocytopenia; pancytopenia likely 2/2 liver dysfunction and alcoholism; trend BMPs  Endo: No active issues, trend glucose on BMP  ID: Pt with ecoli bacteremia, concern for GI source; narrow abx to CTX, repeat cx  Ethics: Full code, plan d/w aunt at bedside Crystal

## 2025-05-13 NOTE — H&P ADULT - HIV OFFER
Detail Level: Simple Price (Do Not Change): 0.00 Instructions: This plan will send the code FBSE to the PM system.  DO NOT or CHANGE the price. actual Unable to answer due to medical condition/unresponsive/etc...